# Patient Record
Sex: FEMALE | Race: WHITE | Employment: UNEMPLOYED | URBAN - METROPOLITAN AREA
[De-identification: names, ages, dates, MRNs, and addresses within clinical notes are randomized per-mention and may not be internally consistent; named-entity substitution may affect disease eponyms.]

---

## 2017-01-30 ENCOUNTER — PRE VISIT (OUTPATIENT)
Dept: OTOLARYNGOLOGY | Facility: CLINIC | Age: 32
End: 2017-01-30

## 2017-01-30 NOTE — TELEPHONE ENCOUNTER
1.  Date/reason for appt: 2/2/17 at 8 AM- sinus  2.  Referring provider: Self  3.  Call to patient (Yes / No - short description):    1ST ATTEMPT: 1/30/17 AT 9:10 AM. VM BOX FULL, UNABLE TO LEAVE MSG.    2ND ATTEMPT: 2/1/17 1:05 PM - NO ANSWER, LVM.   4.  Previous care at: ?

## 2017-02-02 ENCOUNTER — OFFICE VISIT (OUTPATIENT)
Dept: OTOLARYNGOLOGY | Facility: CLINIC | Age: 32
End: 2017-02-02

## 2017-02-02 VITALS — BODY MASS INDEX: 30.13 KG/M2 | WEIGHT: 192 LBS | HEIGHT: 67 IN

## 2017-02-02 DIAGNOSIS — J32.4 CHRONIC PANSINUSITIS: Primary | ICD-10-CM

## 2017-02-02 DIAGNOSIS — E04.1 THYROID NODULE: ICD-10-CM

## 2017-02-02 DIAGNOSIS — J32.4 CHRONIC PANSINUSITIS: ICD-10-CM

## 2017-02-02 LAB
BASOPHILS # BLD AUTO: 0 10E9/L (ref 0–0.2)
BASOPHILS NFR BLD AUTO: 0.5 %
CRP SERPL-MCNC: <2.9 MG/L (ref 0–8)
DIFFERENTIAL METHOD BLD: NORMAL
EOSINOPHIL # BLD AUTO: 0.3 10E9/L (ref 0–0.7)
EOSINOPHIL NFR BLD AUTO: 5.6 %
ERYTHROCYTE [DISTWIDTH] IN BLOOD BY AUTOMATED COUNT: 12.9 % (ref 10–15)
HCT VFR BLD AUTO: 41.7 % (ref 35–47)
HGB BLD-MCNC: 13.8 G/DL (ref 11.7–15.7)
IGA SERPL-MCNC: 94 MG/DL (ref 70–380)
IGG SERPL-MCNC: 754 MG/DL (ref 695–1620)
IGM SERPL-MCNC: 61 MG/DL (ref 60–265)
IMM GRANULOCYTES # BLD: 0 10E9/L (ref 0–0.4)
IMM GRANULOCYTES NFR BLD: 0.2 %
LYMPHOCYTES # BLD AUTO: 1.2 10E9/L (ref 0.8–5.3)
LYMPHOCYTES NFR BLD AUTO: 21.1 %
MCH RBC QN AUTO: 30.2 PG (ref 26.5–33)
MCHC RBC AUTO-ENTMCNC: 33.1 G/DL (ref 31.5–36.5)
MCV RBC AUTO: 91 FL (ref 78–100)
MONOCYTES # BLD AUTO: 0.4 10E9/L (ref 0–1.3)
MONOCYTES NFR BLD AUTO: 8 %
NEUTROPHILS # BLD AUTO: 3.5 10E9/L (ref 1.6–8.3)
NEUTROPHILS NFR BLD AUTO: 64.6 %
NRBC # BLD AUTO: 0 10*3/UL
NRBC BLD AUTO-RTO: 0 /100
PLATELET # BLD AUTO: 185 10E9/L (ref 150–450)
RBC # BLD AUTO: 4.57 10E12/L (ref 3.8–5.2)
WBC # BLD AUTO: 5.5 10E9/L (ref 4–11)

## 2017-02-02 RX ORDER — NORGESTIMATE AND ETHINYL ESTRADIOL 0.25-0.035
KIT ORAL
COMMUNITY
Start: 2013-07-15 | End: 2019-12-20

## 2017-02-02 RX ORDER — PRAZOSIN HYDROCHLORIDE 1 MG/1
1 CAPSULE ORAL
COMMUNITY
Start: 2016-08-22 | End: 2017-05-08

## 2017-02-02 ASSESSMENT — PAIN SCALES - GENERAL: PAINLEVEL: MODERATE PAIN (4)

## 2017-02-02 NOTE — Clinical Note
2017       RE: Andria Daily  [address]  [facility] [address]     Dear Colleague,    Thank you for referring your patient, Andria Daily, to the [facility] EAR NOSE AND THROAT at [facility]. Please see a copy of my visit note below.      Otolaryngology Adult Consultation    Patient: Andria Daily   : 1985     Patient presents with:  Consult:   Chief Complaint   Patient presents with     Consult     sinus problems        Referring Provider: Referred Self   Consulting Physician:  Glory Dowd MD       Assessment/Plan: ASSESSMENT AND PLAN:  Given the frequency of [place] infections, I am recommending an immune workup on her.  I also palpated a small nodule in the left lower thyroid area so I am sending her for a thyroid ultrasound.  I will order immunoglobulin levels on her, CRP and CBC with diff.  We will have her sign a release of information to get her records from [facility].  She is comfortable with this plan.  I also recommend that she work with a therapeutic massage person for her chronic fatigue and pain.     Also recommended cutting back on antihistamines, increasing nasal hydration.     HPI: Andria Daily is a 31 year old female seen today in the Otolaryngology Clinic for ongoing sinus issues.  She states that she feels like she may have a sinus infection right now.  She   describes seven episodes this year.  Most of the time she does not take antibiotics.  She rests and uses a neti pot but she is quite fatigued and worn out when she has these episodes.  She does have allergies.  She has treated that with over-the-counter things as well as Zyrtec, Allegra and Flonase.  She uses saline flushing.  She was on steroids in December from her primary MD.  She had been on antibiotics as well but does not feel like they helped.  She feels like she has been sick a lot this year compared to usual.  Otherwise, she also deals with chronic pain and chronic fatigue.  She takes  some medications other than ibuprofen.  She takes Epsom salt baths.  She takes Celexa and takes Vistaril as well as Sprintec for birth control.  The Vistaril is for sleep.  She has been seen at [facility] for this.  She had a CT scan in November which was reportedly basically normal other than mild mucosal thickening.  She has not had additional workup from a sinus perspective.           Current Outpatient Prescriptions on File Prior to Visit:  citalopram (CELEXA) 20 MG tablet Take 1 tablet (20 mg) by mouth daily     No current facility-administered medications on file prior to visit.       Allergies   Allergen Reactions     No Clinical Screening - See Comments      Apples and bananas cause her throat to itch     Amoxicillin Rash     Penicillins Rash       Past Medical History   Diagnosis Date     Depressive disorder      Anxiety      Substance abuse      Eating disorder          Review of Systems  [facility] E[facility] ROS 2/2/2017   Constitutional Appetite change, Unexplained fatigue, Problems with sleep   Neurology Dizzy spells, Numbness, Unexplained weakness, Headache   Psychology Frequently feeling anxious   Ears, Nose, Throat Hearing loss, Ear pain, Nasal congestion or drainage, Sore throat, Hoarseness   Cardiopulmonary Breathing problems   Gastrointestinal/Genitourinary Constipation   Musculoskeletal Sore or stiff joints, Swollen joints, Back pain, Neck pain   Allergy/Immunology Allergies or hay fever   Endocrine Heat or cold intolerance        14 point ROS neg other than the symptoms noted above.    Physical Exam:    General Assessment   The patient is alert, oriented and in no acute distress.   Head/Face/Scalp  Grossly normal.   Ears  Normal canals, auricles and tympanic membranes.   Nose  External nose is straight, skin is normal. Intranasal exam (anterior rhinoscopy) reveals normal moist mucosa, turbinate tissue without edema, erythema or crusting.  Septum mainly in the midline.  Mild crusting and dryness  throughout.  Mouth  Oral cavity shows healthy mucosa with out ulceration, masses or other lesions involving the tongue, palate, buccal mucosa, floor of mouth or gingiva.  Dentition in good repair.  Oropharynx with normal tonsils, posterior wall and base of tongue.  Neck  No significant adenopathy, thyroid or salivary gland abnormality. Left thyroid fullness, ? Nodule.        SNOT20:  Average and Sum Patient-Supplied Answers for the SNOT-20 2/2/2017   Total SNOT-20 Score (Average) 3.81   SNOT Score: Sum of responses 75     Again, thank you for allowing me to participate in the care of your patient.      Sincerely,    Glory Dowd MD

## 2017-02-02 NOTE — PATIENT INSTRUCTIONS
Plan of care:  Blood work today  Schedule a Ultrasound of the thyroid  Follow up with Dr galindo in 4-5 weeks  Clinic contact information:  1. To schedule an appointment call [phone], option 1  2. To talk to the Triage RN call [phone], option 3  3. If you need to speak to Audra or get a message to your doctor on a Friday, call the triage RN  4. AudraRN: [phone]  5. Surgery scheduling:      Ilana Ely: [phone]      Airam Galeas: [phone]  6. Fax: [phone]  7. [phone]

## 2017-02-02 NOTE — PROGRESS NOTES
Otolaryngology Adult Consultation    Patient: Andria Daily   : 1985     Patient presents with:  Consult:   Chief Complaint   Patient presents with     Consult     sinus problems        Referring Provider: Referred Self   Consulting Physician:  Glory Dowd MD       Assessment/Plan: ASSESSMENT AND PLAN:  Given the frequency of Andria's infections, I am recommending an immune workup on her.  I also palpated a small nodule in the left lower thyroid area so I am sending her for a thyroid ultrasound.  I will order immunoglobulin levels on her, CRP and CBC with diff.  We will have her sign a release of information to get her records from [facility].  She is comfortable with this plan.  I also recommend that she work with a therapeutic massage person for her chronic fatigue and pain.     Also recommended cutting back on antihistamines, increasing nasal hydration.     HPI: Andria Daily is a 31 year old female seen today in the Otolaryngology Clinic for ongoing sinus issues.  She states that she feels like she may have a sinus infection right now.  She   describes seven episodes this year.  Most of the time she does not take antibiotics.  She rests and uses a neti pot but she is quite fatigued and worn out when she has these episodes.  She does have allergies.  She has treated that with over-the-counter things as well as Zyrtec, Allegra and Flonase.  She uses saline flushing.  She was on steroids in December from her primary MD.  She had been on antibiotics as well but does not feel like they helped.  She feels like she has been sick a lot this year compared to usual.  Otherwise, she also deals with chronic pain and chronic fatigue.  She takes some medications other than ibuprofen.  She takes Epsom salt baths.  She takes Celexa and takes Vistaril as well as Sprintec for birth control.  The Vistaril is for sleep.  She has been seen at [facility] for this.  She had a CT scan in November which was reportedly basically normal  other than mild mucosal thickening.  She has not had additional workup from a sinus perspective.           Current Outpatient Prescriptions on File Prior to Visit:  citalopram (CELEXA) 20 MG tablet Take 1 tablet (20 mg) by mouth daily     No current facility-administered medications on file prior to visit.       Allergies   Allergen Reactions     No Clinical Screening - See Comments      Apples and bananas cause her throat to itch     Amoxicillin Rash     Penicillins Rash       Past Medical History   Diagnosis Date     Depressive disorder      Anxiety      Substance abuse      Eating disorder          Review of Systems   ENT ROS 2/2/2017   Constitutional Appetite change, Unexplained fatigue, Problems with sleep   Neurology Dizzy spells, Numbness, Unexplained weakness, Headache   Psychology Frequently feeling anxious   Ears, Nose, Throat Hearing loss, Ear pain, Nasal congestion or drainage, Sore throat, Hoarseness   Cardiopulmonary Breathing problems   Gastrointestinal/Genitourinary Constipation   Musculoskeletal Sore or stiff joints, Swollen joints, Back pain, Neck pain   Allergy/Immunology Allergies or hay fever   Endocrine Heat or cold intolerance        14 point ROS neg other than the symptoms noted above.    Physical Exam:    General Assessment   The patient is alert, oriented and in no acute distress.   Head/Face/Scalp  Grossly normal.   Ears  Normal canals, auricles and tympanic membranes.   Nose  External nose is straight, skin is normal. Intranasal exam (anterior rhinoscopy) reveals normal moist mucosa, turbinate tissue without edema, erythema or crusting.  Septum mainly in the midline.  Mild crusting and dryness throughout.  Mouth  Oral cavity shows healthy mucosa with out ulceration, masses or other lesions involving the tongue, palate, buccal mucosa, floor of mouth or gingiva.  Dentition in good repair.  Oropharynx with normal tonsils, posterior wall and base of tongue.  Neck  No significant adenopathy,  thyroid or salivary gland abnormality. Left thyroid fullness, ? Nodule.        SNOT20:  Average and Sum Patient-Supplied Answers for the SNOT-20 2/2/2017   Total SNOT-20 Score (Average) 3.81   SNOT Score: Sum of responses 75

## 2017-02-02 NOTE — MR AVS SNAPSHOT
After Visit Summary   2017    Andria Daily    MRN: [id number]           Patient Information     Date Of Birth          1985        Visit Information        Provider Department      2017 8:00 AM Glory Dowd MD [facility] Ear Nose and Throat        Today's Diagnoses     Chronic pansinusitis    -  1     Thyroid nodule           Care Instructions    Plan of care:  Blood work today  Schedule a Ultrasound of the thyroid  Follow up with Dr dowd in 4-5 weeks  Clinic contact information:  1. To schedule an appointment call [phone], option 1  2. To talk to the Triage RN call [phone], option 3  3. If you need to speak to Audra or get a message to your doctor on a Friday, call the triage RN  4. AudraRN: [phone]  5. Surgery scheduling:      Ilana Ely: [phone]      Airam Roweter: [phone]  6. Fax: [phone]  7. [phone]          Follow-ups after your visit        Your next 10 appointments already scheduled     2017 10:00 AM   LAB with  LAB   [facility])    [address] [place] [id number] [phone]           Patient must bring picture ID.  Patient should be prepared to give a urine specimen  Please do not eat 10-12 hours before your appointment if you are coming in fasting for labs on lipids, cholesterol, or glucose (sugar).  Pregnant women should follow their Care Team instructions. Water with medications is okay. Do not drink coffee or other fluids.   If you have concerns about taking  your medications, please ask at office or if scheduling via [employer], send a message by clicking on Secure Messaging, Message Your Care Team.            Mar 09, 2017  7:45 AM   (Arrive by 7:30 AM)   RETURN RHINOLOGY with MD PRITESH Bhatti [facility] Ear Nose and Throat [facility])    [address] [place] [address] [phone]              Who  "to contact     Please call your clinic at [phone] to:    Ask questions about your health    Make or cancel appointments    Discuss your medicines    Learn about your test results    Speak to your doctor   If you have compliments or concerns about an experience at your clinic, or if you wish to file a complaint, please contact [facility] Patient Relations at [phone] or email us at [email or web address]         Additional Information About Your Visit        [employer] Information     [employer] is an electronic gateway that provides easy, online access to your medical records. With [employer], you can request a clinic appointment, read your test results, renew a prescription or communicate with your care team.     To sign up for [employer] visit the website at [email or web address]   You will be asked to enter the access code listed below, as well as some personal information. Please follow the directions to create your username and password.     Your access code is: [email or web address]  Expires: 2017  6:30 AM     Your access code will  in 90 days. If you need help or a new code, please contact your [facility] or call [phone] for assistance.        Care EveryWhere ID     This is your Care EveryWhere ID. This could be used by other organizations to access your [place] medical records  [id number]        Your Vitals Were     Height BMI (Body Mass Index)                1.71 m (5' 7.32\") 29.78 kg/m2           Blood Pressure from Last 3 Encounters:   08/17/15 104/63   08/10/15 106/72    Weight from Last 3 Encounters:   17 87.091 kg (192 lb)                 Today's Medication Changes          These changes are accurate as of: 17  9:17 AM.  If you have any questions, ask your nurse or doctor.               Stop taking these medicines if you haven't already. Please contact your care team if you have questions.     BUSPAR PO   Stopped " by:  Glory Dowd MD           TRAZODONE HCL PO   Stopped by:  Glory Dowd MD           WELLBUTRIN PO   Stopped by:  Glory Dowd MD                    Primary Care Provider Office Phone # Fax #    Mera GUERRA EVELIA Plata [phone]       [facility]   [place] [address]        Thank you!     Thank you for choosing [facility] EAR NOSE AND THROAT  for your care. Our goal is always to provide you with excellent care. Hearing back from our patients is one way we can continue to improve our services. Please take a few minutes to complete the written survey that you may receive in the mail after your visit with us. Thank you!             Your Updated Medication List - Protect others around you: Learn how to safely use, store and throw away your medicines at www.disposemymeds.org.          This list is accurate as of: 2/2/17  9:17 AM.  Always use your most recent med list.                   Brand Name Dispense Instructions for use    citalopram 20 MG tablet    celeXA    30 tablet    Take 1 tablet (20 mg) by mouth daily       norgestimate-ethinyl estradiol 0.25-35 MG-MCG per tablet    ORTHO-CYCLEN, SPRINTEC         prazosin 1 MG capsule    MINIPRESS     Take 1 mg by mouth

## 2017-02-03 LAB — IGE SERPL-ACNC: 160 KIU/L (ref 0–114)

## 2017-02-13 NOTE — PROGRESS NOTES
Audra, Please contact patient by letter/phone with following results:  IgE elevated (indicates allergy) otherwise tests are normal.

## 2017-02-16 ENCOUNTER — CARE COORDINATION (OUTPATIENT)
Dept: OTOLARYNGOLOGY | Facility: CLINIC | Age: 32
End: 2017-02-16

## 2017-02-16 DIAGNOSIS — J32.4 CHRONIC PANSINUSITIS: Primary | ICD-10-CM

## 2017-02-16 NOTE — PROGRESS NOTES
US reviewed by Dr Dowd:Most labs are back and are normal as well.  Labs reviewed by Dr Dowd:IgE elevated (indicates allergy) otherwise tests are normal  Message left on patient's voicemail, reminder left to follow up on 3/9 as scheduled

## 2017-03-09 ENCOUNTER — OFFICE VISIT (OUTPATIENT)
Dept: OTOLARYNGOLOGY | Facility: CLINIC | Age: 32
End: 2017-03-09

## 2017-03-09 VITALS — HEIGHT: 69 IN | WEIGHT: 176 LBS | BODY MASS INDEX: 26.07 KG/M2

## 2017-03-09 DIAGNOSIS — J32.4 CHRONIC PANSINUSITIS: Primary | ICD-10-CM

## 2017-03-09 ASSESSMENT — PAIN SCALES - GENERAL: PAINLEVEL: NO PAIN (0)

## 2017-03-09 NOTE — LETTER
3/9/2017       RE: Andria Daily  [address]  [facility] [address]     Dear Colleague,    Thank you for referring your patient, Andria Daily, to the [facility] EAR NOSE AND THROAT at [facility]. Please see a copy of my visit note below.    HISTORY OF PRESENT ILLNESS:  Andria Daily returns.  She had recurrent acute sinusitis.  A workup was ordered.  She also had what I felt was a thyroid nodule, and the thyroid ultrasound was clear.  Her workup for her recurrent sinusitis revealed an elevated IgE indicating allergies.  She states that she has had allergy tests a long time ago and was allergic to multiple things.  Her CT scan showed minimal mucosal thickening and no evidence of anatomic obstruction.  My recommendation to Andria is that she meet with an allergist to discuss ongoing intervention for allergy to see if she can maximize her medical management for that.  She does feel some improvement in cutting back on some of her medications.  She has dryness in her nose, and she is focusing on keeping her nose well-hydrated.      PHYSICAL EXAMINATION:  Exam today reveals on anterior rhinoscopy that she still has some thick secretions but no inflammation and clear nasal passages.      ASSESSMENT AND PLAN:  We spent some time discussing this.  She is happy to meet with an allergist and we will give her a referral for Dr. Pruitt.         Again, thank you for allowing me to participate in the care of your patient.      Sincerely,    Glory Dowd MD

## 2017-03-09 NOTE — PROGRESS NOTES
HISTORY OF PRESENT ILLNESS:  Andria Daily returns.  She had recurrent acute sinusitis.  A workup was ordered.  She also had what I felt was a thyroid nodule, and the thyroid ultrasound was clear.  Her workup for her recurrent sinusitis revealed an elevated IgE indicating allergies.  She states that she has had allergy tests a long time ago and was allergic to multiple things.  Her CT scan showed minimal mucosal thickening and no evidence of anatomic obstruction.  My recommendation to Andria is that she meet with an allergist to discuss ongoing intervention for allergy to see if she can maximize her medical management for that.  She does feel some improvement in cutting back on some of her medications.  She has dryness in her nose, and she is focusing on keeping her nose well-hydrated.      PHYSICAL EXAMINATION:  Exam today reveals on anterior rhinoscopy that she still has some thick secretions but no inflammation and clear nasal passages.      ASSESSMENT AND PLAN:  We spent some time discussing this.  She is happy to meet with an allergist and we will give her a referral for Dr. Pruitt.

## 2017-03-09 NOTE — MR AVS SNAPSHOT
After Visit Summary   3/9/2017    Andria Daily    MRN: [id number]           Patient Information     Date Of Birth          1985        Visit Information        Provider Department      3/9/2017 7:45 AM Glory Dowd MD [facility] Ear Nose and Throat        Today's Diagnoses     Chronic pansinusitis    -  1      Care Instructions    Plan of care:  Follow up with Dr Pruitt as scheduled  Clinic contact information:  1. To schedule an appointment call [phone], option 1  2. To talk to the Triage RN call [phone], option 3  3. If you need to speak to Audra or get a message to your doctor on a Friday, call the triage RN  4. AudraRN: [phone]  5. Surgery scheduling:      Ilana Ely: [phone]      Airam Roweter: [phone]  6. Fax: [phone]  7. [phone]          Follow-ups after your visit        Additional Services     MISCELLANEOUS REFERRAL       Allergy referral with Dr Pruitt                  Your next 10 appointments already scheduled     Mar 13, 2017 10:15 AM CDT   (Arrive by 10:00 AM)   New Allergy with Aries Pruitt MD   [facility] ([facility])    [address] [place] [address] [phone]           Do not take anti-histamines or Zantac for seven day prior to your appointment.              Who to contact     Please call your clinic at [phone] to:    Ask questions about your health    Make or cancel appointments    Discuss your medicines    Learn about your test results    Speak to your doctor   If you have compliments or concerns about an experience at your clinic, or if you wish to file a complaint, please contact [facility] Patient Relations at [phone] or email us at [email or web address]         Additional Information About Your Visit        MyChart Information     [employer] gives you secure access to your  "electronic health record. If you see a primary care provider, you can also send messages to your care team and make appointments. If you have questions, please call your primary care clinic.  If you do not have a primary care provider, please call [phone] and they will assist you.      [employer] is an electronic gateway that provides easy, online access to your medical records. With [employer], you can request a clinic appointment, read your test results, renew a prescription or communicate with your care team.     To access your existing account, please contact your [facility] or call [phone] for assistance.        Care EveryWhere ID     This is your Care EveryWhere ID. This could be used by other organizations to access your [place] medical records  [id number]        Your Vitals Were     Height BMI (Body Mass Index)                1.74 m (5' 8.5\") 26.37 kg/m2           Blood Pressure from Last 3 Encounters:   08/17/15 104/63   08/10/15 106/72    Weight from Last 3 Encounters:   03/09/17 79.8 kg (176 lb)   02/02/17 87.1 kg (192 lb)              We Performed the Following     MISCELLANEOUS REFERRAL        Primary Care Provider Office Phone # Fax #    Mera Plata [phone]       [facility]  [place] [address]        Thank you!     Thank you for choosing [facility] EAR NOSE AND THROAT  for your care. Our goal is always to provide you with excellent care. Hearing back from our patients is one way we can continue to improve our services. Please take a few minutes to complete the written survey that you may receive in the mail after your visit with us. Thank you!             Your Updated Medication List - Protect others around you: Learn how to safely use, store and throw away your medicines at www.disposemymeds.org.          This list is accurate as of: 3/9/17  8:14 AM.  Always use your most recent med list.                   " Brand Name Dispense Instructions for use    citalopram 20 MG tablet    celeXA    30 tablet    Take 1 tablet (20 mg) by mouth daily       norgestimate-ethinyl estradiol 0.25-35 MG-MCG per tablet    ORTHO-CYCLEN, SPRINTEC         prazosin 1 MG capsule    MINIPRESS     Take 1 mg by mouth

## 2017-03-09 NOTE — PATIENT INSTRUCTIONS
Plan of care:  Follow up with Dr Pruitt as scheduled  Clinic contact information:  1. To schedule an appointment call [phone], option 1  2. To talk to the Triage RN call [phone], option 3  3. If you need to speak to Audra or get a message to your doctor on a Friday, call the triage RN  4. AudraRN: [phone]  5. Surgery scheduling:      Ilana Ely: [phone]      Airam Galeas: [phone]  6. Fax: [phone]  7. [phone]

## 2017-03-13 ENCOUNTER — PRE VISIT (OUTPATIENT)
Dept: PULMONOLOGY | Facility: CLINIC | Age: 32
End: 2017-03-13

## 2017-03-13 NOTE — TELEPHONE ENCOUNTER
1.  Date/reason for appt:  3/13/17   Allergy    2.  Referring provider:  Dr Dowd    3.  Call to patient (Yes / No - short description):  No, referred.  Records reviewed.  All records are in Epic

## 2017-05-01 ENCOUNTER — CARE COORDINATION (OUTPATIENT)
Dept: PULMONOLOGY | Facility: CLINIC | Age: 32
End: 2017-05-01

## 2017-05-01 NOTE — PROGRESS NOTES
Writer called patient and left a message to remind her to hold any antihistamines or zantac for 1 week prior to upcoming appointment with Dr. Pruitt. Triage number left if patient has questions.

## 2017-05-08 ENCOUNTER — OFFICE VISIT (OUTPATIENT)
Dept: PULMONOLOGY | Facility: CLINIC | Age: 32
End: 2017-05-08
Attending: ALLERGY & IMMUNOLOGY
Payer: COMMERCIAL

## 2017-05-08 VITALS
RESPIRATION RATE: 16 BRPM | SYSTOLIC BLOOD PRESSURE: 114 MMHG | DIASTOLIC BLOOD PRESSURE: 77 MMHG | OXYGEN SATURATION: 97 % | HEART RATE: 81 BPM

## 2017-05-08 DIAGNOSIS — L27.2 DERMATITIS DUE TO FOOD TAKEN INTERNALLY: Primary | ICD-10-CM

## 2017-05-08 DIAGNOSIS — J30.1 SEASONAL ALLERGIC RHINITIS DUE TO POLLEN: ICD-10-CM

## 2017-05-08 PROCEDURE — 95004 PERQ TESTS W/ALRGNC XTRCS: CPT | Performed by: ALLERGY & IMMUNOLOGY

## 2017-05-08 PROCEDURE — 99212 OFFICE O/P EST SF 10 MIN: CPT | Mod: ZF

## 2017-05-08 ASSESSMENT — PAIN SCALES - GENERAL: PAINLEVEL: NO PAIN (0)

## 2017-05-08 NOTE — PATIENT INSTRUCTIONS
High cat, dust mites and pollen allergies.  It would be a good idea to not bring the cats to the new home and to wash all clothing and not bring coaches and to wash bedding and get a mattress cover and wash the pillows or get new pillows.      Allergy shots are an option and can be started now but with the high levels we will need to do a sensitive build and this will take about 8 months of weekly injections.      Continue the fluticasone 2 sprays in each nostril daily and cetirizine (zyrtec) 10 mg daily.  If you no longer have cats you can try off this regimen in the early winter.                              Oral Allergy Syndrome (Pollen-Food Allergy Syndrome)    The oral allergy syndrome (OAS) is a relatively common form of food allergy, particularly in adults. It occurs in people who have pollen allergy, although not all patients have obvious hay-fever or seasonal allergy symptoms. Patients typically report itching and/or mild swelling of the mouth and throat immediately following ingestion of certain uncooked fruits (including nuts) or raw vegetables. The symptoms result from contact urticaria in the oropharynx caused by pollen-related proteins in these foods. Only a small proportion of affected individuals experience systemic allergic reactions, although the disorder must be differentiated from more serious forms of food allergy.     The prevalence of pollen-food allergy syndrome (PFAS) in the [facility] has not been reported, although it is probably the most common food allergy in adult subjects and there is a general impression that it has become more prevalent as respiratory allergy to pollen has increased over the past two decades.     CLINICAL MANIFESTATIONS--In most patients, symptoms are limited to the oropharynx. However, 2 to 10 percent may experience systemic symptoms, and patients with concomitant atopic dermatitis may notice a worsening of their cutaneous symptoms.    Oropharyngeal symptoms--The  most common signs and symptoms are pruritus, tingling, mild erythema, and subtle angioedema of the lips, oral mucosa, palate, and throat; sometimes accompanied by a sensation of throat swelling. Symptoms occur while or shortly after (within 5-10 minutes of) ingesting the culprit fruit, nut or vegetable. Oral papules or blisters are occasionally reported, although blisters or vesicles in isolation are not typical. Symptoms resolve promptly when the food is swallowed due to disruption of the structure of the allergen by gastric acid and proteolytic digestive enzymes. Data indicates about 1-2% of people will develop severe reactions to these foods so if symptoms worsen it is advised to stop eating the offending food.     Particularly with fruits, patients may report that one variety of the food is more troublesome than another, or that the symptoms do not develop after every exposure.     Seasonal variation--PFAS is an immunoglobulin E (IgE)-mediated reaction, and symptoms may increase during or following the pollen season because of seasonal boosting of pollen IgE levels.    Impact of cooking--In most cases, symptoms only develop in response to eating the raw, uncooked food. Some patients react predominantly to the peel of the raw fruit or vegetable and tolerate pulp. Patients usually tolerate the culprit food in various cooked forms. Cooking, baking, or even briefly microwaving raw fruits and vegetables is usually sufficient to alter the allergens that are responsible for PFAS. Tree nuts and peanuts are an important exception to this generalization, as roasted nuts can cause PFAS.    Caution with anti-ulcer treatments/GERD treatments--Anti-ulcer drugs increase gastric pH and may impair digestion of food proteins.     WHAT CAN I DO ABOUT THIS?: Some studies indicate there may be a benefit when doing allergy shots to the offending pollen however data is not strong. One study looking at slowly increasing the amount  eaten over months and then regularly ingesting the food decreased overall symptoms. However, this way only one study of 40 individuals. Taking antihistamines such as Benadryl, Claritin or Zyrtec should also provide some benefit. If the reactions are severe seek medical care immediately and it is advised at that point to carry an epinephrine device. As stated earlier the risk of severe reactions are low.        *All information has been reviewed, updated and approved by:  Dr. Aries Pruitt[facility] updated: 11/2016        Animal Allergy    The number of pets in the [place] is estimated at more than 150 million. This large number also increases the likelihood of accidental exposure to animals by the allergic patient when visiting homes and public places.    Ways to reduce reactions/complications:    The ideal situation for an allergic reaction would be to not have any pet at all. However, many pet owners feel strongly about their pets, and would rather remove the allergic individual from the home than the pet! A pet such as a dog or cat should at least be kept out of the patient's bedroom. Recent studies have shown that bathing a cat or dog once a week can decrease significantly the amount of the allergen that the allergic patient is exposed to. The avid pet owner may claim that exposure to his or her pet does not cause them any problem. This however, should be viewed skeptically since pet ownership is an emotionally charged subject. Also, many allergic pet owners are rarely away from their pets, so an accurate reporting of pet related symptoms may not be possible. The best test to confirm if a person is allergic to a pet is removal from the home for several weeks and a thorough cleaning done by the non-allergic individual to remove the hair and dander. It should be understood that it could take weeks for meticulous cleaning to remove all the animal hair and dander before a  "change in the allergic patient is noticed. A frequently mistaken idea is that shorthaired animals cause fewer problems. It is the dander (flakes of skin) that causes the most significant allergic reactions, not the length of the hair on the pet. Allergens are also found in the pet's saliva and urine. In addition, long haired animals that are outside can then bring outdoors pollens inside and exposing allergic individuals.    Indoor pets should be restricted to a few rooms in the home if possible. Isolating the pet to one room however, will not limit the allergens to that room. Air currents from forced-air heating and air conditioning will spread the allergens throughout the house. Homes with forced-air heating and/or air conditioning may be fitted with a central air . This may remove significant amounts of pet allergens from the home. The air  should be used at least four hours per day.     Best Pets for Allergenic Individuals:    The best types of pets for allergic patients are tropical fish, lizards, turtles, salamanders and certain types of frogs and tortoises. All of these pets do not have hair, fur or dander nor does their excrement create allergic problems. However, patients should keep in mind that large aquariums can add water vapor in a room, thus increasing mold and house dust mite concentrations in their home.         *All information has been reviewed, updated and approved by:  Dr. Aries Pruitt[facility] updated: 11/2016             Pollen Control Measures      * Keep windows and doors shut and run air conditioner at all times. This keeps outdoor air outside.    * Keep windows closed while in the car and air conditioner on the \"re-circulate\" mode.    * Wash your hair before going to bed at night to reduce exposure during sleep.    * Keep pets outdoors to prevent the pollen from being brought in on their hair.    * Avoid hanging clothes and linens " outside as pollens may collect on the items.     * Don't mow the lawn if you are allergic to grass or weeds. If you must mow the grass, wear a face mask.       Spring: Tree Pollen    Summer: Grass Pollen and Mold    Fall: Weed Pollen and Mold      *All information has been reviewed, updated and approved by:  Dr. Aries Pruitt[facility] updated: 11/2016

## 2017-05-08 NOTE — PROGRESS NOTES
Results:   Reason for Visit  Andria Daily is a 32 year old female who is referred by Mera Plata for rhintis    Allergy HPI  Andria has symptoms of rhinitis, with sneezing, postnasal drip and itchy eyes and occasionally itchy skin.  Most of the time it seems to be year round.  She was having these symptoms since she was a child.  The symptoms seemed to get worse as she gets older.  She had allergy testing in the past that was positive.  She has never been on allergy shots but is interested in them.  She has lots of sinus infection.  She got 5 last year.  Flonase does seem to help, Zyrtec and Claritin, Allegra, also seem to provide some help.  Breathing is fine for her.  She says apples and bananas, almonds and kiwi seem to cause her mouth to itch.      SOCIAL HISTORY:  She has a cat, but she is thinking of moving and not bringing the cats with her.  She does not smoke.         The patient was seen and examined by Aries Mireles MD   Current Outpatient Prescriptions   Medication     norgestimate-ethinyl estradiol (ORTHO-CYCLEN, SPRINTEC) 0.25-35 MG-MCG per tablet     citalopram (CELEXA) 20 MG tablet     No current facility-administered medications for this visit.      Allergies   Allergen Reactions     No Clinical Screening - See Comments      Apples and bananas cause her throat to itch     Amoxicillin Rash     Penicillins Rash     Social History     Social History     Marital status: Single     Spouse name: N/A     Number of children: N/A     Years of education: N/A     Occupational History     Not on file.     Social History Main Topics     Smoking status: Former Smoker     Smokeless tobacco: Never Used     Alcohol use No      Comment: sober for 90 days     Drug use: Yes     Special: Marijuana      Comment: opiate pain killers     Sexual activity: Not on file     Other Topics Concern     Not on file     Social History Narrative     Past Medical History:   Diagnosis Date     Anxiety       Depressive disorder      Eating disorder      Substance abuse      Past Surgical History:   Procedure Laterality Date     ORTHOPEDIC SURGERY       No family history on file.      ROS   A complete ROS was otherwise negative except as noted in the HPI and the end of the note.  /77 (BP Location: Right arm, Patient Position: Chair, Cuff Size: Adult Regular)  Pulse 81  Resp 16  SpO2 97%  Exam:   GENERAL APPEARANCE: Well developed, well nourished, alert, and in no apparent distress.  EYES: PERRL, EOMI, conjunctiva clear non-injected  HENT: Nasal mucosa with no edema and no discharge. No nasal polyps.  No facial tenderness.  EARS: Canals clear, TMs normal  MOUTH: Oral mucosa is moist, without any lesions, no tonsillar enlargement, no oropharyngeal exudate.  NECK: Supple, no masses, no thyromegaly.  LYMPHATICS: No significant cervical, or supraclavicular nodes.  RESP: Good air flow throughout.  No crackles. No rhonchi. No wheezes.  CV: Normal S1, S2, regular rhythm, normal rate. No murmur.  No rub. No gallop. No LE edema.   MS: Extremities normal. No clubbing. No cyanosis.  SKIN: No rashes noted  NEURO: Speech normal, normal strength and tone, normal gait and stance  PSYCH: Normal mentation, orientation to person, place, and time.  Results: 38 percutaneous environmental allergen (and 2 controls) extracts placed by MA and read by MD.  Positive to dust mites, cat, tree, grass and weed pollen.      Assessment and plan: Andria has high environmental allergies to dust mites, cat, tree, grass and weed pollens.  She does have a cat and is thinking about moving to a different environment which I do recommend and doing a thorough cleaning of her environment when she moves.  She can continue fluticasone nasal spray as well as oral antihistamines.  She is interested in allergy shots which is a good option.  She will check insurance and notify us if she wants to start these.  We will most likely need to put her on a sensitive  schedule with all of her high allergens.           She also has Food-Pollen syndrome and this condition was explained to her in detail.   Most of the reactions are mild and limited to the throat or face area and 8% can have GI effects and around 1-2% can have severe reactions.  This condition is very common and normally it is not advised that they need an epinephrine device.  She may notice improved symptoms during various seasons or depending on the ripeness of the fruit or vegetable.  Allergy shots to the offending pollen has shown a benefit in some individuals and can be considered if the above measures are not helpful.

## 2017-05-08 NOTE — LETTER
5/8/2017       RE: Andria Daily  [address]  [place] [address]     Dear Colleague,    Thank you for referring your patient, Andria Daily, to the [facility] at [facility]. Please see a copy of my visit note below.      Results:   Reason for Visit  Andria Daily is a 32 year old female who is referred by Mera Plata for rhintis    Allergy HPI  Andria has symptoms of rhinitis, with sneezing, postnasal drip and itchy eyes and occasionally itchy skin.  Most of the time it seems to be year round.  She was having these symptoms since she was a child.  The symptoms seemed to get worse as she gets older.  She had allergy testing in the past that was positive.  She has never been on allergy shots but is interested in them.  She has lots of sinus infection.  She got 5 last year.  Flonase does seem to help, Zyrtec and Claritin, Allegra, also seem to provide some help.  Breathing is fine for her.  She says apples and bananas, almonds and kiwi seem to cause her mouth to itch.      SOCIAL HISTORY:  She has a cat, but she is thinking of moving and not bringing the cats with her.  She does not smoke.         The patient was seen and examined by Aries Mireles MD   Current Outpatient Prescriptions   Medication     norgestimate-ethinyl estradiol (ORTHO-CYCLEN, SPRINTEC) 0.25-35 MG-MCG per tablet     citalopram (CELEXA) 20 MG tablet     No current facility-administered medications for this visit.      Allergies   Allergen Reactions     No Clinical Screening - See Comments      Apples and bananas cause her throat to itch     Amoxicillin Rash     Penicillins Rash     Social History     Social History     Marital status: Single     Spouse name: N/A     Number of children: N/A     Years of education: N/A     Occupational History     Not on file.     Social History Main Topics     Smoking status: Former Smoker     Smokeless tobacco: Never Used      Alcohol use No      Comment: sober for 90 days     Drug use: Yes     Special: Marijuana      Comment: opiate pain killers     Sexual activity: Not on file     Other Topics Concern     Not on file     Social History Narrative     Past Medical History:   Diagnosis Date     Anxiety      Depressive disorder      Eating disorder      Substance abuse      Past Surgical History:   Procedure Laterality Date     ORTHOPEDIC SURGERY       No family history on file.      ROS   A complete ROS was otherwise negative except as noted in the HPI and the end of the note.  /77 (BP Location: Right arm, Patient Position: Chair, Cuff Size: Adult Regular)  Pulse 81  Resp 16  SpO2 97%  Exam:   GENERAL APPEARANCE: Well developed, well nourished, alert, and in no apparent distress.  EYES: PERRL, EOMI, conjunctiva clear non-injected  HENT: Nasal mucosa with no edema and no discharge. No nasal polyps.  No facial tenderness.  EARS: Canals clear, TMs normal  MOUTH: Oral mucosa is moist, without any lesions, no tonsillar enlargement, no oropharyngeal exudate.  NECK: Supple, no masses, no thyromegaly.  LYMPHATICS: No significant cervical, or supraclavicular nodes.  RESP: Good air flow throughout.  No crackles. No rhonchi. No wheezes.  CV: Normal S1, S2, regular rhythm, normal rate. No murmur.  No rub. No gallop. No LE edema.   MS: Extremities normal. No clubbing. No cyanosis.  SKIN: No rashes noted  NEURO: Speech normal, normal strength and tone, normal gait and stance  PSYCH: Normal mentation, orientation to person, place, and time.  Results: 38 percutaneous environmental allergen (and 2 controls) extracts placed by MA and read by MD.  Positive to dust mites, cat, tree, grass and weed pollen.      Assessment and plan: Andria has high environmental allergies to dust mites, cat, tree, grass and weed pollens.  She does have a cat and is thinking about moving to a different environment which I do recommend and doing a thorough cleaning of  her environment when she moves.  She can continue fluticasone nasal spray as well as oral antihistamines.  She is interested in allergy shots which is a good option.  She will check insurance and notify us if she wants to start these.  We will most likely need to put her on a sensitive schedule with all of her high allergens.     She also has Food-Pollen syndrome and this condition was explained to her in detail.   Most of the reactions are mild and limited to the throat or face area and 8% can have GI effects and around 1-2% can have severe reactions.  This condition is very common and normally it is not advised that they need an epinephrine device.  She may notice improved symptoms during various seasons or depending on the ripeness of the fruit or vegetable.  Allergy shots to the offending pollen has shown a benefit in some individuals and can be considered if the above measures are not helpful.      Again, thank you for allowing me to participate in the care of your patient.      Sincerely,    Aries Mireles MD

## 2017-05-08 NOTE — MR AVS SNAPSHOT
After Visit Summary   5/8/2017    Andria Daily    MRN: [id number]           Patient Information     Date Of Birth          1985        Visit Information        Provider Department      5/8/2017 3:15 PM Aries Pruitt MD [facility]        Today's Diagnoses     Dermatitis due to food taken internally    -  1    Seasonal allergic rhinitis due to pollen          Care Instructions    High cat, dust mites and pollen allergies.  It would be a good idea to not bring the cats to the new home and to wash all clothing and not bring coaches and to wash bedding and get a mattress cover and wash the pillows or get new pillows.      Allergy shots are an option and can be started now but with the high levels we will need to do a sensitive build and this will take about 8 months of weekly injections.      Continue the fluticasone 2 sprays in each nostril daily and cetirizine (zyrtec) 10 mg daily.  If you no longer have cats you can try off this regimen in the early winter.                              Oral Allergy Syndrome (Pollen-Food Allergy Syndrome)    The oral allergy syndrome (OAS) is a relatively common form of food allergy, particularly in adults. It occurs in people who have pollen allergy, although not all patients have obvious hay-fever or seasonal allergy symptoms. Patients typically report itching and/or mild swelling of the mouth and throat immediately following ingestion of certain uncooked fruits (including nuts) or raw vegetables. The symptoms result from contact urticaria in the oropharynx caused by pollen-related proteins in these foods. Only a small proportion of affected individuals experience systemic allergic reactions, although the disorder must be differentiated from more serious forms of food allergy.     The prevalence of pollen-food allergy syndrome (PFAS) in the [facility] has not been reported, although it is probably the most common food  allergy in adult subjects and there is a general impression that it has become more prevalent as respiratory allergy to pollen has increased over the past two decades.     CLINICAL MANIFESTATIONS--In most patients, symptoms are limited to the oropharynx. However, 2 to 10 percent may experience systemic symptoms, and patients with concomitant atopic dermatitis may notice a worsening of their cutaneous symptoms.    Oropharyngeal symptoms--The most common signs and symptoms are pruritus, tingling, mild erythema, and subtle angioedema of the lips, oral mucosa, palate, and throat; sometimes accompanied by a sensation of throat swelling. Symptoms occur while or shortly after (within 5-10 minutes of) ingesting the culprit fruit, nut or vegetable. Oral papules or blisters are occasionally reported, although blisters or vesicles in isolation are not typical. Symptoms resolve promptly when the food is swallowed due to disruption of the structure of the allergen by gastric acid and proteolytic digestive enzymes. Data indicates about 1-2% of people will develop severe reactions to these foods so if symptoms worsen it is advised to stop eating the offending food.     Particularly with fruits, patients may report that one variety of the food is more troublesome than another, or that the symptoms do not develop after every exposure.     Seasonal variation--PFAS is an immunoglobulin E (IgE)-mediated reaction, and symptoms may increase during or following the pollen season because of seasonal boosting of pollen IgE levels.    Impact of cooking--In most cases, symptoms only develop in response to eating the raw, uncooked food. Some patients react predominantly to the peel of the raw fruit or vegetable and tolerate pulp. Patients usually tolerate the culprit food in various cooked forms. Cooking, baking, or even briefly microwaving raw fruits and vegetables is usually sufficient to alter the allergens that are responsible for PFAS.  Tree nuts and peanuts are an important exception to this generalization, as roasted nuts can cause PFAS.    Caution with anti-ulcer treatments/GERD treatments--Anti-ulcer drugs increase gastric pH and may impair digestion of food proteins.     WHAT CAN I DO ABOUT THIS?: Some studies indicate there may be a benefit when doing allergy shots to the offending pollen however data is not strong. One study looking at slowly increasing the amount eaten over months and then regularly ingesting the food decreased overall symptoms. However, this way only one study of 40 individuals. Taking antihistamines such as Benadryl, Claritin or Zyrtec should also provide some benefit. If the reactions are severe seek medical care immediately and it is advised at that point to carry an epinephrine device. As stated earlier the risk of severe reactions are low.        *All information has been reviewed, updated and approved by:  Dr. Aries Pruitt[facility] updated: 11/2016        Animal Allergy    The number of pets in the [place] is estimated at more than 150 million. This large number also increases the likelihood of accidental exposure to animals by the allergic patient when visiting homes and public places.    Ways to reduce reactions/complications:    The ideal situation for an allergic reaction would be to not have any pet at all. However, many pet owners feel strongly about their pets, and would rather remove the allergic individual from the home than the pet! A pet such as a dog or cat should at least be kept out of the patient's bedroom. Recent studies have shown that bathing a cat or dog once a week can decrease significantly the amount of the allergen that the allergic patient is exposed to. The avid pet owner may claim that exposure to his or her pet does not cause them any problem. This however, should be viewed skeptically since pet ownership is an emotionally charged subject. Also,  many allergic pet owners are rarely away from their pets, so an accurate reporting of pet related symptoms may not be possible. The best test to confirm if a person is allergic to a pet is removal from the home for several weeks and a thorough cleaning done by the non-allergic individual to remove the hair and dander. It should be understood that it could take weeks for meticulous cleaning to remove all the animal hair and dander before a change in the allergic patient is noticed. A frequently mistaken idea is that shorthaired animals cause fewer problems. It is the dander (flakes of skin) that causes the most significant allergic reactions, not the length of the hair on the pet. Allergens are also found in the pet's saliva and urine. In addition, long haired animals that are outside can then bring outdoors pollens inside and exposing allergic individuals.    Indoor pets should be restricted to a few rooms in the home if possible. Isolating the pet to one room however, will not limit the allergens to that room. Air currents from forced-air heating and air conditioning will spread the allergens throughout the house. Homes with forced-air heating and/or air conditioning may be fitted with a central air . This may remove significant amounts of pet allergens from the home. The air  should be used at least four hours per day.     Best Pets for Allergenic Individuals:    The best types of pets for allergic patients are tropical fish, lizards, turtles, salamanders and certain types of frogs and tortoises. All of these pets do not have hair, fur or dander nor does their excrement create allergic problems. However, patients should keep in mind that large aquariums can add water vapor in a room, thus increasing mold and house dust mite concentrations in their home.         *All information has been reviewed, updated and approved by:  Dr. Aries Pruitt-[facility] updated:  "11/2016             Pollen Control Measures      * Keep windows and doors shut and run air conditioner at all times. This keeps outdoor air outside.    * Keep windows closed while in the car and air conditioner on the \"re-circulate\" mode.    * Wash your hair before going to bed at night to reduce exposure during sleep.    * Keep pets outdoors to prevent the pollen from being brought in on their hair.    * Avoid hanging clothes and linens outside as pollens may collect on the items.     * Don't mow the lawn if you are allergic to grass or weeds. If you must mow the grass, wear a face mask.       Spring: Tree Pollen    Summer: Grass Pollen and Mold    Fall: Weed Pollen and Mold      *All information has been reviewed, updated and approved by:  Dr. Aries Pruitt-[facility] updated: 11/2016                Follow-ups after your visit        Follow-up notes from your care team     Return in about 4 months (around 9/8/2017).      Your next 10 appointments already scheduled     Sep 11, 2017  3:15 PM CDT   (Arrive by 3:00 PM)   Return Allergy with Aries Pruitt MD   [facility] ([facility])    [address] [place] [id number] [phone]           Do not take anti-histamines or Zantac for seven day prior to your appointment.              Who to contact     If you have questions or need follow up information about today's clinic visit or your schedule please contact [facility] directly at [phone].  Normal or non-critical lab and imaging results will be communicated to you by MyChart, letter or phone within 4 business days after the clinic has received the results. If you do not hear from us within 7 days, please contact the clinic through MyChart or phone. If you have a critical or abnormal lab result, we will notify you by phone as soon as possible.  Submit " refill requests through [employer] or call your pharmacy and they will forward the refill request to us. Please allow 3 business days for your refill to be completed.          Additional Information About Your Visit        [employer] Information     [employer] gives you secure access to your electronic health record. If you see a primary care provider, you can also send messages to your care team and make appointments. If you have questions, please call your primary care clinic.  If you do not have a primary care provider, please call [phone] and they will assist you.        Care EveryWhere ID     This is your Care EveryWhere ID. This could be used by other organizations to access your [facility] medical records  [id number]        Your Vitals Were     Pulse Respirations Pulse Oximetry             81 16 97%          Blood Pressure from Last 3 Encounters:   05/08/17 114/77   08/17/15 104/63   08/10/15 106/72    Weight from Last 3 Encounters:   03/09/17 79.8 kg (176 lb)   02/02/17 87.1 kg (192 lb)              We Performed the Following     Percutaneous test with allergic extract with number of test to be specified        Primary Care Provider Office Phone # Fax #    Mera Plata [phone]       [facility]  [address] [facility] [address]        Thank you!     Thank you for choosing [facility]  for your care. Our goal is always to provide you with excellent care. Hearing back from our patients is one way we can continue to improve our services. Please take a few minutes to complete the written survey that you may receive in the mail after your visit with us. Thank you!             Your Updated Medication List - Protect others around you: Learn how to safely use, store and throw away your medicines at www.disposemymeds.org.          This list is accurate as of: 5/8/17  4:07 PM.  Always use your most recent med list.                   Brand Name  Dispense Instructions for use    citalopram 20 MG tablet    celeXA    30 tablet    Take 1 tablet (20 mg) by mouth daily       norgestimate-ethinyl estradiol 0.25-35 MG-MCG per tablet    ORTHO-CYCLEN, SPRINTEC

## 2017-05-22 ENCOUNTER — TRANSFERRED RECORDS (OUTPATIENT)
Dept: HEALTH INFORMATION MANAGEMENT | Facility: CLINIC | Age: 32
End: 2017-05-22

## 2017-05-22 LAB
HPV ABSTRACT: NORMAL
PAP-ABSTRACT: NORMAL

## 2017-12-18 ENCOUNTER — TRANSFERRED RECORDS (OUTPATIENT)
Dept: HEALTH INFORMATION MANAGEMENT | Facility: CLINIC | Age: 32
End: 2017-12-18

## 2017-12-31 ENCOUNTER — HEALTH MAINTENANCE LETTER (OUTPATIENT)
Age: 32
End: 2017-12-31

## 2018-01-24 ENCOUNTER — OFFICE VISIT - HEALTHEAST (OUTPATIENT)
Dept: FAMILY MEDICINE | Facility: CLINIC | Age: 33
End: 2018-01-24

## 2018-01-24 ENCOUNTER — COMMUNICATION - HEALTHEAST (OUTPATIENT)
Dept: TELEHEALTH | Facility: CLINIC | Age: 33
End: 2018-01-24

## 2018-01-24 DIAGNOSIS — J20.9 ACUTE BRONCHITIS: ICD-10-CM

## 2018-12-27 ENCOUNTER — HOSPITAL PATHOLOGY (OUTPATIENT)
Dept: OTHER | Facility: CLINIC | Age: 33
End: 2018-12-27

## 2019-04-18 ENCOUNTER — OFFICE VISIT - HEALTHEAST (OUTPATIENT)
Dept: FAMILY MEDICINE | Facility: CLINIC | Age: 34
End: 2019-04-18

## 2019-04-18 DIAGNOSIS — H81.11 BENIGN PAROXYSMAL POSITIONAL VERTIGO OF RIGHT EAR: ICD-10-CM

## 2019-06-20 ENCOUNTER — OFFICE VISIT - HEALTHEAST (OUTPATIENT)
Dept: FAMILY MEDICINE | Facility: CLINIC | Age: 34
End: 2019-06-20

## 2019-06-20 DIAGNOSIS — R10.32 LLQ ABDOMINAL PAIN: ICD-10-CM

## 2019-06-20 DIAGNOSIS — R10.84 ABDOMINAL PAIN, GENERALIZED: ICD-10-CM

## 2019-06-20 LAB
ALBUMIN UR-MCNC: ABNORMAL MG/DL
APPEARANCE UR: CLEAR
BACTERIA #/AREA URNS HPF: ABNORMAL HPF
BILIRUB UR QL STRIP: ABNORMAL
COLOR UR AUTO: YELLOW
GLUCOSE UR STRIP-MCNC: NEGATIVE MG/DL
HCG UR QL: NEGATIVE
HGB UR QL STRIP: NEGATIVE
KETONES UR STRIP-MCNC: ABNORMAL MG/DL
LEUKOCYTE ESTERASE UR QL STRIP: NEGATIVE
MUCOUS THREADS #/AREA URNS LPF: ABNORMAL LPF
NITRATE UR QL: NEGATIVE
PH UR STRIP: 7.5 [PH] (ref 5–8)
RBC #/AREA URNS AUTO: ABNORMAL HPF
SP GR UR STRIP: 1.02 (ref 1–1.03)
SQUAMOUS #/AREA URNS AUTO: ABNORMAL LPF
UROBILINOGEN UR STRIP-ACNC: ABNORMAL
WBC #/AREA URNS AUTO: ABNORMAL HPF

## 2019-08-15 ENCOUNTER — OFFICE VISIT (OUTPATIENT)
Dept: OBGYN | Facility: CLINIC | Age: 34
End: 2019-08-15
Payer: COMMERCIAL

## 2019-08-15 VITALS
HEART RATE: 66 BPM | WEIGHT: 160 LBS | SYSTOLIC BLOOD PRESSURE: 102 MMHG | HEIGHT: 68 IN | BODY MASS INDEX: 24.25 KG/M2 | DIASTOLIC BLOOD PRESSURE: 72 MMHG

## 2019-08-15 DIAGNOSIS — R10.2 PELVIC PAIN IN FEMALE: ICD-10-CM

## 2019-08-15 DIAGNOSIS — Z01.419 ENCOUNTER FOR GYNECOLOGICAL EXAMINATION WITHOUT ABNORMAL FINDING: Primary | ICD-10-CM

## 2019-08-15 PROCEDURE — 99385 PREV VISIT NEW AGE 18-39: CPT | Performed by: NURSE PRACTITIONER

## 2019-08-15 RX ORDER — SERTRALINE HYDROCHLORIDE 100 MG/1
100 TABLET, FILM COATED ORAL DAILY
COMMUNITY
End: 2020-08-27

## 2019-08-15 ASSESSMENT — ANXIETY QUESTIONNAIRES
3. WORRYING TOO MUCH ABOUT DIFFERENT THINGS: MORE THAN HALF THE DAYS
2. NOT BEING ABLE TO STOP OR CONTROL WORRYING: MORE THAN HALF THE DAYS
5. BEING SO RESTLESS THAT IT IS HARD TO SIT STILL: SEVERAL DAYS
6. BECOMING EASILY ANNOYED OR IRRITABLE: MORE THAN HALF THE DAYS
1. FEELING NERVOUS, ANXIOUS, OR ON EDGE: MORE THAN HALF THE DAYS
IF YOU CHECKED OFF ANY PROBLEMS ON THIS QUESTIONNAIRE, HOW DIFFICULT HAVE THESE PROBLEMS MADE IT FOR YOU TO DO YOUR WORK, TAKE CARE OF THINGS AT HOME, OR GET ALONG WITH OTHER PEOPLE: SOMEWHAT DIFFICULT
GAD7 TOTAL SCORE: 10
7. FEELING AFRAID AS IF SOMETHING AWFUL MIGHT HAPPEN: NOT AT ALL

## 2019-08-15 ASSESSMENT — PATIENT HEALTH QUESTIONNAIRE - PHQ9
SUM OF ALL RESPONSES TO PHQ QUESTIONS 1-9: 13
5. POOR APPETITE OR OVEREATING: SEVERAL DAYS

## 2019-08-15 ASSESSMENT — MIFFLIN-ST. JEOR: SCORE: 1478.23

## 2019-08-15 NOTE — PROGRESS NOTES
Andria is a 34 year old  female who presents for annual exam.     Besides routine health maintenance, she would like to discuss heavy and painful periods.    HPI:    New patient to me and the clinic here today for her annual gyn exam and to discuss heavy painful menses.     Menses are regular. Taking OCP's. They are 5-7 days long. Using a super tampon and pad for protection. She has cramping with her menses as well as throughout the month. She also has pain with a full colon and deep penetration IC. She does not have any change in bladder function.     She has a new dairy allergy and is workign through this.     Pap in 2017 was NIL HPV neg. Will pap again in . Hx of abnormal in  +HPV.     Same partner for 2 years. Declines STD testing.     Known depression/anxiety. Seeing a new psychiatrist in the next few weeks fo rmed management. Also sees [facility] twice a week for therapy.     The patient's PCP is Dr Mera Plata MD.     GYNECOLOGIC HISTORY:    Patient's last menstrual period was 2019 (approximate).  Her current contraception method is: oral contraceptives.  She  reports that she has quit smoking. She has never used smokeless tobacco.  Patient is sexually active.  STD testing offered?  Declined     Last PHQ-9 score on record =   PHQ-9 SCORE 8/15/2019   PHQ-9 Total Score 13     Last GAD7 score on record =   DIANA-7 SCORE 8/15/2019   Total Score 10     Alcohol Score = 1    HEALTH MAINTENANCE:  Cholesterol: found in Care Everywhere  14   Total= 207, Triglycerides=81, HDL=85, KFK=649  Last Mammo: Last Mammo: N/A, Result: not applicable, Next Mammo: screen age 40  Pap: 2014 found in Care Everywhere, Negative  Colonoscopy:  N/A, Result: not applicable, Next Colonoscopy: screen age 50  Dexa:  N/A  Health maintenance updated:  yes    HISTORY:  OB History    Para Term  AB Living   1 0 0 0 1 0   SAB TAB Ectopic Multiple Live Births   0 1 0 0 0      # Outcome Date GA Lbr  [place]/2nd Weight Sex Delivery Anes PTL Lv   1 TAB                There is no problem list on file for this patient.    Past Surgical History:   Procedure Laterality Date     ORTHOPEDIC SURGERY        Social History     Tobacco Use     Smoking status: Former Smoker     Smokeless tobacco: Never Used   Substance Use Topics     Alcohol use: No     Alcohol/week: 0.0 oz     Comment: sober for 90 days      Problem (# of Occurrences) Relation (Name,Age of Onset)    Diabetes (1) Maternal Grandmother    Hyperlipidemia (1) Maternal Grandfather    Hypertension (1) Maternal Grandfather    No Known Problems (2) Mother, Father            Current Outpatient Medications   Medication Sig     norgestimate-ethinyl estradiol (ORTHO-CYCLEN, SPRINTEC) 0.25-35 MG-MCG per tablet      norgestrel-ethinyl estradiol (LO/OVRAL) 0.3-30 MG-MCG tablet Take 1 tablet by mouth daily . Active tabs only, skip placebo pills.     sertraline (ZOLOFT) 50 MG tablet Take 50 mg by mouth daily     No current facility-administered medications for this visit.      Allergies   Allergen Reactions     No Clinical Screening - See Comments      Apples and bananas cause her throat to itch     Amoxicillin Rash     Penicillins Rash       Past medical, surgical, social and family histories were reviewed and updated in EPIC.    ROS:   12 point review of systems negative other than symptoms noted below.  Constitutional: Fatigue, Loss of Appetite and Weight Loss  Eyes: Spots  Head: Nasal Congestion  Cardiovascular: Chest Pain and Lightheadedness  Respiratory: Shortness of Breath  Gastrointestinal: Diarrhea  Genitourinary: Cramps, Frequency, Heavy Bleeding with Period, Irregular Menses, Night Sweats, Painful [place], Pelvic Pain, Significant PMS, Urgency, Vaginal Discharge, Vaginal Dryness and Vaginal Itching  Skin: Skin Dryness  Neurologic: Dizziness, Headaches and Tremors  Musculoskeletal: Height Loss, Joint Pain, Muscle Cramps and Muscular Weakness  Endocrine: Cold  "Intolerance  Psychiatric: Anxiety, Depression, Difficulty Sleeping, [place] and Significant Memory Loss    EXAM:  /72   Pulse 66   Ht 1.734 m (5' 8.25\")   Wt 72.6 kg (160 lb)   LMP 08/01/2019 (Approximate)   BMI 24.15 kg/m     BMI: Body mass index is 24.15 kg/m .    PHYSICAL EXAM:  Constitutional:  Appearance: Well nourished, well developed, alert, in no acute distress  Neck:  Lymph Nodes:  No lymphadenopathy present    Thyroid:  Gland size normal, nontender, no nodules or masses present  on palpation  Chest:  Respiratory Effort:  Breathing unlabored  Cardiovascular:    Heart: Auscultation:  Regular rate, normal rhythm, no murmurs present  Breasts: Inspection of Breasts:  No lymphadenopathy present., Palpation of Breasts and Axillae:  No masses present on palpation, no breast tenderness., Axillary Lymph Nodes:  No lymphadenopathy present. and No nodularity, asymmetry or nipple discharge bilaterally.  Gastrointestinal:   Abdominal Examination:  Abdomen nontender to palpation, tone normal without rigidity or guarding, no masses present, umbilicus without lesions   Liver and Spleen:  No hepatomegaly present, liver nontender to palpation    Hernias:  No hernias present  Lymphatic: Lymph Nodes:  No other lymphadenopathy present  Skin:  General Inspection:  No rashes present, no lesions present, no areas of  discoloration    Genitalia and Groin:  No rashes present, no lesions present, no areas of  discoloration, no masses present  Neurologic/Psychiatric:    Mental Status:  Oriented X3     Pelvic Exam:  External Genitalia:     Normal appearance for age, no discharge present, no tenderness present, no inflammatory lesions present, color normal  Vagina:     Normal vaginal vault without central or paravaginal defects, no discharge present, no inflammatory lesions present, no masses present  Bladder:     Nontender to palpation  Urethra:   Urethral Body:  Urethra palpation normal, urethra structural support " normal   Urethral Meatus:  No erythema or lesions present  Cervix:     Appearance healthy, no lesions present, nontender to palpation, no bleeding present  Uterus:     Uterus: firm, normal sized and tender, anteverted in position.   Adnexa:     No adnexal tenderness present, no adnexal masses present. Tenderness on uterosacral ligaments.   Perineum:     Perineum within normal limits, no evidence of trauma, no rashes or skin lesions present  Anus:     Anus within normal limits, no hemorrhoids present  Inguinal Lymph Nodes:     No lymphadenopathy present  Pubic Hair:     Normal pubic hair distribution for age  Genitalia and Groin:     No rashes present, no lesions present, no areas of discoloration, no masses present      COUNSELING:   Special attention given to:        Regular exercise       Healthy diet/nutrition       Contraception       Safe sex practices/STD prevention    BMI: Body mass index is 24.15 kg/m .      ASSESSMENT:  34 year old female with satisfactory annual exam.    ICD-10-CM    1. Encounter for gynecological examination without abnormal finding Z01.419    2. Pelvic pain in female R10.2 norgestrel-ethinyl estradiol (LO/OVRAL) 0.3-30 MG-MCG tablet     US Transvaginal Non OB       PLAN:  Thin female with tender exam. ?endo? Will change pills and use them continuously. i've asked her to do an US to evaluate pelvis as she has never had any imaging done. If she does suppress nicely, we will monitor with OCP's. If she continues to have pain we will ask her to consult with DR. Mcneill. Pt agrees to plan.     DAHIANA Caal CNP

## 2019-08-15 NOTE — PROGRESS NOTES
"    SUBJECTIVE:                                                   Andria Daily is a 34 year old female who presents to clinic today for the following health issue(s):  No chief complaint on file.      Additional information: ***    HPI:  ***    No LMP recorded..   Patient {is/is not:395334::\"is\"} sexually active, No obstetric history on file..  Using {[facility] CONTRACEPTION:278225} for contraception.    reports that she has quit smoking. She has never used smokeless tobacco.  {Tobacco Cessation -- Delete if patient is a non-smoker:978314}  STD testing offered?  {[facility] GC/CHLAMYDIA:475291}    Health maintenance updated:  {yes no:263081}    Today's PHQ-2 Score: No flowsheet data found.  Today's PHQ-9 Score: No flowsheet data found.  Today's DIANA-7 Score: No flowsheet data found.    Problem list and histories reviewed & adjusted, as indicated.  Additional history: as documented.    There is no problem list on file for this patient.    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      Allergies   Allergen Reactions     No Clinical Screening - See Comments      Apples and bananas cause her throat to itch     Amoxicillin Rash     Penicillins Rash       ROS:  {[facility] ROSGYN:274112::\"12 point review of systems negative other than symptoms noted below.\"}    OBJECTIVE:     There were no vitals taken for this visit.  There is no height or weight on file to calculate BMI.    Exam:  {[facility] EXAM CHOICES:791835}     In-Clinic Test " Results:  No results found for this or any previous visit (from the past 24 hour(s)).    ASSESSMENT/PLAN:                                                      No diagnosis found.    There are no Patient Instructions on file for this visit.    ***    DAHIANA Caal [facility]

## 2019-08-16 ASSESSMENT — ANXIETY QUESTIONNAIRES: GAD7 TOTAL SCORE: 10

## 2019-10-28 ENCOUNTER — OFFICE VISIT (OUTPATIENT)
Dept: ORTHOPEDICS | Facility: CLINIC | Age: 34
End: 2019-10-28
Payer: COMMERCIAL

## 2019-10-28 ENCOUNTER — MEDICAL CORRESPONDENCE (OUTPATIENT)
Dept: HEALTH INFORMATION MANAGEMENT | Facility: CLINIC | Age: 34
End: 2019-10-28

## 2019-10-28 VITALS — RESPIRATION RATE: 16 BRPM | WEIGHT: 167 LBS | BODY MASS INDEX: 25.21 KG/M2

## 2019-10-28 DIAGNOSIS — F33.2 MDD (MAJOR DEPRESSIVE DISORDER), RECURRENT SEVERE, WITHOUT PSYCHOSIS (H): Primary | ICD-10-CM

## 2019-10-28 DIAGNOSIS — M54.6 ACUTE RIGHT-SIDED THORACIC BACK PAIN: Primary | ICD-10-CM

## 2019-10-28 RX ORDER — TIZANIDINE 2 MG/1
2-4 TABLET ORAL 3 TIMES DAILY PRN
Qty: 30 TABLET | Refills: 1 | Status: SHIPPED | OUTPATIENT
Start: 2019-10-28 | End: 2019-12-23

## 2019-10-28 RX ORDER — DICLOFENAC SODIUM 75 MG/1
75 TABLET, DELAYED RELEASE ORAL 2 TIMES DAILY
Qty: 28 TABLET | Refills: 1 | Status: SHIPPED | OUTPATIENT
Start: 2019-10-28 | End: 2019-12-20

## 2019-10-28 NOTE — PROGRESS NOTES
SPORTS & ORTHOPEDIC WALK-IN VISIT 10/28/2019    Primary Care Physician: Dr. Plata     Patient is here today for right-sided posterior shoulder pain.  4 days ago she experienced a sudden sharp pain in the right lower periscapular area.  She is not sure what precipitated her inciting event.  Has been persistent in a very localized area since that time.  She does have some tingling and numbness in that area but no radiation of pain tingling or numbness around to the chest.  She has had some paresthesias in the right upper extremity separate from her current symptoms.  Rotation of her head provokes the pain.  It does improve with ice heat Tylenol and ibuprofen.  It has improved slightly over the course the last 4 days.    Reason for visit:     What part of your body is injured / painful?  right shoulder    What caused the injury /pain? Stretch     How long ago did your injury occur or pain begin? a week ago    What are your most bothersome symptoms? Pain, Numbness and Tingling    How would you characterize your symptom?  stabbing, sharp and burning    What makes your symptoms better? Ice, Heat, Ibuprofen and Tylenol    What makes your symptoms worse? certain Movement    Have you been previously seen for this problem? No    Medical History:    Any recent changes to your medical history? No    Any new medication prescribed since last visit? No    Have you had surgery on this body part before? No    Social History:    Occupation: Unemployed      Review of Systems:    Do you have fever, chills, weight loss? No    Do you have any vision problems? No    Do you have any chest pain or edema? No    Do you have any shortness of breath or wheezing?  No    Do you have stomach problems? Yes, food related sensitivies     Do you have any numbness or focal weakness? No    Do you have diabetes? No    Do you have problems with bleeding or clotting? No    Do you have an rashes or other skin lesions? No         Past Medical History,  Current Medications, and Allergies are reviewed in the electronic medical record as appropriate.       EXAM:Resp 16   Wt 75.8 kg (167 lb)   BMI 25.21 kg/m      General: alert, pleasant, no distress  Neck/Spine: no TTP of spinous processes in cervical spine. Full ROM with flexion, extension, rotation, tilting  with pain . negative TTP along paraspinous muscles and upper trapezius musculature. positive  Periscapular pain particularly along the inferior medial border of the right scapula.  positive  tightness in this area. No noted bruising or skin discoloration. Spurlings negative  Neurologic: SILT throughout upper extremities. Strength 5/5 and symmetric throughout upper extremities.   Upper Extremities: warm, well perfused, no edema. Full ROM without pain in shoulder, elbow, wrist, and hand. Good capillary refill bilaterally      Imaging: No imaging this visit.      Assessment: Patient is a 34 year old female with right-sided localized periscapular pain.  Likely muscular.    Recommendations:   Reviewed assessment the patient detail  Recommended course of diclofenac along with PRN tizanidine.  Given home exercises.  She will call if she would like to pursue formal physical therapy.  Follow-up in clinic PRN.  Follow-up if there is any new or worsening symptoms including radiation of pain into the right upper extremity or paresthesias.    Quincy Mcconnell MD

## 2019-11-04 NOTE — TELEPHONE ENCOUNTER
DIAGNOSIS: Neck, back pain, per pt. No records available.    APPOINTMENT DATE: 11.6.19   NOTES STATUS DETAILS   OFFICE NOTE from referring provider N/A    OFFICE NOTE from other specialist N/A 10.28.19 Dr. Mcconnell   DISCHARGE SUMMARY from hospital N/A    DISCHARGE REPORT from the ER N/A    OPERATIVE REPORT N/A    MEDICATION LIST Internal    IMPLANT RECORD/STICKER N/A    LABS     CBC/DIFF Internal 10.28.19   CULTURES N/A    INJECTIONS DONE IN RADIOLOGY N/A    MRI N/A    CT SCAN N/A    XRAYS (IMAGES & REPORTS) N/A    TUMOR     PATHOLOGY  Slides & report N/A

## 2019-11-06 ENCOUNTER — PRE VISIT (OUTPATIENT)
Dept: ORTHOPEDICS | Facility: CLINIC | Age: 34
End: 2019-11-06

## 2019-12-16 ENCOUNTER — OFFICE VISIT (OUTPATIENT)
Dept: ALLERGY | Facility: CLINIC | Age: 34
End: 2019-12-16
Payer: COMMERCIAL

## 2019-12-16 DIAGNOSIS — J30.1 SEASONAL ALLERGIC RHINITIS DUE TO POLLEN: Primary | ICD-10-CM

## 2019-12-16 ASSESSMENT — PAIN SCALES - GENERAL: PAINLEVEL: NO PAIN (0)

## 2019-12-16 NOTE — NURSING NOTE
Chief Complaint   Patient presents with     Allergies     Allergy testing last year from [facility] -  positive for food allergies and an ingredient in her nasal spray.  Area of concern: sinus congetion and pain      Eugenia Pantoja LPN

## 2019-12-16 NOTE — PROGRESS NOTES
Reason for Visit  Andria Daily is a 34 year old female who is here for follow-up for allergies.  Allergy HPI    She had some serum food IgG allergy testing last year. Found out that she had a dairy allergy and elimination of dairy has made some difference. Has cut out other things but still having difficulty with sinuses and a chronic dry cough, feels like she has some mucous in her chest for the last week, breathing a bit worse the last year. She also has a lot of sinus infections. Antibiotics don't work well, steroids do tend to work well for her sinus symptoms. She also wants to see if there is a nasal steroid that doesn't have polysorbate 80 (she tested allergic). She occasionally has some wheezing, wonders about getting an inhaler.     Mold and dust are triggers, all seasons outside winter are bad. She still has a cat, she is not going to get rid of the cat. They do have some dust and mold in the home.     Had allergy testing here in 2017 and tested positive for dust mites, cat, tree, grass and weed pollen. She gets some dry skin on her fingers occasionally, some times has some spots that turn red and scabbed if she scratches too much.     She has chronic fatigue and sleep issues.     For allergy medications she is taking hydroxyzine (mostly for anxiety) and is taking fluticasone. She stopped antihistamines due to dry mouth. She is wondering about allergy shots. She has never had lung function testing.     She not had difficulty with almonds or apples lately. Has not tried kiwi or bananas lately (food pollen syndrome last visit).       Current Outpatient Medications   Medication     norgestimate-ethinyl estradiol (ORTHO-CYCLEN, SPRINTEC) 0.25-35 MG-MCG per tablet     norgestrel-ethinyl estradiol (LO/OVRAL) 0.3-30 MG-MCG tablet     sertraline (ZOLOFT) 50 MG tablet     tiZANidine (ZANAFLEX) 2 MG tablet     diclofenac (VOLTAREN) 75 MG EC tablet     No current facility-administered medications for this visit.       Allergies   Allergen Reactions     No Clinical Screening - See Comments      Apples and bananas cause her throat to itch     Amoxicillin Rash     Penicillins Rash     Social History     Socioeconomic History     Marital status: Single     Spouse name: Not on file     Number of children: Not on file     Years of education: Not on file     Highest education level: Not on file   Occupational History     Not on file   Social Needs     Financial resource strain: Not on file     Food insecurity:     Worry: Not on file     Inability: Not on file     Transportation needs:     Medical: Not on file     Non-medical: Not on file   Tobacco Use     Smoking status: Former Smoker     Smokeless tobacco: Never Used   Substance and Sexual Activity     Alcohol use: No     Alcohol/week: 0.0 standard drinks     Comment: sober for 90 days     Drug use: Yes     Types: Marijuana     Comment: opiate pain killers     Sexual activity: Not on file   Lifestyle     Physical activity:     Days per week: Not on file     Minutes per session: Not on file     Stress: Not on file   Relationships     Social connections:     Talks on phone: Not on file     Gets together: Not on file     Attends [employer] service: Not on file     Active member of club or organization: Not on file     Attends meetings of clubs or organizations: Not on file     Relationship status: Not on file     Intimate partner violence:     Fear of current or ex partner: Not on file     Emotionally abused: Not on file     Physically abused: Not on file     Forced sexual activity: Not on file   Other Topics Concern     Not on file   Social History Narrative     Not on file     Past Medical History:   Diagnosis Date     Anxiety      CHHAYA I (cervical intraepithelial neoplasia I) 05/24/2007     Depressive disorder      Eating disorder      Substance abuse (H)      Past Surgical History:   Procedure Laterality Date     ORTHOPEDIC SURGERY       Family History   Problem Relation Age of  Onset     Diabetes Maternal Grandmother      Hyperlipidemia Maternal Grandfather      Hypertension Maternal Grandfather      No Known Problems Mother      No Known Problems Father          ROS   A complete ROS was otherwise negative except as noted in the HPI.    Exam:   GENERAL APPEARANCE: Well developed, well nourished, alert, and in no apparent distress.  EYES: EOMI, conjunctiva clear non-injected  HENT: Nasal mucosa with no edema and no discharge. No nasal polyps.    MOUTH: Oral mucosa is moist, without any lesions, no tonsillar enlargement, no oropharyngeal exudate.  RESP: Good air flow throughout.  No crackles. No rhonchi. No wheezes.  CV: Normal S1, S2, regular rhythm, normal rate. No murmur.  No rub. No gallop. No LE edema.   MS: Extremities normal. No clubbing. No cyanosis.  SKIN: No rashes noted.  NEURO: Speech normal, normal strength and tone, normal gait and stance  PSYCH: Normal mentation, orientation to person, place, and time.  Results:  Had Food Inflammation Testing (FIT) in 1/2018 and tested positive for IgG to:  Cows milk and Vanilla (4+)  Codfish (3+)  Casein and polysorbate 80 (2+)      Assessment and plan:   34 year old woman with chronic fatigue and seasonal allergies as well as cat allergies here for follow up of allergies. Remains symptomatic, particularly during spring, summer, and fall. Has tested positive to tree, weed, and grass pollens previously. Also tested positive to cats but not willing to get rid of her cat. Interested in allergy shots, she will think about allergy shots and call if she wishes to pursue. Will also investigate whether there is a nasal steroid without polysorbate 80.     Patient seen and staffed with Dr. Pruitt.  Tyler Spence MD  Pulmonary/Critical Care Fellow     Physician Attestation   I, Aries Pruitt MD, saw this patient with the resident and agree with the resident/fellow's findings and plan of care as documented in the note.          Aries  John Mireles MD  Date of Service (when I saw the patient): 12/17/19    Qnasl does not seem to have polysorbate 80.  We will prescribe this.

## 2019-12-16 NOTE — LETTER
12/16/2019         RE: Andria Daily  [address]  [facility] [address]        Dear Colleague,    Thank you for referring your patient, Andria Daily, to the [facility]. Please see a copy of my visit note below.    Reason for Visit  Andria Dialy is a 34 year old female who is here for follow-up for allergies.  Allergy HPI    She had some serum food IgG allergy testing last year. Found out that she had a dairy allergy and elimination of dairy has made some difference. Has cut out other things but still having difficulty with sinuses and a chronic dry cough, feels like she has some mucous in her chest for the last week, breathing a bit worse the last year. She also has a lot of sinus infections. Antibiotics don't work well, steroids do tend to work well for her sinus symptoms. She also wants to see if there is a nasal steroid that doesn't have polysorbate 80 (she tested allergic). She occasionally has some wheezing, wonders about getting an inhaler.     Mold and dust are triggers, all seasons outside winter are bad. She still has a cat, she is not going to get rid of the cat. They do have some dust and mold in the home.     Had allergy testing here in 2017 and tested positive for dust mites, cat, tree, grass and weed pollen. She gets some dry skin on her fingers occasionally, some times has some spots that turn red and scabbed if she scratches too much.     She has chronic fatigue and sleep issues.     For allergy medications she is taking hydroxyzine (mostly for anxiety) and is taking fluticasone. She stopped antihistamines due to dry mouth. She is wondering about allergy shots. She has never had lung function testing.     She not had difficulty with almonds or apples lately. Has not tried kiwi or bananas lately (food pollen syndrome last visit).       Current Outpatient Medications   Medication     norgestimate-ethinyl estradiol (ORTHO-CYCLEN, SPRINTEC) 0.25-35 MG-MCG per tablet     norgestrel-ethinyl  estradiol (LO/OVRAL) 0.3-30 MG-MCG tablet     sertraline (ZOLOFT) 50 MG tablet     tiZANidine (ZANAFLEX) 2 MG tablet     diclofenac (VOLTAREN) 75 MG EC tablet     No current facility-administered medications for this visit.      Allergies   Allergen Reactions     No Clinical Screening - See Comments      Apples and bananas cause her throat to itch     Amoxicillin Rash     Penicillins Rash     Social History     Socioeconomic History     Marital status: Single     Spouse name: Not on file     Number of children: Not on file     Years of education: Not on file     Highest education level: Not on file   Occupational History     Not on file   Social Needs     Financial resource strain: Not on file     Food insecurity:     Worry: Not on file     Inability: Not on file     Transportation needs:     Medical: Not on file     Non-medical: Not on file   Tobacco Use     Smoking status: Former Smoker     Smokeless tobacco: Never Used   Substance and Sexual Activity     Alcohol use: No     Alcohol/week: 0.0 standard drinks     Comment: sober for 90 days     Drug use: Yes     Types: Marijuana     Comment: opiate pain killers     Sexual activity: Not on file   Lifestyle     Physical activity:     Days per week: Not on file     Minutes per session: Not on file     Stress: Not on file   Relationships     Social connections:     Talks on phone: Not on file     Gets together: Not on file     Attends Episcopalian service: Not on file     Active member of club or organization: Not on file     Attends meetings of clubs or organizations: Not on file     Relationship status: Not on file     Intimate partner violence:     Fear of current or ex partner: Not on file     Emotionally abused: Not on file     Physically abused: Not on file     Forced sexual activity: Not on file   Other Topics Concern     Not on file   Social History Narrative     Not on file     Past Medical History:   Diagnosis Date     Anxiety      CHHAYA I (cervical intraepithelial  neoplasia I) 05/24/2007     Depressive disorder      Eating disorder      Substance abuse (H)      Past Surgical History:   Procedure Laterality Date     ORTHOPEDIC SURGERY       Family History   Problem Relation Age of Onset     Diabetes Maternal Grandmother      Hyperlipidemia Maternal Grandfather      Hypertension Maternal Grandfather      No Known Problems Mother      No Known Problems Father          ROS   A complete ROS was otherwise negative except as noted in the HPI.    Exam:   GENERAL APPEARANCE: Well developed, well nourished, alert, and in no apparent distress.  EYES: EOMI, conjunctiva clear non-injected  HENT: Nasal mucosa with no edema and no discharge. No nasal polyps.    MOUTH: Oral mucosa is moist, without any lesions, no tonsillar enlargement, no oropharyngeal exudate.  RESP: Good air flow throughout.  No crackles. No rhonchi. No wheezes.  CV: Normal S1, S2, regular rhythm, normal rate. No murmur.  No rub. No gallop. No LE edema.   MS: Extremities normal. No clubbing. No cyanosis.  SKIN: No rashes noted.  NEURO: Speech normal, normal strength and tone, normal gait and stance  PSYCH: Normal mentation, orientation to person, place, and time.  Results:  Had Food Inflammation Testing (FIT) in 1/2018 and tested positive for IgG to:  Cows milk and Vanilla (4+)  Codfish (3+)  Casein and polysorbate 80 (2+)      Assessment and plan:   34 year old woman with chronic fatigue and seasonal allergies as well as cat allergies here for follow up of allergies. Remains symptomatic, particularly during spring, summer, and fall. Has tested positive to tree, weed, and grass pollens previously. Also tested positive to cats but not willing to get rid of her cat. Interested in allergy shots, she will think about allergy shots and call if she wishes to pursue. Will also investigate whether there is a nasal steroid without polysorbate 80.     Patient seen and staffed with Dr. Pruitt.  Tyler Spence,  MD  Pulmonary/Critical Care Fellow     Physician Attestation   I, Aries Mireles MD, saw this patient with the resident and agree with the resident/fellow's findings and plan of care as documented in the note.          Aries Mireles MD  Date of Service (when I saw the patient): 12/17/19    Qnasl does not seem to have polysorbate 80.  We will prescribe this.               Again, thank you for allowing me to participate in the care of your patient.        Sincerely,        Aries Mireles MD

## 2019-12-19 NOTE — PROGRESS NOTES
"  SUBJECTIVE:   Andria Daily is a 34 year old female who presents to clinic today to establish care and to discuss the following problem(s).    # ENT Referral  - chronic sinus pain/pressure, post-nasal drip  - cough for about a month  - recently started on beclomethasone 80mcg by allergist  - previously on flonase but allergy to polysorbol-80  - also uses neti pot and saline nasal spray regularly  - cutting out dairy helped improve symptoms  - smokes MJ daily to help with sleep  - would like to see ENT to evaluate for structural issues    # Endocrinology Referral  - chiropractor did a number of tests  - told her that her cortisol level and hormone levels were off, brought it in records    # Contraception  - started on new contraceptive for possible endometriosis symptoms by ob/gyn Maren Bourne CNP  - takes norgestrel-ethinyl estradiol 0.3mg-30mcg since August continuously  - needs an ultrasound    # CFS  - for about 15 years, diagnosed around 2010    # Hypersomnia  - diagnosed by sleep at [place]  - sleeps 12-14 hours a day and doesn't \"feel rested at all\"  - on modafinil 200mg daily for a little over a month, prescribed by psychiatrist Dr. Palacio  - has talked with sleep doctor about increasing dose but when she doesn't eat it does cause tremors    # Night Sweats  - not as bad as it was  - most nights, wet \"head to toe\"  - feels like she has been losing weight  - always feels cold or sweaty, palms feel clammy    # Anxiety and Depression  # PTSD  # History of substance use  - also history of eating disorder that she has had treatment for and feels like she is in a better place with  - had to quit working because mental health was getting worse  - feels that ideal weight is around 160-165lbs (\"I'm not happy with the number but I feel that is what my body needs\")  - zoloft increased from 50 to 100mg daily last month  - takes hydroxyzine 25mg as needed for anxiety and helps with sinus symptoms  - did get a light box for " light therapy, sometimes forgets to use  - follows with Dr. Palacio with psychiatry  - does smoke MJ daily to help with sleep  - Previously did twice weekly group therapy with [facility], thinking about going back to that  - does have a regular individual therapist for the past 2 years  - considering TMS  - support from boyfriend, mom, and friend from treatment    # Med Monitoring  - psychiatrist requesting CMP, CBC, TSH, Vit D, B12, and Folate      ROS: Denies fevers, chills, chest pain, difficulty breathing    Past Medical History:   Diagnosis Date     Anxiety      Chronic fatigue syndrome      Chronic rhinitis      CHHAYA I (cervical intraepithelial neoplasia I) 05/24/2007     Depressive disorder      History of eating disorder      History of substance use     prescription pain killers, alcohol, cocaine     Hypersomnia      Past Surgical History:   Procedure Laterality Date     KNEE SURGERY Left 2018    for osgood-schlatter and tendon repair/anchor     WRIST SURGERY Left 2015    snowboarding injury, fractured, had metal plate initially that was then removed     Family History   Problem Relation Age of Onset     Diabetes Maternal Grandmother      Hyperlipidemia Maternal Grandfather      Hypertension Maternal Grandfather      Heart Disease Maternal Grandfather      Anxiety Disorder Mother      Depression Father      Anxiety Disorder Father      Anxiety Disorder Sister         after bad car accident     Breast Cancer No family hx of      Ovarian Cancer No family hx of      Colon Cancer No family hx of      Social History     Tobacco Use     Smoking status: Former Smoker     Last attempt to quit: 2016     Years since quitting: 3.9     Smokeless tobacco: Never Used   Substance Use Topics     Alcohol use: Not Currently     Alcohol/week: 0.0 standard drinks     Frequency: 2-4 times a month     Drinks per session: 1 or 2     Drug use: Yes     Frequency: 7.0 times per week     Types: Marijuana     Comment: sober from  "cocaine since 01/2018,      Social History     Social History Narrative    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      I have reviewed the patient's past medical, surgical, family, and social history.     OBJECTIVE:   /76 (BP Location: Right arm, Patient Position: Sitting, Cuff Size: Adult Regular)   Pulse 79   Temp 98.5  F (36.9  C) (Oral)   Resp 17   Ht 1.736 m (5' 8.35\")   Wt 71.7 kg (158 lb)   LMP 11/29/2019 (Within Days)   SpO2 100%   Breastfeeding No   BMI 23.78 kg/m      Constitutional: well-appearing, appears stated age  Eyes: conjunctivae without erythema, sclera anicteric.   Cardiac: regular rate and rhythm, normal S1/S2, no murmur/rubs/gallops  Respiratory: lungs clear to auscultation bilaterally, normal work of breathing, no wheezes/crackles  Skin: no rashes, lesions, or wounds  Psych: affect is full and appropriate, speech is fluent and non-pressured    ASSESSMENT AND PLAN:     (E34.9) Hormone imbalance  (primary encounter diagnosis)  Comment: Brought in records from chiropractor showing a number of \"abnormal\" salivary hormone levels including cortisol, estradiol, and a few others. I advised her that many of these salivary tests are unlikely to have been well validated for correlating to a specific disease/condition. Due to the sheer number of tests, I am referring to endocrinology to discuss the results.   Plan: ENDOCRINOLOGY ADULT REFERRAL          (R53.82) Chronic fatigue syndrome  Comment: Chronic, " stable.   Plan:     (G47.10) Hypersomnia  Comment: Chronic stable. Takes modafinil 200mg daily. Provided with resources for CBT-insomnia.   Plan:     (N87.0) CHHAYA I (cervical intraepithelial neoplasia I)  Comment: Up to date on Pap smears. Reviewed old records. Next due 2022 for co-testing. See problem list for details.   Plan:     (J31.0) Chronic rhinitis  Comment: Has seen allergy. Would like ENT evaluation for possible structural causes. Referred   Plan: OTOLARYNGOLOGY REFERRAL    (F32.9) Depressive disorder  (F41.9) Anxiety  Comment: Chronic. Follows with psychiatry. On sertraline 100mg daily. Considering TMS  Plan:     (Z86.59) History of eating disorder  Comment: Now in remission.   Plan:     (Z87.898) History of substance use  Comment: Continues to use alcohol intermittently and MJ daily but has stopped prescription opioids and cocaine.   Plan:     (Z11.4) Screening for HIV (human immunodeficiency virus)  Comment:   Plan: HIV Antigen Antibody Combo          Mook De La Rosa MD   [facility]  12/23/2019, 4:11 PM

## 2019-12-20 ENCOUNTER — OFFICE VISIT (OUTPATIENT)
Dept: FAMILY MEDICINE | Facility: CLINIC | Age: 34
End: 2019-12-20
Payer: COMMERCIAL

## 2019-12-20 VITALS
WEIGHT: 158 LBS | OXYGEN SATURATION: 100 % | HEIGHT: 68 IN | BODY MASS INDEX: 23.95 KG/M2 | DIASTOLIC BLOOD PRESSURE: 76 MMHG | HEART RATE: 79 BPM | SYSTOLIC BLOOD PRESSURE: 112 MMHG | TEMPERATURE: 98.5 F | RESPIRATION RATE: 17 BRPM

## 2019-12-20 DIAGNOSIS — Z11.4 SCREENING FOR HIV (HUMAN IMMUNODEFICIENCY VIRUS): ICD-10-CM

## 2019-12-20 DIAGNOSIS — F41.9 ANXIETY: ICD-10-CM

## 2019-12-20 DIAGNOSIS — F32.A DEPRESSIVE DISORDER: ICD-10-CM

## 2019-12-20 DIAGNOSIS — Z87.898 HISTORY OF SUBSTANCE USE: ICD-10-CM

## 2019-12-20 DIAGNOSIS — E34.9 HORMONE IMBALANCE: Primary | ICD-10-CM

## 2019-12-20 DIAGNOSIS — J31.0 CHRONIC RHINITIS: ICD-10-CM

## 2019-12-20 DIAGNOSIS — G93.32 CHRONIC FATIGUE SYNDROME: ICD-10-CM

## 2019-12-20 DIAGNOSIS — N87.0 CIN I (CERVICAL INTRAEPITHELIAL NEOPLASIA I): ICD-10-CM

## 2019-12-20 DIAGNOSIS — G47.10 HYPERSOMNIA: ICD-10-CM

## 2019-12-20 DIAGNOSIS — Z86.59 HISTORY OF EATING DISORDER: ICD-10-CM

## 2019-12-20 DIAGNOSIS — F43.10 PTSD (POST-TRAUMATIC STRESS DISORDER): ICD-10-CM

## 2019-12-20 RX ORDER — MODAFINIL 200 MG/1
1 TABLET ORAL DAILY
Refills: 1 | COMMUNITY
Start: 2019-11-13 | End: 2021-09-30

## 2019-12-20 RX ORDER — HYDROXYZINE HYDROCHLORIDE 25 MG/1
25 TABLET, FILM COATED ORAL 3 TIMES DAILY PRN
COMMUNITY
End: 2021-04-21

## 2019-12-20 SDOH — HEALTH STABILITY: MENTAL HEALTH: HOW MANY STANDARD DRINKS CONTAINING ALCOHOL DO YOU HAVE ON A TYPICAL DAY?: 1 OR 2

## 2019-12-20 SDOH — HEALTH STABILITY: MENTAL HEALTH: HOW OFTEN DO YOU HAVE A DRINK CONTAINING ALCOHOL?: 2-4 TIMES A MONTH

## 2019-12-20 ASSESSMENT — MIFFLIN-ST. JEOR: SCORE: 1470.67

## 2019-12-20 NOTE — NURSING NOTE
"34 year old  Chief Complaint   Patient presents with     Establish Care     Referral     ENT, Endocrinology       Blood pressure 112/76, pulse 79, temperature 98.5  F (36.9  C), temperature source Oral, resp. rate 17, height 1.736 m (5' 8.35\"), weight 71.7 kg (158 lb), last menstrual period 11/29/2019, SpO2 100 %, not currently breastfeeding. Body mass index is 23.78 kg/m .  There is no problem list on file for this patient.      Wt Readings from Last 2 Encounters:   12/20/19 71.7 kg (158 lb)   10/28/19 75.8 kg (167 lb)     BP Readings from Last 3 Encounters:   12/20/19 112/76   08/15/19 102/72   05/08/17 114/77         Current Outpatient Medications   Medication     beclomethasone (QNASL) 80 MCG/ACT nasal aerosol     modafinil (PROVIGIL) 200 MG tablet     norgestrel-ethinyl estradiol (LO/OVRAL) 0.3-30 MG-MCG tablet     sertraline (ZOLOFT) 50 MG tablet     tiZANidine (ZANAFLEX) 2 MG tablet     diclofenac (VOLTAREN) 75 MG EC tablet     norgestimate-ethinyl estradiol (ORTHO-CYCLEN, SPRINTEC) 0.25-35 MG-MCG per tablet     No current facility-administered medications for this visit.        Social History     Tobacco Use     Smoking status: Former Smoker     Smokeless tobacco: Never Used   Substance Use Topics     Alcohol use: No     Alcohol/week: 0.0 standard drinks     Comment: sober for 90 days     Drug use: Yes     Types: Marijuana     Comment: opiate pain killers       Health Maintenance Due   Topic Date Due     DEPRESSION ACTION PLAN  1985     DTAP/TDAP/TD IMMUNIZATION (1 - Tdap) 03/17/1996     HIV SCREENING  03/17/2000     HPV  03/17/2006     PAP  03/17/2010       No results found for: PAP      December 20, 2019 4:14 PM    "

## 2019-12-31 ENCOUNTER — OFFICE VISIT (OUTPATIENT)
Dept: ORTHOPEDICS | Facility: CLINIC | Age: 34
End: 2019-12-31
Payer: COMMERCIAL

## 2019-12-31 VITALS — RESPIRATION RATE: 16 BRPM | BODY MASS INDEX: 23.95 KG/M2 | HEIGHT: 68 IN | WEIGHT: 158 LBS

## 2019-12-31 DIAGNOSIS — M79.18 MYOFASCIAL PAIN: Primary | ICD-10-CM

## 2019-12-31 ASSESSMENT — MIFFLIN-ST. JEOR: SCORE: 1470.74
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
## 2020-02-13 ENCOUNTER — TELEPHONE (OUTPATIENT)
Dept: FAMILY MEDICINE | Facility: CLINIC | Age: 35
End: 2020-02-13

## 2020-02-13 NOTE — TELEPHONE ENCOUNTER
Health Call Center    Phone Message    May a detailed message be left on voicemail: yes     Reason for Call: Form or Letter   Type or form/letter needing completion: The patient needs a Supporting statement as to why the patient is unable to work currently @ [employer] personal and social work services Attn: Nyla Mosher Phone:[phone] Fax: [phone]   Provider: Mook De La Rosa MD   Date form needed: asap  Once completed: Fax form to: [phone]       Action Taken: Message routed to:  [facility] [facility] [place]: Not Applicable

## 2020-02-13 NOTE — TELEPHONE ENCOUNTER
Spoke to patient and she is looking for letter of support for disability due to mental health. She has a psychiatrist, last visit about 3 months ago. She agrees to reach out to that provider for letter/forms. She has a therapist and she has reached out to them for letter of support. Advised that she should see Dr. De La Rosa to discuss so that we can try and assist. She is agreeable to this plan, scheduled for Monday to discuss letter/form for disability. Asked her to bring in any previous information she has for disability application denial and what she needs - speak to her SW who is working with her for advice. She will do this, bring with her to appointment.     Antonette Curtis RN  02/13/20  5:01 PM

## 2020-02-13 NOTE — TELEPHONE ENCOUNTER
Called patient - LVM to call back to clinic to speak to a nurse. Will need to discuss more about request - get more information.     CALL CENTER - OK to put call through to [facility].    Antonette Curtis RN  02/13/20  4:41 PM

## 2020-03-10 ENCOUNTER — HEALTH MAINTENANCE LETTER (OUTPATIENT)
Age: 35
End: 2020-03-10

## 2020-04-28 ENCOUNTER — TELEPHONE (OUTPATIENT)
Dept: OTOLARYNGOLOGY | Facility: CLINIC | Age: 35
End: 2020-04-28

## 2020-04-28 NOTE — TELEPHONE ENCOUNTER
Let  message for pt regarding appt on 5/4/20 and needing to switch to a video visit. Call back number provided on .

## 2020-05-01 ENCOUNTER — TELEPHONE (OUTPATIENT)
Dept: OTOLARYNGOLOGY | Facility: CLINIC | Age: 35
End: 2020-05-01

## 2020-05-01 NOTE — TELEPHONE ENCOUNTER
vm message left for pt to discuss the need to change appt on 5/4/20 to a video visit and to get her set up for it. Will contact pt via my chart as well. Call back number was provided on [employer].

## 2020-05-04 ENCOUNTER — VIRTUAL VISIT (OUTPATIENT)
Dept: OTOLARYNGOLOGY | Facility: CLINIC | Age: 35
End: 2020-05-04
Payer: COMMERCIAL

## 2020-05-04 VITALS — HEIGHT: 68 IN | BODY MASS INDEX: 23.19 KG/M2 | WEIGHT: 153 LBS

## 2020-05-04 DIAGNOSIS — J31.0 CHRONIC RHINITIS: Primary | Chronic | ICD-10-CM

## 2020-05-04 RX ORDER — AZELASTINE 1 MG/ML
1 SPRAY, METERED NASAL 2 TIMES DAILY
Qty: 1 BOTTLE | Refills: 3 | Status: SHIPPED | OUTPATIENT
Start: 2020-05-04 | End: 2023-01-05

## 2020-05-04 ASSESSMENT — PAIN SCALES - GENERAL: PAINLEVEL: NO PAIN (0)

## 2020-05-04 ASSESSMENT — MIFFLIN-ST. JEOR: SCORE: 1437.5

## 2020-05-04 NOTE — PROGRESS NOTES
"Andria Daily is a 35 year old female who is being evaluated via a billable video visit.      The patient has been notified of following:     \"This video visit will be conducted via a call between you and your physician/provider. We have found that certain health care needs can be provided without the need for an in-person physical exam.  This service lets us provide the care you need with a video conversation.  If a prescription is necessary we can send it directly to your pharmacy.  If lab work is needed we can place an order for that and you can then stop by our lab to have the test done at a later time.    Video visits are billed at different rates depending on your insurance coverage.  Please reach out to your insurance provider with any questions.    If during the course of the call the physician/provider feels a video visit is not appropriate, you will not be charged for this service.\"    Patient has given verbal consent for Video visit? Yes    How would you like to obtain your AVS? MyChart    Patient would like the video invitation sent by: Send to e-mail at: [email or web address]    Will anyone else be joining your video visit? No        Video-Visit Details    Type of service:  Video Visit    Video Start Time:12:59  Video End Time: 1:11    Originating Location (pt. Location): Home    Distant Location (provider location):  [facility]     Platform used for Video Visit: Jonny Dowd MD        "

## 2020-05-04 NOTE — PATIENT INSTRUCTIONS
Thank You for choosing [facility]. You were seen in the ENT Clinic today.     has recommended the followin.Ordered CT for post covid time frame, will call with results  2. see her after result of CT reviewed  3. Astelin nose spray.      If you have any questions or concerns after your appointment, please  Call [phone].

## 2020-05-04 NOTE — PROGRESS NOTES
Otolaryngology Adult Consultation    Patient: Andria Daily   : 1985     Patient presents with:  Consult: No chief complaint on file.       Referring Provider: Referred Self   Consulting Physician:  Glory Dowd MD       Assessment/Plan: Andria has chronic rhinitis, possible rhinosinusitis. Symptoms have progressed. Will change from nasal steroids to Astelin, and order a CT to be done post covid. She is comfortable with the plan.      HPI: Andria Daily is a 35 year old female seen today virtually in the Otolaryngology Clinic for a history of chronic sinus issues.  I have seen her previously. Work up at that time revealed normal immunoglobulins, elevated IgE, and per report her CT showed minimal mucosal thickening. She continues to have symptoms of congestion and drainage, worse with exercise. She feels like her nose is oversensitive. She uses a netti pot with improvement. Feels like flonase, which she has been using for a long time is not very effective and doesn't want to rely on it. She has seen an allergist who suggested dairy and gluten were an issue. Left nasal congestion is worse.      [] beclomethasone (QNASL) 80 MCG/ACT nasal aerosol, Spray 1 spray into both nostrils daily  hydrOXYzine (ATARAX) 25 MG tablet, Take 25 mg by mouth 3 times daily as needed for itching or other (sinus)  modafinil (PROVIGIL) 200 MG tablet, Take 1 tablet by mouth daily  norgestrel-ethinyl estradiol (LO/OVRAL) 0.3-30 MG-MCG tablet, Take 1 tablet by mouth daily . Active tabs only, skip placebo pills.  sertraline (ZOLOFT) 100 MG tablet, Take 100 mg by mouth daily     No current facility-administered medications on file prior to visit.          Allergies   Allergen Reactions     Milk Protein Extract Other (See Comments)     Nasal problems     No Clinical Screening - See Comments      Apples and bananas cause her throat to itch     Penicillins Rash     Rash on face and threw up       Past Medical History:   Diagnosis Date      Anxiety      Chronic fatigue syndrome      Chronic rhinitis      CHHAYA I (cervical intraepithelial neoplasia I) 2007     Depressive disorder      History of eating disorder      History of substance use     prescription pain killers, alcohol, cocaine     Hypersomnia      PTSD (post-traumatic stress disorder)        Social History     Occupational History     Not on file   Tobacco Use     Smoking status: Former Smoker     Last attempt to quit: 2016     Years since quittin.3     Smokeless tobacco: Never Used   Substance and Sexual Activity     Alcohol use: Not Currently     Alcohol/week: 0.0 standard drinks     Frequency: 2-4 times a month     Drinks per session: 1 or 2     Drug use: Yes     Frequency: 7.0 times per week     Types: Marijuana     Comment: sober from cocaine since 2018,      Sexual activity: Not on file        Review of Systems  [facility] ROS 2017   Constitutional Appetite change, Unexplained fatigue, Problems with sleep   Neurology Dizzy spells, Numbness, Unexplained weakness, Headache   Psychology Frequently feeling anxious   Ears, Nose, Throat Hearing loss, Ear pain, Nasal congestion or drainage, Sore throat, Hoarseness   Cardiopulmonary Breathing problems   Gastrointestinal/Genitourinary Constipation   Musculoskeletal Sore or stiff joints, Swollen joints, Back pain, Neck pain   Allergy/Immunology Allergies or hay fever   Endocrine Heat or cold intolerance        14 point ROS neg other than the symptoms noted above.    Physical Exam:    General Assessment   GENERAL: healthy, alert and no distress  EYES: Eyes grossly normal to inspection, conjunctivae and sclerae normal  RESP: no audible wheeze, cough, or visible cyanosis.  No visible retractions or increased work of breathing.  Able to speak fully in complete sentences.  NEURO: Cranial nerves grossly intact, mentation intact and speech normal  PSYCH: mentation appears normal, affect normal/bright, judgement and insight intact, normal  speech and appearance well-groomed

## 2020-05-04 NOTE — NURSING NOTE
Chief Complaint   Patient presents with     RECHECK     chronic pansinusitis     Viviana Soares LPN

## 2020-05-05 ENCOUNTER — TELEPHONE (OUTPATIENT)
Dept: ENDOCRINOLOGY | Facility: CLINIC | Age: 35
End: 2020-05-05

## 2020-05-05 NOTE — TELEPHONE ENCOUNTER
Patient has a scheduling request in to see Dr. Muir due to dizziness and hypoglycemia.  Called patient and was unable to leave a message as the mailbox was full.

## 2020-06-11 ENCOUNTER — TRANSFERRED RECORDS (OUTPATIENT)
Dept: HEALTH INFORMATION MANAGEMENT | Facility: CLINIC | Age: 35
End: 2020-06-11

## 2020-06-18 ENCOUNTER — TRANSFERRED RECORDS (OUTPATIENT)
Dept: HEALTH INFORMATION MANAGEMENT | Facility: CLINIC | Age: 35
End: 2020-06-18

## 2020-07-13 PROBLEM — F41.1 GAD (GENERALIZED ANXIETY DISORDER): Chronic | Status: ACTIVE | Noted: 2020-07-13

## 2020-07-23 ENCOUNTER — ANCILLARY PROCEDURE (OUTPATIENT)
Dept: CT IMAGING | Facility: CLINIC | Age: 35
End: 2020-07-23
Attending: OTOLARYNGOLOGY
Payer: COMMERCIAL

## 2020-07-23 DIAGNOSIS — J31.0 CHRONIC RHINITIS: Chronic | ICD-10-CM

## 2020-08-17 ENCOUNTER — VIRTUAL VISIT (OUTPATIENT)
Dept: OTOLARYNGOLOGY | Facility: CLINIC | Age: 35
End: 2020-08-17
Payer: COMMERCIAL

## 2020-08-17 VITALS — BODY MASS INDEX: 23.95 KG/M2 | HEIGHT: 68 IN | WEIGHT: 158 LBS

## 2020-08-17 DIAGNOSIS — J31.0 CHRONIC RHINITIS: Primary | ICD-10-CM

## 2020-08-17 ASSESSMENT — PAIN SCALES - GENERAL: PAINLEVEL: NO PAIN (0)

## 2020-08-17 ASSESSMENT — MIFFLIN-ST. JEOR: SCORE: 1460.18

## 2020-08-17 NOTE — LETTER
8/17/2020       RE: Andria Daily  [address]  [facility] [address]     Dear Colleague,    Thank you for referring your patient, Andria Daily, to the [facility] at [facility]. Please see a copy of my visit note below.    Andria Daily is a 35 year old female who is being evaluated via a billable video visit.              Video-Visit Details    Type of service:  Video Visit    Video Start Time: 3:13  Video End Time: 3:26    Originating Location (pt. Location): Home    Distant Location (provider location):  [facility]SE [facility] THRO[facility]     Platform used for Video Visit: Jonny Ayers has had chronic nasal drainage and congestion. She feels a bit worse today than usual. She tried astelin and is not sure it helped. She uses atarax occasionally. She possibly had a reaction to preservative in nasal steroid spray but would like to try them again. She has a cat and is allergic to it, but would be interested in immunotherapy. A recent CT was pretty unremarkable other than some small cysts in the floor of the maxillary sinuses.     ROS: no pulmonary issues reported    Exam: GENERAL: Healthy, alert and no distress  EYES: Eyes grossly normal to inspection.  No discharge or erythema, or obvious scleral/conjunctival abnormalities.  RESP: No audible wheeze, cough, or visible cyanosis.  No visible retractions or increased work of breathing. Frequent sniffling and nose blowing during visit.  SKIN: Visible skin clear. No significant rash, abnormal pigmentation or lesions.  NEURO: Cranial nerves grossly intact.  Mentation and speech appropriate for age.  PSYCH: Mentation appears normal, affect normal/bright, judgement and insight intact, normal speech and appearance well-groomed.    A/P: Andria would like to continue to pursue medical therapy and potentially immunotherapy. We discuss the intended benefits of the medications that were recommended. She will try OTC nasal steroids,  antihistamines. Will avoid decongestants for now. Will also consider immunotherapy. Given minimal findings on CT and main symptom of drainage, it is not likely that surgery would be beneficial.     Glory Dowd MD

## 2020-08-17 NOTE — PROGRESS NOTES
"Andria Daily is a 35 year old female who is being evaluated via a billable video visit.      The patient has been notified of following:     \"This video visit will be conducted via a call between you and your physician/provider. We have found that certain health care needs can be provided without the need for an in-person physical exam.  This service lets us provide the care you need with a video conversation.  If a prescription is necessary we can send it directly to your pharmacy.  If lab work is needed we can place an order for that and you can then stop by our lab to have the test done at a later time.    Video visits are billed at different rates depending on your insurance coverage.  Please reach out to your insurance provider with any questions.    If during the course of the call the physician/provider feels a video visit is not appropriate, you will not be charged for this service.\"    Patient has given verbal consent for Video visit? Yes  How would you like to obtain your AVS? MyChart  If you are dropped from the video visit, the video invite should be resent to: Send to e-mail at: [email or web address]  Will anyone else be joining your video visit? No        Video-Visit Details    Type of service:  Video Visit    Video Start Time: 3:13  Video End Time: 3:26    Originating Location (pt. Location): Home    Distant Location (provider location):  [facility]SE [facility] TH[facility]     Platform used for Video Visit: Jonny Ayers has had chronic nasal drainage and congestion. She feels a bit worse today than usual. She tried astelin and is not sure it helped. She uses atarax occasionally. She possibly had a reaction to preservative in nasal steroid spray but would like to try them again. She has a cat and is allergic to it, but would be interested in immunotherapy. A recent CT was pretty unremarkable other than some small cysts in the floor of the maxillary sinuses.     ROS: no pulmonary issues reported    Exam: " GENERAL: Healthy, alert and no distress  EYES: Eyes grossly normal to inspection.  No discharge or erythema, or obvious scleral/conjunctival abnormalities.  RESP: No audible wheeze, cough, or visible cyanosis.  No visible retractions or increased work of breathing. Frequent sniffling and nose blowing during visit.  SKIN: Visible skin clear. No significant rash, abnormal pigmentation or lesions.  NEURO: Cranial nerves grossly intact.  Mentation and speech appropriate for age.  PSYCH: Mentation appears normal, affect normal/bright, judgement and insight intact, normal speech and appearance well-groomed.    A/P: Andria would like to continue to pursue medical therapy and potentially immunotherapy. We discuss the intended benefits of the medications that were recommended. She will try OTC nasal steroids, antihistamines. Will avoid decongestants for now. Will also consider immunotherapy. Given minimal findings on CT and main symptom of drainage, it is not likely that surgery would be beneficial.     Glory Dowd MD

## 2020-08-21 DIAGNOSIS — R10.2 PELVIC PAIN IN FEMALE: ICD-10-CM

## 2020-08-24 RX ORDER — NORGESTREL AND ETHINYL ESTRADIOL 0.3-0.03MG
KIT ORAL
Qty: 28 TABLET | Refills: 1 | Status: SHIPPED | OUTPATIENT
Start: 2020-08-24 | End: 2020-10-06

## 2020-08-24 NOTE — TELEPHONE ENCOUNTER
"Requested Prescriptions   Pending Prescriptions Disp Refills     LOW-OGESTREL 0.3-30 MG-MCG tablet [Pharmacy Med Name: LOW-OGESTREL TABLETS 28] 112 tablet 4     Sig: TAKE 1 TABLET BY MOUTH EVERY DAY. ACTIVE TABLETS ONLY, SKIP PLACEBO PILLS       Contraceptives Protocol Failed - 8/21/2020  6:48 PM        Failed - Recent (12 mo) or future (30 days) visit within the authorizing provider's specialty     Patient has had an office visit with the authorizing provider or a provider within the authorizing providers department within the previous 12 mos or has a future within next 30 days. See \"Patient Info\" tab in inbasket, or \"Choose Columns\" in Meds & Orders section of the refill encounter.              Passed - Patient is not a current smoker if age is 35 or older        Passed - Medication is active on med list        Passed - No active pregnancy on record        Passed - No positive pregnancy test in past 12 months           Last Written Prescription Date:  08/15/2019  Last Fill Quantity: 112,  # refills: 4   Last office visit: 8/15/2019 with prescribing provider:  Maren Bourne   Future Office Visit:  none    Medication is being filled for 1 time refill only due to:  appointment needed for further refills   Ofelia Lopez RN on 8/24/2020 at 1:40 PM          "

## 2020-08-25 NOTE — PROGRESS NOTES
"Andria Daily is a 35 year old female who is being evaluated via a billable telephone visit.      The patient has been notified of following:     \"This telephone visit will be conducted via a call between you and your physician/provider. We have found that certain health care needs can be provided without the need for a physical exam.  This service lets us provide the care you need with a short phone conversation.  If a prescription is necessary we can send it directly to your pharmacy.  If lab work is needed we can place an order for that and you can then stop by our lab to have the test done at a later time.    Telephone visits are billed at different rates depending on your insurance coverage. During this emergency period, for some insurers they may be billed the same as an in-person visit.  Please reach out to your insurance provider with any questions.    If during the course of the call the physician/provider feels a telephone visit is not appropriate, you will not be charged for this service.\"    Patient has given verbal consent for Telephone visit?  Yes    What phone number would you like to be contacted at? [phone]    How would you like to obtain your AVS? Clemente    CC: Dizziness.     HPI:   Patient presents for evaluation of dizziness.   Issue began ~2 years ago when she fainted at work.   EMS was not called. She woke up and was given food and recovered.   Prior to fainting, she felt light headed. She sat down. Then got up and collapsed.     Relates having \"heat stroke\" this summer.   She was going on a walk. She had Gatorade, smoothie, and snacks but still collapsed afterwards.     Describes tremors and diaphoresis as associated with above.  She has not had any episodes at night.   No clear relation to type of food she has eaten.     Notes she has had other episodes of lightheadedness after moving from sitting to standing. These resolve if she holds onto a wall for a minute.     One episode she " "recalls from her youth, age 13, when she felt lightheaded and the mall and leaned against a counter and then collapsed. No evaluation at that time. She wokr up and left.     She began taking prazosin this summer for her PTSD.   Notes episodes present before starting this drug.     No recent change in weight.     Diarrhea when she eats dairy of gluten.     Last opiate Rx was in 2018.     ROS: 10 point ROS neg other than the symptoms noted above in the HPI.    PMH:   Patient Active Problem List   Diagnosis     Chronic fatigue syndrome     Hypersomnia     CHHAYA I (cervical intraepithelial neoplasia I)     Chronic rhinitis     Recurrent major depressive disorder (H)     Anxiety     History of eating disorder     History of substance use     PTSD (post-traumatic stress disorder)     DIANA (generalized anxiety disorder)     Meds:  Current Outpatient Medications   Medication     azelastine (ASTELIN) 0.1 % nasal spray     hydrOXYzine (ATARAX) 25 MG tablet     LOW-OGESTREL 0.3-30 MG-MCG tablet     modafinil (PROVIGIL) 200 MG tablet     sertraline (ZOLOFT) 100 MG tablet     No current facility-administered medications for this visit.      FHX:   Grandmother had DM.   Aunt with \"thyroid issues\".   No known adrenal disease.     SHX:  2-3 drinks a month.   Prior smoker.     Exam:   Gen: In NAD.     A/P:   Dizziness - Possible hypoglycemia but we do not have any objective evidence to support that at this time. She is on prazosin which can cause lightheadedness but her symptoms pre-date its use. We will start with fasting labs and having her record her FSG during these episodes.   -Rx for meter, lancets, test strips. Call me if you have readings below 60.   -Schedule AM fasting labs.     Due to the COVID 19 pandemic this visit was a telephone/video visit in order to help prevent spread of infection in this high risk patient and the general population. The patient gave verbal consent for the visit today.    Start time 1357  Stop time " 1422  Total time 25  This visit would have been billed as [id number] as an E & M code    Paul Rice MD on 8/26/2020 at 2:22 PM

## 2020-08-26 ENCOUNTER — VIRTUAL VISIT (OUTPATIENT)
Dept: ENDOCRINOLOGY | Facility: CLINIC | Age: 35
End: 2020-08-26
Payer: COMMERCIAL

## 2020-08-26 DIAGNOSIS — R42 DIZZINESS: Primary | ICD-10-CM

## 2020-08-26 PROCEDURE — 99202 OFFICE O/P NEW SF 15 MIN: CPT | Mod: 95 | Performed by: INTERNAL MEDICINE

## 2020-08-26 NOTE — LETTER
"    8/26/2020         RE: Andria Daily  [address]  [facility] [address]        Dear Colleague,    Thank you for referring your patient, Andria Daily, to the [facility]. Please see a copy of my visit note below.    Andria Daily is a 35 year old female who is being evaluated via a billable telephone visit.      The patient has been notified of following:     \"This telephone visit will be conducted via a call between you and your physician/provider. We have found that certain health care needs can be provided without the need for a physical exam.  This service lets us provide the care you need with a short phone conversation.  If a prescription is necessary we can send it directly to your pharmacy.  If lab work is needed we can place an order for that and you can then stop by our lab to have the test done at a later time.    Telephone visits are billed at different rates depending on your insurance coverage. During this emergency period, for some insurers they may be billed the same as an in-person visit.  Please reach out to your insurance provider with any questions.    If during the course of the call the physician/provider feels a telephone visit is not appropriate, you will not be charged for this service.\"    Patient has given verbal consent for Telephone visit?  Yes    What phone number would you like to be contacted at? [phone]    How would you like to obtain your AVS? Clemente    CC: Dizziness.     HPI:   Patient presents for evaluation of dizziness.   Issue began ~2 years ago when she fainted at work.   EMS was not called. She woke up and was given food and recovered.   Prior to fainting, she felt light headed. She sat down. Then got up and collapsed.     Relates having \"heat stroke\" this summer.   She was going on a walk. She had Gatorade, smoothie, and snacks but still collapsed afterwards.     Describes tremors and diaphoresis as associated with above.  She has not had any episodes " "at night.   No clear relation to type of food she has eaten.     Notes she has had other episodes of lightheadedness after moving from sitting to standing. These resolve if she holds onto a wall for a minute.     One episode she recalls from her youth, age 13, when she felt lightheaded and the mall and leaned against a counter and then collapsed. No evaluation at that time. She wokr up and left.     She began taking prazosin this summer for her PTSD.   Notes episodes present before starting this drug.     No recent change in weight.     Diarrhea when she eats dairy of gluten.     Last opiate Rx was in 2018.     ROS: 10 point ROS neg other than the symptoms noted above in the HPI.    PMH:   Patient Active Problem List   Diagnosis     Chronic fatigue syndrome     Hypersomnia     CHHAYA I (cervical intraepithelial neoplasia I)     Chronic rhinitis     Recurrent major depressive disorder (H)     Anxiety     History of eating disorder     History of substance use     PTSD (post-traumatic stress disorder)     DIANA (generalized anxiety disorder)     Meds:  Current Outpatient Medications   Medication     azelastine (ASTELIN) 0.1 % nasal spray     hydrOXYzine (ATARAX) 25 MG tablet     LOW-OGESTREL 0.3-30 MG-MCG tablet     modafinil (PROVIGIL) 200 MG tablet     sertraline (ZOLOFT) 100 MG tablet     No current facility-administered medications for this visit.      FHX:   Grandmother had DM.   Aunt with \"thyroid issues\".   No known adrenal disease.     SHX:  2-3 drinks a month.   Prior smoker.     Exam:   Gen: In NAD.     A/P:   Dizziness - Possible hypoglycemia but we do not have any objective evidence to support that at this time. She is on prazosin which can cause lightheadedness but her symptoms pre-date its use. We will start with fasting labs and having her record her FSG during these episodes.   -Rx for meter, lancets, test strips. Call me if you have readings below 60.   -Schedule AM fasting labs.     Due to the COVID 19 " pandemic this visit was a telephone/video visit in order to help prevent spread of infection in this high risk patient and the general population. The patient gave verbal consent for the visit today.    Start time 1357  Stop time 1422  Total time 25  This visit would have been billed as [id number] as an E & M code    Paul Rice MD on 8/26/2020 at 2:22 PM            Again, thank you for allowing me to participate in the care of your patient.        Sincerely,        Paul Rice MD

## 2020-08-27 ENCOUNTER — VIRTUAL VISIT (OUTPATIENT)
Dept: FAMILY MEDICINE | Facility: CLINIC | Age: 35
End: 2020-08-27
Payer: COMMERCIAL

## 2020-08-27 DIAGNOSIS — F41.1 GAD (GENERALIZED ANXIETY DISORDER): Chronic | ICD-10-CM

## 2020-08-27 DIAGNOSIS — M54.6 CHRONIC MIDLINE THORACIC BACK PAIN: ICD-10-CM

## 2020-08-27 DIAGNOSIS — G89.29 CHRONIC NECK PAIN: Primary | ICD-10-CM

## 2020-08-27 DIAGNOSIS — G89.29 CHRONIC MIDLINE THORACIC BACK PAIN: ICD-10-CM

## 2020-08-27 DIAGNOSIS — F33.41 RECURRENT MAJOR DEPRESSIVE DISORDER, IN PARTIAL REMISSION (H): Chronic | ICD-10-CM

## 2020-08-27 DIAGNOSIS — F43.10 PTSD (POST-TRAUMATIC STRESS DISORDER): Chronic | ICD-10-CM

## 2020-08-27 DIAGNOSIS — M54.2 CHRONIC NECK PAIN: Primary | ICD-10-CM

## 2020-08-27 RX ORDER — SERTRALINE HYDROCHLORIDE 25 MG/1
25 TABLET, FILM COATED ORAL DAILY
COMMUNITY
End: 2021-01-29

## 2020-08-27 RX ORDER — PRAZOSIN HYDROCHLORIDE 1 MG/1
1 CAPSULE ORAL AT BEDTIME
COMMUNITY
End: 2021-04-21
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
## 2020-08-28 ENCOUNTER — ANCILLARY PROCEDURE (OUTPATIENT)
Dept: GENERAL RADIOLOGY | Facility: CLINIC | Age: 35
End: 2020-08-28
Attending: FAMILY MEDICINE
Payer: COMMERCIAL

## 2020-08-28 DIAGNOSIS — M54.6 CHRONIC MIDLINE THORACIC BACK PAIN: ICD-10-CM

## 2020-08-28 DIAGNOSIS — M54.2 CHRONIC NECK PAIN: ICD-10-CM

## 2020-08-28 DIAGNOSIS — G89.29 CHRONIC NECK PAIN: ICD-10-CM

## 2020-08-28 DIAGNOSIS — G89.29 CHRONIC MIDLINE THORACIC BACK PAIN: ICD-10-CM

## 2020-10-06 DIAGNOSIS — R10.2 PELVIC PAIN IN FEMALE: ICD-10-CM

## 2020-10-06 RX ORDER — NORGESTREL AND ETHINYL ESTRADIOL 0.3-0.03MG
KIT ORAL
Qty: 28 TABLET | Refills: 0 | Status: SHIPPED | OUTPATIENT
Start: 2020-10-06 | End: 2020-10-07

## 2020-10-06 NOTE — TELEPHONE ENCOUNTER
Prescription approved per [facility] Refill Protocol.    Nicole Miller RN on 10/6/2020 at 8:53 AM

## 2020-10-06 NOTE — PROGRESS NOTES
SUBJECTIVE:                                                   Andria Daily is a 35 year old female who presents to clinic today for the following health issue(s):  Patient presents with:  Consult: Paragard IUD consult      Additional information:     HPI: The patient is seen at this time for follow-up of left-sided pelvic pain.  She was treated at [facility] and had negative cultures but was given antibiotics for 2 weeks for pelvic inflammatory disease.  An ultrasound was unremarkable.  The patient is on her menses today.  She has not had any follow-up cultures after she finished the course of antibiotics.  She is very emotional and is concerned about the possibility of endometriosis.      Patient's last menstrual period was 09/15/2020 (approximate)..     Patient is sexually active, .  Using oral contraceptives for contraception.    reports that she quit smoking about 4 years ago. She has never used smokeless tobacco.    STD testing offered?  Declined    Health maintenance updated:  no    Today's PHQ-2 Score:   PHQ-2 (  [employer]) 2020   Q1: Little interest or pleasure in doing things 1   Q2: Feeling down, depressed or hopeless 0   PHQ-2 Score 1     Today's PHQ-9 Score:   PHQ-9 SCORE 9/15/2020   PHQ-9 Total Score MyChart 10 (Moderate depression)   PHQ-9 Total Score 10     Today's DIANA-7 Score:   DIANA-7 SCORE 9/15/2020   Total Score 13 (moderate anxiety)   Total Score 13       Problem list and histories reviewed & adjusted, as indicated.  Additional history: as documented.    Patient Active Problem List   Diagnosis     Chronic fatigue syndrome     Hypersomnia     CHHAYA I (cervical intraepithelial neoplasia I)     Chronic rhinitis     Recurrent major depressive disorder (H)     Anxiety     History of eating disorder     History of substance use     PTSD (post-traumatic stress disorder)     DIANA (generalized anxiety disorder)     Past Surgical History:   Procedure Laterality Date     KNEE SURGERY Left  2018    for osgood-schlatter and tendon repair/anchor     WRIST SURGERY Left     snowboarding injury, fractured, had metal plate initially that was then removed      Social History     Tobacco Use     Smoking status: Former Smoker     Quit date: 2016     Years since quittin.7     Smokeless tobacco: Never Used   Substance Use Topics     Alcohol use: Not Currently     Alcohol/week: 0.0 standard drinks     Frequency: 2-4 times a month     Drinks per session: 1 or 2      Problem (# of Occurrences) Relation (Name,Age of Onset)    Anxiety Disorder (3) Mother, Father, Sister: after bad car accident    Depression (1) Father    Diabetes (1) Maternal Grandmother    Heart Disease (1) Maternal Grandfather    Hyperlipidemia (1) Maternal Grandfather    Hypertension (1) Maternal Grandfather       Negative family history of: Breast Cancer, Ovarian Cancer, Colon Cancer            Current Outpatient Medications   Medication Sig     ascorbic acid (VITAMIN C) 500 MG CPCR CR capsule      azelastine (ASTELIN) 0.1 % nasal spray Spray 1 spray into both nostrils 2 times daily     blood glucose (NO BRAND SPECIFIED) lancets standard Use to test blood sugar 1 times daily or as directed.     blood glucose (NO BRAND SPECIFIED) test strip Use to test blood sugar 1 times daily or as directed.     blood glucose monitoring (NO BRAND SPECIFIED) meter device kit Use to test blood sugar 1 times daily or as directed.     calcium gluconate 500 MG tablet Take 500 mg by mouth 2 times daily     Cyanocobalamin (B-12) 1000 MCG TBCR      hydrOXYzine (ATARAX) 25 MG tablet Take 25 mg by mouth 3 times daily as needed for itching or other (sinus)     LOW-OGESTREL 0.3-30 MG-MCG tablet TAKE 1 TABLET BY MOUTH EVERY DAY ONLY TAKING ACTIVE TABLETS     modafinil (PROVIGIL) 200 MG tablet Take 1 tablet by mouth daily     prazosin (MINIPRESS) 1 MG capsule Take 1 mg by mouth At Bedtime     sertraline (ZOLOFT) 25 MG tablet Take 25 mg by mouth daily     vitamin D3  "(CHOLECALCIFEROL) 25 mcg (50080 units) capsule Take 1 capsule by mouth daily     No current facility-administered medications for this visit.      Allergies   Allergen Reactions     Milk Protein Extract Other (See Comments)     Nasal problems     No Clinical Screening - See Comments      Apples and bananas cause her throat to itch     Seasonal Allergies      Penicillins Rash     Rash on face and threw up       ROS:  12 point review of systems negative other than symptoms noted below or in the HPI.  No urinary frequency or dysuria, bladder or kidney problems      OBJECTIVE:     Ht 1.74 m (5' 8.5\")   Wt 78.3 kg (172 lb 9.6 oz)   LMP 09/15/2020 (Approximate)   Breastfeeding No   BMI 25.86 kg/m    Body mass index is 25.86 kg/m .    Exam:  Constitutional:  Appearance: Well nourished, well developed alert, in no acute distress     In-Clinic Test Results:      ASSESSMENT/PLAN:                                                        Patient with left-sided pelvic pain who deferred exam at this time.  We have asked her to go on pills on a continuous basis.  A long discussion of endometriosis was undertaken.  She is not a candidate for a IUD at this time.  She needs a repeat set of cervical cultures done.          Edmund Mcneill MD  [facility] [place]      "

## 2020-10-06 NOTE — TELEPHONE ENCOUNTER
"Requested Prescriptions   Pending Prescriptions Disp Refills     LOW-OGESTREL 0.3-30 MG-MCG tablet [Pharmacy Med Name: LOW-OGESTREL TABLETS 28] 28 tablet 1     Sig: TAKE 1 TABLET BY MOUTH EVERY DAY ONLY TAKING ACTIVE TABLETS       Contraceptives Protocol Passed - 10/6/2020  3:21 AM        Passed - Patient is not a current smoker if age is 35 or older        Passed - Recent (12 mo) or future (30 days) visit within the authorizing provider's specialty     Patient has had an office visit with the authorizing provider or a provider within the authorizing providers department within the previous 12 mos or has a future within next 30 days. See \"Patient Info\" tab in inbasket, or \"Choose Columns\" in Meds & Orders section of the refill encounter.              Passed - Medication is active on med list        Passed - No active pregnancy on record        Passed - No positive pregnancy test in past 12 months           Last Written Prescription Date:  08/24/2020  Last Fill Quantity: 28,  # refills: 1   Last office visit: 8/15/2019 with prescribing provider:  Maren Bourne   Future Office Visit:   Next 5 appointments (look out 90 days)    Oct 07, 2020  3:45 PM  Office Visit with Edmund Mcneill MD  [facility] ([facility]) [address] [place] [id number] [phone]                 "

## 2020-10-07 ENCOUNTER — OFFICE VISIT (OUTPATIENT)
Dept: OBGYN | Facility: CLINIC | Age: 35
End: 2020-10-07
Payer: COMMERCIAL

## 2020-10-07 VITALS
WEIGHT: 172.6 LBS | HEIGHT: 69 IN | DIASTOLIC BLOOD PRESSURE: 70 MMHG | BODY MASS INDEX: 25.56 KG/M2 | SYSTOLIC BLOOD PRESSURE: 110 MMHG

## 2020-10-07 DIAGNOSIS — R10.2 PELVIC PAIN IN FEMALE: ICD-10-CM

## 2020-10-07 PROCEDURE — 99213 OFFICE O/P EST LOW 20 MIN: CPT | Performed by: OBSTETRICS & GYNECOLOGY

## 2020-10-07 RX ORDER — NORGESTREL AND ETHINYL ESTRADIOL 0.3-0.03MG
KIT ORAL
Qty: 28 TABLET | Refills: 3 | Status: SHIPPED | OUTPATIENT
Start: 2020-10-07 | End: 2022-01-20

## 2020-10-07 ASSESSMENT — MIFFLIN-ST. JEOR: SCORE: 1534.35

## 2020-10-30 DIAGNOSIS — R10.2 PELVIC PAIN IN FEMALE: ICD-10-CM

## 2020-10-30 RX ORDER — NORGESTREL AND ETHINYL ESTRADIOL 0.3-0.03MG
KIT ORAL
Qty: 28 TABLET | Refills: 3 | OUTPATIENT
Start: 2020-10-30

## 2020-10-30 NOTE — TELEPHONE ENCOUNTER
"Requested Prescriptions   Pending Prescriptions Disp Refills     norgestrel-ethinyl estradiol (LOW-OGESTREL) 0.3-30 MG-MCG tablet 28 tablet 3     Sig: TAKE 1 TABLET BY MOUTH EVERY DAY ONLY TAKING ACTIVE TABLETS       Contraceptives Protocol Passed - 10/30/2020  2:19 PM        Passed - Patient is not a current smoker if age is 35 or older        Passed - Recent (12 mo) or future (30 days) visit within the authorizing provider's specialty     Patient has had an office visit with the authorizing provider or a provider within the authorizing providers department within the previous 12 mos or has a future within next 30 days. See \"Patient Info\" tab in inbasket, or \"Choose Columns\" in Meds & Orders section of the refill encounter.              Passed - Medication is active on med list        Passed - No active pregnancy on record        Passed - No positive pregnancy test in past 12 months           Last Written Prescription Date:  10/7/20  Last Fill Quantity: 28,  # refills: 3   Last office visit: 10/7/2020 with prescribing provider:   kishor  Future Office Visit:  none          "

## 2020-11-04 NOTE — TELEPHONE ENCOUNTER
DIAGNOSIS: right foot pain,self,no imaging,   APPOINTMENT DATE:11.24.20   NOTES STATUS DETAILS   OFFICE NOTE from referring provider N/A    OFFICE NOTE from other specialist N/A    DISCHARGE SUMMARY from hospital N/A    DISCHARGE REPORT from the ER N/A    OPERATIVE REPORT N/A    MEDICATION LIST Internal    MRI N/A    CT SCAN N/A    XRAYS (IMAGES & REPORTS) N/A

## 2020-11-18 ENCOUNTER — TELEPHONE (OUTPATIENT)
Dept: ORTHOPEDICS | Facility: CLINIC | Age: 35
End: 2020-11-18

## 2020-11-18 NOTE — TELEPHONE ENCOUNTER
Called and LVM.    Explained that due to COVID-19, [facility] is taking steps to try to limit potential patient exposures by reducing face to face visits. A way to do this is by offering telephone visits as an alternative option for their follow-up care.    Video Visit  Billing information reviewed: LUKE  Time information reviewed: LUKE  Contact Number Verified: LUKE    Cancelling Visit  They will call to reschedule: NA  Their appointment was rescheduled for: LUKE    LVM with call back number to convert visit to video and answer any questions.

## 2020-11-20 ENCOUNTER — OFFICE VISIT (OUTPATIENT)
Dept: FAMILY MEDICINE | Facility: CLINIC | Age: 35
End: 2020-11-20
Payer: COMMERCIAL

## 2020-11-20 VITALS
OXYGEN SATURATION: 100 % | TEMPERATURE: 97.1 F | DIASTOLIC BLOOD PRESSURE: 78 MMHG | BODY MASS INDEX: 27.35 KG/M2 | RESPIRATION RATE: 12 BRPM | HEIGHT: 68 IN | WEIGHT: 180.5 LBS | HEART RATE: 70 BPM | SYSTOLIC BLOOD PRESSURE: 115 MMHG

## 2020-11-20 DIAGNOSIS — R25.1 TREMOR: ICD-10-CM

## 2020-11-20 DIAGNOSIS — Z23 NEEDS FLU SHOT: ICD-10-CM

## 2020-11-20 DIAGNOSIS — M79.671 RIGHT FOOT PAIN: ICD-10-CM

## 2020-11-20 DIAGNOSIS — M79.671 RIGHT FOOT PAIN: Primary | ICD-10-CM

## 2020-11-20 ASSESSMENT — MIFFLIN-ST. JEOR: SCORE: 1567.73

## 2020-11-20 NOTE — TELEPHONE ENCOUNTER
Spoke with patient about her visit on 11/24 with Dr. Meraz. According to her [employer] message to her PCP, she injured her foot by dropping something heavy on it. I told her that we can see her in clinic for acute injuries. She will be seeing her PCP today. She will keep or cancel the appointment on 11/14 depending on what happens with her PCP.

## 2020-11-20 NOTE — PROGRESS NOTES
SUBJECTIVE:   Andria is a 35 year old female who presents to clinic today for a return visit.      # Right Foot/Ankle Pain:  Duration: 3 weeks ago, pain stayed about the same  Injury?/ Inciting event?: dropped manikin on foot  Location: Right foot, dorsal aspect of big toe and metatarsals.  Swelling?/ Bruising? no  Able to bear weight? Yes, painful  Limited motion? Sore to bend toes. Painful while driving  Weakness?/ Instability? some  Imaging? no  Treatment? none    -Elevation, taping helps    # Neurologic Symptoms  - has a history of CFS, diagnosed around 2010  - also has a history of hypersomnia, diagnosed by sleep medicine at [facility], takes Modafinil 200mg daily  -really tired last couple months, especially this past month  -working on mental health, trauma recently.   -pain in neck and shoulders, pain in lower back. Whole spine feels sore  -tried vicodin in past, had issues with addiction.   -Taking tylenol 500 mg twice a day  -heat and stretching helps, but only does so much  -ice can be painful    #Tremor  -worsens with specific movements  -past month  -affects family members (mom), mom's cousin  -unsure if alcohol makes it better  -worsens with coffee, may be worse with modafinil     #urinary urgency, incontinence   -not as recent.   -2 episodes of leakage over the past 6 months  -no pain with urination, maybe a little after sex  -gets up once to urinate per night   -no foreign substance around urethra     #vision  -harder to focus, tired eyes at end of the day  -a little light insensitivity     #Vertigo   -first episode 2 years ago  -treated with meclazine  -went to chiro, went away  -light headed when stand too fast  -no tinnitus  -vertigo when move head too fast  -couple times a week  -balance hasn't been great, but hasn't fallen yet    #hands  -losing  strength, drop things such as dishes  -bilateral       ROS: Denies fevers, chills, chest pain, difficulty breathing, abdominal pain    Patient Active  "Problem List   Diagnosis     Chronic fatigue syndrome     Hypersomnia     CHHAYA I (cervical intraepithelial neoplasia I)     Chronic rhinitis     Recurrent major depressive disorder (H)     Anxiety     History of eating disorder     History of substance use     PTSD (post-traumatic stress disorder)     DIANA (generalized anxiety disorder)     Current Outpatient Medications   Medication     ascorbic acid (VITAMIN C) 500 MG CPCR CR capsule     azelastine (ASTELIN) 0.1 % nasal spray     blood glucose (NO BRAND SPECIFIED) lancets standard     blood glucose (NO BRAND SPECIFIED) test strip     blood glucose monitoring (NO BRAND SPECIFIED) meter device kit     calcium gluconate 500 MG tablet     Cyanocobalamin (B-12) 1000 MCG TBCR     hydrOXYzine (ATARAX) 25 MG tablet     modafinil (PROVIGIL) 200 MG tablet     norgestrel-ethinyl estradiol (LOW-OGESTREL) 0.3-30 MG-MCG tablet     prazosin (MINIPRESS) 1 MG capsule     sertraline (ZOLOFT) 25 MG tablet     vitamin D3 (CHOLECALCIFEROL) 25 mcg (49159 units) capsule     No current facility-administered medications for this visit.        I have reviewed the patient's relevant past medical history.     OBJECTIVE:   /78   Pulse 70   Temp 97.1  F (36.2  C)   Resp 12   Ht 1.736 m (5' 8.35\")   Wt 81.9 kg (180 lb 8 oz)   LMP 10/30/2020 (Approximate)   SpO2 100%   Breastfeeding No   BMI 27.17 kg/m      Constitutional: well-appearing, appears stated age  Eyes: conjunctivae without erythema, sclera anicteric.   Skin: no rashes, lesions, or wounds  Psych: affect is full and appropriate, speech is fluent and non-pressured    Neuro: CN 2-12 intact, biceps reflexes 2+ and symmetric, patellar reflexes 2+ and symmetric. Downgoing Babinski. No ankle clonus Intact to fine touch in upper and lower extremities. Strength 5/5 and symmetric with shoulder abduction/adduction, wrist flexion/extension. 5/5 left elbow flexion/extension. 5/5 right elbow flexion, 4+5 right elbow " extension.  Negative Romberg  -left thenar slight atrophy compared to right  No resting tremor  Postural tremor present in both hands equally with arms outstretched  Tremor present with actions as well, such as writing and drawing spiral.     Foot/Ankle:   Swelling: No ; Bruising: faint bruising over dorsal surface right 1st toe proximal phalanx  ROM: Full in dorsiflexion, plantarflexion, inversion, eversion  Sensation: intact over sural, saphenous, tibial, and peroneal distribution.   Pulses: 2+  Tender to palpation over distal 1st metatarsal and proximal 1st phalanx    Results for orders placed or performed in visit on 11/20/20   X-ray rt Foot G/E 3 vws*     Status: None    Narrative    3 views right foot radiographs 11/20/2020 3:58 PM    History: Trauma from dropped weight x3 weeks, tender over distal 1st  metatarsal and proximal 1st phalanx; Right foot pain     Comparison: None    Findings:    Nonweightbearing AP, oblique, and lateral  views of the right foot  were obtained.     No acute osseous abnormality.      Lisfranc articulation alignment is congruent on this non-weight  bearing study.    Mild degenerative changes of first metatarsophalangeal joint. Tiny  plantar calcaneal heel spur. Os peroneus.    Soft tissue is unremarkable.      Impression    Impression:  1. No acute osseous abnormality.  2. No substantial degenerative change.    BRIANA POZO MD (Joe)       ASSESSMENT AND PLAN:     (M79.671) Right foot pain  (primary encounter diagnosis)  Comment: Andria dropped a manikin on her right foot three weeks ago, and has experienced pain in the interphalangeal joint of her right big toe, as well as on the dorsal aspect of her first metatarsal. She reports discomfort while walking, although is still able to bear weight. The area was mildly tender to palpation, with no swelling and little bruising. X-ray did not show any fractures. Andria most likely has a lingering bone contusing.   Plan: X-ray rt Foot G/E  "3 vws*        We talked about resting the foot with possible, and managing pain with NSAIDS. Also recommended mole skin or a padding for the bottom of her foot over the metatarsal phalangeal joints to take pressure of of the injured area.     (R25.1) Tremor  Comment: Andria has been experiencing a hand tremor over the past month that gets worse with specific movements. Runs in the family with her mother and her mother's  Cousin. Unsure if alcohol makes it better. Stopped drinking coffee because of this. Archimedean spiral test showed a low frequency tremor. Most likely diagnosis is an essential tremor.   Plan: NEUROLOGY ADULT REFERRAL        Consult neurology, and possibly start primidone or propanolol to treat essential tremor.     Andria is a 35 year old female with a past medical history of chronic fatigue syndrome who presents to the clinic for evaluation of pain in her first interphalangeal joint of the right foot secondary to dropping an object on it 3 weeks ago. During the visit, Andria also mentioned several symptoms that could be concerning for a neurological etiology. She reports two episodes of nocturnal urinary incontinence over the past 6 months, and says she wakes up about once a night to urinate. Focusing on objects has been more difficult at the end of each day, and is having some light sensitivity. 2 years ago she began having what she describes as \"vertigo\", with dizziness when standing up, and sometimes feels unsteady on her feet. Andria also reports a loss of  strength bilaterally, occasionally leading to her dropping dishes. Physical exam showed slight atrophy of the left thenar muscles, and 4+/5 triceps strength on the right. 5/5 triceps strength on the left. Cranial nerve exam was normal. no ankle clonus, downgoing babinksi, negative romberg. Individually, these symptoms could be attributed to CFS, but in constellation, we can't overlook a neurological origin such as MS. Andria should see neurology " for further evaluation of her symptoms.       Prakash Branch, MS3  On November 22, 2020 at 9:27 PM        I was present with the medical student who participated in the service and in the documentation of the note. I have verified the history and personally performed the physical exam and medical decision making. I agree with the assessment and plan of care as documented in the note with the following additions:     Hard to know what to make of Andria's cluster of symptoms. I suspect most are related to her previously diagnosed CFS (orthostatic intolerance, fatigue). Tremor most consistent with essential tremor. She's young for it but it does run in her family. She's overall fairly hyperreflexic but it's equal. Slightly weaker with elbow extension on the left and less full thenar eminence on the left combined with her other symptoms could suggest some primary neurologic issue. I think all things considered it is very reasonable for her to talk through her cluster of symptoms with neurology to see if additional workup for a CNS process would be warranted.       Mook De La Rosa MD   [facility]  11/24/2020, 10:03 AM

## 2020-11-20 NOTE — NURSING NOTE
"35 year old  Chief Complaint   Patient presents with     Fatigue     Musculoskeletal Problem     right foot pain, pt dropped a heavy object on it about 3 weeks ago        Blood pressure 115/78, pulse 70, temperature 97.1  F (36.2  C), resp. rate 12, height 1.736 m (5' 8.35\"), weight 81.9 kg (180 lb 8 oz), last menstrual period 10/30/2020, SpO2 100 %, not currently breastfeeding. Body mass index is 27.17 kg/m .  Patient Active Problem List   Diagnosis     Chronic fatigue syndrome     Hypersomnia     CHHAYA I (cervical intraepithelial neoplasia I)     Chronic rhinitis     Recurrent major depressive disorder (H)     Anxiety     History of eating disorder     History of substance use     PTSD (post-traumatic stress disorder)     DIANA (generalized anxiety disorder)       Wt Readings from Last 2 Encounters:   20 81.9 kg (180 lb 8 oz)   10/07/20 78.3 kg (172 lb 9.6 oz)     BP Readings from Last 3 Encounters:   20 115/78   10/07/20 110/70   19 112/76         Current Outpatient Medications   Medication     ascorbic acid (VITAMIN C) 500 MG CPCR CR capsule     azelastine (ASTELIN) 0.1 % nasal spray     blood glucose (NO BRAND SPECIFIED) lancets standard     blood glucose (NO BRAND SPECIFIED) test strip     blood glucose monitoring (NO BRAND SPECIFIED) meter device kit     calcium gluconate 500 MG tablet     Cyanocobalamin (B-12) 1000 MCG TBCR     hydrOXYzine (ATARAX) 25 MG tablet     modafinil (PROVIGIL) 200 MG tablet     norgestrel-ethinyl estradiol (LOW-OGESTREL) 0.3-30 MG-MCG tablet     prazosin (MINIPRESS) 1 MG capsule     sertraline (ZOLOFT) 25 MG tablet     vitamin D3 (CHOLECALCIFEROL) 25 mcg (74438 units) capsule     No current facility-administered medications for this visit.        Social History     Tobacco Use     Smoking status: Former Smoker     Quit date: 2016     Years since quittin.8     Smokeless tobacco: Never Used   Substance Use Topics     Alcohol use: Not Currently     Alcohol/week: 0.0 " standard drinks     Frequency: 2-4 times a month     Drinks per session: 1 or 2     Drug use: Yes     Frequency: 7.0 times per week     Types: Marijuana     Comment: sober from cocaine since 01/2018,        Health Maintenance Due   Topic Date Due     DEPRESSION ACTION PLAN  1985     HIV SCREENING  03/17/2000     HEPATITIS C SCREENING  03/17/2003     PREVENTIVE CARE VISIT  08/15/2020     INFLUENZA VACCINE (1) 09/01/2020       No results found for: PAP      November 20, 2020 1:52 PM

## 2020-11-24 ENCOUNTER — PRE VISIT (OUTPATIENT)
Dept: ORTHOPEDICS | Facility: CLINIC | Age: 35
End: 2020-11-24

## 2020-12-27 ENCOUNTER — HEALTH MAINTENANCE LETTER (OUTPATIENT)
Age: 35
End: 2020-12-27

## 2021-02-26 ENCOUNTER — OFFICE VISIT (OUTPATIENT)
Dept: OBGYN | Facility: CLINIC | Age: 36
End: 2021-02-26
Attending: FAMILY MEDICINE
Payer: COMMERCIAL

## 2021-02-26 VITALS
BODY MASS INDEX: 26.1 KG/M2 | HEART RATE: 77 BPM | DIASTOLIC BLOOD PRESSURE: 79 MMHG | WEIGHT: 172.2 LBS | HEIGHT: 68 IN | SYSTOLIC BLOOD PRESSURE: 114 MMHG

## 2021-02-26 DIAGNOSIS — Z11.3 SCREEN FOR STD (SEXUALLY TRANSMITTED DISEASE): ICD-10-CM

## 2021-02-26 DIAGNOSIS — N88.8 NABOTHIAN CYST: ICD-10-CM

## 2021-02-26 DIAGNOSIS — R10.2 PELVIC PAIN IN FEMALE: Primary | ICD-10-CM

## 2021-02-26 DIAGNOSIS — Z12.4 SCREENING FOR CERVICAL CANCER: ICD-10-CM

## 2021-02-26 LAB
HCV AB SERPL QL IA: NONREACTIVE
HIV 1+2 AB+HIV1 P24 AG SERPL QL IA: NONREACTIVE
T PALLIDUM AB SER QL: NONREACTIVE

## 2021-02-26 PROCEDURE — G0463 HOSPITAL OUTPT CLINIC VISIT: HCPCS

## 2021-02-26 PROCEDURE — 99204 OFFICE O/P NEW MOD 45 MIN: CPT | Performed by: NURSE PRACTITIONER

## 2021-02-26 PROCEDURE — 86780 TREPONEMA PALLIDUM: CPT | Performed by: NURSE PRACTITIONER

## 2021-02-26 PROCEDURE — 87491 CHLMYD TRACH DNA AMP PROBE: CPT | Performed by: NURSE PRACTITIONER

## 2021-02-26 PROCEDURE — 36415 COLL VENOUS BLD VENIPUNCTURE: CPT | Performed by: NURSE PRACTITIONER

## 2021-02-26 PROCEDURE — 87624 HPV HI-RISK TYP POOLED RSLT: CPT | Performed by: NURSE PRACTITIONER

## 2021-02-26 PROCEDURE — 86803 HEPATITIS C AB TEST: CPT | Performed by: NURSE PRACTITIONER

## 2021-02-26 PROCEDURE — 87389 HIV-1 AG W/HIV-1&-2 AB AG IA: CPT | Performed by: NURSE PRACTITIONER

## 2021-02-26 PROCEDURE — G0145 SCR C/V CYTO,THINLAYER,RESCR: HCPCS | Performed by: NURSE PRACTITIONER

## 2021-02-26 PROCEDURE — 87591 N.GONORRHOEAE DNA AMP PROB: CPT | Performed by: NURSE PRACTITIONER

## 2021-02-26 ASSESSMENT — ANXIETY QUESTIONNAIRES
GAD7 TOTAL SCORE: 6
6. BECOMING EASILY ANNOYED OR IRRITABLE: SEVERAL DAYS
3. WORRYING TOO MUCH ABOUT DIFFERENT THINGS: SEVERAL DAYS
1. FEELING NERVOUS, ANXIOUS, OR ON EDGE: SEVERAL DAYS
7. FEELING AFRAID AS IF SOMETHING AWFUL MIGHT HAPPEN: NOT AT ALL
2. NOT BEING ABLE TO STOP OR CONTROL WORRYING: SEVERAL DAYS
5. BEING SO RESTLESS THAT IT IS HARD TO SIT STILL: SEVERAL DAYS

## 2021-02-26 ASSESSMENT — PATIENT HEALTH QUESTIONNAIRE - PHQ9
SUM OF ALL RESPONSES TO PHQ QUESTIONS 1-9: 7
5. POOR APPETITE OR OVEREATING: SEVERAL DAYS

## 2021-02-26 ASSESSMENT — MIFFLIN-ST. JEOR: SCORE: 1524.59

## 2021-02-26 NOTE — LETTER
2021       RE: Andria Daily  [address]  [facility] [address]     Dear Colleague,    Thank you for referring your patient, Andria Daily, to the [facility] [place] [facility]. Please see a copy of my visit note below.    Subjective:  Andria Daily is a 35 y.o., , presenting for chronic pelvic pain and concern for endometriosis. She reports intermittent LLQ pain and left lower back pain that tends to present when she is expected to have her period. Pain is consistently on left side but randomly she will get sharp pain in rectum and bilaterally in lower pelvic region. Additional symptoms include painful bowel movements and being unable to hold bowel movements due to pain, dyspareunia, and pain with urination after sex. She is unable to recall when these symptoms started, but the pain has worsened since 2019. She is on continuous COCs which does decrease her symptoms, but does not provide adequate pain control. She has been on COCs for about 4 years and is interested in trying another form of contraception. She reports not being great at remembering to consistently take her pills and she does not want to become pregnant anytime soon. She also uses tylenol and a heating pad to help with pain.    Andria was seen at [facility] on 5/3/2020. She was treated for PID. TVUS was normal. After tx for PID, she reports that she had short term relief of symptoms, but they returned shortly afterwards. Gonorrhea & chlamydia were negative at this time.     Menstrual history: occasional spotting since on continuous COCs. LMP was sometime in summer.     Sexual history: sexually active with 1 male partner in past year. She would like to proceed with STI testing today.    History of depression, anxiety, PTSD, and trauma - currently in mental health group x 3 weeks. Feels like it is helping. PCP is managing her medications. Also has  connection with a psychiatrist.    PHQ-9 score:    PHQ 2/26/2021   PHQ-9 Total Score 7   Q9: Thoughts of better off dead/self-harm past 2 weeks Not at all     DIANA-7 SCORE 8/15/2019 9/15/2020 2/26/2021   Total Score - 13 (moderate anxiety) -   Total Score 10 13 6       Past Medical History:   Diagnosis Date     Anxiety      Chronic fatigue syndrome      Chronic rhinitis      CHHAYA I (cervical intraepithelial neoplasia I) 05/24/2007     Depressive disorder      History of eating disorder      History of substance use     prescription pain killers, alcohol, cocaine     Hypersomnia      PTSD (post-traumatic stress disorder)      Past Surgical History:   Procedure Laterality Date     KNEE SURGERY Left 2018    for osgood-schlatter and tendon repair/anchor     WRIST SURGERY Left 2015    snowboarding injury, fractured, had metal plate initially that was then removed     Family History   Problem Relation Age of Onset     Diabetes Maternal Grandmother      Hyperlipidemia Maternal Grandfather      Hypertension Maternal Grandfather      Heart Disease Maternal Grandfather      Anxiety Disorder Mother      Tremor Mother      Depression Father      Anxiety Disorder Father      Anxiety Disorder Sister         after bad car accident     Breast Cancer No family hx of      Ovarian Cancer No family hx of      Colon Cancer No family hx of      Current Outpatient Medications   Medication     ascorbic acid (VITAMIN C) 500 MG CPCR CR capsule     azelastine (ASTELIN) 0.1 % nasal spray     blood glucose (NO BRAND SPECIFIED) lancets standard     calcium gluconate 500 MG tablet     Cyanocobalamin (B-12) 1000 MCG TBCR     hydrOXYzine (ATARAX) 25 MG tablet     modafinil (PROVIGIL) 200 MG tablet     norgestrel-ethinyl estradiol (LOW-OGESTREL) 0.3-30 MG-MCG tablet     prazosin (MINIPRESS) 1 MG capsule     propranolol (INDERAL) 10 MG tablet     sertraline (ZOLOFT) 50 MG tablet     vitamin D3 (CHOLECALCIFEROL) 25 mcg (45626 units) capsule     blood  "glucose (NO BRAND SPECIFIED) test strip     blood glucose monitoring (NO BRAND SPECIFIED) meter device kit     No current facility-administered medications for this visit.      Allergies   Allergen Reactions     Milk Protein Extract Other (See Comments)     Nasal problems     No Clinical Screening - See Comments      Apples and bananas cause her throat to itch     Seasonal Allergies      Penicillins Rash     Rash on face and threw up       Objective:  /79 (BP Location: Right arm, Patient Position: Chair)   Pulse 77   Ht 1.727 m (5' 8\")   Wt 78.1 kg (172 lb 3.2 oz)   BMI 26.18 kg/m    General: pleasant female in no acute distress  Respiratory: unlabored breathing  Pelvic Exam:  Vulva: No external lesions, normal hair distribution, no adenopathy  Vagina: Moist, pink, no abnormal discharge, well rugated, no lesions  Cervix: deviated to the left, mobile, without CMT; cystic appearing translucent lesion on posterior half of cervix with 2 small opaque nabothian cysts at 5 o'clock and 7 o'clock positions. Pap smear taken.   Uterus: Normal size, anteverted, non-tender, mobile  Ovaries: No mass, non-tender, mobile  Rectal exam: normal anus    Assessment:  Encounter Diagnoses   Name Primary?     Pelvic pain in female Yes     Screen for STD (sexually transmitted disease)      Screening for cervical cancer      Nabothian cyst      Plan:  Counseled on alternative options to manage dysmenorrhea & pelvic pain including addition of Ibuprofen and switching to Nexplanon, Depo provera, or LNG-IUD. Explained to patient that definitive diagnosis of endometriosis is only able to be made by laparoscopy. Pt is interested in pursuing this to get a definitive diagnosis. Ordered pelvic ultrasound to evaluate for other structural cause and/or presence of endometriomas.   At this time, pt desires to continue taking continuous COCs and will take 600mg Ibuprofen q6 hours to manage cyclic pain when present.  Nabothian cysts present on " cervix; given size of cystic lesion and last pap smear ~4 yrs ago, pap smear with HPV testing completed today.   STD testing completed today per patient request.    Pt to schedule pelvic ultrasound followed by ObGyn consult to discuss laparoscopy and f/u on plan for care.      Andria expressed understanding and agreement with the plan for care.     A total of 45 minutes was spent on chart review, patient visit, and documentation.     I, Alma Luna, am serving as a scribe; to document services personally performed by KAI Martinez based on data collection and the provider's statements to me.     Alma Luna, BSN, RN  RASHEEDANP DNP Student    I agree with the PFSH and ROS as completed by the KAI Student, except for changes made by me.  The remainder of the encounter was performed by me and scribed by the KAI Student.  The scribed note accurately reflects my personal services and decisions made by me.  Mera Muñoz DNP, APRN, KAI

## 2021-02-26 NOTE — PROGRESS NOTES
Subjective:  Andria Daily is a 35 y.o., , presenting for chronic pelvic pain and concern for endometriosis. She reports intermittent LLQ pain and left lower back pain that tends to present when she is expected to have her period. Pain is consistently on left side but randomly she will get sharp pain in rectum and bilaterally in lower pelvic region. Additional symptoms include painful bowel movements and being unable to hold bowel movements due to pain, dyspareunia, and pain with urination after sex. She is unable to recall when these symptoms started, but the pain has worsened since 2019. She is on continuous COCs which does decrease her symptoms, but does not provide adequate pain control. She has been on COCs for about 4 years and is interested in trying another form of contraception. She reports not being great at remembering to consistently take her pills and she does not want to become pregnant anytime soon. She also uses tylenol and a heating pad to help with pain.    Andria was seen at [facility] on 5/3/2020. She was treated for PID. TVUS was normal. After tx for PID, she reports that she had short term relief of symptoms, but they returned shortly afterwards. Gonorrhea & chlamydia were negative at this time.     Menstrual history: occasional spotting since on continuous COCs. LMP was sometime in summer.     Sexual history: sexually active with 1 male partner in past year. She would like to proceed with STI testing today.    History of depression, anxiety, PTSD, and trauma - currently in mental health group x 3 weeks. Feels like it is helping. PCP is managing her medications. Also has connection with a psychiatrist.    PHQ-9 score:    PHQ 2021   PHQ-9 Total Score 7   Q9: Thoughts of better off dead/self-harm past 2 weeks Not at all     DIANA-7 SCORE 8/15/2019 9/15/2020 2021   Total Score - 13 (moderate anxiety) -   Total Score 10 13 6       Past Medical History:   Diagnosis Date      Anxiety      Chronic fatigue syndrome      Chronic rhinitis      CHHAYA I (cervical intraepithelial neoplasia I) 05/24/2007     Depressive disorder      History of eating disorder      History of substance use     prescription pain killers, alcohol, cocaine     Hypersomnia      PTSD (post-traumatic stress disorder)      Past Surgical History:   Procedure Laterality Date     KNEE SURGERY Left 2018    for osgood-schlatter and tendon repair/anchor     WRIST SURGERY Left 2015    snowboarding injury, fractured, had metal plate initially that was then removed     Family History   Problem Relation Age of Onset     Diabetes Maternal Grandmother      Hyperlipidemia Maternal Grandfather      Hypertension Maternal Grandfather      Heart Disease Maternal Grandfather      Anxiety Disorder Mother      Tremor Mother      Depression Father      Anxiety Disorder Father      Anxiety Disorder Sister         after bad car accident     Breast Cancer No family hx of      Ovarian Cancer No family hx of      Colon Cancer No family hx of      Current Outpatient Medications   Medication     ascorbic acid (VITAMIN C) 500 MG CPCR CR capsule     azelastine (ASTELIN) 0.1 % nasal spray     blood glucose (NO BRAND SPECIFIED) lancets standard     calcium gluconate 500 MG tablet     Cyanocobalamin (B-12) 1000 MCG TBCR     hydrOXYzine (ATARAX) 25 MG tablet     modafinil (PROVIGIL) 200 MG tablet     norgestrel-ethinyl estradiol (LOW-OGESTREL) 0.3-30 MG-MCG tablet     prazosin (MINIPRESS) 1 MG capsule     propranolol (INDERAL) 10 MG tablet     sertraline (ZOLOFT) 50 MG tablet     vitamin D3 (CHOLECALCIFEROL) 25 mcg (80132 units) capsule     blood glucose (NO BRAND SPECIFIED) test strip     blood glucose monitoring (NO BRAND SPECIFIED) meter device kit     No current facility-administered medications for this visit.      Allergies   Allergen Reactions     Milk Protein Extract Other (See Comments)     Nasal problems     No Clinical Screening - See Comments   "    Apples and bananas cause her throat to itch     Seasonal Allergies      Penicillins Rash     Rash on face and threw up       Objective:  /79 (BP Location: Right arm, Patient Position: Chair)   Pulse 77   Ht 1.727 m (5' 8\")   Wt 78.1 kg (172 lb 3.2 oz)   BMI 26.18 kg/m    General: pleasant female in no acute distress  Respiratory: unlabored breathing  Pelvic Exam:  Vulva: No external lesions, normal hair distribution, no adenopathy  Vagina: Moist, pink, no abnormal discharge, well rugated, no lesions  Cervix: deviated to the left, mobile, without CMT; cystic appearing translucent lesion on posterior half of cervix with 2 small opaque nabothian cysts at 5 o'clock and 7 o'clock positions. Pap smear taken.   Uterus: Normal size, anteverted, non-tender, mobile  Ovaries: No mass, non-tender, mobile  Rectal exam: normal anus    Assessment:  Encounter Diagnoses   Name Primary?     Pelvic pain in female Yes     Screen for STD (sexually transmitted disease)      Screening for cervical cancer      Nabothian cyst      Plan:  Counseled on alternative options to manage dysmenorrhea & pelvic pain including addition of Ibuprofen and switching to Nexplanon, Depo provera, or LNG-IUD. Explained to patient that definitive diagnosis of endometriosis is only able to be made by laparoscopy. Pt is interested in pursuing this to get a definitive diagnosis. Ordered pelvic ultrasound to evaluate for other structural cause and/or presence of endometriomas.   At this time, pt desires to continue taking continuous COCs and will take 600mg Ibuprofen q6 hours to manage cyclic pain when present.  Nabothian cysts present on cervix; given size of cystic lesion and last pap smear ~4 yrs ago, pap smear with HPV testing completed today.   STD testing completed today per patient request.    Pt to schedule pelvic ultrasound followed by ObGyn consult to discuss laparoscopy and f/u on plan for care.      Andria expressed understanding and " agreement with the plan for care.     A total of 45 minutes was spent on chart review, patient visit, and documentation.     I, Alma Luna, am serving as a scribe; to document services personally performed by KAI Martinez based on data collection and the provider's statements to me.     Alma Luna, BSN, RN  RASHEEDANP DNP Student    I agree with the PFSH and ROS as completed by the KAI Student, except for changes made by me.  The remainder of the encounter was performed by me and scribed by the KAI Student.  The scribed note accurately reflects my personal services and decisions made by me.  Mera Muñoz DNP, APRN, KAI

## 2021-02-27 LAB
C TRACH DNA SPEC QL NAA+PROBE: NEGATIVE
N GONORRHOEA DNA SPEC QL NAA+PROBE: NEGATIVE
SPECIMEN SOURCE: NORMAL
SPECIMEN SOURCE: NORMAL

## 2021-02-27 ASSESSMENT — ANXIETY QUESTIONNAIRES: GAD7 TOTAL SCORE: 6

## 2021-03-01 DIAGNOSIS — R25.1 TREMOR: ICD-10-CM

## 2021-03-01 NOTE — TELEPHONE ENCOUNTER
Last time prescribed: 1/29/21 , 120 tabs/caps x 0 refills  Last office visit: 11/20/20  Next appointment: No Future Appointment Scheduled    Forwarding to Dr De La Rosa for review - is she to continue this med? What dosing?  Melisa Gamboa RN  [facility]

## 2021-03-02 RX ORDER — PROPRANOLOL HYDROCHLORIDE 10 MG/1
TABLET ORAL
Qty: 120 TABLET | Refills: 0 | OUTPATIENT
Start: 2021-03-02 | End: 2021-04-01

## 2021-03-02 NOTE — TELEPHONE ENCOUNTER
Messaged patient to see if the propranolol has been effective for tremor and what dose she is up to.    Mook De La Rosa MD on 3/2/2021 at 4:04 PM

## 2021-03-03 ENCOUNTER — TELEPHONE (OUTPATIENT)
Dept: PSYCHIATRY | Facility: CLINIC | Age: 36
End: 2021-03-03

## 2021-03-03 LAB
COPATH REPORT: NORMAL
PAP: NORMAL

## 2021-03-03 NOTE — TELEPHONE ENCOUNTER
"PSYCHIATRY CLINIC PHONE INTAKE     SERVICES REQUESTED / INTERESTED IN          Med Management    Presenting Problem and Brief History                              What would you like to be seen for? (brief description):  Pt's PCP at [facility] is currently managing her medications, and can provide bridging until she is seen here. She has a psychiatrist, but has not seen him since 2020 and wants to find a new med provider (self-referral to our clinic).   Pt stated that she is currently \"5 out of 10, pretty stable.\" When her PTSD gets triggered it can set off her depression.   In 2015 she went to [facility] for residential eating disorder treatment. She is doing \"okay\" in regards to her ED -- she currently struggles with appetite due to her depression, but she is aware of the need to stay on top of her recovery.   She is currently in a 3 day per week DBT program, and she did IOP at [facility] last year.    Have you received a mental health diagnosis? Yes   Which one (s): MDD, anxiety, PTSD  Are you currently seeing a mental health provider?  Yes            Who / month last seen:  DBT at [facility] in [place] ; psychiatrist (Alejandro?) through [facility] in [place], last seen in 2020  Do you have mental health records elsewhere?  Yes  Will you sign a release so we can obtain them?  Yes    Have you ever been hospitalized for psychiatric reasons?  Yes  Describe:  In 2007    Do you have current thoughts of self-harm?  No    Do you currently have thoughts of harming others?  No       Substance Use History     Do you have any history of alcohol / illicit drug use?  Yes  Describe:  In her 20s. She is currently sober.  Have you ever received treatment for this?  Yes    Describe:  Went to [facility] for 90 days in 2015     Social History     Do you have any current or past legal issues?  No    OK to leave a detailed voicemail?  Yes    Medical/ Surgical History                                   Patient Active Problem List "   Diagnosis     Chronic fatigue syndrome     Hypersomnia     CHHAYA I (cervical intraepithelial neoplasia I)     Chronic rhinitis     Recurrent major depressive disorder (H)     Anxiety     History of eating disorder     History of substance use     PTSD (post-traumatic stress disorder)     DIANA (generalized anxiety disorder)          Medications             Current Outpatient Medications   Medication Sig Dispense Refill     ascorbic acid (VITAMIN C) 500 MG CPCR CR capsule        azelastine (ASTELIN) 0.1 % nasal spray Spray 1 spray into both nostrils 2 times daily 1 Bottle 3     blood glucose (NO BRAND SPECIFIED) lancets standard Use to test blood sugar 1 times daily or as directed. 100 each 3     blood glucose (NO BRAND SPECIFIED) test strip Use to test blood sugar 1 times daily or as directed. 100 each 3     blood glucose monitoring (NO BRAND SPECIFIED) meter device kit Use to test blood sugar 1 times daily or as directed. 1 kit 0     calcium gluconate 500 MG tablet Take 500 mg by mouth 2 times daily       Cyanocobalamin (B-12) 1000 MCG TBCR        hydrOXYzine (ATARAX) 25 MG tablet Take 25 mg by mouth 3 times daily as needed for itching or other (sinus)       modafinil (PROVIGIL) 200 MG tablet Take 1 tablet by mouth daily  1     norgestrel-ethinyl estradiol (LOW-OGESTREL) 0.3-30 MG-MCG tablet TAKE 1 TABLET BY MOUTH EVERY DAY ONLY TAKING ACTIVE TABLETS 28 tablet 3     prazosin (MINIPRESS) 1 MG capsule Take 1 mg by mouth At Bedtime       propranolol (INDERAL) 10 MG tablet Take 1 tablet (10 mg) by mouth 2 times daily for 10 days, THEN 2 tablets (20 mg) 2 times daily for 10 days, THEN 3 tablets (30 mg) 2 times daily for 10 days. 120 tablet 0     sertraline (ZOLOFT) 50 MG tablet Take 1 tablet (50 mg) by mouth daily 90 tablet 0     vitamin D3 (CHOLECALCIFEROL) 25 mcg (85258 units) capsule Take 1 capsule by mouth daily           DISPOSITION      Completed phone screen with pt. She would prefer a female provider, but said  that if there is a sooner opening with a male to please call her. Added pt to [facility].    -Renata Alvarez, Intake Coodinator

## 2021-03-04 LAB
FINAL DIAGNOSIS: NORMAL
HPV HR 12 DNA CVX QL NAA+PROBE: NEGATIVE
HPV16 DNA SPEC QL NAA+PROBE: NEGATIVE
HPV18 DNA SPEC QL NAA+PROBE: NEGATIVE
SPECIMEN DESCRIPTION: NORMAL
SPECIMEN SOURCE CVX/VAG CYTO: NORMAL

## 2021-03-15 ENCOUNTER — TELEPHONE (OUTPATIENT)
Dept: OBGYN | Facility: CLINIC | Age: 36
End: 2021-03-15

## 2021-03-15 DIAGNOSIS — R10.2 PELVIC PAIN IN FEMALE: ICD-10-CM

## 2021-03-15 RX ORDER — NORGESTREL-ETHINYL ESTRADIOL 0.3-0.03MG
TABLET ORAL
Qty: 28 TABLET | Refills: 3 | OUTPATIENT
Start: 2021-03-15

## 2021-03-15 NOTE — TELEPHONE ENCOUNTER
----- Message from Ana Dietrich RN sent at 3/15/2021 12:31 PM CDT -----  Regarding: no males  Pt wants to make sure there will be no males in the clinic at her appointment.

## 2021-03-15 NOTE — TELEPHONE ENCOUNTER
Assured Andria that she will be seeing female providers, no male providers are in clinic tomorrow.  Pt indicated understanding and agreed with plan.

## 2021-03-15 NOTE — TELEPHONE ENCOUNTER
"Requested Prescriptions   Pending Prescriptions Disp Refills     norgestrel-ethinyl estradiol (CRYSELLE-28) 0.3-30 MG-MCG tablet [Pharmacy Med Name: CRYSELLE TABLETS 28] 28 tablet 3     Sig: TAKE 1 TABLET BY MOUTH EVERY DAILY       Contraceptives Protocol Passed - 3/15/2021  3:18 AM        Passed - Patient is not a current smoker if age is 35 or older        Passed - Recent (12 mo) or future (30 days) visit within the authorizing provider's specialty     Patient has had an office visit with the authorizing provider or a provider within the authorizing providers department within the previous 12 mos or has a future within next 30 days. See \"Patient Info\" tab in inbasket, or \"Choose Columns\" in Meds & Orders section of the refill encounter.              Passed - Medication is active on med list        Passed - No active pregnancy on record        Passed - No positive pregnancy test in past 12 months           Has appt tomorrow with a different ob/gyn provider  Ofelia Lopez RN on 3/15/2021 at 9:07 AM    "

## 2021-03-16 ENCOUNTER — ANCILLARY PROCEDURE (OUTPATIENT)
Dept: ULTRASOUND IMAGING | Facility: CLINIC | Age: 36
End: 2021-03-16
Attending: NURSE PRACTITIONER
Payer: COMMERCIAL

## 2021-03-16 ENCOUNTER — OFFICE VISIT (OUTPATIENT)
Dept: OBGYN | Facility: CLINIC | Age: 36
End: 2021-03-16
Attending: OBSTETRICS & GYNECOLOGY
Payer: COMMERCIAL

## 2021-03-16 VITALS — WEIGHT: 172.7 LBS | BODY MASS INDEX: 26.18 KG/M2 | HEIGHT: 68 IN

## 2021-03-16 DIAGNOSIS — R10.2 PELVIC PAIN IN FEMALE: ICD-10-CM

## 2021-03-16 DIAGNOSIS — R10.2 PELVIC PAIN IN FEMALE: Primary | ICD-10-CM

## 2021-03-16 PROCEDURE — 76830 TRANSVAGINAL US NON-OB: CPT

## 2021-03-16 PROCEDURE — 99213 OFFICE O/P EST LOW 20 MIN: CPT | Mod: GC | Performed by: OBSTETRICS & GYNECOLOGY

## 2021-03-16 PROCEDURE — G0463 HOSPITAL OUTPT CLINIC VISIT: HCPCS

## 2021-03-16 PROCEDURE — 76830 TRANSVAGINAL US NON-OB: CPT | Mod: 26 | Performed by: OBSTETRICS & GYNECOLOGY

## 2021-03-16 RX ORDER — NORETHINDRONE ACETATE 5 MG
5 TABLET ORAL DAILY
Qty: 90 TABLET | Refills: 1 | Status: SHIPPED | OUTPATIENT
Start: 2021-03-16 | End: 2021-11-24

## 2021-03-16 ASSESSMENT — MIFFLIN-ST. JEOR: SCORE: 1526.86

## 2021-03-16 NOTE — PATIENT INSTRUCTIONS
Start aygestin (norethindrone acetate) 5mg daily. If no improvement can increase dose, send [employer] message to discuss.     Consider depo lupron [email or web address]. Usual treatment is 6 months. Can use aygestin to decrease menopausal symptoms.     Diagnostic laparoscopy with tubal dye study would be other option for definitive diagnosis.     Other things to consider:   - Elimination diet  - Acupuncture for pelvic pain

## 2021-03-16 NOTE — LETTER
3/16/2021       RE: Andria Daily  [address]  [facility] [address]     Dear Colleague,    Thank you for referring your patient, Andria Daily, to the [facility] [place] at [facility]. Please see a copy of my visit note below.    [facility]  3/16/2021    Reason For Visit: follow up pelvic pain, c/f endometriosis    S: Andria Daily is a 35 year old  here for the above. She was seen in clinic on 21 for the same concern. At that time, she described chronic LLQ pain, dysmenorrhea, dyspareunia, and dyschezia, which have been present for an uncertain amount of time but worsening over the last two years. Also c/o intermittent nausea, no vomiting. She had a TVUS at [facility] for pelvic pain in 2020 that was normal. Patient is taking continuous COCs, and was started on ibuprofen since her last visit to manage her dysmenorrhea.    She previously expressed interest in diagnostic laparoscopy to receive a definitive diagnosis for endometriosis or not. Today, she reports minimal improvement in pain with the use of ibuprofen, heating pads do seem to help a lot however. She thinks that many of her symptoms could be consistent with endometriosis and is interested in knowing for sure, but is nervous about surgery. She also does express interest in future fertility.    ANÍBAL Garcia  Age at menarche: 13  Duration of menses: when not on OCPs, lasted ~7 days  Heavy bleeding: yes  Pain with menses: yes  LMP: ~8-10 months ago, intermittent spotting  Sexually active: yes, intermittent pain with intercourse  History of STIs: none  Contraception: COCs, 2-3 years  Pap Smears: 2021, NILM   Lab Results   Component Value Date    PAP NIL 2021       Past Medical History:   Diagnosis Date     Anxiety      Chronic fatigue syndrome      Chronic rhinitis      CHHAYA I (cervical intraepithelial neoplasia I) 2007     Depressive disorder      History of  "eating disorder      History of substance use     prescription pain killers, alcohol, cocaine     Hypersomnia      PTSD (post-traumatic stress disorder)        Past Surgical History:   Procedure Laterality Date     KNEE SURGERY Left     for osgood-schlatter and tendon repair/anchor     WRIST SURGERY Left     snowboarding injury, fractured, had metal plate initially that was then removed       O:   Ht 1.727 m (5' 8\")   Wt 78.3 kg (172 lb 11.2 oz)   BMI 26.26 kg/m    Exam:  Gen: NAD, well appearing  CV: Well perfused, regular rate  Resp: On room air, no increased work of breathing    Imaging:  3/16/21 TVUS - Uterine findings:              Presence: Visible Size: Normal 4.1 x 4.3 x 3.2 cm.  Endometrium = 2.1 mm.              Cx length = 29.9 mm.              Flexion:  Retroverted  Position: Midline          Margins: Smooth         Shape: Normal              Contour: Regular        Texture: Homogeneous          Cavity: Normal            Masses: Normal     Pelvic findings:               Right Adnexa: Normal              Left Adnexa: Normal              Bladder:  Normal                                                           Cul - de - sac fluid: None     Ovarian follicles:              Right ovary:  2.6 x 2.7 x 1.3cm.              Multiple follicles              Size(s):  <1cm              Left ovary:  2.4 x 1.8 x 1.5cm.              Multiple follicles              Size(s):  <1cm     Comments:  Normal pelvic ultrasound    A/P: 35 year old  here for chronic pelvic pain follow-up with concern for possible endometriosis. We reviewed her normal pelvic ultrasound today and findings consistent with her MARISOL use, as well as inability to rule in/out endometriosis based on this, however it provides reassurance that other structural etiologies non-contributory in terms of her pain. We reviewed the risks, benefits and alternatives to diagnostic laparoscopy with tubal dye study, and she would like to defer surgery " at this time. We discussed progestin only options as potentially better for hormonal suppression, and she is willing to try this in place of COCs. Rx sent for norethindrone acetate 5mg daily, can uptitrate as needed if no improvement. We also reviewed the risks and benefits for lupron to induce ovarian suppression for 6 months, including possible benefit of improvement in pain during and after lupron treatment, and risks of temporary menopausal symptoms such as hot flashes, night sweats, vaginal dryness. Discussed norethindrone can be used to minimize symptoms. Will see if this is covered by her insurance. Patient will notify us if she is interested in starting the lupron, and will keep dx lsc as option for later point in time.    Phone visit in 3-6 months for symptom follow up or RTC as needed if issues arise.    Patient seen with Dr. Jody Solomon MD  ObGyn, PGY-1  03/16/21 8:59 AM     Appreciate note by Dr. Solomon. Patient has been seen and examined by me with the resident, agree with above note.     Isabel Vera MD

## 2021-03-16 NOTE — PROGRESS NOTES
[facility]  3[facility]16[facility]    Reason For Visit: follow up pelvic pain, c/f endometriosis    S: Andria Daily is a 35 year old  here for the above. She was seen in clinic on 21 for the same concern. At that time, she described chronic LLQ pain, dysmenorrhea, dyspareunia, and dyschezia, which have been present for an uncertain amount of time but worsening over the last two years. Also c/o intermittent nausea, no vomiting. She had a TVUS at [facility] for pelvic pain in 2020 that was normal. Patient is taking continuous COCs, and was started on ibuprofen since her last visit to manage her dysmenorrhea.    She previously expressed interest in diagnostic laparoscopy to receive a definitive diagnosis for endometriosis or not. Today, she reports minimal improvement in pain with the use of ibuprofen, heating pads do seem to help a lot however. She thinks that many of her symptoms could be consistent with endometriosis and is interested in knowing for sure, but is nervous about surgery. She also does express interest in future fertility.    ANÍBAL Garcia  Age at menarche: 13  Duration of menses: when not on OCPs, lasted ~7 days  Heavy bleeding: yes  Pain with menses: yes  LMP: ~8-10 months ago, intermittent spotting  Sexually active: yes, intermittent pain with intercourse  History of STIs: none  Contraception: COCs, 2-3 years  Pap Smears: 2021, NILM   Lab Results   Component Value Date    PAP NIL 2021       Past Medical History:   Diagnosis Date     Anxiety      Chronic fatigue syndrome      Chronic rhinitis      CHHAYA I (cervical intraepithelial neoplasia I) 2007     Depressive disorder      History of eating disorder      History of substance use     prescription pain killers, alcohol, cocaine     Hypersomnia      PTSD (post-traumatic stress disorder)        Past Surgical History:   Procedure Laterality Date     KNEE SURGERY Left 2018    for osgood-schlatter and tendon repair/anchor     WRIST SURGERY Left  "    snowboarding injury, fractured, had metal plate initially that was then removed       O:   Ht 1.727 m (5' 8\")   Wt 78.3 kg (172 lb 11.2 oz)   BMI 26.26 kg/m    Exam:  Gen: NAD, well appearing  CV: Well perfused, regular rate  Resp: On room air, no increased work of breathing    Imaging:  3/16/21 TVUS - Uterine findings:              Presence: Visible Size: Normal 4.1 x 4.3 x 3.2 cm.  Endometrium = 2.1 mm.              Cx length = 29.9 mm.              Flexion:  Retroverted  Position: Midline          Margins: Smooth         Shape: Normal              Contour: Regular        Texture: Homogeneous          Cavity: Normal            Masses: Normal     Pelvic findings:               Right Adnexa: Normal              Left Adnexa: Normal              Bladder:  Normal                                                           Cul - de - sac fluid: None     Ovarian follicles:              Right ovary:  2.6 x 2.7 x 1.3cm.              Multiple follicles              Size(s):  <1cm              Left ovary:  2.4 x 1.8 x 1.5cm.              Multiple follicles              Size(s):  <1cm     Comments:  Normal pelvic ultrasound    A/P: 35 year old  here for chronic pelvic pain follow-up with concern for possible endometriosis. We reviewed her normal pelvic ultrasound today and findings consistent with her MARISOL use, as well as inability to rule in/out endometriosis based on this, however it provides reassurance that other structural etiologies non-contributory in terms of her pain. We reviewed the risks, benefits and alternatives to diagnostic laparoscopy with tubal dye study, and she would like to defer surgery at this time. We discussed progestin only options as potentially better for hormonal suppression, and she is willing to try this in place of COCs. Rx sent for norethindrone acetate 5mg daily, can uptitrate as needed if no improvement. We also reviewed the risks and benefits for lupron to induce ovarian " suppression for 6 months, including possible benefit of improvement in pain during and after lupron treatment, and risks of temporary menopausal symptoms such as hot flashes, night sweats, vaginal dryness. Discussed norethindrone can be used to minimize symptoms. Will see if this is covered by her insurance. Patient will notify us if she is interested in starting the lupron, and will keep dx lsc as option for later point in time.    Phone visit in 3-6 months for symptom follow up or RTC as needed if issues arise.    Patient seen with Dr. Jody Solomon MD  ObGyn, PGY-1  03/16/21 8:59 AM     Appreciate note by Dr. Solomon. Patient has been seen and examined by me with the resident, agree with above note.     Isabel Vera MD

## 2021-04-14 ENCOUNTER — VIRTUAL VISIT (OUTPATIENT)
Dept: PSYCHIATRY | Facility: CLINIC | Age: 36
End: 2021-04-14
Attending: PSYCHOLOGIST
Payer: COMMERCIAL

## 2021-04-14 DIAGNOSIS — F33.1 MODERATE EPISODE OF RECURRENT MAJOR DEPRESSIVE DISORDER (H): Primary | ICD-10-CM

## 2021-04-14 DIAGNOSIS — F50.9 EATING DISORDER IN REMISSION: ICD-10-CM

## 2021-04-14 DIAGNOSIS — F43.10 PTSD (POST-TRAUMATIC STRESS DISORDER): ICD-10-CM

## 2021-04-14 DIAGNOSIS — Z87.898 HISTORY OF SUBSTANCE USE DISORDER: ICD-10-CM

## 2021-04-14 PROCEDURE — 90791 PSYCH DIAGNOSTIC EVALUATION: CPT | Mod: 95
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
## 2021-04-21 ENCOUNTER — TELEPHONE (OUTPATIENT)
Dept: PSYCHIATRY | Facility: CLINIC | Age: 36
End: 2021-04-21

## 2021-04-21 ENCOUNTER — VIRTUAL VISIT (OUTPATIENT)
Dept: PSYCHIATRY | Facility: CLINIC | Age: 36
End: 2021-04-21
Attending: PSYCHOLOGIST
Payer: COMMERCIAL

## 2021-04-21 DIAGNOSIS — F43.10 PTSD (POST-TRAUMATIC STRESS DISORDER): Chronic | ICD-10-CM

## 2021-04-21 DIAGNOSIS — R25.1 TREMOR: ICD-10-CM

## 2021-04-21 DIAGNOSIS — F41.1 GAD (GENERALIZED ANXIETY DISORDER): Chronic | ICD-10-CM

## 2021-04-21 PROCEDURE — 99215 OFFICE O/P EST HI 40 MIN: CPT | Mod: 95 | Performed by: NURSE PRACTITIONER

## 2021-04-21 RX ORDER — PRAZOSIN HYDROCHLORIDE 1 MG/1
1 CAPSULE ORAL AT BEDTIME
Qty: 90 CAPSULE | Refills: 0 | Status: SHIPPED | OUTPATIENT
Start: 2021-04-21 | End: 2021-06-18

## 2021-04-21 RX ORDER — HYDROXYZINE HYDROCHLORIDE 25 MG/1
25 TABLET, FILM COATED ORAL 2 TIMES DAILY PRN
Qty: 180 TABLET | Refills: 0 | Status: SHIPPED | OUTPATIENT
Start: 2021-04-21 | End: 2021-06-18
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
## 2021-04-21 NOTE — TELEPHONE ENCOUNTER
On April 21, 2021, at 1:04 PM, writer called patient at [phone] to confirm Virtual Visit. Writer unable to make contact with patient. Writer unable to leave detailed voice mail message due to voice mail box full. Marina Palacio, [facility]

## 2021-05-29 NOTE — PROGRESS NOTES
Subjective:   Andria Daily is a 34 y.o. female  Roomed by:  Kacy CLAYTON LPN     Refills needed? No    Do you have any forms that need to be filled out? No      Chief Complaint   Patient presents with     Abdominal Pain     LLQ pain started yesterday and now worse and radiating across entire lower abdomen and around to back.  Low grade temps off and on.  Abdomen tender to touch   Yesterday afternoon noticed some sharp stabbing pain in the left lower side. Says she has some pain occasionally. Usually has a daily BM. Last BM was earlier today. Says appetite has been down for the past 4 days. Has been able to drink and urinate normally. Admits some nausea in the past 4 days. She is combination OCPs and says she does not miss any pills. Denies any recent vomiting, belly pain, or diarrhea. Admits feeling warmer off and on for the last 1.5 days. Denies any ill contacts. Denies CP or shortness of breath. Admits some post nasal drip.     Review of Systems  See HPI for ROS, otherwise balance of other systems negative    Allergies   Allergen Reactions     (D)-Limonene Flavor      Apples and bananas cause her throat to itch     Other Allergy (See Comments)      Apples and bananas cause her throat to itch     Penicillins        Current Outpatient Medications:      DULoxetine (CYMBALTA) 20 MG capsule, Take 40 mg by mouth.    , Disp: , Rfl:      fluticasone (FLONASE) 50 mcg/actuation nasal spray, 1 spray into each nostril daily., Disp: , Rfl:      hydrOXYzine (VISTARIL) 25 MG capsule, TAKE 1 CAP BY MOUTH THREE TIMES A DAY AS NEEDED FOR ANXIETY., Disp: , Rfl: 0     SPRINTEC, 28, 0.25-35 mg-mcg per tablet, TAKE 1 TAB BY MOUTH DAILY., Disp: , Rfl: 3     benzonatate (TESSALON) 200 MG capsule, Take 1 capsule (200 mg total) by mouth 3 (three) times a day as needed for cough., Disp: 30 capsule, Rfl: 0     citalopram (CELEXA) 40 MG tablet, Take 40 mg by mouth daily., Disp: , Rfl:      diclofenac sodium (VOLTAREN) 1 % Gel, APPLY 2-4 GRAMS  TO AFFECTED AREA TWICE DAILY., Disp: , Rfl: 0     fexofenadine (ALLEGRA) 180 MG tablet, Take 180 mg by mouth daily., Disp: , Rfl:      traZODone (DESYREL) 50 MG tablet, , Disp: , Rfl:   Patient Active Problem List   Diagnosis     Anxiety     Narcolepsy     Allergic Rhinitis     Migraine Headache     Acute Sinusitis     Adverse Effect Of Drug Therapy     No past medical history on file. - if none on file, see Problem List    Objective:     Vitals:    06/20/19 1738   BP: 104/56   Patient Site: Right Arm   Patient Position: Sitting   Cuff Size: Adult Regular   Pulse: 87   Resp: 16   Temp: 98.4  F (36.9  C)   TempSrc: Oral   SpO2: 99%   Weight: 167 lb 12.8 oz (76.1 kg)   Gen - Pt in NAD  Cor - RRR w/o murmur  Lungs - Good air entry, no wheezes or crackles noted  Abdomen: Flat, soft, normal BS, no HSM or masses, no rebound or guarding    Results for orders placed or performed in visit on 06/20/19   Urinalysis-UC if Indicated   Result Value Ref Range    Color, UA Yellow Colorless, Yellow, Straw, Light Yellow    Clarity, UA Clear Clear    Glucose, UA Negative Negative    Bilirubin, UA Small (!) Negative    Ketones, UA Trace (!) Negative    Specific Gravity, UA 1.020 1.005 - 1.030    Blood, UA Negative Negative    pH, UA 7.5 5.0 - 8.0    Protein, UA Trace (!) Negative mg/dL    Urobilinogen, UA 1.0 E.U./dL 0.2 E.U./dL, 1.0 E.U./dL    Nitrite, UA Negative Negative    Leukocytes, UA Negative Negative    Bacteria, UA None Seen None Seen hpf    RBC, UA None Seen None Seen, 0-2 hpf    WBC, UA None Seen None Seen, 0-5 hpf    Squam Epithel, UA 5-10 (!) None Seen, 0-5 lpf    Mucus, UA Moderate (!) None Seen lpf   Pregnancy (Beta-hCG, Qual), Urine   Result Value Ref Range    Pregnancy Test, Urine Negative Negative   Lab result discussed on day of visit.     Xr Abdomen 2 Views    Result Date: 6/20/2019  EXAM DATE:         06/20/2019 EXAM: X-RAY ABDOMEN, 2 VIEWS LOCATION: [facility] DATE/TIME: 6/20/2019 6:00 PM  INDICATION: Lower quadrant pain x4 days with nausea.  Question obstruction. COMPARISON: None. FINDINGS: Negative abdomen. Bowel gas pattern is normal. Nothing for obstruction or free air. No evidence for renal stones.   Radiologist's report discussed with patient on day of visit.      Assessment - Plan   Medical Decision Making -34-year-old woman presents with intermittent sharp left lower quadrant pain x3 to 4 days.  Admitted some nausea but denied any vomiting.  On exam she is afebrile and vital signs are stable.  Her exam was essentially negative.  Pregnancy test is negative.  UA was negative for infectious process.  Abdominal flat and upright was negative for obstruction or renal stones.  Discussed with patient that could not elicit a cause for her left lower quadrant pain.  She states that she will follow-up with either primary her primary or OB/GYN.  See patient instructions.    1. LLQ abdominal pain    2. Abdominal pain, generalized  - Urinalysis-UC if Indicated  - Pregnancy (Beta-hCG, Qual), Urine  - XR Abdomen 2 Views; Future  - XR Abdomen 2 Views      At the conclusion of the encounter, assessment and plan were discussed.   All questions were answered.   The patient or guardian acknowledged understanding and was involved in the decision making regarding the overall care plan.    Patient Instructions   1. Make an appointment for follow up with primary care provider or OB/GYN sometime next week  2. If symptoms are getting worse over time or you have any questions, call the clinic number - it's answered 24/7      Patient Education     Unknown Causes of Abdominal Pain (Female)    The exact cause of your abdominal (stomach) pain is not clear. This does not mean that this is something to worry about. Everyone likes to know the exact cause of the problem, but sometimes with abdominal pain, there is no clear-cut cause, and this could be a good thing. The good news is that your symptoms can be treated, and you will  feel better.   Your condition does not seem serious now; however, sometimes the signs of a serious problem may take more time to appear. For this reason, it is important for you to watch for any new symptoms, problems, or worsening of your condition.  Over the next few days, the abdominal pain may come and go, or be continuous. Other common symptoms can include nausea and vomiting. Sometimes it can be difficult to tell if you feel nauseous, you may just feel bad and not associate that feeling with nausea. Constipation, diarrhea, and a fever may go along with the pain.  The pain may continue even if treated correctly over the following days. Depending on how things go, sometimes the cause can become clear and may require further or different treatment. Additional evaluations, medications, or tests may also be needed.  Home care  Your healthcare provider may prescribe medicine for pain, symptoms, or an infection.  Follow the healthcare provider's instructions for taking these medicines.  General care    Rest as much as you can until your next exam. No strenuous activities.    Try to find positions that ease discomfort. A small pillow placed on the abdomen may help relieve pain.    Something warm on your abdomen (such as a heating pad) may help, but be careful not to burn yourself.  Diet    Do not force yourself to eat, especially if having cramps, vomiting, or diarrhea.    Water is important so you do not get dehydrated. Soup may also be good. Sports drinks may also help, especially if they are not too acidic. Make sure you don't drink sugary drinks as this can make things worse. Take liquids in small amounts. Do not guzzle them.    Caffeine sometimes makes the pain and cramping worse.    Avoid dairy products if you have vomiting or diarrhea.    Don't eat large amounts at a time. Wait a few minutes between bites.    Eat a diet low in fiber (called a low-residue diet). Foods allowed include refined breads, white rice,  fruit and vegetable juices without pulp, tender meats. These foods will pass more easily through the intestine.    Avoid whole-grain foods, whole fruits and vegetables, meats, seeds and nuts, fried or fatty foods, dairy, alcohol and spicy foods until your symptoms go away.  Follow-up care  Follow up with your healthcare provider, or as advised, if your pain does not begin to improve in the next 24 hours.  Call 911  Call 911 if any of these occur:    Trouble breathing    Confusion    Fainting or loss of consciousness    Rapid heart rate    Seizure  When to seek medical advice  Call your healthcare provider right away if any of these occur:    Pain gets worse or moves to the right lower abdomen    New or worsening vomiting or diarrhea    Swelling of the abdomen    Unable to pass stool for more than 3 days    Fever of 100.4 F (38 C) or higher, or as directed by your healthcare provider.    Blood in vomit or bowel movements (dark red or black color)    Jaundice (yellow color of eyes and skin)    Weakness, dizziness    Chest, arm, back, neck or jaw pain    Unexpected vaginal bleeding or missed period    Can't keep down liquids or water and are getting dehydrated  Date Last Reviewed: 12/30/2015 2000-2017 [employer]. [address] [place] [address], [place] [address]. All rights reserved. This information is not intended as a substitute for professional medical care. Always follow your healthcare professional's instructions.

## 2021-05-29 NOTE — PATIENT INSTRUCTIONS - HE
1. Make an appointment for follow up with primary care provider or OB/GYN sometime next week  2. If symptoms are getting worse over time or you have any questions, call the clinic number - it's answered 24/7      Patient Education     Unknown Causes of Abdominal Pain (Female)    The exact cause of your abdominal (stomach) pain is not clear. This does not mean that this is something to worry about. Everyone likes to know the exact cause of the problem, but sometimes with abdominal pain, there is no clear-cut cause, and this could be a good thing. The good news is that your symptoms can be treated, and you will feel better.   Your condition does not seem serious now; however, sometimes the signs of a serious problem may take more time to appear. For this reason, it is important for you to watch for any new symptoms, problems, or worsening of your condition.  Over the next few days, the abdominal pain may come and go, or be continuous. Other common symptoms can include nausea and vomiting. Sometimes it can be difficult to tell if you feel nauseous, you may just feel bad and not associate that feeling with nausea. Constipation, diarrhea, and a fever may go along with the pain.  The pain may continue even if treated correctly over the following days. Depending on how things go, sometimes the cause can become clear and may require further or different treatment. Additional evaluations, medications, or tests may also be needed.  Home care  Your healthcare provider may prescribe medicine for pain, symptoms, or an infection.  Follow the healthcare provider's instructions for taking these medicines.  General care    Rest as much as you can until your next exam. No strenuous activities.    Try to find positions that ease discomfort. A small pillow placed on the abdomen may help relieve pain.    Something warm on your abdomen (such as a heating pad) may help, but be careful not to burn yourself.  Diet    Do not force yourself  to eat, especially if having cramps, vomiting, or diarrhea.    Water is important so you do not get dehydrated. Soup may also be good. Sports drinks may also help, especially if they are not too acidic. Make sure you don't drink sugary drinks as this can make things worse. Take liquids in small amounts. Do not guzzle them.    Caffeine sometimes makes the pain and cramping worse.    Avoid dairy products if you have vomiting or diarrhea.    Don't eat large amounts at a time. Wait a few minutes between bites.    Eat a diet low in fiber (called a low-residue diet). Foods allowed include refined breads, white rice, fruit and vegetable juices without pulp, tender meats. These foods will pass more easily through the intestine.    Avoid whole-grain foods, whole fruits and vegetables, meats, seeds and nuts, fried or fatty foods, dairy, alcohol and spicy foods until your symptoms go away.  Follow-up care  Follow up with your healthcare provider, or as advised, if your pain does not begin to improve in the next 24 hours.  Call 911  Call 911 if any of these occur:    Trouble breathing    Confusion    Fainting or loss of consciousness    Rapid heart rate    Seizure  When to seek medical advice  Call your healthcare provider right away if any of these occur:    Pain gets worse or moves to the right lower abdomen    New or worsening vomiting or diarrhea    Swelling of the abdomen    Unable to pass stool for more than 3 days    Fever of 100.4 F (38 C) or higher, or as directed by your healthcare provider.    Blood in vomit or bowel movements (dark red or black color)    Jaundice (yellow color of eyes and skin)    Weakness, dizziness    Chest, arm, back, neck or jaw pain    Unexpected vaginal bleeding or missed period    Can't keep down liquids or water and are getting dehydrated  Date Last Reviewed: 12/30/2015 2000-2017 [employer]. [address] [place] [address], [place] [address]. All rights reserved. This information is  not intended as a substitute for professional medical care. Always follow your healthcare professional's instructions.

## 2021-05-31 VITALS — WEIGHT: 194 LBS | BODY MASS INDEX: 29.07 KG/M2

## 2021-06-01 DIAGNOSIS — R25.1 TREMOR: ICD-10-CM

## 2021-06-01 NOTE — TELEPHONE ENCOUNTER
Last time prescribed: 3/3/2021, 180 tabs x 1 refills  Last office visit: 11/20/2020  Next appointment: No future appointments    Routing refill request to provider for review/approval because: At last office visit you referred to Neuro for tremor, does not look like she has followed up with that specialty. Continue to refill?    Antonette Curtis RN  06/03/21  11:00 AM

## 2021-06-02 VITALS — BODY MASS INDEX: 25.81 KG/M2 | WEIGHT: 172.25 LBS

## 2021-06-03 VITALS — WEIGHT: 167.8 LBS | BODY MASS INDEX: 25.14 KG/M2

## 2021-06-03 RX ORDER — PROPRANOLOL HYDROCHLORIDE 20 MG/1
20 TABLET ORAL 2 TIMES DAILY
Qty: 180 TABLET | Refills: 1 | Status: SHIPPED | OUTPATIENT
Start: 2021-06-03 | End: 2022-04-26

## 2021-06-14 ENCOUNTER — MYC MEDICAL ADVICE (OUTPATIENT)
Dept: FAMILY MEDICINE | Facility: CLINIC | Age: 36
End: 2021-06-14

## 2021-06-14 DIAGNOSIS — G89.29 CHRONIC BILATERAL THORACIC BACK PAIN: Primary | ICD-10-CM

## 2021-06-14 DIAGNOSIS — M54.50 CHRONIC BILATERAL LOW BACK PAIN, UNSPECIFIED WHETHER SCIATICA PRESENT: ICD-10-CM

## 2021-06-14 DIAGNOSIS — G89.29 CHRONIC BILATERAL LOW BACK PAIN, UNSPECIFIED WHETHER SCIATICA PRESENT: ICD-10-CM

## 2021-06-14 DIAGNOSIS — M54.6 CHRONIC BILATERAL THORACIC BACK PAIN: Primary | ICD-10-CM

## 2021-06-15 NOTE — CONFIDENTIAL NOTE
Last seen for back back 8/27/20  At that time I leaned toward sending to the spine center, but I think Andria's request for PT is very reasonable.  Referral placed.    Mook De La Rosa MD on 6/15/2021 at 8:21 AM

## 2021-06-15 NOTE — PROGRESS NOTES
Chief Complaint   Patient presents with     Cough     x 2 weeks, on and off worst yesterday. runny nose  and sinus pressure         HPI    Patient is here for 2 wks productive cough, with nasal discharge, congestion and pressure, associated with intermittent shortness of breath when climbing stairs. She took OTC meds without relief. No fever, chills, chest pain.    ROS: Pertinent ROS noted in HPI.   Allergies   Allergen Reactions     Penicillins        Patient Active Problem List   Diagnosis     Anxiety     Narcolepsy     Allergic Rhinitis     Migraine Headache     Acute Sinusitis     Adverse Effect Of Drug Therapy       No family history on file.    Social History     Social History     Marital status: Single     Spouse name: N/A     Number of children: N/A     Years of education: N/A     Occupational History     Not on file.     Social History Main Topics     Smoking status: Current Some Day Smoker     Smokeless tobacco: Not on file     Alcohol use Not on file     Drug use: Not on file     Sexual activity: Not on file     Other Topics Concern     Not on file     Social History Narrative         Objective:    Vitals:    01/24/18 0958   BP: 122/68   Pulse: 62   Resp: 16   Temp: 98.1  F (36.7  C)   SpO2: 98%       Gen:NAD  Oropharynx: normal throat, oral mucosa  Ears:Normal TMs and canals.   Nose: no discharge  Sinus: no pain to percussion   Neck: NAD  CV:RRR, no M, R, G  Pulm: CTAB, normal effort        Acute bronchitis  -     doxycycline (VIBRA-TABS) 100 MG tablet; Take 1 tablet (100 mg total) by mouth 2 (two) times a day for 10 days.  -     benzonatate (TESSALON) 200 MG capsule; Take 1 capsule (200 mg total) by mouth 3 (three) times a day as needed for cough.

## 2021-06-17 NOTE — PATIENT INSTRUCTIONS - HE
Patient Instructions by Yue Bennett PA-C at 4/18/2019 10:30 AM     Author: Yue Bennett PA-C Service: -- Author Type: Physician Assistant    Filed: 4/18/2019 11:06 AM Encounter Date: 4/18/2019 Status: Addendum    : Yue Bennett PA-C (Physician Assistant)    Related Notes: Original Note by Yue Bennett PA-C (Physician Assistant) filed at 4/18/2019 11:05 AM         You were seen today for dizziness likely due to a condition known as benign paroxysmal positional vertigo. This will improve on its own over time.    Symptom management:  - Perform the Epley maneuver detailed in the picture below 3 times a day until resolution of symptoms  - May take Meclizine to help reduce spinning sensation    Reasons to be seen for re-evaluation:  - Fever of 100.4 or higher  - Develop new symptoms such as headaches, ear pain, or vomiting  - Symptoms worsen over time  - No symptom improvement in 3 days      Congestion    You were seen today for sinus congestion and/or pain. This is likely due to a viral illness.    Symptoms management:  - May use Tylenol or Ibuprofen for discomfort and/or fever if present  - May try saline irrigation to relieve congestion (see instructions below)  - Use of nasal steroids (Flonase) as prescribed  - If you are experiencing ear fullness, may try an oral decongestant such as sudafed    Reasons to come back for re-evaluation:  - Develop a fever of 100.4F or current fever worsens  - Sudden and severe pain in the face and head  - Troubles seeing or double vision  - Swelling or redness around one or both eyes  - Sfiff neck  - Symptoms have not improved after 7 days    Buffered normal saline nasal irrigation   The benefits   1. Saline (saltwater) washes the mucus and irritants from your nose.   2. The sinus passages are moisturized.   3. Studies have also shown that a nasal irrigation improves cell function (the cells that move the mucus work better).   The recipe   Use a  one-quart glass jar that is thoroughly cleansed.   You may use a large medical syringe (30 cc), water pick with an irrigation tip (preferred method), squeeze bottle, or Neti pot. Do not use a baby bulb syringe. The syringe or pick should be sterilized frequently or replaced every two to three weeks to avoid contamination and infection.   Fill with water that has been distilled, previously boiled, or otherwise sterilized. Plain tap water is not recommended, because it is not necessarily sterile.   Add 1 to 1  heaping teaspoons of pickling/lamont salt. Do not use table salt, because it contains a large number of additives.   Add 1 teaspoon of baking soda (pure bicarbonate).   Mix ingredients together, and store at room temperature. Discard after one week.   You may also make up a solution from premixed packets that are commercially prepared specifically for nasal irrigation.   The instructions   Irrigate your nose with saline one to two times per day.   If you have been told to use nasal medication, you should always use your saline solution first. The nasal medication is much more effective when sprayed onto clean nasal membranes, and the spray will reach deeper into the nose.   Pour the amount of fluid you plan to use into a clean bowl. Do not put your used syringe back into the storage container, because it contaminates your solution.   You may warm the solution slightly in the microwave, but be sure that the solution is not hot.   Bend over the sink (some people do this in the shower) and squirt the solution into each side of your nose, aiming the stream toward the back of your head, not the top of your head. The solution should flow into one nostril and out of the other, but it will not harm you if you swallow a little.   Some people experience a little burning sensation the first few times they use buffered saline solution, but this usually goes away after they adapt to it.

## 2021-06-18 ENCOUNTER — VIRTUAL VISIT (OUTPATIENT)
Dept: PSYCHIATRY | Facility: CLINIC | Age: 36
End: 2021-06-18
Attending: NURSE PRACTITIONER
Payer: COMMERCIAL

## 2021-06-18 DIAGNOSIS — F43.10 PTSD (POST-TRAUMATIC STRESS DISORDER): Chronic | ICD-10-CM

## 2021-06-18 DIAGNOSIS — F41.1 GAD (GENERALIZED ANXIETY DISORDER): Chronic | ICD-10-CM

## 2021-06-18 PROCEDURE — 99213 OFFICE O/P EST LOW 20 MIN: CPT | Mod: 95 | Performed by: NURSE PRACTITIONER

## 2021-06-18 RX ORDER — HYDROXYZINE HYDROCHLORIDE 25 MG/1
25 TABLET, FILM COATED ORAL 2 TIMES DAILY PRN
Qty: 180 TABLET | Refills: 0 | Status: SHIPPED | OUTPATIENT
Start: 2021-06-18 | End: 2021-11-29

## 2021-06-18 RX ORDER — PRAZOSIN HYDROCHLORIDE 1 MG/1
1 CAPSULE ORAL AT BEDTIME
Qty: 90 CAPSULE | Refills: 0 | Status: SHIPPED | OUTPATIENT
Start: 2021-06-18 | End: 2022-01-26

## 2021-06-18 ASSESSMENT — PAIN SCALES - GENERAL: PAINLEVEL: MODERATE PAIN (5)
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
## 2021-06-18 NOTE — PATIENT INSTRUCTIONS
**For crisis resources, please see the information at the end of this document**     Patient Education      Thank you for coming to the [facility] [place] [facility].    Lab Testing:  If you had lab testing today and your results are reassuring or normal they will be mailed to you or sent through [email or web address] within 7 days. If the lab tests need quick action we will call you with the results. The phone number we will call with results is # [phone] (home) . If this is not the best number please call our clinic and change the number.    Medication Refills:  If you need any refills please call your pharmacy and they will contact us. Our fax number for refills is [phone]. Please allow three business for refill processing. If you need to  your refill at a new pharmacy, please contact the new pharmacy directly. The new pharmacy will help you get your medications transferred.     Scheduling:  If you have any concerns about today's visit or wish to schedule another appointment please call our office during normal business hours [phone] (8-5:00 M-F)    Contact Us:  Please call [phone] during business hours (8-5:00 M-F).  If after clinic hours, or on the weekend, please call  [phone].    Financial Assistance [phone]  [facility] Billing [phone]  Central Billing Office, [facility]: [phone]  [facility] Billing [phone]  Medical Records [phone]  [facility] Patient Bill of Rights [email or web address]       MENTAL HEALTH CRISIS NUMBERS:  For a medical emergency please call  911 or go to the nearest ER.     [facility]:   [facility] -[phone]   Crisis Residence [facility] -[phone]   Walk-In Counseling Center [facility] -[phone]   COPE 24/7 [place] Mobile Team -[phone] (adults)/[phone] (child)  CHILD: [facility] needs assessment  team - [phone]      [facility]:   [facility] - [phone]   Walk-in counseling [facility] - [phone]   Walk-in counseling [facility] - [phone]   Crisis Residence [facility] - [phone]  Urgent Care Adult Mental Rpzttb-[phone] mobile unit/ 24/7 crisis line    National Crisis Numbers:   National Suicide Prevention Lifeline: 7-072-271-TALK (333-573-9665)  Poison Control Center - 3-287-530-5818  [employer]/resources for a list of additional resources (SOS)  Trans Lifeline a hotline for transgender people 1-992.321.8368  The [employer] Project a hotline for LGBT youth 6-526-266-2732  Crisis Text Line: For any crisis 24/7   To: 314978  see www.crisistextline.org  - IF MAKING A CALL FEELS TOO HARD, send a text!         Again thank you for choosing [facility] and please let us know how we can best partner with you to improve you and your family's health.    You may be receiving a survey regarding this appointment. We would love to have your feedback, both positive and negative. The survey is done by an external company, so your answers are anonymous.

## 2021-06-27 NOTE — PROGRESS NOTES
Progress Notes by Yue Bennett PA-C at 4/18/2019 10:30 AM     Author: Yue Bennett PA-C Service: -- Author Type: Physician Assistant    Filed: 4/18/2019 11:27 AM Encounter Date: 4/18/2019 Status: Signed    : Yue Bennett PA-C (Physician Assistant)       Assessment:       BPPV.  Sinus congestion.      Plan:       Meclizine per orders.  Epley maneuver demonstrated.  Continue use of at home Flonase and sudafed.  Fluids.  Discussed signs of worsening symptoms and when to follow-up with PCP if no symptom improvement.    Patient Instructions     You were seen today for dizziness likely due to a condition known as benign paroxysmal positional vertigo. This will improve on its own over time.    Symptom management:  - Perform the Epley maneuver detailed in the picture below 3 times a day until resolution of symptoms  - May take Meclizine to help reduce spinning sensation    Reasons to be seen for re-evaluation:  - Fever of 100.4 or higher  - Develop new symptoms such as headaches, ear pain, or vomiting  - Symptoms worsen over time  - No symptom improvement in 3 days      Congestion    You were seen today for sinus congestion and/or pain. This is likely due to a viral illness.    Symptoms management:  - May use Tylenol or Ibuprofen for discomfort and/or fever if present  - May try saline irrigation to relieve congestion (see instructions below)  - Use of nasal steroids (Flonase) as prescribed  - If you are experiencing ear fullness, may try an oral decongestant such as sudafed    Reasons to come back for re-evaluation:  - Develop a fever of 100.4F or current fever worsens  - Sudden and severe pain in the face and head  - Troubles seeing or double vision  - Swelling or redness around one or both eyes  - Sfiff neck  - Symptoms have not improved after 7 days    Buffered normal saline nasal irrigation   The benefits   1. Saline (saltwater) washes the mucus and irritants from your nose.   2. The sinus  passages are moisturized.   3. Studies have also shown that a nasal irrigation improves cell function (the cells that move the mucus work better).   The recipe   Use a one-quart glass jar that is thoroughly cleansed.   You may use a large medical syringe (30 cc), water pick with an irrigation tip (preferred method), squeeze bottle, or Neti pot. Do not use a baby bulb syringe. The syringe or pick should be sterilized frequently or replaced every two to three weeks to avoid contamination and infection.   Fill with water that has been distilled, previously boiled, or otherwise sterilized. Plain tap water is not recommended, because it is not necessarily sterile.   Add 1 to 1  heaping teaspoons of pickling/lamont salt. Do not use table salt, because it contains a large number of additives.   Add 1 teaspoon of baking soda (pure bicarbonate).   Mix ingredients together, and store at room temperature. Discard after one week.   You may also make up a solution from premixed packets that are commercially prepared specifically for nasal irrigation.   The instructions   Irrigate your nose with saline one to two times per day.   If you have been told to use nasal medication, you should always use your saline solution first. The nasal medication is much more effective when sprayed onto clean nasal membranes, and the spray will reach deeper into the nose.   Pour the amount of fluid you plan to use into a clean bowl. Do not put your used syringe back into the storage container, because it contaminates your solution.   You may warm the solution slightly in the microwave, but be sure that the solution is not hot.   Bend over the sink (some people do this in the shower) and squirt the solution into each side of your nose, aiming the stream toward the back of your head, not the top of your head. The solution should flow into one nostril and out of the other, but it will not harm you if you swallow a little.   Some people experience a  little burning sensation the first few times they use buffered saline solution, but this usually goes away after they adapt to it.               Subjective:        Andria Daily is a 34 y.o. female who presents for evaluation of dizziness. The symptoms started 3 days ago and are improved. The attacks occur intermittently and last minutes to hours. Positions that worsen symptoms: sitting up or turning the head. Symptoms improve when the patient lays down. Previous workup/treatments: none. Associated ear symptoms: ear fullness and pressure. Associated CNS symptoms: none. Recent infections: patient was recently treated for a sinusitis 1 month ago with doxycycline. Head trauma: denied. Drug ingestion: none.    The following portions of the patient's history were reviewed and updated as appropriate: allergies, current medications and problem list.    Review of Systems  Pertinent items are noted in HPI.    Allergies  Allergies   Allergen Reactions   ? (D)-Limonene Flavor      Apples and bananas cause her throat to itch   ? Other Allergy (See Comments)      Apples and bananas cause her throat to itch   ? Penicillins           Objective:       /75 (Patient Site: Right Arm, Patient Position: Sitting, Cuff Size: Adult Regular)   Pulse 70   Temp 98  F (36.7  C) (Oral)   Resp 18   Wt 172 lb 4 oz (78.1 kg)   LMP 04/04/2019   SpO2 97%   Breastfeeding? No   General appearance: alert, appears stated age, cooperative, no distress and non-toxic  Head: Normocephalic, without obvious abnormality, atraumatic, sinuses nontender to percussion  Eyes: PERRL. EOM intact. Mild right beating nystagmus seen.  Ears: Right: TM intact with mucoid fluid and bulging, no erythema. Left: TM intact with mild mucoid fluid, no bulging or erythema. External ears normal  Nose: no discharge  Throat: lips, mucosa, and tongue normal; teeth and gums normal  Neck: no adenopathy and supple, symmetrical, trachea midline  Lungs: clear to auscultation  bilaterally   Heart: regular rate and rhythm, normal S1 and S2, no M/R/G  Neurologic: Torri- Hallpike maneuver is positive on the right side

## 2021-07-27 DIAGNOSIS — F41.1 GAD (GENERALIZED ANXIETY DISORDER): Chronic | ICD-10-CM

## 2021-07-27 DIAGNOSIS — F43.10 PTSD (POST-TRAUMATIC STRESS DISORDER): Chronic | ICD-10-CM

## 2021-08-13 ENCOUNTER — THERAPY VISIT (OUTPATIENT)
Dept: PHYSICAL THERAPY | Facility: CLINIC | Age: 36
End: 2021-08-13
Attending: FAMILY MEDICINE
Payer: COMMERCIAL

## 2021-08-13 DIAGNOSIS — M54.6 CHRONIC BILATERAL THORACIC BACK PAIN: ICD-10-CM

## 2021-08-13 DIAGNOSIS — G89.29 CHRONIC BILATERAL THORACIC BACK PAIN: ICD-10-CM

## 2021-08-13 DIAGNOSIS — M54.50 CHRONIC BILATERAL LOW BACK PAIN, UNSPECIFIED WHETHER SCIATICA PRESENT: ICD-10-CM

## 2021-08-13 DIAGNOSIS — G89.29 CHRONIC BACK PAIN, UNSPECIFIED BACK LOCATION, UNSPECIFIED BACK PAIN LATERALITY: ICD-10-CM

## 2021-08-13 DIAGNOSIS — G89.29 CHRONIC BILATERAL LOW BACK PAIN, UNSPECIFIED WHETHER SCIATICA PRESENT: ICD-10-CM

## 2021-08-13 DIAGNOSIS — M54.9 CHRONIC BACK PAIN, UNSPECIFIED BACK LOCATION, UNSPECIFIED BACK PAIN LATERALITY: ICD-10-CM

## 2021-08-13 PROCEDURE — 97162 PT EVAL MOD COMPLEX 30 MIN: CPT | Mod: GP | Performed by: PHYSICAL THERAPIST

## 2021-08-13 PROCEDURE — 97112 NEUROMUSCULAR REEDUCATION: CPT | Mod: GP | Performed by: PHYSICAL THERAPIST

## 2021-08-13 PROCEDURE — 97110 THERAPEUTIC EXERCISES: CPT | Mod: GP | Performed by: PHYSICAL THERAPIST
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
## 2021-08-17 ENCOUNTER — TELEPHONE (OUTPATIENT)
Dept: OBGYN | Facility: CLINIC | Age: 36
End: 2021-08-17

## 2021-08-17 NOTE — TELEPHONE ENCOUNTER
Refill request received for norgestrel-ethinyl estradiol. Medication prescribed by Edmund Mcnelil.     Called and spoke with patient to clarify. States it is the norgestrel-ethinyl estradiol she needs and will reach out to Dr. Mcneill's clinic for refill.

## 2021-08-17 NOTE — TELEPHONE ENCOUNTER
[facility] Call Center    Phone Message    May a detailed message be left on voicemail: yes     Reason for Call: Medication Refill Request    Has the patient contacted the pharmacy for the refill? Yes   Name of medication being requested: norgestrel-ethinyl estradiol (LOW-OGESTREL) 0.3-30 MG-MCG tablet  Provider who prescribed the medication: Dr. Mcneill - Pt wondering if Dr. Vera is able to fill this med for her  Pharmacy: [id number] [address] AT [facility] [address]  Date medication is needed: 08/17/21         Action Taken: Message routed to:  Other: PHIL Rn    Travel Screening: Not Applicable

## 2021-08-20 ENCOUNTER — THERAPY VISIT (OUTPATIENT)
Dept: PHYSICAL THERAPY | Facility: CLINIC | Age: 36
End: 2021-08-20
Payer: COMMERCIAL

## 2021-08-20 DIAGNOSIS — M54.12 CERVICAL RADICULITIS: Primary | ICD-10-CM

## 2021-08-20 PROCEDURE — 97140 MANUAL THERAPY 1/> REGIONS: CPT | Mod: GP | Performed by: PHYSICAL THERAPIST

## 2021-08-20 PROCEDURE — 97112 NEUROMUSCULAR REEDUCATION: CPT | Mod: GP | Performed by: PHYSICAL THERAPIST

## 2021-08-20 PROCEDURE — 97110 THERAPEUTIC EXERCISES: CPT | Mod: GP | Performed by: PHYSICAL THERAPIST

## 2021-09-21 ENCOUNTER — OFFICE VISIT (OUTPATIENT)
Dept: OBGYN | Facility: CLINIC | Age: 36
End: 2021-09-21
Attending: OBSTETRICS & GYNECOLOGY
Payer: COMMERCIAL

## 2021-09-21 VITALS
HEIGHT: 68 IN | DIASTOLIC BLOOD PRESSURE: 77 MMHG | WEIGHT: 181.9 LBS | BODY MASS INDEX: 27.57 KG/M2 | SYSTOLIC BLOOD PRESSURE: 112 MMHG | HEART RATE: 69 BPM

## 2021-09-21 DIAGNOSIS — Z30.430 ENCOUNTER FOR IUD INSERTION: Primary | ICD-10-CM

## 2021-09-21 LAB
HCG UR QL: NEGATIVE
INTERNAL QC OK POCT: NORMAL

## 2021-09-21 PROCEDURE — 250N000011 HC RX IP 250 OP 636: Performed by: OBSTETRICS & GYNECOLOGY

## 2021-09-21 PROCEDURE — G0463 HOSPITAL OUTPT CLINIC VISIT: HCPCS

## 2021-09-21 PROCEDURE — 81025 URINE PREGNANCY TEST: CPT | Performed by: OBSTETRICS & GYNECOLOGY

## 2021-09-21 PROCEDURE — 58300 INSERT INTRAUTERINE DEVICE: CPT

## 2021-09-21 RX ADMIN — LEVONORGESTREL 20 MCG: 52 INTRAUTERINE DEVICE INTRAUTERINE at 11:38

## 2021-09-21 ASSESSMENT — MIFFLIN-ST. JEOR: SCORE: 1563.59

## 2021-09-21 ASSESSMENT — ANXIETY QUESTIONNAIRES
5. BEING SO RESTLESS THAT IT IS HARD TO SIT STILL: SEVERAL DAYS
2. NOT BEING ABLE TO STOP OR CONTROL WORRYING: SEVERAL DAYS
7. FEELING AFRAID AS IF SOMETHING AWFUL MIGHT HAPPEN: SEVERAL DAYS
6. BECOMING EASILY ANNOYED OR IRRITABLE: SEVERAL DAYS
GAD7 TOTAL SCORE: 10
3. WORRYING TOO MUCH ABOUT DIFFERENT THINGS: SEVERAL DAYS
1. FEELING NERVOUS, ANXIOUS, OR ON EDGE: MORE THAN HALF THE DAYS

## 2021-09-21 ASSESSMENT — PATIENT HEALTH QUESTIONNAIRE - PHQ9
5. POOR APPETITE OR OVEREATING: NEARLY EVERY DAY
SUM OF ALL RESPONSES TO PHQ QUESTIONS 1-9: 8

## 2021-09-21 NOTE — LETTER
"2021       RE: Andria Daily  [address]  [place] [address]     Dear Colleague,    Thank you for referring your patient, Andria Daily, to the [facility] [place] [facility]. Please see a copy of my visit note below.      IUD Insertion:  CONSULT:    Is a pregnancy test required: Yes.  Was it positive or negative?  Negative  Was a consent obtained?  Yes    Subjective: Andria Daily is a 36 year old  presents for IUD and desires Mirena type IUD. She has endometriosis which is more recently improved control on OCP + aygestin. Desires IUD as has been given multiple brands of OCP and her body does not respond the same to different forms.     Patient has been given the opportunity to ask questions about all forms of birth control, including all options appropriate for Andria Daily. Discussed that no method of birth control, except abstinence is 100% effective against pregnancy or sexually transmitted infection.     Andria Daily understands she may have the IUD removed at any time. IUD should be removed by a health care provider.    The entire insertion procedure was reviewed with the patient, including care after placement.    No LMP recorded.No allergy to betadine or shellfish.  HCG Qual Urine   Date Value Ref Range Status   2021 Negative Negative Final         /77   Pulse 69   Ht 1.727 m (5' 8\")   Wt 82.5 kg (181 lb 14.4 oz)   BMI 27.66 kg/m      Pelvic Exam:   EG/BUS: normal genital architecture without lesions, erythema or abnormal secretions.   Vagina: moist, pink, rugae with physiologic discharge and secretions  Cervix: nuliparous no lesions and pink, moist, closed, without lesion or CMT  Uterus: midposition, mobile, no pain  Adnexa: within normal limits and no masses, nodularity, tenderness    PROCEDURE NOTE: -- IUD Insertion    Reason for Insertion: contraception and endometriosis    Under sterile " technique, cervix was visualized with speculum and prepped with Betadine solution swab x 3. Tenaculum was placed for stability. The uterus was gently straightened and sounded to 8.0 cm. IUD prepared for placement, and IUD inserted according to 's instructions without difficulty or significant resitance, and deployed at the fundus. The strings were visualized and trimmed to 3.0 cm from the external os. Tenaculum was removed and hemostasis noted. Speculum removed.  Patient tolerated procedure well.    Lot # [id number]     Exp: 11/2023    EBL: minimal    Complications: none    ASSESSMENT:     ICD-10-CM    1. Encounter for IUD insertion  Z30.430 levonorgestrel (MIRENA) 20 MCG/24HR IUD 20 mcg     hCG qual urine POCT        PLAN:    Given 's handouts, including when to have IUD removed, list of danger s/sx, side effects and follow up recommended. Encouraged condom use for prevention of STD. Back up contraception advised for 7 days if progestin method. Advised to call for any fever, for prolonged or severe pain or bleeding, abnormal vaginal discharge, or unable to palpate strings. She was advised to use pain medications (ibuprofen) as needed for mild to moderate pain. Advised to follow-up in clinic in 4-6 weeks for IUD string check if unable to find strings or as directed by provider.     Isabel Vera MD

## 2021-09-21 NOTE — PROGRESS NOTES
"  IUD Insertion:  CONSULT:    Is a pregnancy test required: Yes.  Was it positive or negative?  Negative  Was a consent obtained?  Yes    Subjective: Andria Daily is a 36 year old  presents for IUD and desires Mirena type IUD. She has endometriosis which is more recently improved control on OCP + aygestin. Desires IUD as has been given multiple brands of OCP and her body does not respond the same to different forms.     Patient has been given the opportunity to ask questions about all forms of birth control, including all options appropriate for Andria Daily. Discussed that no method of birth control, except abstinence is 100% effective against pregnancy or sexually transmitted infection.     Andria Daily understands she may have the IUD removed at any time. IUD should be removed by a health care provider.    The entire insertion procedure was reviewed with the patient, including care after placement.    No LMP recorded.No allergy to betadine or shellfish.  HCG Qual Urine   Date Value Ref Range Status   2021 Negative Negative Final         /77   Pulse 69   Ht 1.727 m (5' 8\")   Wt 82.5 kg (181 lb 14.4 oz)   BMI 27.66 kg/m      Pelvic Exam:   EG/BUS: normal genital architecture without lesions, erythema or abnormal secretions.   Vagina: moist, pink, rugae with physiologic discharge and secretions  Cervix: nuliparous no lesions and pink, moist, closed, without lesion or CMT  Uterus: midposition, mobile, no pain  Adnexa: within normal limits and no masses, nodularity, tenderness    PROCEDURE NOTE: -- IUD Insertion    Reason for Insertion: contraception and endometriosis    Under sterile technique, cervix was visualized with speculum and prepped with Betadine solution swab x 3. Tenaculum was placed for stability. The uterus was gently straightened and sounded to 8.0 cm. IUD prepared for placement, and IUD inserted according to 's instructions without difficulty or significant " resitance, and deployed at the fundus. The strings were visualized and trimmed to 3.0 cm from the external os. Tenaculum was removed and hemostasis noted. Speculum removed.  Patient tolerated procedure well.    Lot # [id number]     Exp: 11/2023    EBL: minimal    Complications: none    ASSESSMENT:     ICD-10-CM    1. Encounter for IUD insertion  Z30.430 levonorgestrel (MIRENA) 20 MCG/24HR IUD 20 mcg     hCG qual urine POCT        PLAN:    Given 's handouts, including when to have IUD removed, list of danger s/sx, side effects and follow up recommended. Encouraged condom use for prevention of STD. Back up contraception advised for 7 days if progestin method. Advised to call for any fever, for prolonged or severe pain or bleeding, abnormal vaginal discharge, or unable to palpate strings. She was advised to use pain medications (ibuprofen) as needed for mild to moderate pain. Advised to follow-up in clinic in 4-6 weeks for IUD string check if unable to find strings or as directed by provider.     Isabel Vera MD

## 2021-09-22 ASSESSMENT — ANXIETY QUESTIONNAIRES: GAD7 TOTAL SCORE: 10

## 2021-10-04 ENCOUNTER — HEALTH MAINTENANCE LETTER (OUTPATIENT)
Age: 36
End: 2021-10-04

## 2021-11-23 ENCOUNTER — MYC MEDICAL ADVICE (OUTPATIENT)
Dept: FAMILY MEDICINE | Facility: CLINIC | Age: 36
End: 2021-11-23
Payer: COMMERCIAL

## 2021-11-23 DIAGNOSIS — G93.32 CHRONIC FATIGUE SYNDROME: ICD-10-CM

## 2021-11-23 RX ORDER — MODAFINIL 200 MG/1
200 TABLET ORAL DAILY
Qty: 30 TABLET | Refills: 0 | Status: SHIPPED | OUTPATIENT
Start: 2021-11-23 | End: 2022-01-20

## 2021-11-25 DIAGNOSIS — F41.1 GAD (GENERALIZED ANXIETY DISORDER): Chronic | ICD-10-CM

## 2021-11-29 RX ORDER — HYDROXYZINE HYDROCHLORIDE 25 MG/1
TABLET, FILM COATED ORAL
Qty: 60 TABLET | Refills: 0 | Status: SHIPPED | OUTPATIENT
Start: 2021-11-29 | End: 2022-01-20

## 2021-11-29 NOTE — TELEPHONE ENCOUNTER
ATARAX  25MG  Last refilled: 6/18/21  Qty: 180    Last seen: 6/18/21  RTC: 3 MOS  Cancel: 9/17/21  No-show: 0  Next appt: NONE  Scheduling has been notified to contact the pt for appointment.  30 DAY MARCO Refill pended and routed to the provider for review/determination due to: Pt outside of RTC timeframe WITH CANCEL X 1

## 2021-12-16 NOTE — PROGRESS NOTES
DISCHARGE SUMMARY    Andria Daily was seen   times for evaluation and treatment.  Patient did not return for further treatment and current status is unknown.  Due to short treatment duration, no objective or functional changes were made.  Please see goal flow sheet from episode noted date below and initial evaluation for further information.  Patient is discharged from therapy and therapy episode is resolved as of 12/16/21.      Linked Episodes   Type: Episode: Status: Noted: Resolved: Last update: Updated by:   PHYSICAL THERAPY Back pain 8/13/21 Active 8/20/2021 12/14/2021  8:56 AM Jarad Patel      Comments:

## 2022-01-18 NOTE — PROGRESS NOTES
ASSESSMENT AND PLAN:     (R53.82) Chronic fatigue syndrome  (primary encounter diagnosis)  (G47.10) Hypersomnia  Comment: Came to me with diagnosis of CFS and certainly seems to fit IOM criteria (impaired activities, post-external malaise, unrefreshing sleep, subjective orthostatic intolerance at least) and has features of fibromyalgia which are common to CFS.     I'm less certain on her diagnosis of hypersomnia for which she takes Modafinil. This diagnosis was made at [place] long ago and I don't have access to those records. I think regardless it would be good to reassess that diagnosis, so I have referred her for sleep evaluation.     The modafinil helps with her symptoms but she would be interested in exploring other treatments to see if they are better tolerated.     We discussed that for the CFS there are not great evidence-based treatments but regular low-impact exercise as tolerated, healthy diet, and a good sleep pattern are important for long-term wellbeing.     Plan: modafinil (PROVIGIL) 200 MG tablet  Plan: SLEEP EVALUATION & MANAGEMENT REFERRAL - ADULT     (Z23) Need for vaccination  Comment:   Plan: FLU VAC PRESRV FREE QUAD SPLIT VIR 3+YRS IM    (F41.1) DIANA (generalized anxiety disorder)  Comment: Follows with [facility]. Giving Andria a refill on hydroxyzine while she sets up follow up with them.   Plan: hydrOXYzine (ATARAX) 25 MG tablet          (Z60.9) High risk social situation  Comment: Having some financial issues making ends meet between her two jobs. I will connect Andria with our clinic's  Liliam Kelly.   Plan:     I spent a total of 46 minutes on the day of the visit.   Time spent doing chart review, history and exam, documentation and further activities per the note    Mook De L aRosa MD   [facility]  01/20/2022, 11:04 AM      SUBJECTIVE:   Andria is a 36 year old female who presents to clinic today for a return visit.    - takes Modafinil for CFS which  "helps  - usually takes just 100mg (1/2 of 200mg tablet) because it can make her jittery    - bedtime is usually 10:30-11pm  - but 2-3 times night a week won't be able to go to sleep until 2-4a  - feels like \"brain and body don't shut down\"  - typically wakes up at final time around noon, but this is very inconsistent  - wakes up at least 5 times throughout the night  - can't really tell what wakes her up  - usually not too difficult to fall back to asleep  - usually stays in bed for about 30-60 minutes before getting up   - still feels tired and body feels sore when she gets out of bed    - was in a DBT group last year  - got to be a little too much from a trauma standpoint, taking a break  - opening up more with family, which is hard  - sleep is not great    - follows with Eugenia Velazco at [facility] for psychiatry    - not doing any therapy    - work as [employer] but shows are on hold, still getting paid  - also works at [employer]  - getting financial assistance from mom    - had COVID last year    Current Regimen:   - Modafinil 100mg daily (1/2 of 200mg tablet)  - Propranolol 20mg twice a day for psychomotor agitation, helps  - Sertraline 50mg daily  - Hydroxyzine 25mg PRN anxiety, takes at least once a day. 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  - walking is most consistent exercise      Patient Active Problem List   Diagnosis     Chronic fatigue syndrome     Hypersomnia     CHHAYA I (cervical intraepithelial neoplasia I)     Chronic rhinitis     Recurrent major depressive disorder (H)     Anxiety     History of eating disorder     History of substance " "use     PTSD (post-traumatic stress disorder)     DIANA (generalized anxiety disorder)     Back pain     Current Outpatient Medications   Medication     ascorbic acid (VITAMIN C) 500 MG CPCR CR capsule     azelastine (ASTELIN) 0.1 % nasal spray     blood glucose (NO BRAND SPECIFIED) lancets standard     blood glucose (NO BRAND SPECIFIED) test strip     blood glucose monitoring (NO BRAND SPECIFIED) meter device kit     calcium gluconate 500 MG tablet     Cyanocobalamin (B-12) 1000 MCG TBCR     hydrOXYzine (ATARAX) 25 MG tablet     modafinil (PROVIGIL) 200 MG tablet     norethindrone (AYGESTIN) 5 MG tablet     prazosin (MINIPRESS) 1 MG capsule     propranolol (INDERAL) 20 MG tablet     sertraline (ZOLOFT) 50 MG tablet     vitamin D3 (CHOLECALCIFEROL) 25 mcg (05819 units) capsule     No current facility-administered medications for this visit.       I have reviewed the patient's relevant past medical history.     OBJECTIVE:   /69 (BP Location: Right arm, Patient Position: Sitting, Cuff Size: Adult Regular)   Pulse 71   Temp (!) 96.6  F (35.9  C) (Temporal)   Ht 1.75 m (5' 8.9\")   Wt 80.9 kg (178 lb 4 oz)   SpO2 98%   BMI 26.40 kg/m      Constitutional: well-appearing, appears stated age  Eyes: conjunctivae without erythema, sclera anicteric.   Skin: no rashes, lesions, or wounds  Psych: affect is full and appropriate, speech is fluent and non-pressured  "

## 2022-01-20 ENCOUNTER — OFFICE VISIT (OUTPATIENT)
Dept: FAMILY MEDICINE | Facility: CLINIC | Age: 37
End: 2022-01-20
Payer: COMMERCIAL

## 2022-01-20 VITALS
SYSTOLIC BLOOD PRESSURE: 101 MMHG | TEMPERATURE: 96.6 F | WEIGHT: 178.25 LBS | HEART RATE: 71 BPM | OXYGEN SATURATION: 98 % | DIASTOLIC BLOOD PRESSURE: 69 MMHG | BODY MASS INDEX: 26.4 KG/M2 | HEIGHT: 69 IN

## 2022-01-20 DIAGNOSIS — Z60.9 HIGH RISK SOCIAL SITUATION: ICD-10-CM

## 2022-01-20 DIAGNOSIS — G93.32 CHRONIC FATIGUE SYNDROME: Primary | ICD-10-CM

## 2022-01-20 DIAGNOSIS — Z23 NEED FOR VACCINATION: ICD-10-CM

## 2022-01-20 DIAGNOSIS — F41.1 GAD (GENERALIZED ANXIETY DISORDER): Chronic | ICD-10-CM

## 2022-01-20 DIAGNOSIS — G47.10 HYPERSOMNIA: ICD-10-CM

## 2022-01-20 RX ORDER — MODAFINIL 200 MG/1
100-200 TABLET ORAL DAILY
Qty: 30 TABLET | Refills: 0 | Status: SHIPPED | OUTPATIENT
Start: 2022-01-20 | End: 2022-03-14

## 2022-01-20 RX ORDER — HYDROXYZINE HYDROCHLORIDE 25 MG/1
TABLET, FILM COATED ORAL
Qty: 60 TABLET | Refills: 1 | Status: SHIPPED | OUTPATIENT
Start: 2022-01-20 | End: 2022-01-26

## 2022-01-20 SDOH — SOCIAL STABILITY - SOCIAL INSECURITY: PROBLEM RELATED TO SOCIAL ENVIRONMENT, UNSPECIFIED: Z60.9

## 2022-01-20 ASSESSMENT — ANXIETY QUESTIONNAIRES
7. FEELING AFRAID AS IF SOMETHING AWFUL MIGHT HAPPEN: SEVERAL DAYS
6. BECOMING EASILY ANNOYED OR IRRITABLE: MORE THAN HALF THE DAYS
2. NOT BEING ABLE TO STOP OR CONTROL WORRYING: MORE THAN HALF THE DAYS
3. WORRYING TOO MUCH ABOUT DIFFERENT THINGS: MORE THAN HALF THE DAYS
IF YOU CHECKED OFF ANY PROBLEMS ON THIS QUESTIONNAIRE, HOW DIFFICULT HAVE THESE PROBLEMS MADE IT FOR YOU TO DO YOUR WORK, TAKE CARE OF THINGS AT HOME, OR GET ALONG WITH OTHER PEOPLE: SOMEWHAT DIFFICULT
5. BEING SO RESTLESS THAT IT IS HARD TO SIT STILL: SEVERAL DAYS
1. FEELING NERVOUS, ANXIOUS, OR ON EDGE: MORE THAN HALF THE DAYS
GAD7 TOTAL SCORE: 13

## 2022-01-20 ASSESSMENT — PATIENT HEALTH QUESTIONNAIRE - PHQ9
SUM OF ALL RESPONSES TO PHQ QUESTIONS 1-9: 11
5. POOR APPETITE OR OVEREATING: NEARLY EVERY DAY

## 2022-01-20 ASSESSMENT — MIFFLIN-ST. JEOR: SCORE: 1561.3

## 2022-01-20 NOTE — NURSING NOTE
Prior to immunization administration, verified patients identity using patient s name and date of birth. Please see Immunization Activity for additional information.     Screening Questionnaire for Adult Immunization    Are you sick today?   No   Do you have allergies to medications, food, a vaccine component or latex?   No   Have you ever had a serious reaction after receiving a vaccination?   No   Do you have a long-term health problem with heart, lung, kidney, or metabolic disease (e.g., diabetes), asthma, a blood disorder, no spleen, complement component deficiency, a cochlear implant, or a spinal fluid leak?  Are you on long-term aspirin therapy?   No   Do you have cancer, leukemia, HIV/AIDS, or any other immune system problem?   No   Do you have a parent, brother, or sister with an immune system problem?   No   In the past 3 months, have you taken medications that affect  your immune system, such as prednisone, other steroids, or anticancer drugs; drugs for the treatment of rheumatoid arthritis, Crohn s disease, or psoriasis; or have you had radiation treatments?   No   Have you had a seizure, or a brain or other nervous system problem?   No   During the past year, have you received a transfusion of blood or blood    products, or been given immune (gamma) globulin or antiviral drug?   No   For women: Are you pregnant or is there a chance you could become       pregnant during the next month?   No   Have you received any vaccinations in the past 4 weeks?   No     Immunization questionnaire answers were all negative.        Per orders of Dr. De La Rosa , injection of  Flu shot   given by Fanta Ortiz. Patient instructed to remain in clinic for 15 minutes afterwards, and to report any adverse reaction to me immediately.       Screening performed by Fanta Ortiz on 1/20/2022 at 9:22 AM.

## 2022-01-20 NOTE — NURSING NOTE
"36 year old  Chief Complaint   Patient presents with     Recheck Medication     f/u management med / refills provigils        Blood pressure 101/69, pulse 71, temperature (!) 96.6  F (35.9  C), temperature source Temporal, height 1.75 m (5' 8.9\"), weight 80.9 kg (178 lb 4 oz), SpO2 98 %, not currently breastfeeding. Body mass index is 26.4 kg/m .  Patient Active Problem List   Diagnosis     Chronic fatigue syndrome     Hypersomnia     CHHAYA I (cervical intraepithelial neoplasia I)     Chronic rhinitis     Recurrent major depressive disorder (H)     Anxiety     History of eating disorder     History of substance use     PTSD (post-traumatic stress disorder)     DIANA (generalized anxiety disorder)     Back pain       Wt Readings from Last 2 Encounters:   22 80.9 kg (178 lb 4 oz)   21 82.5 kg (181 lb 14.4 oz)     BP Readings from Last 3 Encounters:   22 101/69   21 112/77   21 114/79         Current Outpatient Medications   Medication     ascorbic acid (VITAMIN C) 500 MG CPCR CR capsule     azelastine (ASTELIN) 0.1 % nasal spray     blood glucose (NO BRAND SPECIFIED) lancets standard     blood glucose (NO BRAND SPECIFIED) test strip     blood glucose monitoring (NO BRAND SPECIFIED) meter device kit     calcium gluconate 500 MG tablet     Cyanocobalamin (B-12) 1000 MCG TBCR     hydrOXYzine (ATARAX) 25 MG tablet     modafinil (PROVIGIL) 200 MG tablet     norethindrone (AYGESTIN) 5 MG tablet     prazosin (MINIPRESS) 1 MG capsule     propranolol (INDERAL) 20 MG tablet     sertraline (ZOLOFT) 50 MG tablet     vitamin D3 (CHOLECALCIFEROL) 25 mcg (56615 units) capsule     norgestrel-ethinyl estradiol (LOW-OGESTREL) 0.3-30 MG-MCG tablet     No current facility-administered medications for this visit.       Social History     Tobacco Use     Smoking status: Former Smoker     Quit date:      Years since quittin.0     Smokeless tobacco: Never Used   Substance Use Topics     Alcohol use: Not " Currently     Alcohol/week: 0.0 standard drinks     Drug use: Yes     Frequency: 7.0 times per week     Types: Marijuana     Comment: sober from cocaine since 01/2018,        Health Maintenance Due   Topic Date Due     ADVANCE CARE PLANNING  Never done     DEPRESSION ACTION PLAN  Never done     PREVENTIVE CARE VISIT  08/15/2020     INFLUENZA VACCINE (1) 09/01/2021       Lab Results   Component Value Date    PAP NIL 02/26/2021 January 20, 2022 8:20 AM

## 2022-01-20 NOTE — LETTER
My Depression Action Plan  Name: Andria Daily   Date of Birth 1985  Date: 1/20/2022    My doctor: Mook De La Rosa   My clinic: [address]  [facility] [address]  [phone]          GREEN    ZONE   Good Control    What it looks like:     Things are going generally well. You have normal ups and downs. You may even feel depressed from time to time, but bad moods usually last less than a day.   What you need to do:  1. Continue to care for yourself (see self care plan)  2. Check your depression survival kit and update it as needed  3. Follow your physician s recommendations including any medication.  4. Do not stop taking medication unless you consult with your physician first.           YELLOW         ZONE Getting Worse    What it looks like:     Depression is starting to interfere with your life.     It may be hard to get out of bed; you may be starting to isolate yourself from others.    Symptoms of depression are starting to last most all day and this has happened for several days.     You may have suicidal thoughts but they are not constant.   What you need to do:     1. Call your care team. Your response to treatment will improve if you keep your care team informed of your progress. Yellow periods are signs an adjustment may need to be made.     2. Continue your self-care.  Just get dressed and ready for the day.  Don't give yourself time to talk yourself out of it.    3. Talk to someone in your support network.    4. Open up your Depression Self-Care Plan/Wellness Kit.           RED    ZONE Medical Alert - Get Help    What it looks like:     Depression is seriously interfering with your life.     You may experience these or other symptoms: You can t get out of bed most days, can t work or engage in other necessary activities, you have trouble taking care of basic hygiene, or basic responsibilities, thoughts of suicide or death  that will not go away, self-injurious behavior.     What you need to do:  1. Call your care team and request a same-day appointment. If they are not available (weekends or after hours) call your local crisis line, emergency room or 911.          Depression Self-Care Plan / Wellness Kit    Many people find that medication and therapy are helpful treatments for managing depression. In addition, making small changes to your everyday life can help to boost your mood and improve your wellbeing. Below are some tips for you to consider. Be sure to talk with your medical provider and/or behavioral health consultant if your symptoms are worsening or not improving.     Sleep   Sleep hygiene  means all of the habits that support good, restful sleep. It includes maintaining a consistent bedtime and wake time, using your bedroom only for sleeping or sex, and keeping the bedroom dark and free of distractions like a computer, smartphone, or television.     Develop a Healthy Routine  Maintain good hygiene. Get out of bed in the morning, make your bed, brush your teeth, take a shower, and get dressed. Don t spend too much time viewing media that makes you feel stressed. Find time to relax each day.    Exercise  Get some form of exercise every day. This will help reduce pain and release endorphins, the  feel good  chemicals in your brain. It can be as simple as just going for a walk or doing some gardening, anything that will get you moving.      Diet  Strive to eat healthy foods, including fruits and vegetables. Drink plenty of water. Avoid excessive sugar, caffeine, alcohol, and other mood-altering substances.     Stay Connected with Others  Stay in touch with friends and family members.    Manage Your Mood  Try deep breathing, massage therapy, biofeedback, or meditation. Take part in fun activities when you can. Try to find something to smile about each day.     Psychotherapy  Be open to working with a therapist if your provider  recommends it.     Medication  Be sure to take your medication as prescribed. Most anti-depressants need to be taken every day. It usually takes several weeks for medications to work. Not all medicines work for all people. It is important to follow-up with your provider to make sure you have a treatment plan that is working for you. Do not stop your medication abruptly without first discussing it with your provider.    Crisis Resources   These hotlines are for both adults and children. They and are open 24 hours a day, 7 days a week unless noted otherwise.      National Suicide Prevention Lifeline   5-017-909-WOTK (3160)      Crisis Text Line    www.crisistextline.org  Text HOME to 271357 from anywhere in the [place], anytime, about any type of crisis. A live, trained crisis counselor will receive the text and respond quickly.      [place] Lifeline for LGBTQ Youth  A national crisis intervention and suicide lifeline for LGBTQ youth under 25. Provides a safe place to talk without judgement. Call 1-649.388.8371; text START to 756904 or visit www.thetrevorproject.org to talk to a trained counselor.      For [facility] crisis numbers, visit the [facility] website at:  https://[place].gov/dhs/people-we-serve/adults/health-care/mental-health/resources/crisis-contacts.jsp

## 2022-01-21 ASSESSMENT — ANXIETY QUESTIONNAIRES: GAD7 TOTAL SCORE: 13

## 2022-01-23 ENCOUNTER — HEALTH MAINTENANCE LETTER (OUTPATIENT)
Age: 37
End: 2022-01-23

## 2022-01-26 ENCOUNTER — VIRTUAL VISIT (OUTPATIENT)
Dept: PSYCHIATRY | Facility: CLINIC | Age: 37
End: 2022-01-26
Attending: NURSE PRACTITIONER
Payer: COMMERCIAL

## 2022-01-26 DIAGNOSIS — F43.10 PTSD (POST-TRAUMATIC STRESS DISORDER): Chronic | ICD-10-CM

## 2022-01-26 DIAGNOSIS — F41.1 GAD (GENERALIZED ANXIETY DISORDER): Chronic | ICD-10-CM

## 2022-01-26 PROCEDURE — 99213 OFFICE O/P EST LOW 20 MIN: CPT | Mod: 95 | Performed by: NURSE PRACTITIONER

## 2022-01-26 RX ORDER — HYDROXYZINE HYDROCHLORIDE 25 MG/1
TABLET, FILM COATED ORAL
Qty: 60 TABLET | Refills: 2 | Status: SHIPPED | OUTPATIENT
Start: 2022-01-26 | End: 2023-01-25

## 2022-01-26 RX ORDER — PRAZOSIN HYDROCHLORIDE 1 MG/1
1 CAPSULE ORAL AT BEDTIME
Qty: 90 CAPSULE | Refills: 1 | Status: SHIPPED | OUTPATIENT
Start: 2022-01-26 | End: 2023-01-25

## 2022-01-26 ASSESSMENT — PATIENT HEALTH QUESTIONNAIRE - PHQ9
SUM OF ALL RESPONSES TO PHQ QUESTIONS 1-9: 6
SUM OF ALL RESPONSES TO PHQ QUESTIONS 1-9: 6
10. IF YOU CHECKED OFF ANY PROBLEMS, HOW DIFFICULT HAVE THESE PROBLEMS MADE IT FOR YOU TO DO YOUR WORK, TAKE CARE OF THINGS AT HOME, OR GET ALONG WITH OTHER PEOPLE: SOMEWHAT DIFFICULT
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
## 2022-01-27 ASSESSMENT — PATIENT HEALTH QUESTIONNAIRE - PHQ9: SUM OF ALL RESPONSES TO PHQ QUESTIONS 1-9: 6

## 2022-02-11 ENCOUNTER — OFFICE VISIT (OUTPATIENT)
Dept: URGENT CARE | Facility: URGENT CARE | Age: 37
End: 2022-02-11
Payer: COMMERCIAL

## 2022-02-11 ENCOUNTER — TELEPHONE (OUTPATIENT)
Dept: FAMILY MEDICINE | Facility: CLINIC | Age: 37
End: 2022-02-11

## 2022-02-11 VITALS
RESPIRATION RATE: 18 BRPM | WEIGHT: 175 LBS | HEART RATE: 67 BPM | DIASTOLIC BLOOD PRESSURE: 64 MMHG | SYSTOLIC BLOOD PRESSURE: 97 MMHG | TEMPERATURE: 98.5 F | BODY MASS INDEX: 25.92 KG/M2 | OXYGEN SATURATION: 98 %

## 2022-02-11 DIAGNOSIS — R07.0 THROAT PAIN: ICD-10-CM

## 2022-02-11 DIAGNOSIS — N30.00 ACUTE CYSTITIS WITHOUT HEMATURIA: Primary | ICD-10-CM

## 2022-02-11 DIAGNOSIS — R30.0 DYSURIA: ICD-10-CM

## 2022-02-11 LAB
ALBUMIN UR-MCNC: NEGATIVE MG/DL
APPEARANCE UR: ABNORMAL
BACTERIA #/AREA URNS HPF: ABNORMAL /HPF
BILIRUB UR QL STRIP: NEGATIVE
COLOR UR AUTO: YELLOW
DEPRECATED S PYO AG THROAT QL EIA: NEGATIVE
GLUCOSE UR STRIP-MCNC: NEGATIVE MG/DL
GROUP A STREP BY PCR: NOT DETECTED
HGB UR QL STRIP: ABNORMAL
KETONES UR STRIP-MCNC: NEGATIVE MG/DL
LEUKOCYTE ESTERASE UR QL STRIP: ABNORMAL
NITRATE UR QL: NEGATIVE
PH UR STRIP: 6 [PH] (ref 5–7)
RBC #/AREA URNS AUTO: ABNORMAL /HPF
SP GR UR STRIP: 1.01 (ref 1–1.03)
SQUAMOUS #/AREA URNS AUTO: ABNORMAL /LPF
UROBILINOGEN UR STRIP-ACNC: 0.2 E.U./DL
WBC #/AREA URNS AUTO: ABNORMAL /HPF

## 2022-02-11 PROCEDURE — 87086 URINE CULTURE/COLONY COUNT: CPT | Performed by: PHYSICIAN ASSISTANT

## 2022-02-11 PROCEDURE — 81001 URINALYSIS AUTO W/SCOPE: CPT | Performed by: PHYSICIAN ASSISTANT

## 2022-02-11 PROCEDURE — 99214 OFFICE O/P EST MOD 30 MIN: CPT | Performed by: PHYSICIAN ASSISTANT

## 2022-02-11 PROCEDURE — 87186 SC STD MICRODIL/AGAR DIL: CPT | Performed by: PHYSICIAN ASSISTANT

## 2022-02-11 PROCEDURE — 87651 STREP A DNA AMP PROBE: CPT | Performed by: PHYSICIAN ASSISTANT

## 2022-02-11 RX ORDER — CEFDINIR 300 MG/1
300 CAPSULE ORAL 2 TIMES DAILY
Qty: 20 CAPSULE | Refills: 0 | Status: SHIPPED | OUTPATIENT
Start: 2022-02-11 | End: 2023-01-05

## 2022-02-11 NOTE — TELEPHONE ENCOUNTER
Called patient because she is scheduled for a video visit for sore throat. There was no answer. LVM to call clinic and speak to clinic RN.     Triage symptoms and determine if home care advice or be seen in a clinic. Did patient have COVID test done?    Antonette Curtis, MS RN-BC  02/11/22  2:58 PM

## 2022-02-11 NOTE — PROGRESS NOTES
Chief Complaint   Patient presents with     Pharyngitis     have a sore throat-woke up with it yesterday, woke up today with an earache in right side, think might have a bladder infection d/t painful and not alot coming out        Results for orders placed or performed in visit on 02/11/22   UA macro with reflex to Microscopic and Culture - Clinc Collect     Status: Abnormal    Specimen: Urine, Clean Catch   Result Value Ref Range    Color Urine Yellow Colorless, Straw, Light Yellow, Yellow    Appearance Urine Slightly Cloudy (A) Clear    Glucose Urine Negative Negative mg/dL    Bilirubin Urine Negative Negative    Ketones Urine Negative Negative mg/dL    Specific Gravity Urine 1.010 1.003 - 1.035    Blood Urine Trace (A) Negative    pH Urine 6.0 5.0 - 7.0    Protein Albumin Urine Negative Negative mg/dL    Urobilinogen Urine 0.2 0.2, 1.0 E.U./dL    Nitrite Urine Negative Negative    Leukocyte Esterase Urine Small (A) Negative   Urine Microscopic     Status: Abnormal   Result Value Ref Range    Bacteria Urine Few (A) None Seen /HPF    RBC Urine 2-5 (A) 0-2 /HPF /HPF    WBC Urine 5-10 (A) 0-5 /HPF /HPF    Squamous Epithelials Urine Few (A) None Seen /LPF    Narrative    Urine Culture not indicated   Streptococcus A Rapid Screen w/Reflex to PCR - Clinic Collect     Status: Normal    Specimen: Throat; Swab   Result Value Ref Range    Group A Strep antigen Negative Negative           ASSESSMENT:     ICD-10-CM    1. Acute cystitis without hematuria  N30.00 cefdinir (OMNICEF) 300 MG capsule     Urine Culture Aerobic Bacterial - lab collect   2. Throat pain  R07.0 Streptococcus A Rapid Screen w/Reflex to PCR - Clinic Collect     Group A Streptococcus PCR Throat Swab     cefdinir (OMNICEF) 300 MG capsule   3. Dysuria  R30.0 UA macro with reflex to Microscopic and Culture - Clinc Collect     Urine Microscopic     cefdinir (OMNICEF) 300 MG capsule           PLAN: Vital signs stable.  UTI, treated with cefdinir.  Low risk of  cross-reactivity with penicillin family.  Penicillin caused a rash on her face and she vomited.  Cefdinir will also cover strep should the further strep test come back positive.  Right tonsil had a tonsillar stone versus some exudate.  Salt water gargles.  Urine culture and further strep test pending.  I have discussed clinical findings with patient.  Side effects of medications discussed.  Symptomatic care is discussed.  I have discussed the possibility of  worsening symptoms and indication to RTC or go to the ER if they occur.  All questions are answered, patient indicates understanding of these issues and is in agreement with plan.   Patient care instructions are discussed/given at the end of visit.   Lots of rest and fluids.      Domitila Zarate PA-C      SUBJECTIVE:  36-year-old female presents for sore throat since yesterday, right ear pain that started today intermittent urinary dysuria over the past week.  History of tonsil stones.  Mild nausea.  No vomiting.  Very mild headache.  No abdominal pain or flank pain.  No fever.  IUD for contraception.      covid vacc x3    Allergies   Allergen Reactions     Dairy Products [Milk Protein Extract] Other (See Comments)     Nasal problems     No Clinical Screening - See Comments      Apples and bananas cause her throat to itch     Seasonal Allergies      Penicillins Rash     Rash on face and threw up       Past Medical History:   Diagnosis Date     Anxiety      Chronic fatigue syndrome      Chronic rhinitis      CHHAYA I (cervical intraepithelial neoplasia I) 05/24/2007     Depressive disorder      History of eating disorder      History of substance use     prescription pain killers, alcohol, cocaine     Hypersomnia      PTSD (post-traumatic stress disorder)        ascorbic acid (VITAMIN C) 500 MG CPCR CR capsule,   azelastine (ASTELIN) 0.1 % nasal spray, Spray 1 spray into both nostrils 2 times daily  blood glucose (NO BRAND SPECIFIED) lancets standard, Use to test  blood sugar 1 times daily or as directed.  blood glucose (NO BRAND SPECIFIED) test strip, Use to test blood sugar 1 times daily or as directed.  blood glucose monitoring (NO BRAND SPECIFIED) meter device kit, Use to test blood sugar 1 times daily or as directed.  calcium gluconate 500 MG tablet, Take 500 mg by mouth 2 times daily  Cyanocobalamin (B-12) 1000 MCG TBCR,   hydrOXYzine (ATARAX) 25 MG tablet, Take 1 tablet (25 mg) 2 times daily as needed for other (anxiety)  modafinil (PROVIGIL) 200 MG tablet, Take 0.5-1 tablets (100-200 mg) by mouth daily  norethindrone (AYGESTIN) 5 MG tablet, Take 1 tablet (5 mg) by mouth daily  prazosin (MINIPRESS) 1 MG capsule, Take 1 capsule (1 mg) by mouth At Bedtime  propranolol (INDERAL) 20 MG tablet, Take 1 tablet (20 mg) by mouth 2 times daily  sertraline (ZOLOFT) 50 MG tablet, Take 1 tablet (50 mg) by mouth daily  vitamin D3 (CHOLECALCIFEROL) 25 mcg (74065 units) capsule, Take 1 capsule by mouth daily    No current facility-administered medications on file prior to visit.      Social History     Tobacco Use     Smoking status: Former Smoker     Quit date:      Years since quittin.1     Smokeless tobacco: Never Used   Substance Use Topics     Alcohol use: Not Currently     Alcohol/week: 0.0 standard drinks       ROS:  CONSTITUTIONAL: Negative for fatigue or fever.  EYES: Negative for eye problems.  ENT: As above.  RESP: Neg. For SOB.  CV: Negative for chest pains.  GI: Negative for vomiting.  MUSCULOSKELETAL:  Negative for significant muscle or joint pains.  NEUROLOGIC: Negative for headaches.  SKIN: Negative for rash.  PSYCH: Normal mentation for age.  : positive dysuria    OBJECTIVE:  BP 97/64 (BP Location: Left arm, Patient Position: Sitting, Cuff Size: Adult Regular)   Pulse 67   Temp 98.5  F (36.9  C) (Oral)   Resp 18   Wt 79.4 kg (175 lb)   SpO2 98%   BMI 25.92 kg/m    GENERAL APPEARANCE: Healthy, alert and no distress.  EYES:Conjunctiva/sclera  clear.  EARS: No cerumen.   Ear canals w/o erythema.  TM's intact w/o erythema.    NOSE/MOUTH: Nose without ulcers, erythema or lesions.  SINUSES: No maxillary sinus tenderness.  THROAT: Mild to moderate erythema with 1+ tonsillar enlargement bilaterally.  Looks like there is a tonsil stone on the right versus exudate.  Uvula is midline.  NECK: Supple, nontender, no lymphadenopathy.  RESP: Lungs clear to auscultation - no rales, rhonchi or wheezes  CV: Regular rate and rhythm, normal S1 S2, no murmur noted.  NEURO: Awake, alert    SKIN: No rashes  Abdomen-soft, nontender.  No hepatosplenomegaly.  Normal active bowel sounds.      Domitila Zarate PA-C

## 2022-02-11 NOTE — TELEPHONE ENCOUNTER
Patient reports sore throat x 2 days, white patches on left side of throat, swelling of left side. Denies fever or feeling feverish. Also with ear pain on left side. Also with painful urination, frequent urination. Advised patient to be seen in the clinic and not virtually. No appointments available for in clinic now, but advised patient to seek urgent care either this evening or tomorrow. She is unsure where to go because of her insurance. She does have her insurance card and plans to call Member Services to find a location under her plan. Call back if there is a problem.   Antonette Curtis MS RN-BC  02/11/22  3:32 PM

## 2022-02-14 LAB — BACTERIA UR CULT: ABNORMAL

## 2022-03-14 DIAGNOSIS — G93.32 CHRONIC FATIGUE SYNDROME: ICD-10-CM

## 2022-03-14 RX ORDER — MODAFINIL 200 MG/1
100-200 TABLET ORAL DAILY
Qty: 30 TABLET | Refills: 0 | Status: SHIPPED | OUTPATIENT
Start: 2022-03-14 | End: 2022-05-05

## 2022-03-14 NOTE — TELEPHONE ENCOUNTER
Medication requested: MODAFINIL 200 MG  Last office visit: 1/20/22  [facility] appointments: 4/25/22  Medication last refilled: 1/26/22 PER  REVIEW  Last qualifying labs: N/A    Routing refill request to provider for review/approval because: Drug not on the [facility] refill protocol   Antonette Curtis MS RN-BC  03/14/22  1:48 PM

## 2022-04-25 DIAGNOSIS — R25.1 TREMOR: ICD-10-CM

## 2022-04-25 NOTE — TELEPHONE ENCOUNTER
Last office visit was on 01/20/2022, no future visit scheduled.  Medication last filled on 06/03/2021, with 180 tablets and 1 refill.    Prescription approved per [facility] Refill Protocol.  Melisa Gamboa RN  [facility]

## 2022-04-26 RX ORDER — PROPRANOLOL HYDROCHLORIDE 20 MG/1
20 TABLET ORAL 2 TIMES DAILY
Qty: 180 TABLET | Refills: 1 | Status: SHIPPED | OUTPATIENT
Start: 2022-04-26 | End: 2023-01-05

## 2022-05-05 DIAGNOSIS — G93.32 CHRONIC FATIGUE SYNDROME: ICD-10-CM

## 2022-05-05 RX ORDER — MODAFINIL 200 MG/1
100-200 TABLET ORAL DAILY
Qty: 30 TABLET | Refills: 0 | Status: SHIPPED | OUTPATIENT
Start: 2022-05-05 | End: 2022-07-13

## 2022-05-05 NOTE — TELEPHONE ENCOUNTER
Last visit 1/20/22  Last refill modafanil 3/16/22 - checked   Melisa Gamboa RN  [facility]

## 2022-05-05 NOTE — CONFIDENTIAL NOTE
Alert for interaction with norethindrone.  Has IUD for primary contraception    Mook De La Rosa MD on 5/5/2022 at 6:22 PM

## 2022-05-10 ENCOUNTER — TELEPHONE (OUTPATIENT)
Dept: FAMILY MEDICINE | Facility: CLINIC | Age: 37
End: 2022-05-10
Payer: COMMERCIAL

## 2022-05-10 NOTE — TELEPHONE ENCOUNTER
Prior Authorization Retail Medication Request    Medication/Dose : modafanil 200 mg  ICD code (if different than what is on RX):  same  Previously Tried and Failed:    Rationale:      Insurance Name:  same  Insurance ID:  same      Pharmacy Information (if different than what is on RX)  Name:  [facility]  Phone:  [phone]    Melisa Gamboa RN  [facility]

## 2022-05-11 NOTE — TELEPHONE ENCOUNTER
Central Prior Authorization Team   Phone: [phone]    PA Initiation    Medication: Modafanil 200 mg  Insurance Company: [employer] - Phone [phone] Fax [phone]  Pharmacy Filling the Rx: [id number] [place], [place] - [address] AT [facility] [address]  Filling Pharmacy Phone: [phone]  Filling Pharmacy Fax: [phone]  Start Date: 5/11/2022

## 2022-05-12 NOTE — TELEPHONE ENCOUNTER
Prior Authorization Approval    Authorization Effective Date: 5/11/2022  Authorization Expiration Date: 5/10/2023  Medication: Modafanil 200 mg-PA APPROVED   Approved Dose/Quantity:   Reference #:     Insurance Company: [email or web address] - Phone [phone] Fax [phone]  Expected CoPay:       CoPay Card Available:      Foundation Assistance Needed:    Which Pharmacy is filling the prescription (Not needed for infusion/clinic administered): [id number] [place] [id number] AT [address]  Pharmacy Notified: Yes- **Instructed pharmacy to notify patient when script is ready to /ship.**  Patient Notified: Yes    Received call from [employer] stating that PA has been Approved with Effective Dates: 5/11/2022-5/10/2023. No PA Approval Letter was provided, Approval was given by phone.

## 2022-07-12 DIAGNOSIS — G93.32 CHRONIC FATIGUE SYNDROME: ICD-10-CM

## 2022-07-12 NOTE — TELEPHONE ENCOUNTER
Last visit 1/20/22, no future visit  Last refill 5/13/22 - unable to find date in  - called [facility] pharmacy and they verified last refill 5/13/22.  Routing refill request to provider for review/approval because:  Drug not on the [facility] refill protocol   Melisa Gamboa RN  [facility]

## 2022-07-13 RX ORDER — MODAFINIL 200 MG/1
100-200 TABLET ORAL DAILY
Qty: 30 TABLET | Refills: 0 | Status: SHIPPED | OUTPATIENT
Start: 2022-07-13 | End: 2022-09-06

## 2022-08-08 DIAGNOSIS — F43.10 PTSD (POST-TRAUMATIC STRESS DISORDER): Chronic | ICD-10-CM

## 2022-08-08 DIAGNOSIS — F41.1 GAD (GENERALIZED ANXIETY DISORDER): Chronic | ICD-10-CM

## 2022-08-09 NOTE — TELEPHONE ENCOUNTER
sertraline (ZOLOFT) 50 MG  Last refilled: 1/26/22  Qty: 90  : 1    Last seen: 1/26/22  RTC: 3 MOS  Cancel: 4/5  No-show: 0  Next appt: NONE  Scheduling has been notified to contact the pt for appointment.  30 DAY MARCO Refill pended and routed to the provider for review/determination due to : Pt outside of RTC timeframe WITH CANCEL X 1

## 2022-09-04 DIAGNOSIS — G93.32 CHRONIC FATIGUE SYNDROME: ICD-10-CM

## 2022-09-06 RX ORDER — MODAFINIL 200 MG/1
TABLET ORAL
Qty: 30 TABLET | Refills: 2 | Status: SHIPPED | OUTPATIENT
Start: 2022-09-06 | End: 2023-03-13

## 2022-09-06 NOTE — TELEPHONE ENCOUNTER
Last visit 1/20/22, no future visit  Last refill modafinil 200 mg 7/16/22 - checked   Routing refill request to provider for review/approval because:  Drug not on the [facility] refill protocol   Melisa Gamboa RN  [facility]

## 2022-09-11 ENCOUNTER — HEALTH MAINTENANCE LETTER (OUTPATIENT)
Age: 37
End: 2022-09-11

## 2022-10-07 ENCOUNTER — VIRTUAL VISIT (OUTPATIENT)
Dept: SLEEP MEDICINE | Facility: CLINIC | Age: 37
End: 2022-10-07
Payer: COMMERCIAL

## 2022-10-07 VITALS — HEIGHT: 68 IN | WEIGHT: 165 LBS | BODY MASS INDEX: 25.01 KG/M2

## 2022-10-07 DIAGNOSIS — G47.10 HYPERSOMNIA: ICD-10-CM

## 2022-10-07 DIAGNOSIS — F41.9 ANXIETY AND DEPRESSION: ICD-10-CM

## 2022-10-07 DIAGNOSIS — R06.83 SNORING: ICD-10-CM

## 2022-10-07 DIAGNOSIS — F32.A ANXIETY AND DEPRESSION: ICD-10-CM

## 2022-10-07 DIAGNOSIS — F51.04 CHRONIC INSOMNIA: Primary | ICD-10-CM

## 2022-10-07 DIAGNOSIS — G47.9 SLEEP DISTURBANCE: ICD-10-CM

## 2022-10-07 DIAGNOSIS — F43.10 PTSD (POST-TRAUMATIC STRESS DISORDER): ICD-10-CM

## 2022-10-07 DIAGNOSIS — F51.5 NIGHTMARES: ICD-10-CM

## 2022-10-07 DIAGNOSIS — G47.21 DELAYED SLEEP PHASE SYNDROME: ICD-10-CM

## 2022-10-07 PROCEDURE — 99205 OFFICE O/P NEW HI 60 MIN: CPT | Mod: 95 | Performed by: INTERNAL MEDICINE

## 2022-10-07 ASSESSMENT — SLEEP AND FATIGUE QUESTIONNAIRES
HOW LIKELY ARE YOU TO NOD OFF OR FALL ASLEEP WHILE SITTING QUIETLY AFTER LUNCH WITHOUT ALCOHOL: WOULD NEVER DOZE
HOW LIKELY ARE YOU TO NOD OFF OR FALL ASLEEP IN A CAR, WHILE STOPPED FOR A FEW MINUTES IN TRAFFIC: WOULD NEVER DOZE
HOW LIKELY ARE YOU TO NOD OFF OR FALL ASLEEP WHILE SITTING AND READING: SLIGHT CHANCE OF DOZING
HOW LIKELY ARE YOU TO NOD OFF OR FALL ASLEEP WHILE SITTING AND TALKING TO SOMEONE: WOULD NEVER DOZE
HOW LIKELY ARE YOU TO NOD OFF OR FALL ASLEEP WHILE LYING DOWN TO REST IN THE AFTERNOON WHEN CIRCUMSTANCES PERMIT: MODERATE CHANCE OF DOZING
HOW LIKELY ARE YOU TO NOD OFF OR FALL ASLEEP WHILE SITTING INACTIVE IN A PUBLIC PLACE: WOULD NEVER DOZE
HOW LIKELY ARE YOU TO NOD OFF OR FALL ASLEEP WHEN YOU ARE A PASSENGER IN A CAR FOR AN HOUR WITHOUT A BREAK: SLIGHT CHANCE OF DOZING
HOW LIKELY ARE YOU TO NOD OFF OR FALL ASLEEP WHILE WATCHING TV: MODERATE CHANCE OF DOZING

## 2022-10-07 ASSESSMENT — PAIN SCALES - GENERAL: PAINLEVEL: NO PAIN (0)

## 2022-10-07 NOTE — PROGRESS NOTES
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 results found for this or any previous visit from the past 365 days.      PFT: Most Recent [facility] Pulmonary Function Testing    No results found for: 20001    Alexander Johnson MD 10/7/2022

## 2022-10-13 NOTE — PATIENT INSTRUCTIONS
"      Am I having a sleep study at a sleep center?  --->Due to normal delays, you will be contacted within 2-4 weeks to schedule    Frequently asked questions:  1. What is Obstructive Sleep Apnea (JOHANNY)? JOHANNY is the most common type of sleep apnea. Apnea means, \"without breath.\"  Apnea is most often caused by narrowing or collapse of the upper airway as muscles relax during sleep.   Almost everyone has occasional apneas. Most people with sleep apnea have had brief interruptions at night frequently for many years.  The severity of sleep apnea is related to how frequent and severe the events are.   2. What are the consequences of JOHANNY? Symptoms include: feeling sleepy during the day, snoring loudly, gasping or stopping of breathing, trouble sleeping, and occasionally morning headaches or heartburn at night.  Sleepiness can be serious and even increase the risk of falling asleep while driving. Other health consequences may include development of high blood pressure and other cardiovascular disease in persons who are susceptible. Untreated JOHANNY  can contribute to heart disease, stroke and diabetes.   3. What are the treatment options? In most situations, sleep apnea is a lifelong disease that must be managed with daily therapy. Medications are not effective for sleep apnea and surgery is generally not considered until other therapies have been tried. Your treatment is your choice . Continuous Positive Airway (CPAP) works right away and is the therapy that is effective in nearly everyone. An oral device to hold your jaw forward is usually the next most reliable option. Other options include postioning devices (to keep you off your back), weight loss, and surgery including a tongue pacing device. There is more detail about some of these options below.  4. Are my sleep studies covered by insurance? Although we will request verification of coverage, we advise you also check in advance of the study to ensure there is " coverage.    Important tips for those choosing CPAP and similar devices   Know your equipment:  CPAP is continuous positive airway pressure that prevents obstructive sleep apnea by keeping the throat from collapsing while you are sleeping. In most cases, the device is  smart  and can slowly self-adjusts if your throat collapses and keeps a record every day of how well you are treated-this information is available to you and your care team.  BPAP is bilevel positive airway pressure that keeps your throat open and also assists each breath with a pressure boost to maintain adequate breathing.  Special kinds of BPAP are used in patients who have inadequate breathing from lung or heart disease. In most cases, the device is  smart  and can slowly self-adjusts to assist breathing. Like CPAP, the device keeps a record of how well you are treated.  Your mask is your connection to the device. You get to choose what feels most comfortable and the staff will help to make sure if fits. Here: are some examples of the different masks that are available:       Key points to remember on your journey with sleep apnea:  Sleep study.  PAP devices often need to be adjusted during a sleep study to show that they are effective and adjusted right.  Good tips to remember: Try wearing just the mask during a quiet time during the day so your body adapts to wearing it. A humidifier is recommended for comfort in most cases to prevent drying of your nose and throat. Allergy medication from your provider may help you if you are having nasal congestion.  Getting settled-in. It takes more than one night for most of us to get used to wearing a mask. Try wearing just the mask during a quiet time during the day so your body adapts to wearing it. A humidifier is recommended for comfort in most cases. Our team will work with you carefully on the first day and will be in contact within 4 days and again at 2 and 4 weeks for advice and remote device  adjustments. Your therapy is evaluated by the device each day.   Use it every night. The more you are able to sleep naturally for 7-8 hours, the more likely you will have good sleep and to prevent health risks or symptoms from sleep apnea. Even if you use it 4 hours it helps. Occasionally all of us are unable to use a medical therapy, in sleep apnea, it is not dangerous to miss one night.   Communicate. Call our skilled team on the number provided on the first day if your visit for problems that make it difficult to wear the device. Over 2 out of 3 patients can learn to wear the device long-term with help from our team. Remember to call our team or your sleep providers if you are unable to wear the device as we may have other solutions for those who cannot adapt to mask CPAP therapy. It is recommended that you sleep your sleep provider within the first 3 months and yearly after that if you are not having problems.   Use it for your health. We encourage use of CPAP masks during daytime quiet periods to allow your face and brain to adapt to the sensation of CPAP so that it will be a more natural sensation to awaken to at night or during naps. This can be very useful during the first few weeks or months of adapting to CPAP though it does not help medically to wear CPAP during wakefulness and  should not be used as a strategy just to meet guidelines.  Take care of your equipment. Make sure you clean your mask and tubing using directions every day and that your filter and mask are replaced as recommended or if they are not working.     BESIDES CPAP, WHAT OTHER THERAPIES ARE THERE?    Positioning Device  Positioning devices are generally used when sleep apnea is mild and only occurs on your back.This example shows a pillow that straps around the waist. It may be appropriate for those whose sleep study shows milder sleep apnea that occurs primarily when lying flat on one's back. Preliminary studies have shown benefit but  effectiveness at home may need to be verified by a home sleep test. These devices are generally not covered by medical insurance.  Examples of devices that maintain sleeping on the back to prevent snoring and mild sleep apnea.    Belt type body positioner  http://zzomaosa.com/    Electronic reminder  http://nightshifttherapy.com/            Oral Appliance  What is oral appliance therapy?  An oral appliance device fits on your teeth at night like a retainer used after having braces. The device is made by a specialized dentist and requires several visits over 1-2 months before a manufactured device is made to fit your teeth and is adjusted to prevent your sleep apnea. Once an oral device is working properly, snoring should be improved. A home sleep test may be recommended at that time if to determine whether the sleep apnea is adequately treated.       Some things to remember:  -Oral devices are often, but not always, covered by your medical insurance. Be sure to check with your insurance provider.   -If you are referred for oral therapy, you will be given a list of specialized dentists to consider or you may choose to visit the Web site of the American Academy of Dental Sleep Medicine  -Oral devices are less likely to work if you have severe sleep apnea or are extremely overweight.     More detailed information  An oral appliance is a small acrylic device that fits over the upper and lower teeth  (similar to a retainer or a mouth guard). This device slightly moves jaw forward, which moves the base of the tongue forward, opens the airway, improves breathing for effective treat snoring and obstructive sleep apnea in perhaps 7 out of 10 people .  The best working devices are custom-made by a dental device  after a mold is made of the teeth 1, 2, 3.  When is an oral appliance indicated?  Oral appliance therapy is recommended as a first-line treatment for patients with primary snoring, mild sleep apnea, and  for patients with moderate sleep apnea who prefer appliance therapy to use of CPAP4, 5. Severity of sleep apnea is determined by sleep testing and is based on the number of respiratory events per hour of sleep.   How successful is oral appliance therapy?  The success rate of oral appliance therapy in patients with mild sleep apnea is 75-80% while in patients with moderate sleep apnea it is 50-70%. The chance of success in patients with severe sleep apnea is 40-50%. The research also shows that oral appliances have a beneficial effect on the cardiovascular health of JOHANNY patients at the same magnitude as CPAP therapy7.  Oral appliances should be a second-line treatment in cases of severe sleep apnea, but if not completely successful then a combination therapy utilizing CPAP plus oral appliance therapy may be effective. Oral appliances tend to be effective in a broad range of patients although studies show that the patients who have the highest success are females, younger patients, those with milder disease, and less severe obesity. 3, 6.   Finding a dentist that practices dental sleep medicine  Specific training is available through the American Academy of Dental Sleep Medicine for dentists interested in working in the field of sleep. To find a dentist who is educated in the field of sleep and the use of oral appliances, near you, visit the Web site of the American Academy of Dental Sleep Medicine.    References  1. Ronda, et al. Objectively measured vs self-reported compliance during oral appliance therapy for sleep-disordered breathing. Chest 2013; 144(5): 1831-9995.  2. Pearl, et al. Objective measurement of compliance during oral appliance therapy for sleep-disordered breathing. Thorax 2013; 68(1): 91-96.  3. Deric et al. Mandibular advancement devices in 620 men and women with JOHANNY and snoring: tolerability and predictors of treatment success. Chest 2004; 125: 2042-0258.  4. Margot et al. Oral  appliances for snoring and JOHANNY: a review. Sleep 2006; 29: 244-262.  5. Eden et al. Oral appliance treatment for JOHANNY: an update. J Clin Sleep Med 2014; 10(2): 215-227.  6. Laury et al. Predictors of OSAH treatment outcome. J Dent Res 2007; 86: 8980-0471.      Weight Loss:    Weight loss is a long-term strategy that may improve sleep apnea in some patients.    Weight management is a personal decision and the decision should be based on your interest and the potential benefits.  If you are interested in exploring weight loss strategies, the following discussion covers the impact on weight loss on sleep apnea and the approaches that may be successful.    Being overweight does not necessarily mean you will have health consequences.  Those who have BMI over 35 or over 27 with existing medical conditions carries greater risk.   Weight loss decreases severity of sleep apnea in most people with obesity. For those with mild obesity who have developed snoring with weight gain, even 15-30 pound weight loss can improve and occasionally eliminate sleep apnea.  Structured and life-long dietary and health habits are necessary to lose weight and keep healthier weight levels.     Though there may be significant health benefits from weight loss, long-term weight loss is very difficult to achieve- studies show success with dietary management in less than 10% of people. In addition, substantial weight loss may require years of dietary control and may be difficult if patients have severe obesity. In these cases, surgical management may be considered.  Finally, older individuals who have tolerated obesity without health complications may be less likely to benefit from weight loss strategies.         Surgery:    Surgery for obstructive sleep apnea is considered generally only when other therapies fail to work. Surgery may be discussed with you if you are having a difficult time tolerating CPAP and or when there is an abnormal  structure that requires surgical correction.  Nose and throat surgeries often enlarge the airway to prevent collapse.  Most of these surgeries create pain for 1-2 weeks and up to half of the most common surgeries are not effective throughout life.  You should carefully discuss the benefits and drawbacks to surgery with your sleep provider and surgeon to determine if it is the best solution for you.   More information  Surgery for JOHANNY is directed at areas that are responsible for narrowing or complete obstruction of the airway during sleep.  There are a wide range of procedures available to enlarge and/or stabilize the airway to prevent blockage of breathing in the three major areas where it can occur: the palate, tongue, and nasal regions.  Successful surgical treatment depends on the accurate identification of the factors responsible for obstructive sleep apnea in each person.  A personalized approach is required because there is no single treatment that works well for everyone.  Because of anatomic variation, consultation with an examination by a sleep surgeon is a critical first step in determining what surgical options are best for each patient.  In some cases, examination during sedation may be recommended in order to guide the selection of procedures.  Patients will be counseled about risks and benefits as well as the typical recovery course after surgery. Surgery is typically not a cure for a person s JOHANNY.  However, surgery will often significantly improve one s JOHANNY severity (termed  success rate ).  Even in the absence of a cure, surgery will decrease the cardiovascular risk associated with OSA7; improve overall quality of life8 (sleepiness, functionality, sleep quality, etc).      Palate Procedures:  Patients with JOHANNY often have narrowing of their airway in the region of their tonsils and uvula.  The goals of palate procedures are to widen the airway in this region as well as to help the tissues resist  collapse.  Modern palate procedure techniques focus on tissue conservation and soft tissue rearrangement, rather than tissue removal.  Often the uvula is preserved in this procedure. Residual sleep apnea is common in patient after pharyngoplasty with an average reduction in sleep apnea events of 33%2.      Tongue Procedures:  ExamWhile patients are awake, the muscles that surround the throat are active and keep this region open for breathing. These muscles relax during sleep, allowing the tongue and other structures to collapse and block breathing.  There are several different tongue procedures available.  Selection of a tongue base procedure depends on characteristics seen on physical exam.  Generally, procedures are aimed at removing bulky tissues in this area or preventing the back of the tongue from falling back during sleep.  Success rates for tongue surgery range from 50-62%3.    Hypoglossal Nerve Stimulation:  Hypoglossal nerve stimulation has recently received approval from the [place] Food and Drug Administration for the treatment of obstructive sleep apnea.  This is based on research showing that the system was safe and effective in treating sleep apnea6.  Results showed that the median AHI score decreased 68%, from 29.3 to 9.0. This therapy uses an implant system that senses breathing patterns and delivers mild stimulation to airway muscles, which keeps the airway open during sleep.  The system consists of three fully implanted components: a small generator (similar in size to a pacemaker), a breathing sensor, and a stimulation lead.  Using a small handheld remote, a patient turns the therapy on before bed and off upon awakening.    Candidates for this device must be greater than 22 years of age, have moderate to severe JOHANNY (AHI between 20-65), BMI less than 32, have tried CPAP/oral appliance without success, and have appropriate upper airway anatomy (determined by a sleep endoscopy performed by   Hsia).    Hypoglossal Nerve Stimulation Pathway:    The sleep surgeon s office will work with the patient through the insurance prior-authorization process (including communications and appeals).    Nasal Procedures:  Nasal obstruction can interfere with nasal breathing during the day and night.  Studies have shown that relief of nasal obstruction can improve the ability of some patients to tolerate positive airway pressure therapy for obstructive sleep apnea1.  Treatment options include medications such as nasal saline, topical corticosteroid and antihistamine sprays, and oral medications such as antihistamines or decongestants. Non-surgical treatments can include external nasal dilators for selected patients. If these are not successful by themselves, surgery can improve the nasal airway either alone or in combination with these other options.      Combination Procedures:  Combination of surgical procedures and other treatments may be recommended, particularly if patients have more than one area of narrowing or persistent positional disease.  The success rate of combination surgery ranges from 66-80%2,3.    References  Isai MURRIETA. The Role of the Nose in Snoring and Obstructive Sleep Apnoea: An Update.  Eur Arch Otorhinolaryngol. 2011; 268: 1365-73.   Johann SM; Carroll JA; Carlos JR; Pallanch JF; Puma MB; Misha SG; Lew ALTAMIRANOD. Surgical modifications of the upper airway for obstructive sleep apnea in adults: a systematic review and meta-analysis. SLEEP 2010;33(10):9933-2755. [place] AGUILA. Hypopharyngeal surgery in obstructive sleep apnea: an evidence-based medicine review.  Arch Otolaryngol Head Neck Surg. 2006 Feb;132(2):206-13.  Marlon YH1, Babak Y, Jaime BELINDA. The efficacy of anatomically based multilevel surgery for obstructive sleep apnea. Otolaryngol Head Neck Surg. 2003 Oct;129(4):327-35.  [place] AGUILA, Goldberg A. Hypopharyngeal Surgery in Obstructive Sleep Apnea: An Evidence-Based Medicine Review. Arch  Otolaryngol Head Neck Surg. 2006 Feb;132(2):206-13.  Fred PJ et al. Upper-Airway Stimulation for Obstructive Sleep Apnea.  N Engl J Med. 2014 Jan 9;370(2):139-49.  Morena Y et al. Increased Incidence of Cardiovascular Disease in Middle-aged Men with Obstructive Sleep Apnea. Am J Respir Crit Care Med; 2002 166: 159-165  Payan EM et al. Studying Life Effects and Effectiveness of Palatopharyngoplasty (SLEEP) study: Subjective Outcomes of Isolated Uvulopalatopharyngoplasty. Otolaryngol Head Neck Surg. 2011; 144: 623-631.        WHAT IF I ONLY HAVE SNORING?    Mandibular advancement devices, lateral sleep positioning, long-term weight loss and treatment of nasal allergies have been shown to improve snoring.  Exercising tongue muscles with a game ([employer]://apps.Top Rops/Routehappy/hope/soundly-reduce-snoring/no3319682665) or stimulating the tongue during the day with a device (https://doi.org/10.3390/bkz92802197) have improved snoring in some individuals.    Remember to Drive Safe... Drive Alive     Sleep health profoundly affects your health, mood, and your safety.  Thirty three percent of the population (one in three of us) is not getting enough sleep and many have a sleep disorder. Not getting enough sleep or having an untreated / undertreated sleep condition may make us sleepy without even knowing it. In fact, our driving could be dramatically impaired due to our sleep health. As your provider, here are some things I would like you to know about driving:     Here are some warning signs for impairment and dangerous drowsy driving:              -Having been awake more than 16 hours               -Looking tired               -Eyelid drooping              -Head nodding (it could be too late at this point)              -Driving for more than 30 minutes     Some things you could do to make the driving safer if you are experiencing some drowsiness:              -Stop driving and rest              -Call for transportation               -Make sure your sleep disorder is adequately treated     Some things that have been shown NOT to work when experiencing drowsiness while driving:              -Turning on the radio              -Opening windows              -Eating any  distracting  /  entertaining  foods (e.g., sunflower seeds, candy, or any other)              -Talking on the phone      Your decision may not only impact your life, but also the life of others. Please, remember to drive safe for yourself and all of us.      Insomnia and Behavioral Sleep Medicine Program    The [facility] Insomnia and Behavioral Sleep [facility] Program provides non-drug treatment for sleep problems including:    Cognitive-behavioral Therapies for Insomnia (CBT-I)  Management of Shift-work and Jet Lag  Management of Delayed, Advanced and Irregular Circadian Rhythm Sleep Disorders  Imagery Rehearsal Therapy (IRT) for Nightmare Disorder  PAP Therapy Desensitization    You have been referred for consultation with a sleep psychologist who specializes in behavioral sleep medicine and treatment of insomnia.  The [facility]omnia [facility] offers individualized telehealth services through our [facility] and online CBT-I.    Preparing for your Consultation    You will need to keep a Sleep Diary for at least a week prior to your visit. Complete the sleep diary each day first thing after you get up by answering a few key questions about your sleep using our convenient mobile leanne or paper sleep diary.  Your answers should be based on your recall of the past 24 hours.  Avoid watching the clock or recording data during the night.     Insomnia  Leanne    The Insomnia  mobile leanne  is a convenient way to keep track of your sleep prior to your sleep consultation.  Simply download the free leanne on your Apple or Android phone and record your information each morning.  The leanne includes training, self-assessment,  and sleep schedule recommendations.  Prior to your consultation we recommend you use only the sleep diary function. You can e-mail yourself a copy of your sleep diary data by going to the Settings section and using the [employer] User Data function.  During your consultation your provider will review the data with you.           [employer] Sleep Diary    You can also track your sleep using the [employer] paper sleep diary.  You can upload your sleep diary and send it via a [employer] message, fax it to [phone], or have it with you at the time of your consultation.            CBT-I:  Frequently Asked Questions    What is CBT-I?    Cognitive Behavioral Therapy for Insomnia, also known as CBT-I, is a highly effective non-drug treatment for insomnia. The American College of Physicians recommends CBT-I as the first treatment for chronic insomnia.  Research has shown CBT-I to be safer and more effective long term than sleeping pills.    What does CBT-I involve?     CBT-I targets behaviors that lead to chronic insomnia:  Habits that weaken the bed as a cue for sleep  Habits that weaken your body's sleep drive and sleep/wake clock   Unhelpful sleep thoughts that increase sleep-related worry and arousal.    The process involves 3-6 telehealth visits that guide you to implement proven strategies to get a better night's sleep.    People often see improvement in their sleep within a few weeks. Research shows if you keep practicing the skills you learn your sleep is likely to continue to improve 6-12 months after treatment.    Does this program prescribe or manage sleep medication?    No.  Your prescribing provider is responsible to assist you in managing your sleep medications.  Some people choose to stop using sleep medication prior to or during CBT-I.  Our program can work with your prescribing provider to help reduce or eliminate use of sleep medications.     Getting Started Today!    If you haven't already done  so, we recommend you consider making the following changes to your sleep habits prior to your sleep consultation:     Reduce your consumption of caffeine and alcohol.  Both can disrupt sleep and make strengthening your sleep more difficult.  Specifically:    - Avoid caffeine within 6 hours of bedtime   - No more than 3 caffeinated beverages per day (e.g. 8 oz. cup coffee or 12 oz. cup soda)            - No alcohol within 3 hours of bedtime    Make sure your bedroom is quiet, comfortable and dark.  Noise, light and an uncomfortable sleep space can harm your sleep.      Keep the same sleep schedule 7 days a week.unless you do shift work.      Online CBT-I     If you want to get started today, research indicates that online CBT-I can be effective for some individuals. These programs requires comfort with hope-based or online learning.  However, digital CBT-I programs are not for everyone.  Contraindications include:    Seizure disorders,   Bipolar disorder,   Unstable medical or mental health conditions,   Frailty or risk of falling  Pregnancy    You should consult a sleep specialist before using these resources if you have:    Sleep Apnea  Restless Leg Syndrome  Sleep Walking  REM behavior disorder  Night Terrors  Excessive Daytime Sleepiness  Are engaged in shift work  Use prescription sleep medication    Our Online CBT-I program    If your sleep provider recommends online CBT-I for you , the cost for an entire 6-week program is $40.    To get started, copy and paste the link below which will take you to the landing page to register:                           [email or web address]               If you wish to complete the online CBT-I program but do not plan to follow-up with a sleep provider, you are set to begin the program.    If you are planning to work with an [facility] sleep provider, there are a couple of extra steps you can take to share your sleep data with your sleep provider.  To share  sleep log data, go to the left side navigation and click on the  share sleep log  button:         You will be taken to the page below where you will enter  the provider code [facility] into the box.          Once you press the locate button, the information for [facility] [employer] [facility] will pop up as below.  By pressing the Submit button your data will be sent to our  secure [facility] sleep program portal for review by your sleep provider. You will only need to do this step once.                                  Self-help Workbooks for Insomnia    If you have found self-help books useful in the past, you may want to consider reading one of the following books prior to your consultation:    Say Chelsey to Insomnia: The Six-Week, Drug-Free Program Developed at [employer].  Jose F Escobar MD. Available in paperback, Shalom, and audiobook.    Overcoming Insomnia: A Cognitive-Behavioral Therapy Approach, Workbook.  Jhonathan Carballo, PhD  and Eden Falcon, PhD.  Available in paperback and Shalom.    Quiet Your Mind and Get to Sleep: Solutions to Insomnia for Those with Depression, Anxiety, or Chronic Pain.  Demetrice Day PhD and Eden Falcon, PhD.  Available in paperback and Shalom

## 2022-12-14 DIAGNOSIS — R10.2 PELVIC PAIN IN FEMALE: ICD-10-CM

## 2022-12-14 RX ORDER — NORETHINDRONE ACETATE 5 MG
5 TABLET ORAL DAILY
Qty: 90 TABLET | Refills: 3 | Status: SHIPPED | OUTPATIENT
Start: 2022-12-14 | End: 2023-03-03

## 2022-12-14 NOTE — TELEPHONE ENCOUNTER
Routed refill to Dr. Vera due to an interaction with another medication that she is currently on .    Last PAP and HPV done on 2-26-21 and both negative.

## 2022-12-23 DIAGNOSIS — F43.10 PTSD (POST-TRAUMATIC STRESS DISORDER): Chronic | ICD-10-CM

## 2022-12-23 DIAGNOSIS — F41.1 GAD (GENERALIZED ANXIETY DISORDER): Chronic | ICD-10-CM

## 2022-12-23 NOTE — TELEPHONE ENCOUNTER
Last Seen: 1/26/22  RTC: 12 wks  Cancel: x1 4/25/22  No-Show: 0  Next Appt: None    Incoming Refill From pharmacy via fax    Medication Requested: sertraline (ZOLOFT) 50 MG tablet  Directions: Take 1 tablet (50 mg) by mouth daily   Qty: 90  Last Refill: 9/14/22    Refill request came from [facility] on [facility] [facility] in [place], but med was last sent to [facility] on [address] in [place].    Called pt at mobile to verify preferred pharmacy. Lvm for callback.

## 2022-12-27 ENCOUNTER — TELEPHONE (OUTPATIENT)
Dept: SLEEP MEDICINE | Facility: CLINIC | Age: 37
End: 2022-12-27

## 2022-12-27 NOTE — TELEPHONE ENCOUNTER
Pt was called to reschedule appt with Dr Wyatt on 12/30/22. Pt is new and was scheduled in a return slot. [facility] for them to call back

## 2022-12-27 NOTE — TELEPHONE ENCOUNTER
Called pt back at mobile to verify preferred pharmacy. Writer silvia stating med would be filled at the requesting pharmacy - [facility]. Pt may call clinic or pharmacy if pharmacy change is needed. Routed to provider and scheduling.

## 2022-12-28 NOTE — TELEPHONE ENCOUNTER
Scheduled pt with Dr Wyatt on his next available appt.LVM for them to call back if that doesn't work for them

## 2023-01-05 DIAGNOSIS — R25.1 TREMOR: ICD-10-CM

## 2023-01-05 RX ORDER — PROPRANOLOL HYDROCHLORIDE 20 MG/1
20 TABLET ORAL 2 TIMES DAILY
Qty: 180 TABLET | Refills: 0 | Status: SHIPPED | OUTPATIENT
Start: 2023-01-05 | End: 2023-04-17

## 2023-01-05 NOTE — TELEPHONE ENCOUNTER
Medication requested: propranolol (INDERAL) 20 MG tablet  Last office visit: 1/20/22  [facility] appointments: none  Medication last refilled: 4/26/22; 180 + 1 refill  Last qualifying labs: N/A    Medication is being filled for 1 time refill only due to:  Pt is due for physical in early February 2023     Message sent to pt to schedule physical in February to ensure continuing prescription management.    Travis SCHMITT, RN  01/05/23 2:57 PM

## 2023-01-25 ENCOUNTER — VIRTUAL VISIT (OUTPATIENT)
Dept: PSYCHIATRY | Facility: CLINIC | Age: 38
End: 2023-01-25
Attending: NURSE PRACTITIONER
Payer: COMMERCIAL

## 2023-01-25 DIAGNOSIS — F41.1 GAD (GENERALIZED ANXIETY DISORDER): Chronic | ICD-10-CM

## 2023-01-25 DIAGNOSIS — F43.10 PTSD (POST-TRAUMATIC STRESS DISORDER): Chronic | ICD-10-CM

## 2023-01-25 DIAGNOSIS — R25.1 TREMOR: ICD-10-CM

## 2023-01-25 PROCEDURE — 99443 PR PHYSICIAN TELEPHONE EVALUATION 21-30 MIN: CPT | Mod: TEL | Performed by: NURSE PRACTITIONER

## 2023-01-25 RX ORDER — HYDROXYZINE HYDROCHLORIDE 25 MG/1
TABLET, FILM COATED ORAL
Qty: 60 TABLET | Refills: 2 | Status: SHIPPED | OUTPATIENT
Start: 2023-01-25 | End: 2023-09-05

## 2023-01-25 RX ORDER — SERTRALINE HYDROCHLORIDE 25 MG/1
25 TABLET, FILM COATED ORAL DAILY
Qty: 90 TABLET | Refills: 3 | Status: SHIPPED | OUTPATIENT
Start: 2023-01-25 | End: 2023-11-09

## 2023-01-25 RX ORDER — PRAZOSIN HYDROCHLORIDE 1 MG/1
1 CAPSULE ORAL AT BEDTIME
Qty: 90 CAPSULE | Refills: 3 | Status: SHIPPED | OUTPATIENT
Start: 2023-01-25 | End: 2024-03-04

## 2023-01-25 ASSESSMENT — ANXIETY QUESTIONNAIRES
3. WORRYING TOO MUCH ABOUT DIFFERENT THINGS: SEVERAL DAYS
GAD7 TOTAL SCORE: 10
IF YOU CHECKED OFF ANY PROBLEMS ON THIS QUESTIONNAIRE, HOW DIFFICULT HAVE THESE PROBLEMS MADE IT FOR YOU TO DO YOUR WORK, TAKE CARE OF THINGS AT HOME, OR GET ALONG WITH OTHER PEOPLE: SOMEWHAT DIFFICULT
GAD7 TOTAL SCORE: 10
7. FEELING AFRAID AS IF SOMETHING AWFUL MIGHT HAPPEN: SEVERAL DAYS
1. FEELING NERVOUS, ANXIOUS, OR ON EDGE: MORE THAN HALF THE DAYS
6. BECOMING EASILY ANNOYED OR IRRITABLE: SEVERAL DAYS
5. BEING SO RESTLESS THAT IT IS HARD TO SIT STILL: SEVERAL DAYS
2. NOT BEING ABLE TO STOP OR CONTROL WORRYING: SEVERAL DAYS
8. IF YOU CHECKED OFF ANY PROBLEMS, HOW DIFFICULT HAVE THESE MADE IT FOR YOU TO DO YOUR WORK, TAKE CARE OF THINGS AT HOME, OR GET ALONG WITH OTHER PEOPLE?: SOMEWHAT DIFFICULT
7. FEELING AFRAID AS IF SOMETHING AWFUL MIGHT HAPPEN: SEVERAL DAYS
4. TROUBLE RELAXING: NEARLY EVERY DAY
GAD7 TOTAL SCORE: 10

## 2023-01-25 ASSESSMENT — PATIENT HEALTH QUESTIONNAIRE - PHQ9
10. IF YOU CHECKED OFF ANY PROBLEMS, HOW DIFFICULT HAVE THESE PROBLEMS MADE IT FOR YOU TO DO YOUR WORK, TAKE CARE OF THINGS AT HOME, OR GET ALONG WITH OTHER PEOPLE: SOMEWHAT DIFFICULT
SUM OF ALL RESPONSES TO PHQ QUESTIONS 1-9: 8
SUM OF ALL RESPONSES TO PHQ QUESTIONS 1-9: 8
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
## 2023-01-29 PROBLEM — Z30.431 INTRAUTERINE DEVICE SURVEILLANCE: Chronic | Status: ACTIVE | Noted: 2023-01-29
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
## 2023-01-31 ENCOUNTER — OFFICE VISIT (OUTPATIENT)
Dept: FAMILY MEDICINE | Facility: CLINIC | Age: 38
End: 2023-01-31
Payer: COMMERCIAL

## 2023-01-31 DIAGNOSIS — M25.512 CHRONIC LEFT SHOULDER PAIN: ICD-10-CM

## 2023-01-31 DIAGNOSIS — F41.1 GAD (GENERALIZED ANXIETY DISORDER): Chronic | ICD-10-CM

## 2023-01-31 DIAGNOSIS — F33.41 RECURRENT MAJOR DEPRESSIVE DISORDER, IN PARTIAL REMISSION (H): Chronic | ICD-10-CM

## 2023-01-31 DIAGNOSIS — N87.0 CIN I (CERVICAL INTRAEPITHELIAL NEOPLASIA I): Chronic | ICD-10-CM

## 2023-01-31 DIAGNOSIS — Z23 NEED FOR PROPHYLACTIC VACCINATION AND INOCULATION AGAINST INFLUENZA: ICD-10-CM

## 2023-01-31 DIAGNOSIS — G47.10 HYPERSOMNIA: Chronic | ICD-10-CM

## 2023-01-31 DIAGNOSIS — J30.89 NON-SEASONAL ALLERGIC RHINITIS, UNSPECIFIED TRIGGER: ICD-10-CM

## 2023-01-31 DIAGNOSIS — Z00.00 ANNUAL PHYSICAL EXAM: Primary | ICD-10-CM

## 2023-01-31 DIAGNOSIS — G89.29 CHRONIC LEFT SHOULDER PAIN: ICD-10-CM

## 2023-01-31 DIAGNOSIS — G93.32 CHRONIC FATIGUE SYNDROME: Chronic | ICD-10-CM

## 2023-01-31 DIAGNOSIS — F43.10 PTSD (POST-TRAUMATIC STRESS DISORDER): Chronic | ICD-10-CM

## 2023-01-31 DIAGNOSIS — Z13.1 SCREENING FOR DIABETES MELLITUS: ICD-10-CM

## 2023-01-31 DIAGNOSIS — Z23 NEED FOR HEPATITIS B VACCINATION: ICD-10-CM

## 2023-01-31 RX ORDER — FLUTICASONE PROPIONATE 50 MCG
1 SPRAY, SUSPENSION (ML) NASAL DAILY
Qty: 31.6 ML | Refills: 3 | Status: SHIPPED | OUTPATIENT
Start: 2023-01-31 | End: 2023-04-21

## 2023-01-31 ASSESSMENT — ENCOUNTER SYMPTOMS
DIARRHEA: 0
COUGH: 1
BREAST MASS: 0
MYALGIAS: 1
SHORTNESS OF BREATH: 0
CHILLS: 0
NAUSEA: 0
SORE THROAT: 0
WEAKNESS: 0
FEVER: 0
CONSTIPATION: 0
NERVOUS/ANXIOUS: 0
DYSURIA: 0
ABDOMINAL PAIN: 0
FREQUENCY: 0
HEARTBURN: 0
HEMATOCHEZIA: 0
DIZZINESS: 0
PALPITATIONS: 0
ARTHRALGIAS: 1
EYE PAIN: 0
HEMATURIA: 0
PARESTHESIAS: 1
HEADACHES: 0
JOINT SWELLING: 0

## 2023-01-31 ASSESSMENT — ANXIETY QUESTIONNAIRES
IF YOU CHECKED OFF ANY PROBLEMS ON THIS QUESTIONNAIRE, HOW DIFFICULT HAVE THESE PROBLEMS MADE IT FOR YOU TO DO YOUR WORK, TAKE CARE OF THINGS AT HOME, OR GET ALONG WITH OTHER PEOPLE: SOMEWHAT DIFFICULT
GAD7 TOTAL SCORE: 11
2. NOT BEING ABLE TO STOP OR CONTROL WORRYING: MORE THAN HALF THE DAYS
GAD7 TOTAL SCORE: 11
1. FEELING NERVOUS, ANXIOUS, OR ON EDGE: MORE THAN HALF THE DAYS
7. FEELING AFRAID AS IF SOMETHING AWFUL MIGHT HAPPEN: SEVERAL DAYS
6. BECOMING EASILY ANNOYED OR IRRITABLE: SEVERAL DAYS
5. BEING SO RESTLESS THAT IT IS HARD TO SIT STILL: SEVERAL DAYS
3. WORRYING TOO MUCH ABOUT DIFFERENT THINGS: MORE THAN HALF THE DAYS

## 2023-01-31 ASSESSMENT — PATIENT HEALTH QUESTIONNAIRE - PHQ9
10. IF YOU CHECKED OFF ANY PROBLEMS, HOW DIFFICULT HAVE THESE PROBLEMS MADE IT FOR YOU TO DO YOUR WORK, TAKE CARE OF THINGS AT HOME, OR GET ALONG WITH OTHER PEOPLE: SOMEWHAT DIFFICULT
5. POOR APPETITE OR OVEREATING: MORE THAN HALF THE DAYS
SUM OF ALL RESPONSES TO PHQ QUESTIONS 1-9: 9
SUM OF ALL RESPONSES TO PHQ QUESTIONS 1-9: 9

## 2023-01-31 NOTE — NURSING NOTE
Prior to immunization administration, verified patients identity using patient s name and date of birth. Please see Immunization Activity for additional information.     Screening Questionnaire for Adult Immunization    Are you sick today?   No   Do you have allergies to medications, food, a vaccine component or latex?   No   Have you ever had a serious reaction after receiving a vaccination?   No   Do you have a long-term health problem with heart, lung, kidney, or metabolic disease (e.g., diabetes), asthma, a blood disorder, no spleen, complement component deficiency, a cochlear implant, or a spinal fluid leak?  Are you on long-term aspirin therapy?   No   Do you have cancer, leukemia, HIV/AIDS, or any other immune system problem?   No   Do you have a parent, brother, or sister with an immune system problem?   No   In the past 3 months, have you taken medications that affect  your immune system, such as prednisone, other steroids, or anticancer drugs; drugs for the treatment of rheumatoid arthritis, Crohn s disease, or psoriasis; or have you had radiation treatments?   No   Have you had a seizure, or a brain or other nervous system problem?   No   During the past year, have you received a transfusion of blood or blood    products, or been given immune (gamma) globulin or antiviral drug?   No   For women: Are you pregnant or is there a chance you could become       pregnant during the next month?   No   Have you received any vaccinations in the past 4 weeks?   No     Immunization questionnaire answers were all negative.        Per orders of Dr. De La Rosa, injection of Influenza and Hepatitis B given by Anali Collier. Patient instructed to remain in clinic for 15 minutes afterwards, and to report any adverse reaction to me immediately.       Screening performed by Anali Collier on 1/31/2023 at 12:17 PM.

## 2023-01-31 NOTE — PATIENT INSTRUCTIONS
1) Get a COVID booster.     2) Schedule an appointment with Dr. Matthew Duval (sports medicine doctor) about neck and shoulder pain.

## 2023-02-16 ENCOUNTER — OFFICE VISIT (OUTPATIENT)
Dept: OBGYN | Facility: CLINIC | Age: 38
End: 2023-02-16
Attending: OBSTETRICS & GYNECOLOGY
Payer: COMMERCIAL

## 2023-02-16 VITALS
BODY MASS INDEX: 27.87 KG/M2 | HEIGHT: 68 IN | DIASTOLIC BLOOD PRESSURE: 67 MMHG | HEART RATE: 61 BPM | SYSTOLIC BLOOD PRESSURE: 99 MMHG | WEIGHT: 183.9 LBS

## 2023-02-16 DIAGNOSIS — R10.2 PELVIC PAIN IN FEMALE: Primary | ICD-10-CM

## 2023-02-16 PROCEDURE — G0463 HOSPITAL OUTPT CLINIC VISIT: HCPCS | Performed by: OBSTETRICS & GYNECOLOGY

## 2023-02-16 PROCEDURE — 99213 OFFICE O/P EST LOW 20 MIN: CPT | Performed by: OBSTETRICS & GYNECOLOGY

## 2023-02-16 ASSESSMENT — ANXIETY QUESTIONNAIRES
1. FEELING NERVOUS, ANXIOUS, OR ON EDGE: SEVERAL DAYS
IF YOU CHECKED OFF ANY PROBLEMS ON THIS QUESTIONNAIRE, HOW DIFFICULT HAVE THESE PROBLEMS MADE IT FOR YOU TO DO YOUR WORK, TAKE CARE OF THINGS AT HOME, OR GET ALONG WITH OTHER PEOPLE: SOMEWHAT DIFFICULT
3. WORRYING TOO MUCH ABOUT DIFFERENT THINGS: SEVERAL DAYS
6. BECOMING EASILY ANNOYED OR IRRITABLE: SEVERAL DAYS
GAD7 TOTAL SCORE: 7
4. TROUBLE RELAXING: SEVERAL DAYS
7. FEELING AFRAID AS IF SOMETHING AWFUL MIGHT HAPPEN: SEVERAL DAYS
5. BEING SO RESTLESS THAT IT IS HARD TO SIT STILL: SEVERAL DAYS
8. IF YOU CHECKED OFF ANY PROBLEMS, HOW DIFFICULT HAVE THESE MADE IT FOR YOU TO DO YOUR WORK, TAKE CARE OF THINGS AT HOME, OR GET ALONG WITH OTHER PEOPLE?: SOMEWHAT DIFFICULT
7. FEELING AFRAID AS IF SOMETHING AWFUL MIGHT HAPPEN: SEVERAL DAYS
GAD7 TOTAL SCORE: 7
2. NOT BEING ABLE TO STOP OR CONTROL WORRYING: SEVERAL DAYS

## 2023-02-16 NOTE — PATIENT INSTRUCTIONS
Thank you for trusting us with your care!     If you need to contact us for questions about:  Symptoms, Scheduling & Medical Questions; Non-urgent (2-3 day response) [place] message, Urgent (needing response today) [phone] (if after 3:30pm next day response)   Prescriptions: Please call your Pharmacy   Billing: [facility] [phone] or [facility]:[phone]

## 2023-02-16 NOTE — LETTER
"2/16/2023       RE: Andria Daily  [address]  [facility] [address]     Dear Colleague,    Thank you for referring your patient, Andria Daily, to the [facility] [place] at [facility]. Please see a copy of my visit note below.    [facility]    CC: Follow up pelvic pain    S: 37 year old  here for follow up of chronic pelvic pain, possible endometriosis. Pain has been somewhat controlled on Mirena IUD, had also used additional aygestin which was less helpful. Continues to have pain, bloating and bowel issues that seem cyclic. Is amenorrheic from IUD. Denies blood in stool, constipation or diarrhea, but does have pain with bowel movements. No recent changes with diet. She has considered laparoscopy in past for definitive diagnosis and would now like to proceed with this. She is also interested in her tubal function as she thinks she still desires fertility. She was treated for possible PID in 2020.     Past Medical History:   Diagnosis Date     Anxiety      Chronic fatigue syndrome      Chronic rhinitis      CHHAYA I (cervical intraepithelial neoplasia I) 05/24/2007     Depressive disorder      History of eating disorder      History of substance use     prescription pain killers, alcohol, cocaine     Hypersomnia      PTSD (post-traumatic stress disorder)      Past Surgical History:   Procedure Laterality Date     KNEE SURGERY Left 2018    for osgood-schlatter and tendon repair/anchor     [place] Teeth Removal  2001     WRIST SURGERY Left 2015    snowboarding injury, fractured, had metal plate initially that was then removed         O: BP 99/67 (BP Location: Left arm, Patient Position: Chair)   Pulse 61   Ht 1.727 m (5' 8\")   Wt 83.4 kg (183 lb 14.4 oz)   LMP  (LMP Unknown)   BMI 27.96 kg/m    General: No distress  Abdomen: Soft, non-tender, non-distended, no masses      A:37 year old with chronic pelvic " pain, possible endometriosis    P: Reviewed possible etiologies of her pelvic pain including endometriosis or GI cause. It is reasonable to proceed with diagnostic laparoscopy for evaluation of endometriosis and tubal dye study. Reviewed that if she had endo found we would not do full excision, and if she preferred surgery to include this I would recommend Dr. Major at [facility]. As there is no definitive evidence of endo- she prefers to stick with diagnostic laparoscopy to start. Also reviewed possibility of bowel cause for her pain as it includes dyschezia. If no endo found at surgery will follow up with GI. Surgical risks and benefits reviewed in detail. All questions answered.       Isabel Vera MD FACOG  Answers for HPI/ROS submitted by the patient on 2/16/2023  DIANA 7 TOTAL SCORE: 7

## 2023-02-17 RX ORDER — ACETAMINOPHEN 325 MG/1
975 TABLET ORAL ONCE
Status: CANCELLED | OUTPATIENT
Start: 2023-02-17 | End: 2023-02-17

## 2023-02-18 NOTE — PROGRESS NOTES
"[facility]    CC: Follow up pelvic pain    S: 37 year old  here for follow up of chronic pelvic pain, possible endometriosis. Pain has been somewhat controlled on Mirena IUD, had also used additional aygestin which was less helpful. Continues to have pain, bloating and bowel issues that seem cyclic. Is amenorrheic from IUD. Denies blood in stool, constipation or diarrhea, but does have pain with bowel movements. No recent changes with diet. She has considered laparoscopy in past for definitive diagnosis and would now like to proceed with this. She is also interested in her tubal function as she thinks she still desires fertility. She was treated for possible PID in 2020.     Past Medical History:   Diagnosis Date     Anxiety      Chronic fatigue syndrome      Chronic rhinitis      CHHAYA I (cervical intraepithelial neoplasia I) 05/24/2007     Depressive disorder      History of eating disorder      History of substance use     prescription pain killers, alcohol, cocaine     Hypersomnia      PTSD (post-traumatic stress disorder)      Past Surgical History:   Procedure Laterality Date     KNEE SURGERY Left 2018    for osgood-schlatter and tendon repair/anchor     [place] Teeth Removal  2001     WRIST SURGERY Left 2015    snowboarding injury, fractured, had metal plate initially that was then removed         O: BP 99/67 (BP Location: Left arm, Patient Position: Chair)   Pulse 61   Ht 1.727 m (5' 8\")   Wt 83.4 kg (183 lb 14.4 oz)   LMP  (LMP Unknown)   BMI 27.96 kg/m    General: No distress  Abdomen: Soft, non-tender, non-distended, no masses      A:37 year old with chronic pelvic pain, possible endometriosis    P: Reviewed possible etiologies of her pelvic pain including endometriosis or GI cause. It is reasonable to proceed with diagnostic laparoscopy for evaluation of endometriosis and tubal dye study. Reviewed that if she had endo found we would not do full excision, and if she " preferred surgery to include this I would recommend [place]. Major at [facility]. As there is no definitive evidence of endo- she prefers to stick with diagnostic laparoscopy to start. Also reviewed possibility of bowel cause for her pain as it includes dyschezia. If no endo found at surgery will follow up with GI. Surgical risks and benefits reviewed in detail. All questions answered.       Isabel Vera MD FACOG  Answers for HPI/ROS submitted by the patient on 2/16/2023  DIANA 7 TOTAL SCORE: 7

## 2023-02-20 ENCOUNTER — TELEPHONE (OUTPATIENT)
Dept: OBGYN | Facility: CLINIC | Age: 38
End: 2023-02-20
Payer: COMMERCIAL

## 2023-02-20 PROBLEM — R10.2 PELVIC PAIN IN FEMALE: Status: ACTIVE | Noted: 2023-02-20

## 2023-02-20 NOTE — TELEPHONE ENCOUNTER
Spoke with patient, scheduled surgery. Patient is aware of date, time, location, prep instructions, need for H&P within 30 days.    Type of surgery: laparoscopy, possible biopsy or fulguration of endometriosis, chromopertubation  Location of surgery: [facility]  Date and time of surgery: 3/22/23 at 10:30am  Surgeon: Jody  Pre-Op Appt Date: 3/3/23  Post-Op Appt Date:  4/11/23  Packet sent out: Yes  Pre-cert/Authorization completed:  Not Applicable  Date: 2/20/23    Yadi Howell  Clinical Services Assistant

## 2023-03-02 ASSESSMENT — ENCOUNTER SYMPTOMS
SINUS PAIN: 1
HEARTBURN: 0
MUSCLE CRAMPS: 1
EYE PAIN: 1
MUSCLE WEAKNESS: 0
WEAKNESS: 0
DECREASED CONCENTRATION: 1
EYE WATERING: 1
DIARRHEA: 1
NAUSEA: 0
NECK PAIN: 1
NUMBNESS: 1
NERVOUS/ANXIOUS: 1
COUGH DISTURBING SLEEP: 1
VOMITING: 0
STIFFNESS: 1
MYALGIAS: 1
RECTAL PAIN: 1
DIZZINESS: 1
SINUS CONGESTION: 1
TROUBLE SWALLOWING: 1
DEPRESSION: 1
FLANK PAIN: 0
MEMORY LOSS: 1
HEMOPTYSIS: 0
DYSPNEA ON EXERTION: 1
PANIC: 1
POOR WOUND HEALING: 0
NECK MASS: 0
SKIN CHANGES: 0
NAIL CHANGES: 0
EYE REDNESS: 0
LOSS OF CONSCIOUSNESS: 0
PARALYSIS: 0
SPEECH CHANGE: 0
SPUTUM PRODUCTION: 1
HOARSE VOICE: 1
BACK PAIN: 1
JOINT SWELLING: 0
DIFFICULTY URINATING: 1
ABDOMINAL PAIN: 1
BLOOD IN STOOL: 0
JAUNDICE: 0
TREMORS: 1
DECREASED LIBIDO: 1
ARTHRALGIAS: 1
SNORES LOUDLY: 1
BLOATING: 1
EYE IRRITATION: 1
SMELL DISTURBANCE: 0
DOUBLE VISION: 0
BOWEL INCONTINENCE: 0
WHEEZING: 0
CONSTIPATION: 1
HOT FLASHES: 0
POSTURAL DYSPNEA: 0
SEIZURES: 0
HEMATURIA: 0
INSOMNIA: 1
SORE THROAT: 0
DYSURIA: 0
DISTURBANCES IN COORDINATION: 1
HEADACHES: 1
TINGLING: 1
SHORTNESS OF BREATH: 0
COUGH: 1
TASTE DISTURBANCE: 0

## 2023-03-02 ASSESSMENT — ANXIETY QUESTIONNAIRES
7. FEELING AFRAID AS IF SOMETHING AWFUL MIGHT HAPPEN: SEVERAL DAYS
4. TROUBLE RELAXING: MORE THAN HALF THE DAYS
GAD7 TOTAL SCORE: 8
6. BECOMING EASILY ANNOYED OR IRRITABLE: SEVERAL DAYS
7. FEELING AFRAID AS IF SOMETHING AWFUL MIGHT HAPPEN: SEVERAL DAYS
1. FEELING NERVOUS, ANXIOUS, OR ON EDGE: SEVERAL DAYS
8. IF YOU CHECKED OFF ANY PROBLEMS, HOW DIFFICULT HAVE THESE MADE IT FOR YOU TO DO YOUR WORK, TAKE CARE OF THINGS AT HOME, OR GET ALONG WITH OTHER PEOPLE?: SOMEWHAT DIFFICULT
GAD7 TOTAL SCORE: 8
IF YOU CHECKED OFF ANY PROBLEMS ON THIS QUESTIONNAIRE, HOW DIFFICULT HAVE THESE PROBLEMS MADE IT FOR YOU TO DO YOUR WORK, TAKE CARE OF THINGS AT HOME, OR GET ALONG WITH OTHER PEOPLE: SOMEWHAT DIFFICULT
3. WORRYING TOO MUCH ABOUT DIFFERENT THINGS: SEVERAL DAYS
5. BEING SO RESTLESS THAT IT IS HARD TO SIT STILL: SEVERAL DAYS
2. NOT BEING ABLE TO STOP OR CONTROL WORRYING: SEVERAL DAYS

## 2023-03-02 NOTE — PROGRESS NOTES
[facility] PROFESSIONAL BLDG  3RD FLR,[address]  [address]  [facility] [address]  [facility] [address]  Phone: [phone]  Fax: [phone]  Primary Provider: Mook De La Rosa  Pre-op Performing Provider: ANDRIA HOLDEN    PREOPERATIVE EVALUATION:  Today's date: 3/3/2023    Andria Daily is a 37 year old female who presents for a preoperative evaluation.    Surgical Information:  Surgery/Procedure: laparoscopy, possible biopsy or fulguration of endometriosis, chromopertubation  Surgery Location: [facility]  Surgeon: Dr. Vera  Surgery Date: 3/22/2023  Time of Surgery: 9:25am  Where patient plans to recover: At home with family  Fax number for surgical facility: Note does not need to be faxed, will be available electronically in Epic.    Type of Anesthesia Anticipated: General    Assessment & Plan     The proposed surgical procedure is considered INTERMEDIATE risk.    Preop general physical exam  Pelvic pain in female  Plans on diagnostic laparoscopy.     Chronic fatigue syndrome  DIANA (generalized anxiety disorder)  Recurrent major depressive disorder, in partial remission (H)    Medication Instructions:  Patient is to take all scheduled medications on the day of surgery EXCEPT for modifications listed below:  - HOLD (do not take) modafinil or hydroxyzine on the morning of surgery.   - STOP taking all vitamins and herbal supplements 14 days before surgery.    RECOMMENDATION:  APPROVAL GIVEN to proceed with proposed procedure, without further diagnostic evaluation.      Subjective     HPI related to upcoming procedure:   Andria has chronic pelvic pain. Possible endometriosis. Diag lap planned for evaluation of endo & tubal dye study.     1. What is your level of physical activity? Fairly regular exercise, a few times per week  2. Do you have chest pain when you re physically active? No  3. Do you currently have a cold, bronchitis or symptoms of other respiratory (head and chest)  infections? No  4. Do you have a cough, shortness of breath, or wheezing? Occasional dry cough in the morning, needs to resume her Flonase nasal spray b/c thinks it's due to post-nasal drainage     5. Have you ever had anemia or been told to take iron pills? No  6. Have you had any abnormal blood loss such as black, tarry or bloody stools, or abnormal vaginal bleeding? No  7. Have you ever had a blood transfusion? No  8. Are you willing to have a blood transfusion if it is medically needed before, during or after your surgery? Yes  9. Have you ever had a heart attack or stroke? No  10. Have you ever had surgery on your heart or blood vessels, such as a stent, coronary (heart) bypass, or surgery on an artery in the head, neck, heart or legs? No  11. Do you have a history of heart failure? No  12. Do you have sleep apnea, excessive snoring or daytime drowsiness? Yes - does have daytime drowsiness, but does not have sleep apnea diagnosed - a sleep study is planned    13. Do you or anyone in your family have a history of blood clots? No  14. Do you or anyone in your family have a serious bleeding problem, such as longlasting bleeding after surgeries or cuts? No  15. Have you or anyone in your family ever had problems with anesthesia (sedation for surgery)? No    16. Do you have any artificial heart valves or other implanted medical devices, such as a pacemaker, defibrillator or continuous glucose monitor? No  17. Do you have any artificial joints? No  18. Are you allergic to latex? No  19. Is there any chance that you may be pregnant? No    Preoperative Review of :   reviewed - controlled substances reflected in medication list.    Status of Chronic Conditions:  DEPRESSION - Patient has a long history of Depression of moderate severity requiring medication for control with recent symptoms being stable.    SLEEP PROBLEM - Patient has a longstanding history of fatigue. Taking modafinil daily. Plans for a sleep  study.         Review of Systems  Answers for HPI/ROS submitted by the patient on 3/2/2023  DIANA 7 TOTAL SCORE: 8  General Symptoms: No  Skin Symptoms: Yes  HENT Symptoms: Yes  EYE SYMPTOMS: Yes  HEART SYMPTOMS: No  LUNG SYMPTOMS: Yes  INTESTINAL SYMPTOMS: Yes  URINARY SYMPTOMS: Yes  GYNECOLOGIC SYMPTOMS: Yes  BREAST SYMPTOMS: No  SKELETAL SYMPTOMS: Yes  BLOOD SYMPTOMS: No  NERVOUS SYSTEM SYMPTOMS: Yes  MENTAL HEALTH SYMPTOMS: Yes  Congestion: Yes - related to postnasal drainage  Voice hoarseness: Yes - related to postnasal drainage   Tooth pain: No  Gum tenderness: No  Bleeding gums: No  Change in taste: No  Change in sense of smell: No  Dry mouth: No  Hearing aid used: No  Neck lump: No  Changes in hair: Yes  Changes in moles/birth marks: No  Itching: Yes  Changes in nails: No  Acne: Yes  Hair in places you don't want it: No  Change in facial hair: No  Warts: No  Non-healing sores: No  Scarring: No  Flaking of skin: Yes  Color changes of hands/feet in cold : No  Sun sensitivity: Yes  Skin thickening: No  Vision loss: No  Dry eyes: Yes  Watery eyes: Yes  Eye bulging: No  Double vision: No  Flashing of lights: No  Spots: Yes  Floaters: Yes  Crossed eyes: No  Tunnel Vision: No  Yellowing of eyes: No  Eye irritation: Yes  Sputum or phlegm: Yes  Coughing up blood: No  Snoring: Yes  Difficulty breathing on exertion: Yes - if she overdoes it on her exercise  Nighttime Cough: Yes  Difficulty breathing when lying flat: No  Bloating: Yes  Blood in stool: No  Black stools: No  Fecal incontinence: No  Yellowing of skin or eyes: No  Vomit with blood: No  Change in stools: No  Trouble holding urine or incontinence: Yes  Increased frequency of urination: No  Decreased frequency of urination: No  Frequent nighttime urination: No  Bleeding or spotting between periods: No  Heavy or painful periods: Yes  Irregular periods: No  Hot flashes: No  Vaginal dryness: Yes  Reduced libido: Yes  Difficulty with sexual arousal:  Yes  Post-menopausal bleeding: No  Bone pain: Yes  Muscle cramps: Yes  Muscle weakness: No  Joint stiffness: Yes  Bone fracture: No  Trouble with coordination: Yes  Fainting or black-out spells: No  Memory loss: Yes  Speech problems: No  Tingling: Yes  Difficulty walking: No  Paralysis: No  Depression: Yes  Trouble sleeping: Yes  Mood changes: Yes  Panic attacks: Yes      Patient Active Problem List   Diagnosis     Chronic fatigue syndrome     Hypersomnia     CHHAYA I (cervical intraepithelial neoplasia I)     Chronic rhinitis     Recurrent major depressive disorder (H)     History of eating disorder     History of substance use     PTSD (post-traumatic stress disorder)     DIANA (generalized anxiety disorder)     Back pain     Intrauterine device surveillance     Pelvic pain in female       Past Medical History:   Diagnosis Date     Anxiety      Chronic fatigue syndrome      Chronic rhinitis      CHHAYA I (cervical intraepithelial neoplasia I) 05/24/2007     Depressive disorder      History of eating disorder      History of substance use     prescription pain killers, alcohol, cocaine     Hypersomnia      PTSD (post-traumatic stress disorder)      Past Surgical History:   Procedure Laterality Date     KNEE SURGERY Left 2018    for osgood-schlatter and tendon repair/anchor     [place] Teeth Removal  2001     WRIST SURGERY Left 2015    snowboarding injury, fractured, had metal plate initially that was then removed     Current Outpatient Medications   Medication Sig Dispense Refill     fluticasone (FLONASE) 50 MCG/ACT nasal spray Spray 1 spray into both nostrils daily 31.6 mL 3     hydrOXYzine (ATARAX) 25 MG tablet Take 1 tablet (25 mg) 2 times daily as needed for other (anxiety) 60 tablet 2     levonorgestrel (MIRENA) 20 MCG/DAY IUD 1 each by Intrauterine route once       modafinil (PROVIGIL) 200 MG tablet TAKE 1/2 TO 1 TABLET(100  MG) BY MOUTH DAILY 30 tablet 2     prazosin (MINIPRESS) 1 MG capsule Take 1 capsule (1  "mg) by mouth At Bedtime 90 capsule 3     propranolol (INDERAL) 20 MG tablet Take 1 tablet (20 mg) by mouth 2 times daily 180 tablet 0     sertraline (ZOLOFT) 25 MG tablet Take 1 tablet (25 mg) by mouth daily 90 tablet 3       Allergies   Allergen Reactions     Dairy Products [Milk Protein Extract] Other (See Comments)     Nasal problems, stomach pain      No Clinical Screening - See Comments      Apples and bananas cause her throat to itch     Seasonal Allergies      Penicillins Rash     Rash on face and threw up     SH: Former tobacco use. Minimal alcohol use. Smokes marijuana daily.          Objective     /67   Pulse 65   Ht 1.727 m (5' 8\")   Wt 80.5 kg (177 lb 8 oz)   LMP  (LMP Unknown)   BMI 26.99 kg/m      Physical Exam     GENERAL APPEARANCE: healthy, alert and no distress     EYES: EOMI, PERRL     HENT: ear canals and TM's normal and nose and mouth without ulcers or lesions     NECK: no adenopathy, no asymmetry, masses, or scars and thyroid normal to palpation     RESP: lungs clear to auscultation - no rales, rhonchi or wheezes     CV: regular rates and rhythm, normal S1 S2, no S3 or S4 and no murmur, click or rub     ABDOMEN:  soft, nontender, no HSM or masses     MS: extremities normal- no gross deformities noted, no evidence of inflammation in joints, FROM in all extremities.     SKIN: no suspicious lesions or rashes     NEURO: Normal strength and tone, sensory exam grossly normal, mentation intact and speech normal     PSYCH: mentation appears normal. and affect normal/bright     LYMPHATICS: No cervical adenopathy    Diagnostics:  No labs were ordered during this visit.   No EKG required, no history of coronary heart disease, significant arrhythmia, peripheral arterial disease or other structural heart disease.    Revised Cardiac Risk Index (RCRI):  The patient has the following serious cardiovascular risks for perioperative complications:   - No serious cardiac risks = 0 points     RCRI " Interpretation: 0 points: Class I (very low risk - 0.4% complication rate)       Signed Electronically by: Adela Limon MD  Copy of this evaluation report is provided to requesting physician.

## 2023-03-02 NOTE — PATIENT INSTRUCTIONS
Thank you for trusting us with your care!     If you need to contact us for questions about:  Symptoms, Scheduling & Medical Questions; Non-urgent (2-3 day response) Candelaria dunbar, Urgent (needing response today) [phone] (if after 3:30pm next day response)   Prescriptions: Please call your Pharmacy   Billing: [facility] [phone] or  Physicians:[phone]    For informational purposes only. Not to replace the advice of your health care provider. Copyright   ,  [facility]. All rights reserved. Clinically reviewed by Jennifer Jhaveri MD. [employer] [id number] - REV .  Preparing for Your Surgery  Getting started  A nurse will call you to review your health history and instructions. They will give you an arrival time based on your scheduled surgery time. Please be ready to share:  Your doctor's clinic name and phone number  Your medical, surgical, and anesthesia history  A list of allergies and sensitivities  A list of medicines, including herbal treatments and over-the-counter drugs  Whether the patient has a legal guardian (ask how to send us the papers in advance)  Please tell us if you're pregnant--or if there's any chance you might be pregnant. Some surgeries may injure a fetus (unborn baby), so they require a pregnancy test. Surgeries that are safe for a fetus don't always need a test, and you can choose whether to have one.   If you have a child who's having surgery, please ask for a copy of Preparing for Your Child's Surgery.    Preparing for surgery  Within 10 to 30 days of surgery: Have a pre-op exam (sometimes called an H&P, or History and Physical). This can be done at a clinic or pre-operative center.  If you're having a , you may not need this exam. Talk to your care team.  At your pre-op exam, talk to your care team about all medicines you take. If you need to stop any medicines before surgery, ask when to start taking them again.  We do this for your safety. Many  medicines can make you bleed too much during surgery. Some change how well surgery (anesthesia) drugs work.  Call your insurance company to let them know you're having surgery. (If you don't have insurance, call [phone].)  Call your clinic if there's any change in your health. This includes signs of a cold or flu (sore throat, runny nose, cough, rash, fever). It also includes a scrape or scratch near the surgery site.  If you have questions on the day of surgery, call your hospital or surgery center.  Eating and drinking guidelines  For your safety: Unless your surgeon tells you otherwise, follow the guidelines below.  Eat and drink as usual until 8 hours before you arrive for surgery. After that, no food or milk.  Drink clear liquids until 2 hours before you arrive. These are liquids you can see through, like water, Gatorade, and Propel Water. They also include plain black coffee and tea (no cream or milk), candy, and breath mints. You can spit out gum when you arrive.  If you drink alcohol: Stop drinking it the night before surgery.  If your care team tells you to take medicine on the morning of surgery, it's okay to take it with a sip of water.  Preventing infection  Shower or bathe the night before and morning of your surgery. Follow the instructions your clinic gave you. (If no instructions, use regular soap.)  Don't shave or clip hair near your surgery site. We'll remove the hair if needed.  Don't smoke or vape the morning of surgery. You may chew nicotine gum up to 2 hours before surgery. A nicotine patch is okay.  Note: Some surgeries require you to completely quit smoking and nicotine. Check with your surgeon.  Your care team will make every effort to keep you safe from infection. We will:  Clean our hands often with soap and water (or an alcohol-based hand rub).  Clean the skin at your surgery site with a special soap that kills germs.  Give you a special gown to keep you warm. (Cold raises the risk  of infection.)  Wear special hair covers, masks, gowns and gloves during surgery.  Give antibiotic medicine, if prescribed. Not all surgeries need antibiotics.  What to bring on the day of surgery  Photo ID and insurance card  Copy of your health care directive, if you have one  Glasses and hearing aids (bring cases)  You can't wear contacts during surgery  Inhaler and eye drops, if you use them (tell us about these when you arrive)  CPAP machine or breathing device, if you use them  A few personal items, if spending the night  If you have . . .  A pacemaker, ICD (cardiac defibrillator) or other implant: Bring the ID card.  An implanted stimulator: Bring the remote control.  A legal guardian: Bring a copy of the certified (court-stamped) guardianship papers.  Please remove any jewelry, including body piercings. Leave jewelry and other valuables at home.  If you're going home the day of surgery  You must have a responsible adult drive you home. They should stay with you overnight as well.  If you don't have someone to stay with you, and you aren't safe to go home alone, we may keep you overnight. Insurance often won't pay for this.  After surgery  If it's hard to control your pain or you need more pain medicine, please call your surgeon's office.  Questions?   If you have any questions for your care team, list them here: _________________________________________________________________________________________________________________________________________________________________________ ____________________________________ ____________________________________ ____________________________________    How to Take Your Medication Before Surgery  - Take all of your medications before surgery except as noted below  - HOLD (do not take) modafinil or hydroxyzine on the morning of surgery.   - HOLD (do not take) ibuprofen or naproxen for one week prior to surgery. You can take Tylenol (acetaminophen) if needed.   - STOP  taking all vitamins and herbal supplements 14 days before surgery.    No marijuana the day prior to the procedure.

## 2023-03-03 ENCOUNTER — OFFICE VISIT (OUTPATIENT)
Dept: FAMILY MEDICINE | Facility: CLINIC | Age: 38
End: 2023-03-03
Attending: FAMILY MEDICINE
Payer: COMMERCIAL

## 2023-03-03 VITALS
SYSTOLIC BLOOD PRESSURE: 100 MMHG | BODY MASS INDEX: 26.9 KG/M2 | HEIGHT: 68 IN | WEIGHT: 177.5 LBS | DIASTOLIC BLOOD PRESSURE: 67 MMHG | HEART RATE: 65 BPM

## 2023-03-03 DIAGNOSIS — F41.1 GAD (GENERALIZED ANXIETY DISORDER): Chronic | ICD-10-CM

## 2023-03-03 DIAGNOSIS — Z01.818 PREOP GENERAL PHYSICAL EXAM: Primary | ICD-10-CM

## 2023-03-03 DIAGNOSIS — F33.41 RECURRENT MAJOR DEPRESSIVE DISORDER, IN PARTIAL REMISSION (H): Chronic | ICD-10-CM

## 2023-03-03 DIAGNOSIS — R10.2 PELVIC PAIN IN FEMALE: ICD-10-CM

## 2023-03-03 DIAGNOSIS — G93.32 CHRONIC FATIGUE SYNDROME: Chronic | ICD-10-CM

## 2023-03-03 PROCEDURE — 99214 OFFICE O/P EST MOD 30 MIN: CPT | Performed by: FAMILY MEDICINE

## 2023-03-03 PROCEDURE — G0463 HOSPITAL OUTPT CLINIC VISIT: HCPCS | Performed by: FAMILY MEDICINE

## 2023-03-06 ENCOUNTER — ANCILLARY PROCEDURE (OUTPATIENT)
Dept: ULTRASOUND IMAGING | Facility: CLINIC | Age: 38
End: 2023-03-06
Attending: OBSTETRICS & GYNECOLOGY
Payer: COMMERCIAL

## 2023-03-06 DIAGNOSIS — R10.2 PELVIC PAIN IN FEMALE: ICD-10-CM

## 2023-03-06 PROCEDURE — 76830 TRANSVAGINAL US NON-OB: CPT

## 2023-03-06 PROCEDURE — 76830 TRANSVAGINAL US NON-OB: CPT | Mod: 26 | Performed by: STUDENT IN AN ORGANIZED HEALTH CARE EDUCATION/TRAINING PROGRAM

## 2023-03-13 DIAGNOSIS — G93.32 CHRONIC FATIGUE SYNDROME: ICD-10-CM

## 2023-03-13 RX ORDER — MODAFINIL 200 MG/1
200 TABLET ORAL DAILY
Qty: 30 TABLET | Refills: 2 | Status: SHIPPED | OUTPATIENT
Start: 2023-03-13 | End: 2023-07-10

## 2023-03-13 NOTE — TELEPHONE ENCOUNTER
Modafinil (Provigil) 200 mg    Last Office Visit: 3/3/23  Future [facility] Appointments: None  Medication last refilled: 9/6/22 #30 with 2 refill(s)     verified - last fill date: 1/26/23 #30    Vital Signs 2/11/2022 2/16/2023 3/3/2023   Systolic 97 99 100   Diastolic 64 67 67     Routing refill request to provider for review/approval because:  Drug not on the G refill protocol     OSKAR Frankel, RN, CCM

## 2023-03-21 ENCOUNTER — ANESTHESIA EVENT (OUTPATIENT)
Dept: SURGERY | Facility: AMBULATORY SURGERY CENTER | Age: 38
End: 2023-03-21
Payer: COMMERCIAL

## 2023-03-21 NOTE — OP NOTE
[facility]  Operative Note  Name: Andria Daily  MRN: [id number]  : 1985     Date of Surgery: 2023    Surgeon: Isabel Vera MD    Assistants: Charlene Ballard MD PGY-1    Preoperative diagnosis: Chronic pelvic pain, dyschezia      Postoperative diagnosis:  Same as above, s/p below procedure    Procedure(s): Diagnostic laparoscopy, tubal dye study    Anesthesia: GETA and Local    EBL: 20 mL  IVF: 650 mL  UOP: 200 mL clear urine    Complications: None apparent    Specimens: None    Indications: Andria Daily is a 38 year old  who presented with chronic pelvic pain. She had medical management with Mirena IUD and Aygestin but continued to have pain and dyschezia. She was also interested in tubal function as she was treated for possible PID in .  Risks, benefits, and alternatives to the procedure were discussed with the patient who elected to proceed.  All questions were answered and an informed consent was obtained.    Findings: EUA revealed retroverted uterus. No adnexal masses noted. External genitalia, vaginal vault and cervix appeared normal. On laparoscopy, normal appearing liver edge, stomach omentum, appendix and bowel with filmy adhesions of cecum to abdominal side wall. Normal appearing uterus, bilateral fallopian tubes and ovaries. No endometriosis lesions appreciated on thorough inspection of the abdominal cavity. Spillage noted from bilateral fallopian tubes on tubal dye study. Hemostatic surgical sites at end of case.    Procedure in detail:   The patient was taken to the operating room where GETA was administered and found to adequate. She was prepped and draped in usual sterile manner in dorsal lithotomy position. A time out was performed. SCDs were in place for VTE prophylaxis. A denton catheter was placed.    A speculum was inserted into the vagina and an acorn uterine manipulator was placed. Attention was then turned to the abdomen where 0.25%  Marcaine plain was used to infiltrate the inferior aspect of the umbilicus. An 11-blade scalpel was used to make a 5 mm vertical incision in the umbilicus. A Veress needle was placed with saline noted to flow freely through the attached syringe. The CO2 gas was attached with an opening pressure of 7. With attempted insufflation the pressure was noted to rapidly rise. The Veress needle was removed and abdominal entry was attempted via the Bryant technique. The skin incision was slightly extended. S retractors were used to bluntly dissect down to the fascia which was then grasped with two Allis clamps. The fascia was incised with Estrada scissors and the opening extended with blunt pressure. The laparoscope was used to place the port through the fascial incision via direct entry.  The abdomen was insufflated to a pressure of 15 mmHg. The abdominal contents directly below the port site were thoroughly inspected without evidence of injury upon entry. Minimal amount of pre-peritoneal insufflation was noted along the anterior abdominal wall.  The abdomen and pelvis were then explored with the above noted findings. An additional 5 mm port was placed in the right lower quadrant under direct visualization. The abdomen and pelvis were explored with the above noted findings.      Dilute methylene blue was instilled into the uterus through the acorn manipulator and spillage was observed from bilateral fallopian tubes. The abdomen was desufflated and the ports were removed. The fasica of umbilical port was closed using 0-Vicryl. The skin of port sites was closed with 4-0 Monocryl and surgical glue. The uterine manipulator was removed.     Sponge and needle counts were correct x2. The patient tolerated the procedure well and was taken to the recovery area in stable condition. Dr. Vera was present and scrubbed for the procedure.    Charlene Ballard MD  Ob/Gyn PGY-1  03/22/23 5:57 PM    I was present and scrubbed for entire procedure.    Isabel Vera MD

## 2023-03-22 ENCOUNTER — ANESTHESIA (OUTPATIENT)
Dept: SURGERY | Facility: AMBULATORY SURGERY CENTER | Age: 38
End: 2023-03-22
Payer: COMMERCIAL

## 2023-03-22 ENCOUNTER — HOSPITAL ENCOUNTER (OUTPATIENT)
Facility: AMBULATORY SURGERY CENTER | Age: 38
Discharge: HOME | End: 2023-03-22
Attending: OBSTETRICS & GYNECOLOGY
Payer: COMMERCIAL

## 2023-03-22 VITALS
SYSTOLIC BLOOD PRESSURE: 107 MMHG | HEIGHT: 68 IN | HEART RATE: 74 BPM | RESPIRATION RATE: 14 BRPM | DIASTOLIC BLOOD PRESSURE: 65 MMHG | OXYGEN SATURATION: 96 % | WEIGHT: 177 LBS | BODY MASS INDEX: 26.83 KG/M2 | TEMPERATURE: 97.6 F

## 2023-03-22 DIAGNOSIS — Z98.890 S/P LAPAROSCOPY: Primary | ICD-10-CM

## 2023-03-22 DIAGNOSIS — R10.2 PELVIC PAIN IN FEMALE: ICD-10-CM

## 2023-03-22 LAB
HCG UR QL: NEGATIVE
INTERNAL QC OK POCT: NORMAL
POCT KIT EXPIRATION DATE: NORMAL
POCT KIT LOT NUMBER: NORMAL

## 2023-03-22 PROCEDURE — 81025 URINE PREGNANCY TEST: CPT | Performed by: PATHOLOGY

## 2023-03-22 PROCEDURE — 58350 REOPEN FALLOPIAN TUBE: CPT | Mod: GC | Performed by: OBSTETRICS & GYNECOLOGY

## 2023-03-22 PROCEDURE — 49320 DIAG LAPARO SEPARATE PROC: CPT | Mod: GC | Performed by: OBSTETRICS & GYNECOLOGY

## 2023-03-22 PROCEDURE — 58350 REOPEN FALLOPIAN TUBE: CPT

## 2023-03-22 PROCEDURE — 49320 DIAG LAPARO SEPARATE PROC: CPT

## 2023-03-22 RX ORDER — DEXAMETHASONE SODIUM PHOSPHATE 4 MG/ML
INJECTION, SOLUTION INTRA-ARTICULAR; INTRALESIONAL; INTRAMUSCULAR; INTRAVENOUS; SOFT TISSUE PRN
Status: DISCONTINUED | OUTPATIENT
Start: 2023-03-22 | End: 2023-03-22

## 2023-03-22 RX ORDER — SODIUM CHLORIDE, SODIUM LACTATE, POTASSIUM CHLORIDE, CALCIUM CHLORIDE 600; 310; 30; 20 MG/100ML; MG/100ML; MG/100ML; MG/100ML
INJECTION, SOLUTION INTRAVENOUS CONTINUOUS
Status: DISCONTINUED | OUTPATIENT
Start: 2023-03-22 | End: 2023-03-22 | Stop reason: HOSPADM

## 2023-03-22 RX ORDER — ACETAMINOPHEN 325 MG/1
975 TABLET ORAL ONCE
Status: DISCONTINUED | OUTPATIENT
Start: 2023-03-22 | End: 2023-03-23 | Stop reason: HOSPADM

## 2023-03-22 RX ORDER — FENTANYL CITRATE 50 UG/ML
50 INJECTION, SOLUTION INTRAMUSCULAR; INTRAVENOUS EVERY 5 MIN PRN
Status: DISCONTINUED | OUTPATIENT
Start: 2023-03-22 | End: 2023-03-22 | Stop reason: HOSPADM

## 2023-03-22 RX ORDER — IBUPROFEN 200 MG
600 TABLET ORAL EVERY 6 HOURS PRN
COMMUNITY
Start: 2023-03-22 | End: 2024-05-28

## 2023-03-22 RX ORDER — HYDROMORPHONE HYDROCHLORIDE 1 MG/ML
0.4 INJECTION, SOLUTION INTRAMUSCULAR; INTRAVENOUS; SUBCUTANEOUS EVERY 5 MIN PRN
Status: DISCONTINUED | OUTPATIENT
Start: 2023-03-22 | End: 2023-03-22 | Stop reason: HOSPADM

## 2023-03-22 RX ORDER — ONDANSETRON 4 MG/1
4 TABLET, ORALLY DISINTEGRATING ORAL EVERY 30 MIN PRN
Status: DISCONTINUED | OUTPATIENT
Start: 2023-03-22 | End: 2023-03-22 | Stop reason: HOSPADM

## 2023-03-22 RX ORDER — LIDOCAINE HYDROCHLORIDE 20 MG/ML
INJECTION, SOLUTION INFILTRATION; PERINEURAL PRN
Status: DISCONTINUED | OUTPATIENT
Start: 2023-03-22 | End: 2023-03-22

## 2023-03-22 RX ORDER — GABAPENTIN 300 MG/1
300 CAPSULE ORAL
Status: DISCONTINUED | OUTPATIENT
Start: 2023-03-22 | End: 2023-03-22 | Stop reason: HOSPADM

## 2023-03-22 RX ORDER — BUPIVACAINE HYDROCHLORIDE 2.5 MG/ML
INJECTION, SOLUTION INFILTRATION; PERINEURAL PRN
Status: DISCONTINUED | OUTPATIENT
Start: 2023-03-22 | End: 2023-03-22 | Stop reason: HOSPADM

## 2023-03-22 RX ORDER — ACETAMINOPHEN 325 MG/1
975 TABLET ORAL ONCE
Status: COMPLETED | OUTPATIENT
Start: 2023-03-22 | End: 2023-03-22

## 2023-03-22 RX ORDER — LIDOCAINE 40 MG/G
CREAM TOPICAL
Status: DISCONTINUED | OUTPATIENT
Start: 2023-03-22 | End: 2023-03-22 | Stop reason: HOSPADM

## 2023-03-22 RX ORDER — PROPOFOL 10 MG/ML
INJECTION, EMULSION INTRAVENOUS PRN
Status: DISCONTINUED | OUTPATIENT
Start: 2023-03-22 | End: 2023-03-22

## 2023-03-22 RX ORDER — OXYCODONE HYDROCHLORIDE 5 MG/1
5 TABLET ORAL EVERY 6 HOURS PRN
Qty: 12 TABLET | Refills: 0 | Status: SHIPPED | OUTPATIENT
Start: 2023-03-22 | End: 2023-03-25

## 2023-03-22 RX ORDER — FENTANYL CITRATE 50 UG/ML
25 INJECTION, SOLUTION INTRAMUSCULAR; INTRAVENOUS EVERY 5 MIN PRN
Status: DISCONTINUED | OUTPATIENT
Start: 2023-03-22 | End: 2023-03-22 | Stop reason: HOSPADM

## 2023-03-22 RX ORDER — ONDANSETRON 2 MG/ML
4 INJECTION INTRAMUSCULAR; INTRAVENOUS EVERY 30 MIN PRN
Status: DISCONTINUED | OUTPATIENT
Start: 2023-03-22 | End: 2023-03-22 | Stop reason: HOSPADM

## 2023-03-22 RX ORDER — ONDANSETRON 2 MG/ML
INJECTION INTRAMUSCULAR; INTRAVENOUS PRN
Status: DISCONTINUED | OUTPATIENT
Start: 2023-03-22 | End: 2023-03-22

## 2023-03-22 RX ORDER — KETOROLAC TROMETHAMINE 30 MG/ML
INJECTION, SOLUTION INTRAMUSCULAR; INTRAVENOUS PRN
Status: DISCONTINUED | OUTPATIENT
Start: 2023-03-22 | End: 2023-03-22

## 2023-03-22 RX ORDER — FENTANYL CITRATE 50 UG/ML
INJECTION, SOLUTION INTRAMUSCULAR; INTRAVENOUS PRN
Status: DISCONTINUED | OUTPATIENT
Start: 2023-03-22 | End: 2023-03-22

## 2023-03-22 RX ORDER — HYDROMORPHONE HYDROCHLORIDE 1 MG/ML
0.2 INJECTION, SOLUTION INTRAMUSCULAR; INTRAVENOUS; SUBCUTANEOUS EVERY 5 MIN PRN
Status: DISCONTINUED | OUTPATIENT
Start: 2023-03-22 | End: 2023-03-22 | Stop reason: HOSPADM

## 2023-03-22 RX ORDER — IBUPROFEN 200 MG
800 TABLET ORAL ONCE
Status: DISCONTINUED | OUTPATIENT
Start: 2023-03-22 | End: 2023-03-23 | Stop reason: HOSPADM

## 2023-03-22 RX ORDER — PROPOFOL 10 MG/ML
INJECTION, EMULSION INTRAVENOUS CONTINUOUS PRN
Status: DISCONTINUED | OUTPATIENT
Start: 2023-03-22 | End: 2023-03-22

## 2023-03-22 RX ORDER — ACETAMINOPHEN 325 MG/1
650 TABLET ORAL EVERY 6 HOURS PRN
Qty: 50 TABLET | Refills: 0 | COMMUNITY
Start: 2023-03-22 | End: 2024-05-28

## 2023-03-22 RX ORDER — OXYCODONE HYDROCHLORIDE 5 MG/1
5 TABLET ORAL
Status: COMPLETED | OUTPATIENT
Start: 2023-03-22 | End: 2023-03-22

## 2023-03-22 RX ADMIN — ACETAMINOPHEN 975 MG: 325 TABLET ORAL at 07:53

## 2023-03-22 RX ADMIN — LIDOCAINE HYDROCHLORIDE 100 MG: 20 INJECTION, SOLUTION INFILTRATION; PERINEURAL at 09:35

## 2023-03-22 RX ADMIN — ONDANSETRON 4 MG: 2 INJECTION INTRAMUSCULAR; INTRAVENOUS at 09:48

## 2023-03-22 RX ADMIN — FENTANYL CITRATE 50 MCG: 50 INJECTION, SOLUTION INTRAMUSCULAR; INTRAVENOUS at 09:33

## 2023-03-22 RX ADMIN — FENTANYL CITRATE 50 MCG: 50 INJECTION, SOLUTION INTRAMUSCULAR; INTRAVENOUS at 10:03

## 2023-03-22 RX ADMIN — DEXAMETHASONE SODIUM PHOSPHATE 4 MG: 4 INJECTION, SOLUTION INTRA-ARTICULAR; INTRALESIONAL; INTRAMUSCULAR; INTRAVENOUS; SOFT TISSUE at 09:48

## 2023-03-22 RX ADMIN — Medication 10 MG: at 10:10

## 2023-03-22 RX ADMIN — SODIUM CHLORIDE, SODIUM LACTATE, POTASSIUM CHLORIDE, CALCIUM CHLORIDE: 600; 310; 30; 20 INJECTION, SOLUTION INTRAVENOUS at 09:14

## 2023-03-22 RX ADMIN — Medication 50 MG: at 09:35

## 2023-03-22 RX ADMIN — PROPOFOL 200 MCG/KG/MIN: 10 INJECTION, EMULSION INTRAVENOUS at 09:35

## 2023-03-22 RX ADMIN — OXYCODONE HYDROCHLORIDE 5 MG: 5 TABLET ORAL at 10:55

## 2023-03-22 RX ADMIN — PROPOFOL 150 MG: 10 INJECTION, EMULSION INTRAVENOUS at 09:35

## 2023-03-22 RX ADMIN — PROPOFOL 50 MG: 10 INJECTION, EMULSION INTRAVENOUS at 09:37

## 2023-03-22 RX ADMIN — KETOROLAC TROMETHAMINE 30 MG: 30 INJECTION, SOLUTION INTRAMUSCULAR; INTRAVENOUS at 10:33

## 2023-03-22 RX ADMIN — Medication 0.2 MG: at 10:06

## 2023-03-22 NOTE — ADDENDUM NOTE
Addendum  created 03/22/23 1128 by Hanna Singh MD    Attestation recorded in Intraprocedure, Intraprocedure Attestations filed

## 2023-03-22 NOTE — ANESTHESIA POSTPROCEDURE EVALUATION
Patient: Andria Daily    Procedure: Procedure(s):  laparoscopy, chromopertubation       Anesthesia Type:  General    Note:  Disposition: Outpatient   Postop Pain Control: Uneventful            Sign Out: Well controlled pain   PONV: No   Neuro/Psych: Uneventful            Sign Out: Acceptable/Baseline neuro status   Airway/Respiratory: Uneventful            Sign Out: Acceptable/Baseline resp. status   CV/Hemodynamics: Uneventful            Sign Out: Acceptable CV status; No obvious hypovolemia; No obvious fluid overload   Other NRE: NONE   DID A NON-ROUTINE EVENT OCCUR? No           Last vitals:  Vitals Value Taken Time   /74 03/22/23 1100   Temp 36.7  C (98  F) 03/22/23 1100   Pulse 63 03/22/23 1100   Resp 19 03/22/23 1100   SpO2 98 % 03/22/23 1100       Electronically Signed By: Hanna Singh MD  March 22, 2023  11:27 AM

## 2023-03-22 NOTE — ANESTHESIA PREPROCEDURE EVALUATION
Anesthesia Pre-Procedure Evaluation    Patient: Andria Daily   MRN: [id number] : 1985        Procedure : Procedure(s):  laparoscopy, possible biopsy or fulguration of endometriosis, chromopertubation          Past Medical History:   Diagnosis Date     Anxiety      Chronic fatigue syndrome      Chronic rhinitis      CHHAYA I (cervical intraepithelial neoplasia I) 2007     Depressive disorder      History of eating disorder      History of substance use     prescription pain killers, alcohol, cocaine     Hypersomnia      PTSD (post-traumatic stress disorder)       Past Surgical History:   Procedure Laterality Date     KNEE SURGERY Left 2018    for osgood-schlatter and tendon repair/anchor     Cope Teeth Removal       WRIST SURGERY Left 2015    snowboarding injury, fractured, had metal plate initially that was then removed      Allergies   Allergen Reactions     Dairy Products [Milk Protein Extract] Other (See Comments)     Nasal problems, stomach pain      No Clinical Screening - See Comments      Apples and bananas cause her throat to itch     Seasonal Allergies      Penicillins Rash     Rash on face and threw up      Social History     Tobacco Use     Smoking status: Former     Types: Cigarettes     Quit date:      Years since quittin.2     Smokeless tobacco: Never   Substance Use Topics     Alcohol use: Yes     Comment: Rarely (3-4 times /months)      Wt Readings from Last 1 Encounters:   23 80.3 kg (177 lb)        Anesthesia Evaluation   Pt has had prior anesthetic.         ROS/MED HX  ENT/Pulmonary:  - neg pulmonary ROS     Neurologic:  - neg neurologic ROS     Cardiovascular:  - neg cardiovascular ROS     METS/Exercise Tolerance:     Hematologic:  - neg hematologic  ROS     Musculoskeletal:  - neg musculoskeletal ROS     GI/Hepatic:  - neg GI/hepatic ROS     Renal/Genitourinary: Comment: Pelvic pain      Endo:  - neg endo ROS     Psychiatric/Substance Use: Comment:        PTSD on  prazosin     On propranolol for psychomotor agitation     History of substance use         (+) psychiatric history anxiety and depression     Infectious Disease:  - neg infectious disease ROS     Malignancy:  - neg malignancy ROS     Other:               OUTSIDE LABS:  CBC:   Lab Results   Component Value Date    WBC 5.5 02/02/2017    HGB 13.8 02/02/2017    HCT 41.7 02/02/2017     02/02/2017     BMP: No results found for: NA, POTASSIUM, CHLORIDE, CO2, BUN, CR, GLC  COAGS: No results found for: PTT, INR, FIBR  POC:   Lab Results   Component Value Date    HCG Negative 03/22/2023     HEPATIC: No results found for: ALBUMIN, PROTTOTAL, ALT, AST, GGT, ALKPHOS, BILITOTAL, BILIDIRECT, TERESA  OTHER:   Lab Results   Component Value Date    CRP <2.9 02/02/2017       Anesthesia Plan    ASA Status:  2   NPO Status:  NPO Appropriate    Anesthesia Type: General.     - Airway: ETT   Induction: Intravenous.   Maintenance: Balanced.        Consents    Anesthesia Plan(s) and associated risks, benefits, and realistic alternatives discussed. Questions answered and patient/representative(s) expressed understanding.    - Discussed:     - Discussed with:  Patient         Postoperative Care    Pain management: IV analgesics, Oral pain medications.   PONV prophylaxis: Ondansetron (or other 5HT-3), Dexamethasone or Solumedrol, Background Propofol Infusion     Comments:           H&P reviewed: Unable to attach H&P to encounter due to EHR limitations. H&P Update: appropriate H&P reviewed, patient examined. No interval changes since H&P (within 30 days).         Hanna Singh MD

## 2023-03-22 NOTE — ANESTHESIA CARE TRANSFER NOTE
Patient: Andria Daily    Procedure: Procedure(s):  laparoscopy, chromopertubation       Diagnosis: Pelvic pain in female [R10.2]  Diagnosis Additional Information: No value filed.    Anesthesia Type:   General     Note:    Oropharynx: oropharynx clear of all foreign objects and spontaneously breathing  Level of Consciousness: awake  Oxygen Supplementation: face mask  Level of Supplemental Oxygen (L/min / FiO2): 6  Independent Airway: airway patency satisfactory and stable  Dentition: dentition unchanged  Vital Signs Stable: post-procedure vital signs reviewed and stable  Report to RN Given: handoff report given  Patient transferred to: PACU    Handoff Report: Identifed the Patient, Identified the Reponsible Provider, Reviewed the pertinent medical history, Discussed the surgical course, Reviewed Intra-OP anesthesia mangement and issues during anesthesia, Set expectations for post-procedure period and Allowed opportunity for questions and acknowledgement of understanding      Vitals:  Vitals Value Taken Time   BP 94/80 03/22/23 1045   Temp 98.0    Pulse 72 03/22/23 1047   Resp 14 03/22/23 1047   SpO2 98 % 03/22/23 1047   Vitals shown include unvalidated device data.    Electronically Signed By: REVA CALDWELL  March 22, 2023  10:49 AM

## 2023-03-22 NOTE — ANESTHESIA PROCEDURE NOTES
Airway       Patient location during procedure: OR       Procedure Start/Stop Times: 3/22/2023 9:38 AM  Staff -        CRNA: Johana Angulo       Performed By: CRNAIndications and Patient Condition       Indications for airway management: estela-procedural       Induction type:intravenous       Mask difficulty assessment: 1 - vent by mask    Final Airway Details       Final airway type: endotracheal airway       Successful airway: ETT - single  Endotracheal Airway Details        ETT size (mm): 7.0       Cuffed: yes       Successful intubation technique: direct laryngoscopy       DL Blade Type: MAC 3       Grade View of Cords: 1       Adjucts: stylet       Position: Right       Measured from: lips       Secured at (cm): 23       Bite block used: None    Post intubation assessment        Placement verified by: capnometry, equal breath sounds and chest rise        Number of attempts at approach: 1       Number of other approaches attempted: 0       Secured with: pink tape       Ease of procedure: easy       Dentition: Intact    Medication(s) Administered   Medication Administration Time: 3/22/2023 9:38 AM

## 2023-03-22 NOTE — DISCHARGE INSTRUCTIONS
[facility]  Home Care Following Anesthesia  For 24 hours after surgery:  Get plenty of rest.  A responsible adult must stay with you for at least 24 hours after you leave the surgery center.  Do not drive or use heavy equipment.  If you have weakness or tingling, don't drive or use heavy equipment until this feeling goes away.   Do not drink alcohol.   Avoid strenuous or risky activities.  Ask for help when climbing stairs.  You may feel lightheaded.  IF so, sit for a few minutes before standing.  Have someone help you get up.   If you have nausea (feel sick to your stomach): Drink only clear liquids such as apple juice, ginger ale, broth or 7-Up.  Rest may also help.  Be sure to drink enough fluids.  Move to a regular diet as you feel able.   You may have a slight fever.  Call the doctor if your fever is over 100 F (37.7 C) (taken under the tongue) or lasts longer than 24 hours.  You may have a dry mouth, a sore throat, muscle aches or trouble sleeping. These should go away after 24 hours.  Do not make important or legal decisions.   It is recommended to avoid smoking.   If you use hormonal birth control (such as the pill, patch, ring or implants):  You will need a second form of birth control for 7 days (condoms, a diaphragm or contraceptive foam).  While in the surgery center, you received a medicine called Sugammadex.  Hormonal birth control (such as the pill, patch, ring or implants) will not work as well for a week after taking this medicine.         Tips for taking pain medications  To get the best pain relief possible, remember these points:  Take pain medications as directed, before pain becomes severe.  Pain medication can upset your stomach: taking it with food may help.  Constipation is a common side effect of pain medication. Drink plenty of  fluids.  Eat foods high in fiber. Take a stool softener if recommended by your doctor or pharmacist.  Do not drink alcohol,  drive or operate machinery while taking pain medications.  Ask about other ways to control pain, such as with heat, ice or relaxation.    Tylenol/Acetaminophen Consumption  To help encourage the safe use of acetaminophen, the makers of TYLENOL  have lowered the maximum daily dose for single-ingredient Extra Strength TYLENOL  (acetaminophen) products sold in the [place] from 8 pills per day (4,000 mg) to 6 pills per day (3,000 mg). The dosing interval has also changed from 2 pills every 4-6 hours to 2 pills every 6 hours.  If you feel your pain relief is insufficient, you may take Tylenol/Acetaminophen in addition to your narcotic pain medication.   Be careful not to exceed 3,000 mg of Tylenol/Acetaminophen in a 24 hour period from all sources.  If you are taking extra strength Tylenol/acetaminophen (500 mg), the maximum dose is 6 tablets in 24 hours.  If you are taking regular strength acetaminophen (325 mg), the maximum dose is 9 tablets in 24 hours.  975mg Tylenol (acetaminophen) last given at 8am, next dose available at 2pm    Call a doctor for any of the following:  Signs of infection (fever, growing tenderness at the surgery site, a large amount of drainage or bleeding, severe pain, foul-smelling drainage, redness, swelling).  It has been over 8 to 10 hours since surgery and you are still not able to urinate (pass water).  Headache for over 24 hours.  Numbness, tingling or weakness the day after surgery (if you had spinal anesthesia).  Signs of Covid-19 infection (temperature over 100 degrees, shortness of breath, cough, loss of taste/smell, generalized body aches, persistent headache, chills, sore throat, nausea/vomiting/diarrhea)  Your doctor is:  Dr. Isabel Vera, OB/GYN: [phone]                    Or dial [phone] and ask for the resident on call for:  OB/GYN  For emergency care, call the:  [facility]:  [phone] (TTY for hearing impaired: [phone])

## 2023-03-22 NOTE — BRIEF OP NOTE
[facility]  Brief Operative Note    Pre-operative diagnosis: Pelvic pain in female [R10.2]  Post-operative diagnosis Same as pre-operative diagnosis    Procedure: Procedure(s):  laparoscopy, chromopertubation  Surgeon: Surgeon(s) and Role:     * Isabel Vera MD - Primary     * Charlene Ballard MD - Resident - Assisting  Anesthesia: General   Estimated Blood Loss: 20 mL    Specimens: * No specimens in log *     Findings:     EUA revealed retroverted uterus. No adnexal masses noted. External genitalia, vaginal vault and cervix appeared normal. On laparoscopy, normal appearing liver edge, stomach omentum, appendix and bowel with filmy adhesions of cecum to abdominal side wall. Normal appearing uterus, bilateral fallopian tubes and ovaries. No endometriosis lesions appreciated on thorough inspection of the abdominal cavity. Spillage noted from bilateral fallopian tubes on tubal dye study. Hemostatic surgical sites at end of case.    Complications: None.    Charlene Ballard MD  Ob/Gyn PGY-1  03/22/23 10:44 AM

## 2023-04-17 DIAGNOSIS — R25.1 TREMOR: ICD-10-CM

## 2023-04-17 RX ORDER — PROPRANOLOL HYDROCHLORIDE 20 MG/1
20 TABLET ORAL 2 TIMES DAILY
Qty: 180 TABLET | Refills: 1 | Status: SHIPPED | OUTPATIENT
Start: 2023-04-17 | End: 2023-10-25

## 2023-04-17 NOTE — TELEPHONE ENCOUNTER
Medication requested: propranolol (INDERAL) 20 MG tablet  Last office visit: 3/3/23  [facility] appointments: none  Medication last refilled: 1/5/23; 180 + 0 refills  Last qualifying labs:   BP Readings from Last 3 Encounters:   03/22/23 107/65   03/03/23 100/67   02/16/23 99/67     Routing refill request to provider for review/approval because:  Drug interaction warning - prazosin and propranolol    Travis SCHMITT, RN  04/17/23 1:01 PM

## 2023-04-19 NOTE — PROGRESS NOTES
ASSESSMENT AND PLAN:     (M61.242) Attention deficit  (primary encounter diagnosis)  Comment: Chronic, stable.  I agree with Andria that her symptoms could be consistent with ADHD and these untreated symptoms could be contributing negatively to her overall mental health. I think formal neuropsych evaluation would be beneficial given the complexity of her situation and have provided her with resources to pursue that testing.    We briefly discussed that ADHD treatment typically involves stimulant treatment and I wouldn't want her on both modafinil and an ADHD stimulant. We would attempt to just use the ADHD stimulant and see if this also kept her hypersomnia in check.     Plan to follow up after testing to discuss treatment further.   Plan:       Mook De La Rosa MD   [facility]  04/21/2023, 5:28 PM      SUBJECTIVE:   Andria is a 38 year old female who presents to clinic today for a return visit.      - feels that despite management of depressive and anxious symptoms she still struggles with keeping up with daily chore, keeping a clean space, finish chores, finish tests, forgetting things  - feels overwhelmed  - wondering about possible ADHD  - these issues were present even before depression started    - does notice both improved wakefullness but also improved functioning since starting on modafinil  - takes modafinil 100mg in the morning and another half in the afternoon  - if working late will sometimes do a third half        Patient Active Problem List   Diagnosis     Chronic fatigue syndrome     Hypersomnia     CHHAYA I (cervical intraepithelial neoplasia I)     Chronic rhinitis     Recurrent major depressive disorder (H)     History of eating disorder     History of substance use     PTSD (post-traumatic stress disorder)     DIANA (generalized anxiety disorder)     Back pain     Intrauterine device surveillance     Pelvic pain in female     Current Outpatient Medications   Medication      acetaminophen (TYLENOL) 325 MG tablet     hydrOXYzine (ATARAX) 25 MG tablet     ibuprofen (ADVIL/MOTRIN) 200 MG tablet     levonorgestrel (MIRENA) 20 MCG/DAY IUD     modafinil (PROVIGIL) 200 MG tablet     prazosin (MINIPRESS) 1 MG capsule     propranolol (INDERAL) 20 MG tablet     sertraline (ZOLOFT) 25 MG tablet     No current facility-administered medications for this visit.       I have reviewed the patient's relevant past medical history.     OBJECTIVE:   /76 (BP Location: Left arm, Patient Position: Left side, Cuff Size: Adult Regular)   Pulse 82   Temp 97.6  F (36.4  C) (Temporal)   Resp 17   SpO2 99%     Constitutional: well-appearing, appears stated age  Eyes: conjunctivae without erythema, sclera anicteric.   Skin: no rashes, lesions, or wounds  Psych: affect is full and appropriate, speech is fluent and non-pressured

## 2023-04-21 ENCOUNTER — OFFICE VISIT (OUTPATIENT)
Dept: FAMILY MEDICINE | Facility: CLINIC | Age: 38
End: 2023-04-21
Payer: COMMERCIAL

## 2023-04-21 VITALS
HEART RATE: 82 BPM | DIASTOLIC BLOOD PRESSURE: 76 MMHG | SYSTOLIC BLOOD PRESSURE: 111 MMHG | OXYGEN SATURATION: 99 % | TEMPERATURE: 97.6 F | RESPIRATION RATE: 17 BRPM

## 2023-04-21 DIAGNOSIS — R41.840 ATTENTION DEFICIT: Primary | ICD-10-CM

## 2023-04-21 ASSESSMENT — PATIENT HEALTH QUESTIONNAIRE - PHQ9
5. POOR APPETITE OR OVEREATING: MORE THAN HALF THE DAYS
SUM OF ALL RESPONSES TO PHQ QUESTIONS 1-9: 11

## 2023-04-21 ASSESSMENT — ANXIETY QUESTIONNAIRES
1. FEELING NERVOUS, ANXIOUS, OR ON EDGE: MORE THAN HALF THE DAYS
3. WORRYING TOO MUCH ABOUT DIFFERENT THINGS: MORE THAN HALF THE DAYS
7. FEELING AFRAID AS IF SOMETHING AWFUL MIGHT HAPPEN: SEVERAL DAYS
5. BEING SO RESTLESS THAT IT IS HARD TO SIT STILL: SEVERAL DAYS
GAD7 TOTAL SCORE: 12
2. NOT BEING ABLE TO STOP OR CONTROL WORRYING: MORE THAN HALF THE DAYS
6. BECOMING EASILY ANNOYED OR IRRITABLE: MORE THAN HALF THE DAYS
IF YOU CHECKED OFF ANY PROBLEMS ON THIS QUESTIONNAIRE, HOW DIFFICULT HAVE THESE PROBLEMS MADE IT FOR YOU TO DO YOUR WORK, TAKE CARE OF THINGS AT HOME, OR GET ALONG WITH OTHER PEOPLE: SOMEWHAT DIFFICULT
GAD7 TOTAL SCORE: 12

## 2023-04-21 NOTE — PATIENT INSTRUCTIONS
Neuropsychological Testing for ADHD    [facility]   Psychiatry Clinic  [facility]  Floor 2, [address]  [address] [place] [id number]  Appointments: [phone]    [place] [address] [facility], [address]  [place], [place] [address] [phone]  ADHD testing eval for only 18+    [facility] - [phone] Children and adult learners  [facility] - [phone]  (Multiple Locations) Children and adults  [facility] - [phone]  (Multiple Locations) Children 6+ and adults  [facility] - [phone] ([place])  [facility] - [phone] ([place])

## 2023-04-21 NOTE — NURSING NOTE
38 year old  Chief Complaint   Patient presents with     Recheck Medication     Wanting to talk about ADHD testing and getting off of depression meds.  Modafinil -- up to 300 mg every day and not helping like it did in the past.       Blood pressure 111/76, pulse 82, temperature 97.6  F (36.4  C), temperature source Temporal, resp. rate 17, SpO2 99 %, not currently breastfeeding. There is no height or weight on file to calculate BMI.  Patient Active Problem List   Diagnosis     Chronic fatigue syndrome     Hypersomnia     CHHAYA I (cervical intraepithelial neoplasia I)     Chronic rhinitis     Recurrent major depressive disorder (H)     History of eating disorder     History of substance use     PTSD (post-traumatic stress disorder)     DIANA (generalized anxiety disorder)     Back pain     Intrauterine device surveillance     Pelvic pain in female       Wt Readings from Last 2 Encounters:   23 80.3 kg (177 lb)   23 80.5 kg (177 lb 8 oz)     BP Readings from Last 3 Encounters:   23 111/76   23 107/65   23 100/67         Current Outpatient Medications   Medication     acetaminophen (TYLENOL) 325 MG tablet     hydrOXYzine (ATARAX) 25 MG tablet     ibuprofen (ADVIL/MOTRIN) 200 MG tablet     levonorgestrel (MIRENA) 20 MCG/DAY IUD     modafinil (PROVIGIL) 200 MG tablet     prazosin (MINIPRESS) 1 MG capsule     propranolol (INDERAL) 20 MG tablet     sertraline (ZOLOFT) 25 MG tablet     fluticasone (FLONASE) 50 MCG/ACT nasal spray     No current facility-administered medications for this visit.       Social History     Tobacco Use     Smoking status: Former     Types: Cigarettes     Quit date: 2016     Years since quittin.3     Smokeless tobacco: Never   Vaping Use     Vaping status: Never Used   Substance Use Topics     Alcohol use: Yes     Comment: Rarely (3-4 times /months)     Drug use: Yes     Types: Marijuana     Comment: Daily (usually smoke, occoassionally edibles)       Health  Maintenance Due   Topic Date Due     COVID-19 Vaccine (4 - Booster for Pfizer series) 03/03/2022     HEPATITIS B IMMUNIZATION (2 of 3 - 19+ 3-dose series) 02/28/2023       Lab Results   Component Value Date    PAP NIL 02/26/2021 April 21, 2023 11:37 AM

## 2023-05-02 ENCOUNTER — OFFICE VISIT (OUTPATIENT)
Dept: OBGYN | Facility: CLINIC | Age: 38
End: 2023-05-02
Attending: OBSTETRICS & GYNECOLOGY
Payer: COMMERCIAL

## 2023-05-02 VITALS
DIASTOLIC BLOOD PRESSURE: 62 MMHG | HEART RATE: 90 BPM | HEIGHT: 68 IN | SYSTOLIC BLOOD PRESSURE: 98 MMHG | BODY MASS INDEX: 26.91 KG/M2

## 2023-05-02 DIAGNOSIS — Z31.41 FERTILITY TESTING: ICD-10-CM

## 2023-05-02 DIAGNOSIS — K59.00 DYSCHEZIA: Primary | ICD-10-CM

## 2023-05-02 PROCEDURE — 99213 OFFICE O/P EST LOW 20 MIN: CPT | Performed by: OBSTETRICS & GYNECOLOGY

## 2023-05-02 PROCEDURE — G0463 HOSPITAL OUTPT CLINIC VISIT: HCPCS | Performed by: OBSTETRICS & GYNECOLOGY

## 2023-05-02 NOTE — PATIENT INSTRUCTIONS
Thank you for trusting us with your care!     If you need to contact us for questions about:  Symptoms, Scheduling & Medical Questions; Non-urgent (2-3 day response) [place] message, Urgent (needing response today) [phone] (if after 3:30pm next day response)   Prescriptions: Please call your Pharmacy   Billing: [facility] [phone] or [facility]:[phone]

## 2023-05-02 NOTE — PROGRESS NOTES
"[facility]    CC: Post-op     Subjective:   Andria Daily is a 38 year old  who presents for a post-op follow up.     She had some discharge of dye following the procedure, but has not had any vaginal discharge or bleeding since. She notes continued sporadic abdominal pain, which she feels is primarily in the LLQ, which she feels is not directly associated with BMs. She has pain with defecation sometimes. She also notes that she feels this at her \"ovaries,\" but overall feels relieved to not have endometriosis and believes that the pain is less since learning this. Her surgical incisions have healed well without concerns. Denies fevers or chills.    She is wondering about a GI referral and is considering eliminating certain foods from her diet, especially since she has a dairy allergy. She notes no family history of colon or other cancers.    Considering having a child with her partner, but would like to get her mental health under control first.     Medical, surgical and family histories, medications and allergies were reviewed and updated.     Objective:   BP 98/62   Pulse 90   Ht 1.727 m (5' 8\")   BMI 26.91 kg/m    General: Healthy appearing. Alert, oriented. Affect is appropriate.   HEENT: Eyes are normal with clear sclerae. Ears are symmetric.   Abdomen: Flat. Well healed laparoscopic surgical incisions.    3/22 Diagnostic laparoscopy with tubal dye study for chronic pelvic pain. She had medical management with Mirena IUD and Aygestin but continued to have pain and dyschezia. EUA revealed retroverted uterus. No adnexal masses noted. External genitalia, vaginal vault and cervix appeared normal. On laparoscopy, no endometriosis lesions appreciated on thorough inspection of the abdominal cavity. Spillage noted from bilateral fallopian tubes on tubal dye study.     Assessment/Plan: Andria Daily is a 38 year old  who presents for a post op follow up after normal diagnostic " laparoscopy with tubal dye study for chronic pelvic pain on 3/22/2023.     -GI referral for ongoing pain, reviewed trial of elimination diet  -Discussed fertility planning related to age and IUD. Discussed decreasing ovarian function and increase of SAB with age. If desires IUD removal, reviewed process.   -AMH ordered today, to be completed as patient so wishes    Follow-up in 1 year, sooner as needed if any concerns.     Everardo Gonzalez, MS4  Patient was seen and discussed with my attending, Dr. Vera.     Physician Attestation   I, Isabel Vera MD, was present with the medical/LAKEISHA student who participated in the service and in the documentation of the note.  I have verified the history and personally performed the physical exam and medical decision making.  I agree with the assessment and plan of care as documented in the note.      Items personally reviewed: vitals.    Isabel Vera MD

## 2023-05-02 NOTE — LETTER
"2023       RE: Andria Daily  [address]  [facility] [address]     Dear Colleague,    Thank you for referring your patient, Andria Daily, to the [facility]. Please see a copy of my visit note below.    [facility]    CC: Post-op     Subjective:   Andria Daily is a 38 year old  who presents for a post-op follow up.     She had some discharge of dye following the procedure, but has not had any vaginal discharge or bleeding since. She notes continued sporadic abdominal pain, which she feels is primarily in the LLQ, which she feels is not directly associated with BMs. She has pain with defecation sometimes. She also notes that she feels this at her \"ovaries,\" but overall feels relieved to not have endometriosis and believes that the pain is less since learning this. Her surgical incisions have healed well without concerns. Denies fevers or chills.    She is wondering about a GI referral and is considering eliminating certain foods from her diet, especially since she has a dairy allergy. She notes no family history of colon or other cancers.    Considering having a child with her partner, but would like to get her mental health under control first.     Medical, surgical and family histories, medications and allergies were reviewed and updated.     Objective:   BP 98/62   Pulse 90   Ht 1.727 m (5' 8\")   BMI 26.91 kg/m    General: Healthy appearing. Alert, oriented. Affect is appropriate.   HEENT: Eyes are normal with clear sclerae. Ears are symmetric.   Abdomen: Flat. Well healed laparoscopic surgical incisions.    3/22 Diagnostic laparoscopy with tubal dye study for chronic pelvic pain. She had medical management with Mirena IUD and Aygestin but continued to have pain and dyschezia. EUA revealed retroverted uterus. No adnexal masses noted. External genitalia, vaginal vault and cervix " appeared normal. On laparoscopy, no endometriosis lesions appreciated on thorough inspection of the abdominal cavity. Spillage noted from bilateral fallopian tubes on tubal dye study.     Assessment/Plan: Andria Daily is a 38 year old  who presents for a post op follow up after normal diagnostic laparoscopy with tubal dye study for chronic pelvic pain on 3/22/2023.     -GI referral for ongoing pain, reviewed trial of elimination diet  -Discussed fertility planning related to age and IUD. Discussed decreasing ovarian function and increase of SAB with age. If desires IUD removal, reviewed process.   -AMH ordered today, to be completed as patient so wishes    Follow-up in 1 year, sooner as needed if any concerns.     Everardo Gonzalez, MS4  Patient was seen and discussed with my attending, Dr. Vera.     Physician Attestation   I, Isabel Vera MD, was present with the medical/LAKEISHA student who participated in the service and in the documentation of the note.  I have verified the history and personally performed the physical exam and medical decision making.  I agree with the assessment and plan of care as documented in the note.      Items personally reviewed: vitals.    Isabel Vera MD

## 2023-06-05 ENCOUNTER — TELEPHONE (OUTPATIENT)
Dept: FAMILY MEDICINE | Facility: CLINIC | Age: 38
End: 2023-06-05

## 2023-06-05 NOTE — TELEPHONE ENCOUNTER
Prior Authorization Retail Medication Request    Medication/Dose: Modafinil 200 mg  ICD code (if different than what is on RX):  Same   Previously Tried and Failed:  Same  Rationale:  Same    Insurance Name:  Same  Insurance ID:  Same      Pharmacy Information (if different than what is on RX)  Name:  Same  Phone:  Same    PA request came from [email or web address].  PAULINE FrankelN, RN, CCM  RN Care Coordinator  [facility]  06/05/23  11:20 AM  Phone: [phone]

## 2023-06-08 NOTE — TELEPHONE ENCOUNTER
Prior Authorization Approval    Medication: MODAFINIL 200 MG PO TABS  Authorization Effective Date: 6/8/2023  Authorization Expiration Date: 6/7/2024  Approved Dose/Quantity:   Reference #:     Insurance Company: [employer] - Phone [phone] Fax [phone]  Expected CoPay:       CoPay Card Available:      Financial Assistance Needed:   Which Pharmacy is filling the prescription: [id number] [place] [id number] & [facility]  Pharmacy Notified: Yes  Patient Notified: No-Pharmacy will notify patient when ready.    Savage from [employer] called with approval. Pharmacy notified and got a paid claim.

## 2023-06-08 NOTE — TELEPHONE ENCOUNTER
Central Prior Authorization Team   Phone: [phone]      I received a call from the patient. She was calling to relay she is out of the medication and has been for a few days.    I let her know I would send this message to Mandie Sandoval to let her know that this is an urgent case.    I relayed to patient that it was submitted at 8:05 AM    I also mentioned that she could contact [facility] to see if they can supply her with an emergency supply - but she does need to contact them.  The only downside would be the pharmacy not allowing this or allowing the fill, but the authorization gets denied.

## 2023-06-08 NOTE — TELEPHONE ENCOUNTER
Central Prior Authorization Team   Phone: [phone]      PA Initiation    Medication: MODAFINIL 200 MG PO TABS  Insurance Company: [employer] - Phone [phone] Fax [phone]  Pharmacy Filling the Rx: [id number] [place] [id number] [place] [id number] & [facility]  Filling Pharmacy Phone: [phone]  Filling Pharmacy Fax:    Start Date: 6/8/2023

## 2023-07-10 DIAGNOSIS — G93.32 CHRONIC FATIGUE SYNDROME: ICD-10-CM

## 2023-07-10 RX ORDER — MODAFINIL 200 MG/1
200 TABLET ORAL DAILY
Qty: 30 TABLET | Refills: 2 | Status: SHIPPED | OUTPATIENT
Start: 2023-07-10 | End: 2023-11-24

## 2023-07-10 NOTE — TELEPHONE ENCOUNTER
Medication requested: modafinil (PROVIGIL) 200 MG tablet  Last office visit: 4/21/2023  [facility] appointments: none  Medication last refilled: 3/13/2023; 30 + 2 refills    Routing refill request to provider for review/approval because:  Drug not on the [facility] [place] [facility] according to the [facility] refill protocol     OSKAR Gonzales, RN  07/10/23, 3:55 PM

## 2023-09-05 DIAGNOSIS — F41.1 GAD (GENERALIZED ANXIETY DISORDER): Chronic | ICD-10-CM

## 2023-09-05 NOTE — TELEPHONE ENCOUNTER
Last Seen 1/25/23  RTC 12 months or sooner as needed  Cancel None  No-Show None    Next Appt 1/17/24    [facility]    Medication Requested   hydrOXYzine (ATARAX) 25 MG tablet     Directions   Take 1 tablet (25 mg) 2 times daily as needed for other (anxiety)     Qty 60    Last Refill 7/27      Medication Refill Approved Per Refill Protocol

## 2023-09-06 RX ORDER — HYDROXYZINE HYDROCHLORIDE 25 MG/1
TABLET, FILM COATED ORAL
Qty: 60 TABLET | Refills: 2 | Status: SHIPPED | OUTPATIENT
Start: 2023-09-06 | End: 2023-11-09

## 2023-09-10 ENCOUNTER — OFFICE VISIT (OUTPATIENT)
Dept: FAMILY MEDICINE | Facility: CLINIC | Age: 38
End: 2023-09-10
Payer: COMMERCIAL

## 2023-09-10 VITALS
OXYGEN SATURATION: 98 % | SYSTOLIC BLOOD PRESSURE: 110 MMHG | BODY MASS INDEX: 24.33 KG/M2 | DIASTOLIC BLOOD PRESSURE: 72 MMHG | TEMPERATURE: 98.4 F | WEIGHT: 160 LBS | HEART RATE: 73 BPM

## 2023-09-10 DIAGNOSIS — S69.92XA INJURY OF LEFT WRIST, INITIAL ENCOUNTER: ICD-10-CM

## 2023-09-10 DIAGNOSIS — M25.532 LEFT WRIST PAIN: Primary | ICD-10-CM

## 2023-09-10 PROCEDURE — 99213 OFFICE O/P EST LOW 20 MIN: CPT

## 2023-09-10 ASSESSMENT — ENCOUNTER SYMPTOMS: ARTHRALGIAS: 1

## 2023-09-10 NOTE — PATIENT INSTRUCTIONS
Wear the wrist brace and follow up with Orthopedics for imaging and further instructions.  Tylenol and ibuprofen for pain

## 2023-09-10 NOTE — PROGRESS NOTES
Assessment & Plan     Left wrist pain    - Orthopedic  Referral  - Wrist/Arm Supplies Order Wrist Brace; Left; with thumb spica    Injury of left wrist, initial encounter    -Patient was referred to Orthopedics for x-ray imaging and further evaluation.  X-ray not available at the [facility] urgent care clinic.  Patient was provided with a wrist brace for comfort and support.  She will wear the brace at all times until her visit with Orthopedics.  - Orthopedic  Referral  - Wrist/Arm Supplies Order Wrist Brace; Left; with thumb spica       Patient Instructions   Wear the wrist brace and follow up with Orthopedics for imaging and further instructions.  Tylenol and ibuprofen for pain      Return if symptoms worsen or fail to improve.    At the end of the encounter, I discussed results, diagnosis, medications. Discussed red flags for immediate return to clinic/ER, as well as indications for follow up if no improvement. Patient understood and agreed to plan. Patient was stable for discharge.    Cynthia Ayers is a 38 year old female who presents to clinic today for the following health issues:  Chief Complaint   Patient presents with    Wrist Injury     Pt fell and landed near left wrist- having pain scrape x today -aware we have no xray      HPI    Patient reports left wrist pain. She fell onto concrete floor and landed onto the left wrist, scraping the skin on the anterior aspect of wrist. She reports pain with flexing and extension of the wrist. She had prior surgery to the left wrist and is concerned about fracture. Denies numbness and tingling, weakness.     Review of Systems   Musculoskeletal:  Positive for arthralgias.       Problem List:  2023-02: Pelvic pain in female  2023-01: Intrauterine device surveillance  2021-08: Back pain  2020-07: DIANA (generalized anxiety disorder)  2007-05: CHHAYA I (cervical intraepithelial neoplasia I)  Chronic fatigue syndrome  Hypersomnia  Chronic rhinitis  Recurrent  major depressive disorder (H)  Anxiety  History of eating disorder  History of substance use  PTSD (post-traumatic stress disorder)      Past Medical History:   Diagnosis Date    Anxiety     Chronic fatigue syndrome     Chronic rhinitis     CHHAYA I (cervical intraepithelial neoplasia I) 2007    Depressive disorder     History of eating disorder     History of substance use     prescription pain killers, alcohol, cocaine    Hypersomnia     PTSD (post-traumatic stress disorder)        Social History     Tobacco Use    Smoking status: Former     Types: Cigarettes     Quit date: 2016     Years since quittin.6    Smokeless tobacco: Never   Substance Use Topics    Alcohol use: Yes     Comment: Rarely (3-4 times /months)           Objective    /72   Pulse 73   Temp 98.4  F (36.9  C) (Tympanic)   Wt 72.6 kg (160 lb)   SpO2 98%   BMI 24.33 kg/m    Physical Exam  Vitals and nursing note reviewed.   Constitutional:       Appearance: Normal appearance.   Cardiovascular:      Rate and Rhythm: Normal rate and regular rhythm.   Pulmonary:      Effort: Pulmonary effort is normal.      Breath sounds: Normal breath sounds.   Musculoskeletal:         General: Normal range of motion.      Left wrist: Swelling and tenderness present. Normal range of motion.        Arms:       Cervical back: Normal range of motion and neck supple.      Comments: Abrasion on the anterior surface of left wrist  Mild swelling present  Tenderness with palpation on the lateral aspect of wrist area   Skin:     General: Skin is warm and dry.      Findings: No rash.   Neurological:      General: No focal deficit present.      Mental Status: She is alert and oriented to person, place, and time.      Sensory: Sensation is intact.      Motor: Motor function is intact.   Psychiatric:         Mood and Affect: Mood normal.         Behavior: Behavior normal.              Andreina Santiago PA-C

## 2023-09-12 ENCOUNTER — OFFICE VISIT (OUTPATIENT)
Dept: ORTHOPEDICS | Facility: CLINIC | Age: 38
End: 2023-09-12
Payer: COMMERCIAL

## 2023-09-12 ENCOUNTER — ANCILLARY PROCEDURE (OUTPATIENT)
Dept: GENERAL RADIOLOGY | Facility: CLINIC | Age: 38
End: 2023-09-12
Attending: FAMILY MEDICINE
Payer: COMMERCIAL

## 2023-09-12 ENCOUNTER — PRE VISIT (OUTPATIENT)
Dept: ORTHOPEDICS | Facility: CLINIC | Age: 38
End: 2023-09-12

## 2023-09-12 DIAGNOSIS — M25.532 LEFT WRIST PAIN: Primary | ICD-10-CM

## 2023-09-12 DIAGNOSIS — M25.532 LEFT WRIST PAIN: ICD-10-CM

## 2023-09-12 DIAGNOSIS — S69.92XA INJURY OF LEFT WRIST, INITIAL ENCOUNTER: ICD-10-CM

## 2023-09-12 PROCEDURE — 73110 X-RAY EXAM OF WRIST: CPT | Mod: LT | Performed by: RADIOLOGY

## 2023-09-12 PROCEDURE — 99203 OFFICE O/P NEW LOW 30 MIN: CPT | Performed by: FAMILY MEDICINE

## 2023-09-12 NOTE — TELEPHONE ENCOUNTER
DIAGNOSIS: Left wrist pain, Injury of left wrist/ Andreina Santiago @ [facility]/ [facility]/ No Xray    APPOINTMENT DATE: 9/12/23   NOTES STATUS DETAILS   OFFICE NOTE from referring provider Internal 9/10/23 Andreina Messina PA-C   MEDICATION LIST Internal

## 2023-09-12 NOTE — TELEPHONE ENCOUNTER
DIAGNOSIS: Left wrist pain, Injury of left wrist/ Andreina Santiago @ [facility]/ [facility]/ No Xray    APPOINTMENT DATE: 09/12/23   NOTES STATUS DETAILS   OFFICE NOTE from referring provider Internal 09/10/23 - Andreina Santiago @ [facility]   OFFICE NOTE from other specialist Care Everywhere [facility]:  - 01/25/15   OPERATIVE REPORT In process [facility]:  - 02/09/2015, 07/22/16   MEDICATION LIST Internal    XRAYS  & INJECTIONS (IMAGES & REPORTS) PACS [facility]:  - XR L WRIST  - 01/25/15 - Edwardo Alexander, DO

## 2023-09-12 NOTE — PATIENT INSTRUCTIONS
Wrist Stretching/Strengthening Exercises    STRETCHING EXERCISES    Wrist range of motion  Flexion: Gently bend your wrist forward. Hold for 5 seconds. Do 2 sets of 15.  Extension: Gently bend your wrist backward. Hold this position 5 seconds. Do 2 sets of 15.    Side to side: Gently move your wrist from side to side (a handshake motion). Hold for 5 seconds in each direction. Do 2 sets of 15.  Wrist stretch: Press the back of the hand on your injured side with your other hand to help bend your wrist. Hold for 15 to 30 seconds. Next, stretch the hand back by pressing the fingers in a backward direction. Hold for 15 to 30 seconds. Keep the arm on your injured side straight during this exercise. Do 3 sets.    Wrist extension stretch: Stand at a table with your palms down, fingers flat, and elbows straight. Lean your body weight forward. Hold this position for 15 seconds. Repeat 3 times.    Wrist flexion stretch: Stand with the back of your hands on a table, palms facing up, fingers pointing toward your body, and elbows straight. Lean away from the table. Hold this position for 15 to 30 seconds. Repeat 3 times.    Forearm pronation and supination: Bend the elbow of your injured arm 90 degrees, keeping your elbow at your side. Turn your palm up and hold for 5 seconds. Then slowly turn your palm down and hold for 5 seconds. Make sure you keep your elbow at your side and bent 90 degrees while you do the exercise. Do 2 sets of 15.    When this exercise becomes pain free, do it with some weight in your hand such as a soup can or hammer handle.    STRENGTHENING EXERCISES    Wrist flexion: Hold a can or hammer handle in your hand with your palm facing up. Bend your wrist upward. Slowly lower the weight and return to the starting position. Do 2 sets of 15. Gradually increase the weight of the can or weight you are holding.    Wrist extension: Hold a soup can or hammer handle in your hand with your palm facing down. Slowly bend  your wrist up. Slowly lower the weight down into the starting position. Do 2 sets of 15. Gradually increase the weight of the object you are holding.     strengthening: Squeeze a soft rubber ball and hold the squeeze for 5 seconds. Do 2 sets of 15.    Developed by [employer].  Published by [employer].  Copyright  2014 [employer] and/or one of its subsidiaries. All rights reserved.

## 2023-09-12 NOTE — PROGRESS NOTES
CHIEF COMPLAINT:  No chief complaint on file.       HISTORY OF PRESENT ILLNESS  Ms. Daily is a pleasant 38 year old year old female who presents to clinic today with left wrist pain.  Andria explains that she tripped over a speaker and fell FOOSH    Onset: sudden  Location: left wrist  Quality:  aching and sharp  Duration: 2 days   Severity: 3/10 at worst  Timing:constant  Modifying factors:  resting and non-use makes it better, movement and use makes it worse  Associated signs & symptoms: Abrasions noted   Previous similar pain: Yes, left wrist ORIF at [facility] in 2015 and hardware removal in 2016   Treatments to date:Brace     Additional history: as documented    Review of Systems:  Have you recently had a a fever, chills, weight loss? No  Do you have any vision problems? No  Do you have any chest pain or edema? No  Do you have any shortness of breath or wheezing?  No  Do you have stomach problems? No  Do you have any numbness or focal weakness? No  Do you have diabetes? No  Do you have problems with bleeding or clotting? No  Do you have an rashes or other skin lesions? No    MEDICAL HISTORY  Patient Active Problem List   Diagnosis    Chronic fatigue syndrome    Hypersomnia    CHHAYA I (cervical intraepithelial neoplasia I)    Chronic rhinitis    Recurrent major depressive disorder (H)    History of eating disorder    History of substance use    PTSD (post-traumatic stress disorder)    DIANA (generalized anxiety disorder)    Back pain    Intrauterine device surveillance    Pelvic pain in female       Current Outpatient Medications   Medication Sig Dispense Refill    acetaminophen (TYLENOL) 325 MG tablet Take 2 tablets (650 mg) by mouth every 6 hours as needed for mild pain 50 tablet 0    hydrOXYzine (ATARAX) 25 MG tablet Take 1 tablet (25 mg) 2 times daily as needed for other (anxiety) 60 tablet 2    ibuprofen (ADVIL/MOTRIN) 200 MG tablet Take 3 tablets (600 mg) by mouth every 6 hours as needed for other (mild and/or  inflammatory pain)      levonorgestrel (MIRENA) 20 MCG/DAY IUD 1 each by Intrauterine route once      modafinil (PROVIGIL) 200 MG tablet Take 1 tablet (200 mg) by mouth daily 30 tablet 2    prazosin (MINIPRESS) 1 MG capsule Take 1 capsule (1 mg) by mouth At Bedtime 90 capsule 3    propranolol (INDERAL) 20 MG tablet Take 1 tablet (20 mg) by mouth 2 times daily 180 tablet 1    sertraline (ZOLOFT) 25 MG tablet Take 1 tablet (25 mg) by mouth daily 90 tablet 3       Allergies   Allergen Reactions    Dairy Products [Milk Protein] Other (See Comments)     Nasal problems, stomach pain     No Clinical Screening - See Comments      Apples and bananas cause her throat to itch    Seasonal Allergies     Flonase [Fluticasone] Dizziness and Rash    Penicillins Rash     Rash on face and threw up       Family History   Problem Relation Age of Onset    Anxiety Disorder Mother     Tremor Mother     Depression Father     Anxiety Disorder Father     Anxiety Disorder Sister         after bad car accident    Diabetes Maternal Grandmother     Hyperlipidemia Maternal Grandfather     Hypertension Maternal Grandfather     Heart Disease Maternal Grandfather     Breast Cancer No family hx of     Ovarian Cancer No family hx of     Colon Cancer No family hx of     Cerebrovascular Disease No family hx of        Additional medical/Social/Surgical histories reviewed in [facility] and updated as appropriate.       PHYSICAL EXAM  There were no vitals taken for this visit.    General- AOX4, NAD  Musculoskeletal - left wrist  Inspection: normal joint alignment, no obvious deformity, no swelling  Palpation: Tenderness in region of flexor carpi radialis. Mild tenderness at distal radius and radiocarpal joint.  No tenderness at radiocarpal or ulnocarpal joint. No tenderness at ECU or ECR.  No tenderness at the anatomical snuffbox, radial or ulnar styloid.   ROM:  Approximately 90  of wrist flexion, 60  wrist extension with mild discomfort, 30 ulnar deviation and  15  radial deviation without pain.  Normal supination and pronation.   Strength: 5/5  strength, 5/5 flexion, extension, pronation, supination, adduction, abduction  Special tests:  (-) Tinel's  (-) Finkelstein  (-) Phalen  (-) Ca click test  (-) ulnar impaction  Neuro  - no sensory or motor deficit, grossly normal coordination, normal muscle tone  Skin  - no ecchymosis, erythema, warmth, or induration, no obvious rash    IMAGING : XR wrist left 3 views: Final results and radiologist's interpretation, available in the [facility] health record. Images were reviewed with the patient/family members in the office today. My personal interpretation of the performed imaging is evidence for healed distal radius fracture with intra-articular extension.  Evidence for bony signs to suggest ORIF removal.     ASSESSMENT & PLAN  Ms. Daily is a 38 year old year old female hx ORIF left wrist who presents to clinic today with acute wrist injury 2 days ago.    Clinically reassuring overall. Concern for wrist sprain rather than any osseous abnormality.    Diagnosis: Acute pain of left wrist.    Reviewed x-rays in detail with Brisa.  At this time I would like her to continue using her wrist brace which she received 2 days ago at walk-in clinic.  She should start gentle range of motion exercises and I did provide her with a strengthening program as pain subsides.  She will use Advil and discussed consideration for prescription NSAID, but agreed to hold off.    If pain does persist in a similar fashion in 10 days, we can repeat x-rays to rule out occult fracture.  I did reassure her that I believe this will continue to improve as it is already improved in the past 2 days.    It was a pleasure seeing Andria today.    Tyler Parada DO, [facility]  Primary Care Sports Medicine

## 2023-09-12 NOTE — LETTER
9/12/2023      RE: Andria Daily  [address] [address]     Dear Colleague,    Thank you for referring your patient, Andria Daily, to the [facility] [place]. Please see a copy of my visit note below.    CHIEF COMPLAINT:  No chief complaint on file.       HISTORY OF PRESENT ILLNESS  Ms. Daily is a pleasant 38 year old year old female who presents to clinic today with left wrist pain.  Andria explains that she tripped over a speaker and fell FOOSH    Onset: sudden  Location: left wrist  Quality:  aching and sharp  Duration: 2 days   Severity: 3/10 at worst  Timing:constant  Modifying factors:  resting and non-use makes it better, movement and use makes it worse  Associated signs & symptoms: Abrasions noted   Previous similar pain: Yes, left wrist ORIF at [facility] in 2015 and hardware removal in 2016   Treatments to date:Brace     Additional history: as documented    Review of Systems:  Have you recently had a a fever, chills, weight loss? No  Do you have any vision problems? No  Do you have any chest pain or edema? No  Do you have any shortness of breath or wheezing?  No  Do you have stomach problems? No  Do you have any numbness or focal weakness? No  Do you have diabetes? No  Do you have problems with bleeding or clotting? No  Do you have an rashes or other skin lesions? No    MEDICAL HISTORY  Patient Active Problem List   Diagnosis    Chronic fatigue syndrome    Hypersomnia    CHHAYA I (cervical intraepithelial neoplasia I)    Chronic rhinitis    Recurrent major depressive disorder (H)    History of eating disorder    History of substance use    PTSD (post-traumatic stress disorder)    DIANA (generalized anxiety disorder)    Back pain    Intrauterine device surveillance    Pelvic pain in female       Current Outpatient Medications   Medication Sig Dispense Refill    acetaminophen (TYLENOL) 325 MG tablet Take 2 tablets (650 mg) by mouth every 6 hours as needed for  mild pain 50 tablet 0    hydrOXYzine (ATARAX) 25 MG tablet Take 1 tablet (25 mg) 2 times daily as needed for other (anxiety) 60 tablet 2    ibuprofen (ADVIL/MOTRIN) 200 MG tablet Take 3 tablets (600 mg) by mouth every 6 hours as needed for other (mild and/or inflammatory pain)      levonorgestrel (MIRENA) 20 MCG/DAY IUD 1 each by Intrauterine route once      modafinil (PROVIGIL) 200 MG tablet Take 1 tablet (200 mg) by mouth daily 30 tablet 2    prazosin (MINIPRESS) 1 MG capsule Take 1 capsule (1 mg) by mouth At Bedtime 90 capsule 3    propranolol (INDERAL) 20 MG tablet Take 1 tablet (20 mg) by mouth 2 times daily 180 tablet 1    sertraline (ZOLOFT) 25 MG tablet Take 1 tablet (25 mg) by mouth daily 90 tablet 3       Allergies   Allergen Reactions    Dairy Products [Milk Protein] Other (See Comments)     Nasal problems, stomach pain     No Clinical Screening - See Comments      Apples and bananas cause her throat to itch    Seasonal Allergies     Flonase [Fluticasone] Dizziness and Rash    Penicillins Rash     Rash on face and threw up       Family History   Problem Relation Age of Onset    Anxiety Disorder Mother     Tremor Mother     Depression Father     Anxiety Disorder Father     Anxiety Disorder Sister         after bad car accident    Diabetes Maternal Grandmother     Hyperlipidemia Maternal Grandfather     Hypertension Maternal Grandfather     Heart Disease Maternal Grandfather     Breast Cancer No family hx of     Ovarian Cancer No family hx of     Colon Cancer No family hx of     Cerebrovascular Disease No family hx of        Additional medical/Social/Surgical histories reviewed in [facility] and updated as appropriate.       PHYSICAL EXAM  There were no vitals taken for this visit.    General- AOX4, NAD  Musculoskeletal - left wrist  Inspection: normal joint alignment, no obvious deformity, no swelling  Palpation: Tenderness in region of flexor carpi radialis. Mild tenderness at distal radius and radiocarpal  joint.  No tenderness at radiocarpal or ulnocarpal joint. No tenderness at ECU or ECR.  No tenderness at the anatomical snuffbox, radial or ulnar styloid.   ROM:  Approximately 90  of wrist flexion, 60  wrist extension with mild discomfort, 30 ulnar deviation and 15  radial deviation without pain.  Normal supination and pronation.   Strength: 5/5  strength, 5/5 flexion, extension, pronation, supination, adduction, abduction  Special tests:  (-) Tinel's  (-) Finkelstein  (-) Phalen  (-) Ca click test  (-) ulnar impaction  Neuro  - no sensory or motor deficit, grossly normal coordination, normal muscle tone  Skin  - no ecchymosis, erythema, warmth, or induration, no obvious rash    IMAGING : XR wrist left 3 views: Final results and radiologist's interpretation, available in the [facility] health record. Images were reviewed with the patient/family members in the office today. My personal interpretation of the performed imaging is evidence for healed distal radius fracture with intra-articular extension.  Evidence for bony signs to suggest ORIF removal.     ASSESSMENT & PLAN  Ms. Daily is a 38 year old year old female hx ORIF left wrist who presents to clinic today with acute wrist injury 2 days ago.    Clinically reassuring overall. Concern for wrist sprain rather than any osseous abnormality.    Diagnosis: Acute pain of left wrist.    Reviewed x-rays in detail with Brisa.  At this time I would like her to continue using her wrist brace which she received 2 days ago at walk-in clinic.  She should start gentle range of motion exercises and I did provide her with a strengthening program as pain subsides.  She will use Advil and discussed consideration for prescription NSAID, but agreed to hold off.    If pain does persist in a similar fashion in 10 days, we can repeat x-rays to rule out occult fracture.  I did reassure her that I believe this will continue to improve as it is already improved in the past 2  days.    It was a pleasure seeing Andria today.    Tyler Parada DO, [facility]  Primary Care Sports Medicine

## 2023-09-25 ENCOUNTER — TELEPHONE (OUTPATIENT)
Dept: FAMILY MEDICINE | Facility: CLINIC | Age: 38
End: 2023-09-25

## 2023-09-25 NOTE — PROGRESS NOTES
ASSESSMENT AND PLAN:     (R11.0) Nausea  (primary encounter diagnosis)  (R42) Dizziness  Comment: Acute, uncomplicated.    Positional worsening of dizziness could suggest neurologic source but it's not the change in position so much as being in certain positions (head down or lying on right side) that seem to worsen symptoms, which would be less typical for a neurologic source. Nausea and now loose stools could suggest acute gastroenteritis. Still tolerating PO liquids well.    For now, treat symptomatically with ondansetron. I've asked Andria to update me later this week on her symptoms: any changes, new symptoms, worse/better symptoms, to help guide further testing if needed.     Plan: ondansetron (ZOFRAN) 4 MG tablet          (Z13.1) Screening for diabetes mellitus  Comment: Plan: Glucose          (Z13.220) Screening cholesterol level  Comment: Plan: Lipid panel reflex to direct LDL Fasting                        Mook De La Rosa MD   [facility]  09/26/2023, 1:50 PM      SUBJECTIVE:   Andria is a 38 year old female who presents to clinic today for a return visit.    # Dizziness  # Low Appetite  - has been feeling unwell for about 5 days  - started with lightheadedness when she stands    - past 4 days has been having nausea/loss of appetite  - these symptoms have been consistent since then  - no vomiting  - has been able to drink electrolyte drinks, sodas  - ate some ramen last night  - appetite is worse in the morning  - feels more nauseated after eating  - last ate around midnight    - has been having dizziness, balance issues the past 2-3 days  - worse when head is leaning forward  - when she goes to sit in bed, she feels like she is going to lose her balance  - dizziness doesn't worsen with going from sitting to standing like the lightheadedness does  - doesn't improve after eating  - had some movement sensation when lying in bed last night  - dizziness has been pretty constant since it  started, sometimes worse, sometimes better    - took a home UPT test which was negative, has IUD    - no vision changes this week  - no hearing changes, no tinnitus  - no ear pain/pressure  - having rhinorrhea and nasal congestion, worse this past week  - has had a dry cough in the mornings, worse this past week  - throat a little sore    - BMs have been looser for a couple of days, 1-2 times a day  - no fevers. Some chills  - occasional lower pelvic pain, intermittent, not worse this past week    BP Readings from Last 6 Encounters:   09/26/23 103/70   09/10/23 110/72   05/02/23 98/62   04/21/23 111/76   03/22/23 107/65   03/03/23 100/67           Patient Active Problem List   Diagnosis    Chronic fatigue syndrome    Hypersomnia    CHHAYA I (cervical intraepithelial neoplasia I)    Chronic rhinitis    Recurrent major depressive disorder (H)    History of eating disorder    History of substance use    PTSD (post-traumatic stress disorder)    DIANA (generalized anxiety disorder)    Back pain    Intrauterine device surveillance    Pelvic pain in female     Current Outpatient Medications   Medication    acetaminophen (TYLENOL) 325 MG tablet    hydrOXYzine (ATARAX) 25 MG tablet    ibuprofen (ADVIL/MOTRIN) 200 MG tablet    levonorgestrel (MIRENA) 20 MCG/DAY IUD    modafinil (PROVIGIL) 200 MG tablet    ondansetron (ZOFRAN) 4 MG tablet    prazosin (MINIPRESS) 1 MG capsule    propranolol (INDERAL) 20 MG tablet    sertraline (ZOLOFT) 25 MG tablet     No current facility-administered medications for this visit.       I have reviewed the patient's relevant past medical history.     OBJECTIVE:   /70 (BP Location: Right arm, Patient Position: Sitting, Cuff Size: Adult Regular)   Pulse 77   Temp 98.1  F (36.7  C)   Wt 74.4 kg (164 lb)   SpO2 97%   BMI 24.94 kg/m      Constitutional: well-appearing, appears stated age  Eyes: conjunctivae without erythema, sclera anicteric.   Cardiac: regular rate and rhythm, normal S1/S2, no  murmur/rubs/gallops  Abdomen: soft, mild tenderness in RLQ, no guarding    Neuro: CN 2-12 intact, no horizontal or vertical nystagmus, normal cover/uncover test, non-localizing head impulse test. Gait normal.

## 2023-09-25 NOTE — TELEPHONE ENCOUNTER
"Pt calling to report new onset dizziness with accompanying balance disruption, \"no appetite\" and nausea, and breast tenderness x4 days. Pt has been drinking Liquid IV and Gatorade to stay hydrated. She is not eating much, just snacks intermittently over the last several days which is all she can tolerate. She has not vomited since the nausea started. She is wondering if she might be pregnant because these symptoms are consistent with hx of past pregnancy. Mirena IUD placed about 3 years ago with OBGYN. She has no pelvic pain or noticeable spotting. Scheduled pt to see Dr. De La Rosa tomorrow morning at 1100 and asked her to take UPT tonight just in case.    Travis SCHMITT, RN  09/25/23 3:24 PM  "

## 2023-09-26 ENCOUNTER — OFFICE VISIT (OUTPATIENT)
Dept: FAMILY MEDICINE | Facility: CLINIC | Age: 38
End: 2023-09-26
Payer: COMMERCIAL

## 2023-09-26 VITALS
HEART RATE: 77 BPM | DIASTOLIC BLOOD PRESSURE: 70 MMHG | TEMPERATURE: 98.1 F | WEIGHT: 164 LBS | SYSTOLIC BLOOD PRESSURE: 103 MMHG | OXYGEN SATURATION: 97 % | BODY MASS INDEX: 24.94 KG/M2

## 2023-09-26 DIAGNOSIS — Z13.1 SCREENING FOR DIABETES MELLITUS: ICD-10-CM

## 2023-09-26 DIAGNOSIS — R42 DIZZINESS: ICD-10-CM

## 2023-09-26 DIAGNOSIS — Z13.220 SCREENING CHOLESTEROL LEVEL: ICD-10-CM

## 2023-09-26 DIAGNOSIS — R11.0 NAUSEA: Primary | ICD-10-CM

## 2023-09-26 LAB
CHOLEST SERPL-MCNC: 163 MG/DL
ERYTHROCYTE [DISTWIDTH] IN BLOOD BY AUTOMATED COUNT: 13.3 % (ref 10–15)
FASTING STATUS PATIENT QL REPORTED: YES
GLUCOSE SERPL-MCNC: 84 MG/DL (ref 70–99)
HCT VFR BLD AUTO: 42.1 % (ref 35–47)
HDLC SERPL-MCNC: 67 MG/DL
HGB BLD-MCNC: 13.6 G/DL (ref 11.7–15.7)
LDLC SERPL CALC-MCNC: 86 MG/DL
MCH RBC QN AUTO: 30.4 PG (ref 26.5–33)
MCHC RBC AUTO-ENTMCNC: 32.3 G/DL (ref 31.5–36.5)
MCV RBC AUTO: 94 FL (ref 78–100)
NONHDLC SERPL-MCNC: 96 MG/DL
PLATELET # BLD AUTO: 175 10E3/UL (ref 150–450)
RBC # BLD AUTO: 4.48 10E6/UL (ref 3.8–5.2)
TRIGL SERPL-MCNC: 51 MG/DL
WBC # BLD AUTO: 6.2 10E3/UL (ref 4–11)

## 2023-09-26 PROCEDURE — 82947 ASSAY GLUCOSE BLOOD QUANT: CPT | Performed by: FAMILY MEDICINE

## 2023-09-26 PROCEDURE — 80061 LIPID PANEL: CPT | Performed by: FAMILY MEDICINE

## 2023-09-26 RX ORDER — ONDANSETRON 4 MG/1
4 TABLET, FILM COATED ORAL EVERY 8 HOURS PRN
Qty: 20 TABLET | Refills: 0 | Status: SHIPPED | OUTPATIENT
Start: 2023-09-26 | End: 2023-12-12

## 2023-10-18 ENCOUNTER — OFFICE VISIT (OUTPATIENT)
Dept: PSYCHIATRY | Facility: CLINIC | Age: 38
End: 2023-10-18
Attending: PSYCHOLOGIST
Payer: COMMERCIAL

## 2023-10-18 DIAGNOSIS — F90.0 ATTENTION DEFICIT HYPERACTIVITY DISORDER (ADHD), PREDOMINANTLY INATTENTIVE TYPE: ICD-10-CM

## 2023-10-18 DIAGNOSIS — F43.10 PTSD (POST-TRAUMATIC STRESS DISORDER): ICD-10-CM

## 2023-10-18 DIAGNOSIS — F41.1 GAD (GENERALIZED ANXIETY DISORDER): Primary | ICD-10-CM

## 2023-10-18 DIAGNOSIS — F33.1 MODERATE EPISODE OF RECURRENT MAJOR DEPRESSIVE DISORDER (H): ICD-10-CM

## 2023-10-18 PROCEDURE — 90791 PSYCH DIAGNOSTIC EVALUATION: CPT | Performed by: PSYCHOLOGIST

## 2023-10-18 ASSESSMENT — PATIENT HEALTH QUESTIONNAIRE - PHQ9
SUM OF ALL RESPONSES TO PHQ QUESTIONS 1-9: 12
SUM OF ALL RESPONSES TO PHQ QUESTIONS 1-9: 12
10. IF YOU CHECKED OFF ANY PROBLEMS, HOW DIFFICULT HAVE THESE PROBLEMS MADE IT FOR YOU TO DO YOUR WORK, TAKE CARE OF THINGS AT HOME, OR GET ALONG WITH OTHER PEOPLE: SOMEWHAT DIFFICULT
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
## 2023-10-24 DIAGNOSIS — R25.1 TREMOR: ICD-10-CM

## 2023-10-25 ENCOUNTER — OFFICE VISIT (OUTPATIENT)
Dept: PSYCHIATRY | Facility: CLINIC | Age: 38
End: 2023-10-25
Attending: PSYCHOLOGIST
Payer: COMMERCIAL

## 2023-10-25 DIAGNOSIS — F90.0 ATTENTION DEFICIT HYPERACTIVITY DISORDER (ADHD), PREDOMINANTLY INATTENTIVE TYPE: ICD-10-CM

## 2023-10-25 DIAGNOSIS — F43.10 PTSD (POST-TRAUMATIC STRESS DISORDER): ICD-10-CM

## 2023-10-25 DIAGNOSIS — F41.1 GAD (GENERALIZED ANXIETY DISORDER): Primary | ICD-10-CM

## 2023-10-25 DIAGNOSIS — F33.1 MODERATE EPISODE OF RECURRENT MAJOR DEPRESSIVE DISORDER (H): ICD-10-CM

## 2023-10-25 PROCEDURE — 90834 PSYTX W PT 45 MINUTES: CPT | Performed by: PSYCHOLOGIST

## 2023-10-25 RX ORDER — PROPRANOLOL HYDROCHLORIDE 20 MG/1
20 TABLET ORAL 2 TIMES DAILY
Qty: 180 TABLET | Refills: 1 | Status: SHIPPED | OUTPATIENT
Start: 2023-10-25 | End: 2024-05-16

## 2023-10-25 ASSESSMENT — ANXIETY QUESTIONNAIRES
7. FEELING AFRAID AS IF SOMETHING AWFUL MIGHT HAPPEN: SEVERAL DAYS
3. WORRYING TOO MUCH ABOUT DIFFERENT THINGS: NEARLY EVERY DAY
GAD7 TOTAL SCORE: 17
5. BEING SO RESTLESS THAT IT IS HARD TO SIT STILL: MORE THAN HALF THE DAYS
2. NOT BEING ABLE TO STOP OR CONTROL WORRYING: NEARLY EVERY DAY
1. FEELING NERVOUS, ANXIOUS, OR ON EDGE: NEARLY EVERY DAY
IF YOU CHECKED OFF ANY PROBLEMS ON THIS QUESTIONNAIRE, HOW DIFFICULT HAVE THESE PROBLEMS MADE IT FOR YOU TO DO YOUR WORK, TAKE CARE OF THINGS AT HOME, OR GET ALONG WITH OTHER PEOPLE: SOMEWHAT DIFFICULT
GAD7 TOTAL SCORE: 17
6. BECOMING EASILY ANNOYED OR IRRITABLE: NEARLY EVERY DAY
4. TROUBLE RELAXING: MORE THAN HALF THE DAYS

## 2023-10-25 NOTE — TELEPHONE ENCOUNTER
Propranolol (Inderal) 20 mg    Last Office Visit: 9/26/23  Future [facility] Appointments: None  Medication last refilled: 4/17/23 #180 with 1 refill(s)    Vital Signs Systolic Diastolic   9/26/23 103 70   3/22/23 111 76   1/20/22 101 69     Prescription approved per [facility] Refill Protocol.    PAULINE FrankelN, RN, CCM
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
## 2023-11-09 ENCOUNTER — VIRTUAL VISIT (OUTPATIENT)
Dept: PSYCHIATRY | Facility: CLINIC | Age: 38
End: 2023-11-09
Attending: NURSE PRACTITIONER
Payer: COMMERCIAL

## 2023-11-09 VITALS
DIASTOLIC BLOOD PRESSURE: 60 MMHG | WEIGHT: 164.4 LBS | BODY MASS INDEX: 25 KG/M2 | SYSTOLIC BLOOD PRESSURE: 95 MMHG | HEART RATE: 58 BPM

## 2023-11-09 DIAGNOSIS — F43.10 PTSD (POST-TRAUMATIC STRESS DISORDER): Chronic | ICD-10-CM

## 2023-11-09 DIAGNOSIS — F41.1 GAD (GENERALIZED ANXIETY DISORDER): Chronic | ICD-10-CM

## 2023-11-09 PROCEDURE — 99214 OFFICE O/P EST MOD 30 MIN: CPT | Mod: VID | Performed by: NURSE PRACTITIONER

## 2023-11-09 RX ORDER — HYDROXYZINE HYDROCHLORIDE 25 MG/1
TABLET, FILM COATED ORAL
Qty: 60 TABLET | Refills: 2 | Status: SHIPPED | OUTPATIENT
Start: 2023-11-09 | End: 2024-03-04

## 2023-11-09 ASSESSMENT — PAIN SCALES - GENERAL: PAINLEVEL: NO PAIN (0)
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
## 2023-11-22 DIAGNOSIS — G93.32 CHRONIC FATIGUE SYNDROME: ICD-10-CM

## 2023-11-24 RX ORDER — MODAFINIL 200 MG/1
200 TABLET ORAL DAILY
Qty: 30 TABLET | Refills: 3 | Status: SHIPPED | OUTPATIENT
Start: 2023-11-24 | End: 2024-03-25 | Stop reason: ALTCHOICE

## 2023-11-24 NOTE — TELEPHONE ENCOUNTER
Medication requested: modafinil (PROVIGIL) 200 MG tablet   Last office visit: 9/26/23  [facility] appointments: none  Medication last refilled: 7/10/23; 30 + 2 refills, last refill sold 10/15/23 per   Last qualifying labs: N/A    Routing refill request to provider for review/approval because:  Drug not on the [facility] refill protocol     Travis SCHMITT RN  11/24/23 11:52 AM

## 2023-11-27 ENCOUNTER — TELEPHONE (OUTPATIENT)
Dept: FAMILY MEDICINE | Facility: CLINIC | Age: 38
End: 2023-11-27

## 2023-11-27 NOTE — TELEPHONE ENCOUNTER
"Pt stopped taking her sertraline about 4 days ago, she was taking 12.5 mg per day. She reports that she is trying to taper off of sertraline, and has been feeling \"physically off\" since she discontinued it. She has a follow-up appt with Dr. De La Rosa to discuss this further next week. Advised she resume taking the sertraline at 12.5 mg daily until she meets with Dr. De La Rosa and to call back if her symptoms worsen to discuss alternative plan.    Travis SCHMITT, RN  11/27/23 4:29 PM    "

## 2023-12-03 NOTE — PROGRESS NOTES
"ASSESSMENT AND PLAN:     (F90.0) Attention deficit hyperactivity disorder (ADHD), predominantly inattentive type  (primary encounter diagnosis)  Comment: Chronic, progressed.    Discussed pharmacotherapy for ADHD, recommending a stimulant to replace her current modafinil for CFS/excessive daytime sleepiness.    We discussed reasonable expectations for which of her symptoms stimulants can help with and which ones a stimulant is unlikely to be helpful for directly.    We discussed potential side effects of stimulants for ADHD and abuse potential; though, with Andria successfully being on a stimulant (modafanil) for a long period, I'm less concerned.    We discussed types of stimulants, short- and long-acting. Andria would prefer to start with a short-acting stimulant to give her more control over the timing of the stimulant effect.    Start Focalin 2.5mg IR once a day for a week and then increase to 2.5mg twice a day. At this low dose, can continue modafanil for now with cautious that there are overlapping side effects and she may wish to stop modafanil early.     After a week on 2.5mg twice a day, I asked Andria to reach out to me with an update via [employer] and we will continue to adjust from there.     Plan: dexmethylphenidate (FOCALIN) 2.5 MG tablet          (Z23) Needs flu shot  Comment: Plan: INFLUENZA VACCINE IM > 6 MONTHS VALENT IIV4         (FLUZONE), CANCELED: INFLUENZA VACCINE 18-64Y         (FLUBLOK)          (Z23) Need for COVID-19 vaccine  Comment: Plan: COVID-19 12+ (2023-24) (MODERNA)          Mook De La Rosa MD   [facility]  12/05/2023, 4:57 PM      SUBJECTIVE:   Andria is a 38 year old female who presents to clinic today for a return visit.      # ADHD  - underwent diagnostic assessment with Dr. Paul Johnson, PhD on 10/18/23 with follow up session 10/25/23  - Reported she did satisfactory in school (\"mostly C's\"), but recalled attending [facility] services throughout " "school, noted she was diagnosed with dyslexia at age 30     - completed Darci Adult ADHD self-rating scale, boyfriend completed other-report scale, and mother completed other-report of childhood symptoms  - significant inattentive symptoms on self-report and boyfriend's report  - significant hyperactive/impulse symptoms on boyfriend's report  - mother reported childhood symptoms starting at age 7 that did not meet criteria for significance (inattentive 4/9, hyperactive/impulse 2/9)  - Dr. Johnson felt that Andria's symptoms and questionnaires are consistent with ADHD, inattentive     - Andria is concerned about side effects from her Sertraline and is in the process of weaning off of that medication  - currently taking 12.5mg daily. Attempted to discontinue and reports after 3 days restarted due to withdrawal symptoms    - her goals from ADHD treatment are:   \"Normal functioning without minor panic attacks over small things\"  Improve emotional dysregulation  Help with negative internal dialogue, sensitivity to rejection and external stimuli (e.g. easily overwhelmed),  Help with inattentiveness (e.g. at work, social situations)    - thoughts of self-harm and history of self-harm (e.g. hitting self, bulimia, drinking/drug to excess), couple times a month but becoming more intense urges; sensation (e.g. hitting self), with urges usually lasting a couple hours but not into following day  - no suicide thoughts or plans for suicide  - no thoughts of harming others  - coping: crying, deep breathing, calling crisis numbers, talking with partner    - of note, Andria has a history of CFS and excessive daytime sleepiness  - she takes Modafanil 200mg tablets: takes 1-1.5 tablets/day  - finds this helpful for fatigue but not for inattentive symptoms  - there was a plan to repeat a sleep study in 03/2023, but has not completed yet.     - follows with [facility] psychiatry, Eugenia Velazco CNP, for her MDD, PTSD, DIANA  - following with [place] [facility] for therapy     Current Regimen  Sertraline 12.5mg daily  Prazosin 1mg bedtime PRN  Hydroxyzine 25mg bid PRN - at least 1x/day, helpful with anxiety when she takes it  Propranolol 20mg twice a day        4/21/2023    11:40 AM 10/18/2023     8:11 AM 12/5/2023    10:37 AM   PHQ   PHQ-9 Total Score 11 12 8   Q9: Thoughts of better off dead/self-harm past 2 weeks Not at all Not at all Not at all         4/21/2023    11:40 AM 10/25/2023     7:58 AM 12/5/2023    10:37 AM   DIANA-7 SCORE   Total Score  17 (severe anxiety)    Total Score 12 17 11     Patient Active Problem List   Diagnosis    Chronic fatigue syndrome    Hypersomnia    CHHAYA I (cervical intraepithelial neoplasia I)    Chronic rhinitis    Recurrent major depressive disorder (H24)    History of eating disorder    History of substance use    PTSD (post-traumatic stress disorder)    DIANA (generalized anxiety disorder)    Back pain    Intrauterine device surveillance    Pelvic pain in female     Current Outpatient Medications   Medication    acetaminophen (TYLENOL) 325 MG tablet    dexmethylphenidate (FOCALIN) 2.5 MG tablet    hydrOXYzine (ATARAX) 25 MG tablet    ibuprofen (ADVIL/MOTRIN) 200 MG tablet    levonorgestrel (MIRENA) 20 MCG/DAY IUD    modafinil (PROVIGIL) 200 MG tablet    prazosin (MINIPRESS) 1 MG capsule    propranolol (INDERAL) 20 MG tablet    sertraline (ZOLOFT) 50 MG tablet    ondansetron (ZOFRAN) 4 MG tablet     No current facility-administered medications for this visit.       I have reviewed the patient's relevant past medical history.     OBJECTIVE:   BP 92/60   Pulse 68   Temp 98  F (36.7  C)   Ht 1.722 m (5' 7.8\")   Wt 74.8 kg (165 lb)   SpO2 97%   BMI 25.24 kg/m      Constitutional: well-appearing, appears stated age  Eyes: conjunctivae without erythema, sclera anicteric.   Skin: no rashes, lesions, or wounds  Psych: affect is full and appropriate, speech is fluent and non-pressured  "

## 2023-12-05 ENCOUNTER — OFFICE VISIT (OUTPATIENT)
Dept: FAMILY MEDICINE | Facility: CLINIC | Age: 38
End: 2023-12-05
Payer: COMMERCIAL

## 2023-12-05 VITALS
WEIGHT: 165 LBS | HEIGHT: 68 IN | BODY MASS INDEX: 25.01 KG/M2 | HEART RATE: 68 BPM | OXYGEN SATURATION: 97 % | TEMPERATURE: 98 F | SYSTOLIC BLOOD PRESSURE: 92 MMHG | DIASTOLIC BLOOD PRESSURE: 60 MMHG

## 2023-12-05 DIAGNOSIS — Z23 NEED FOR COVID-19 VACCINE: ICD-10-CM

## 2023-12-05 DIAGNOSIS — Z23 NEEDS FLU SHOT: ICD-10-CM

## 2023-12-05 DIAGNOSIS — F90.0 ATTENTION DEFICIT HYPERACTIVITY DISORDER (ADHD), PREDOMINANTLY INATTENTIVE TYPE: Primary | ICD-10-CM

## 2023-12-05 RX ORDER — DEXMETHYLPHENIDATE HYDROCHLORIDE 2.5 MG/1
2.5 TABLET ORAL 2 TIMES DAILY
Qty: 60 TABLET | Refills: 0 | Status: SHIPPED | OUTPATIENT
Start: 2023-12-05 | End: 2023-12-15

## 2023-12-05 ASSESSMENT — PATIENT HEALTH QUESTIONNAIRE - PHQ9
5. POOR APPETITE OR OVEREATING: MORE THAN HALF THE DAYS
SUM OF ALL RESPONSES TO PHQ QUESTIONS 1-9: 8

## 2023-12-05 ASSESSMENT — ANXIETY QUESTIONNAIRES
GAD7 TOTAL SCORE: 11
GAD7 TOTAL SCORE: 11
3. WORRYING TOO MUCH ABOUT DIFFERENT THINGS: MORE THAN HALF THE DAYS
5. BEING SO RESTLESS THAT IT IS HARD TO SIT STILL: SEVERAL DAYS
1. FEELING NERVOUS, ANXIOUS, OR ON EDGE: SEVERAL DAYS
2. NOT BEING ABLE TO STOP OR CONTROL WORRYING: MORE THAN HALF THE DAYS
6. BECOMING EASILY ANNOYED OR IRRITABLE: NEARLY EVERY DAY
IF YOU CHECKED OFF ANY PROBLEMS ON THIS QUESTIONNAIRE, HOW DIFFICULT HAVE THESE PROBLEMS MADE IT FOR YOU TO DO YOUR WORK, TAKE CARE OF THINGS AT HOME, OR GET ALONG WITH OTHER PEOPLE: SOMEWHAT DIFFICULT
7. FEELING AFRAID AS IF SOMETHING AWFUL MIGHT HAPPEN: NOT AT ALL

## 2023-12-05 NOTE — NURSING NOTE
"38 year old  Chief Complaint   Patient presents with    Medication Request     Discuss starting ADHD medication       Blood pressure 92/60, pulse 68, temperature 98  F (36.7  C), height 1.722 m (5' 7.8\"), weight 74.8 kg (165 lb), SpO2 97%, not currently breastfeeding. Body mass index is 25.24 kg/m .  Patient Active Problem List   Diagnosis    Chronic fatigue syndrome    Hypersomnia    CHHAYA I (cervical intraepithelial neoplasia I)    Chronic rhinitis    Recurrent major depressive disorder (H24)    History of eating disorder    History of substance use    PTSD (post-traumatic stress disorder)    DIANA (generalized anxiety disorder)    Back pain    Intrauterine device surveillance    Pelvic pain in female       Wt Readings from Last 2 Encounters:   23 74.8 kg (165 lb)   23 74.4 kg (164 lb)     BP Readings from Last 3 Encounters:   23 92/60   23 103/70   09/10/23 110/72         Current Outpatient Medications   Medication    acetaminophen (TYLENOL) 325 MG tablet    hydrOXYzine (ATARAX) 25 MG tablet    ibuprofen (ADVIL/MOTRIN) 200 MG tablet    levonorgestrel (MIRENA) 20 MCG/DAY IUD    modafinil (PROVIGIL) 200 MG tablet    prazosin (MINIPRESS) 1 MG capsule    propranolol (INDERAL) 20 MG tablet    sertraline (ZOLOFT) 50 MG tablet    ondansetron (ZOFRAN) 4 MG tablet     No current facility-administered medications for this visit.       Social History     Tobacco Use    Smoking status: Former     Types: Cigarettes     Quit date: 2016     Years since quittin.9    Smokeless tobacco: Never   Vaping Use    Vaping Use: Never used   Substance Use Topics    Alcohol use: Yes     Comment: Rarely (3-4 times /months)    Drug use: Yes     Types: Marijuana     Comment: Daily (usually smoke, occoassionally edibles)       Health Maintenance Due   Topic Date Due    HEPATITIS B IMMUNIZATION (2 of 3 - 19+ 3-dose series) 2023    INFLUENZA VACCINE (1) 2023    COVID-19 Vaccine (4 - - season) 2023 "       Lab Results   Component Value Date    PAP NIL 02/26/2021 December 5, 2023 9:18 AM

## 2023-12-05 NOTE — PATIENT INSTRUCTIONS
Start taking Focalin (dexmethylphenidate) 2.5mg once a day for the first week  After a week, if no side effects, increase to 2.5mg twice a day (1 hour before you need to be focused and then a second dose 4 hours later)  After a week, let me know how things are going and we'll talk about increasing the dose

## 2023-12-12 ENCOUNTER — TELEPHONE (OUTPATIENT)
Dept: FAMILY MEDICINE | Facility: CLINIC | Age: 38
End: 2023-12-12

## 2023-12-12 DIAGNOSIS — F90.0 ATTENTION DEFICIT HYPERACTIVITY DISORDER (ADHD), PREDOMINANTLY INATTENTIVE TYPE: Primary | ICD-10-CM

## 2023-12-12 NOTE — TELEPHONE ENCOUNTER
Prior Authorization Retail Medication Request    Medication/Dose: Dexmethylphenidate (FOCALIN) 2.5 MG  Diagnosis and ICD code (if different than what is on RX):  Same  New/renewal/insurance change PA/secondary ins. PA:  Previously Tried and Failed:  Same  Rationale:  Same    Insurance   Primary: Same  Insurance ID:  Same    Secondary (if applicable):Same  Insurance ID:  Same    covermymeds  Key:  [id number]  Last Name:  Abimbola  :  1985    Pharmacy Information (if different than what is on RX)  Name:  Same  Phone:  Same  Fax:Same    PA request came from PAULINE PatN, RN, CCM  RN Care Coordinator  [facility]  23  2:42 PM  Phone: [phone]

## 2023-12-14 NOTE — NURSING NOTE
Chief Complaint   Patient presents with     Allergy Consult     Patient is being seen for consultation of allergies      Antonette Steel CMA at 3:08 PM on 5/8/2017    
Is This A New Presentation, Or A Follow-Up?: Skin Lesion
How Severe Is Your Skin Lesion?: mild
Have Your Skin Lesions Been Treated?: not been treated

## 2023-12-15 RX ORDER — DEXMETHYLPHENIDATE HYDROCHLORIDE 5 MG/1
TABLET ORAL
Qty: 15 TABLET | Refills: 0 | Status: SHIPPED | OUTPATIENT
Start: 2023-12-15 | End: 2024-01-02

## 2023-12-15 NOTE — TELEPHONE ENCOUNTER
Central Prior Authorization Team   Phone: [phone]    PA Initiation    Medication: DEXMETHYLPHENIDATE HCL 2.5 MG PO TABS  Insurance Company: [employer] - Phone [phone] Fax [phone]  Pharmacy Filling the Rx: [id number] [place] [id number] [place] [address] & [facility]  Filling Pharmacy Phone: [phone]  Filling Pharmacy Fax:    Start Date: 12/15/2023

## 2023-12-15 NOTE — TELEPHONE ENCOUNTER
Prior Authorization Not Needed per Insurance    Medication: DEXMETHYLPHENIDATE HCL 2.5 MG PO TABS  Insurance Company: [employer] - Phone [phone] Fax [phone]  Expected CoPay: $    Pharmacy Filling the Rx: [id number] [place] [address] & [facility]  Pharmacy Notified: No  Patient Notified:       Received a call from Mari at [employer], she states a PA is not needed as this insurance covers the 5mg 1/2 tab BID.  They do not cover the 2.5mg  Please send a new RX to the pharmacy for the 5mg.

## 2024-01-17 ENCOUNTER — MYC REFILL (OUTPATIENT)
Dept: FAMILY MEDICINE | Facility: CLINIC | Age: 39
End: 2024-01-17

## 2024-01-17 DIAGNOSIS — F90.0 ATTENTION DEFICIT HYPERACTIVITY DISORDER (ADHD), PREDOMINANTLY INATTENTIVE TYPE: ICD-10-CM

## 2024-01-18 ENCOUNTER — MYC MEDICAL ADVICE (OUTPATIENT)
Dept: PSYCHIATRY | Facility: CLINIC | Age: 39
End: 2024-01-18
Payer: COMMERCIAL

## 2024-01-18 ENCOUNTER — TELEPHONE (OUTPATIENT)
Dept: FAMILY MEDICINE | Facility: CLINIC | Age: 39
End: 2024-01-18

## 2024-01-18 RX ORDER — DEXMETHYLPHENIDATE HYDROCHLORIDE 10 MG/1
TABLET ORAL
Qty: 30 TABLET | Refills: 0 | Status: SHIPPED | OUTPATIENT
Start: 2024-01-18 | End: 2024-04-01

## 2024-01-18 NOTE — TELEPHONE ENCOUNTER
Retail Pharmacy Prior Authorization Team   Phone: [phone]        Covered up to 2 daily from [place] from .    Pharmacy ran 15 per 15 and it did process as no PA is needed.

## 2024-01-18 NOTE — TELEPHONE ENCOUNTER
Dexmethylphenidate (Focalin) 10 mg     Last Office Visit: 12/5/23  Future [facility] Appointments: None  Medication last refilled: 1/3/24 #15 with 0 refill(s)     verified - last fill date: 1/4/24 #15    Routing refill request to provider for review/approval because:  Drug not on the [facility] refill protocol     PAULINE FrankelN, RN, CCM

## 2024-01-18 NOTE — TELEPHONE ENCOUNTER
Retail Pharmacy Prior Authorization Team   Phone: [phone]    PA Initiation    Medication: Dexmethylphenidate (FOCALIN) 10 MG  Insurance Company: [employer] - Phone [phone] Fax [phone]  Pharmacy Filling the Rx: [facility] #[id number] [place] [id number] [place] [address] & [facility]  Filling Pharmacy Phone: [phone]  Filling Pharmacy Fax: [phone]  Start Date: 1/18/2024

## 2024-01-18 NOTE — TELEPHONE ENCOUNTER
PA not needed.  Insurance states medication is covered up to 2 daily.   Script sent to pharmacy was written for 15 tabs.  Pharmacy processed 15 day supply.  A new script for 30 day supply will need to be sent to pharmacy.

## 2024-01-18 NOTE — TELEPHONE ENCOUNTER
Prior Authorization Retail Medication Request    Medication/Dose: Dexmethylphenidate (FOCALIN) 10 MG  Diagnosis and ICD code (if different than what is on RX):  Same  New/renewal/insurance change PA/secondary ins. PA:  Previously Tried and Failed:  Same  Rationale:  Same    Insurance   Primary: Same  Insurance ID:  Same    Secondary (if applicable):Same  Insurance ID:  Same    Pharmacy Information (if different than what is on RX)  Name:  Same  Phone:  Same  Fax:Same      This is for a quantity override.  Her insurance is only allowing #15 tablets at a time.  She is having to refill this prescription every 2 weeks.  That's ridiculous.  Could a PA please be done for a 30 day supply?  She takes a 1/2 tab (5 mg)  twice daily     OSKAR Frankel, RN, [facility]  RN Care Coordinator  [facility]  01/18/24  11:07 AM  Phone: [phone]

## 2024-01-18 NOTE — CONFIDENTIAL NOTE
Per Care Everywhere, patient is at [facility] to be evaluated. Mom is aware that patient is safe.  __________________  Writer reached out to patient after she canceled today's appointment with Eugenia Velazco.  Patient indicates on appointment cancellation message that she is experiencing a crisis and is going to see about going inpatient today.  Writer called patient, LVM and sent qcuehart message.    Writer reached out to:    Altagracia Daily, mom, [phone] - Consent to Communicate on file,  Altagracia returned writer's call. She states that [facility]  Altagracia is concerned because she got a text earlier today and Bobby stated she is not doing well.  She has not been able to reach her at this time.  Altagracia is going to Bobby's home to check on her and will call the clinic after she gets there.    Rg Neal, significant other, [phone].  Writer LVM for Rg.  Per mom, Rg lives with Bobby.  He works from home at times.  Writer will attempt to reach Rg again.  ______  Appointment canceled for Bobby Daily ([id number])  Visit Type: ADULT PSYCHIATRY RETURN  Date        Time      Length    Provider                  Department  1/18/2024    2:30 PM  30 mins.  Eugenia Velazco                 [facility]     Reason for Cancellation: Other     Patient Comments: Mental health crisis. Not suicidal but struggling to function and my body is struggling so I m going to see about inpatient today.

## 2024-01-19 ENCOUNTER — TELEPHONE (OUTPATIENT)
Dept: PSYCHIATRY | Facility: CLINIC | Age: 39
End: 2024-01-19
Payer: COMMERCIAL

## 2024-01-19 RX ORDER — OLANZAPINE 5 MG/1
5 TABLET ORAL AT BEDTIME
COMMUNITY
Start: 2024-01-19 | End: 2024-02-05

## 2024-01-19 NOTE — CONFIDENTIAL NOTE
Discharge Outreach Call    Patient spent the night at [facility].  She was started on Olanzapine 5 mg at .  She went to [facility] for worsening psychiatric symptoms.  She felt dysregulated, poor sleep, agitated, racing thoughts.  Per discharge note, she was attempting to wean herself off zoloft.      Patient was sleeping when writer called - check in was very brief.  She denies any safety concerns.  She reports zyprexa was helpful last night.  Hospital mentioned CBT and PHP program.    Appt with Eugenia Velazco scheduled for 1/25 at 2 pm

## 2024-01-25 ENCOUNTER — VIRTUAL VISIT (OUTPATIENT)
Dept: PSYCHIATRY | Facility: CLINIC | Age: 39
End: 2024-01-25
Attending: NURSE PRACTITIONER
Payer: COMMERCIAL

## 2024-01-25 DIAGNOSIS — F43.10 PTSD (POST-TRAUMATIC STRESS DISORDER): ICD-10-CM

## 2024-01-25 DIAGNOSIS — F41.1 GAD (GENERALIZED ANXIETY DISORDER): Primary | ICD-10-CM

## 2024-01-25 DIAGNOSIS — F33.1 MODERATE EPISODE OF RECURRENT MAJOR DEPRESSIVE DISORDER (H): ICD-10-CM

## 2024-01-25 PROCEDURE — 99214 OFFICE O/P EST MOD 30 MIN: CPT | Mod: 95 | Performed by: NURSE PRACTITIONER

## 2024-01-25 ASSESSMENT — PAIN SCALES - GENERAL: PAINLEVEL: NO PAIN (0)

## 2024-01-25 NOTE — PROGRESS NOTES
"  Assessment & Plan   Problem List Items Addressed This Visit    None  Visit Diagnoses       Chronic congestion of paranasal sinus    -  Primary    Relevant Orders    Adult ENT  Referral           Suspicion for possible structural issue/deviated septum that could be contributing to chronic sinus issues. Referral as noted above. Encouraged Andria to contact me with any questions or concerns.        27 minutes spent on the date of the encounter doing chart review, history and exam, documentation and further activities as noted.        Subjective   Andria is a 38 year old, presenting for the following health issues:  Referral (ENT - has been having difficulty breathing, noticed more snoring and allergy symptoms/) and Consult (Wondering if she qualifies for a tonsillectomy - gets tonsil stones often, and wondering if it is contributing to her ENT issues)    HPI   \"ENT referral\"  - congestion, rhinorrhea  - chronic since childhood  - has had a couple of injuries where she may have broken her nose in the meantime  - thinks symptoms seem to have gotten worse since she had COVID last  - sometimes associated ear pressure or some dizziness    Review of Systems  Constitutional, HEENT, cardiovascular, pulmonary, gi and gu systems are negative, except as otherwise noted.      Objective    BP 98/62 (BP Location: Right arm, Patient Position: Sitting, Cuff Size: Adult Regular)   Pulse 77   Temp 98.5  F (36.9  C) (Skin)   Wt 75.3 kg (166 lb)   SpO2 97%   BMI 25.39 kg/m    Body mass index is 25.39 kg/m .  Physical Exam   GENERAL: alert and no distress  NECK: no adenopathy, no asymmetry, masses, or scars  RESP: lungs clear to auscultation - no rales, rhonchi or wheezes  CV: regular rate and rhythm, normal S1 S2, no S3 or S4, no murmur, click or rub, no peripheral edema  ABDOMEN: soft, nontender, no hepatosplenomegaly, no masses and bowel sounds normal  MS: no gross musculoskeletal defects noted, no edema            Signed " Electronically by: Savage Garcia MD

## 2024-01-25 NOTE — PATIENT INSTRUCTIONS
**For crisis resources, please see the information at the end of this document**   Patient Education    Thank you for coming to the [facility] [place] [facility].     Lab Testing:  If you had lab testing today and your results are reassuring or normal they will be mailed to you or sent through [employer] within 7 days. If the lab tests need quick action we will call you with the results. The phone number we will call with results is # [phone]. If this is not the best number please call our clinic and change the number.     Medication Refills:  If you need any refills please call your pharmacy and they will contact us. Our fax number for refills is [phone].   Three business days of notice are needed for general medication refill requests.   Five business days of notice are needed for controlled substance refill requests.   If you need to change to a different pharmacy, please contact the new pharmacy directly. The new pharmacy will help you get your medications transferred.     Contact Us:  Please call [phone] during business hours (8-5:00 M-F).   If you have medication related questions after clinic hours, or on the weekend, please call [phone].     Financial Assistance [phone]   Medical Records [phone]       MENTAL HEALTH CRISIS RESOURCES:  For a emergency help, please call 911 or go to the nearest Emergency Department.     Emergency Walk-In Options:   EmPATH Unit @ [facility] ([place]): [phone] - Specialized mental health emergency area designed to be calming  [facility] ([place]): [phone]  [facility] ([place]): [phone]  [facility]): [phone]    [facility] Crisis Information:   [place]: [phone]  [place]: [phone]  [place] (YOUNG) - Adult: [phone]     Child: [phone]  [place] - Adult: [phone]     Child: [phone]  [place]:  [phone]  List of all [facility] resources:   https://[place].gov/dhs/people-we-serve/adults/health-care/mental-health/resources/crisis-contacts.jsp    National Crisis Information:   Crisis Text Line: Text  [place]  to 026010  Suicide & Crisis Lifeline: 988  National Suicide Prevention Lifeline: 6-148-823-TALK (1-848.564.9517)       For online chat options, visit https://suicidepreventionlifeline.org/chat/  Poison Control Center: 9-113-738-6170  Trans Lifeline: 8-221-567-7472 - Hotline for transgender people of all ages  The [employer] Project: 4-261-732-1932 - Hotline for LGBT youth     For Non-Emergency Support:   Fast Tracker: Mental Health & Substance Use Disorder Resources -   https://www.[employer].org/

## 2024-01-25 NOTE — PROGRESS NOTES
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  Vaccinations:  PCP, please administer pneumococcal vaccination.  Referral: none  Plan: Bring glucometer to next visit. Monitor blood sugars. Proceed with lifestyle modifications.   Follow-up: 1/9/2024   Time Spent with Patient:  I have spent 50 minutes with the patient today.  Electronically signed by Chelle Barnes CNP on 11/07/23   Followed protocol

## 2024-01-25 NOTE — NURSING NOTE
Is the patient currently in the state of [place]? YES    Visit mode:VIDEO    If the visit is dropped, the patient can be reconnected by: VIDEO VISIT: Text to cell phone:   Telephone Information:   Mobile [phone]       Will anyone else be joining the visit? NO  (If patient encounters technical issues they should call [phone]    How would you like to obtain your AVS? MyChart    Are changes needed to the allergy or medication list? No    Will do QNRS on own through mychart    Reason for visit: LIVIER PINEDAF

## 2024-01-26 ENCOUNTER — OFFICE VISIT (OUTPATIENT)
Dept: FAMILY MEDICINE | Facility: CLINIC | Age: 39
End: 2024-01-26
Payer: COMMERCIAL

## 2024-01-26 VITALS
SYSTOLIC BLOOD PRESSURE: 98 MMHG | OXYGEN SATURATION: 97 % | TEMPERATURE: 98.5 F | DIASTOLIC BLOOD PRESSURE: 62 MMHG | WEIGHT: 166 LBS | HEART RATE: 77 BPM | BODY MASS INDEX: 25.39 KG/M2

## 2024-01-26 DIAGNOSIS — J32.9 CHRONIC CONGESTION OF PARANASAL SINUS: Primary | ICD-10-CM

## 2024-01-26 NOTE — NURSING NOTE
Andria  38 year old    Chief Complaint   Patient presents with    Referral     ENT - has been having difficulty breathing, noticed more snoring and allergy symptoms      Consult     Wondering if she qualifies for a tonsillectomy - gets tonsil stones often, and wondering if it is contributing to her ENT issues            Blood pressure 98/62, pulse 77, temperature 98.5  F (36.9  C), temperature source Skin, weight 75.3 kg (166 lb), SpO2 97%, not currently breastfeeding. Body mass index is 25.39 kg/m .    Patient Active Problem List   Diagnosis    Chronic fatigue syndrome    Hypersomnia    CHHAYA I (cervical intraepithelial neoplasia I)    Chronic rhinitis    Recurrent major depressive disorder (H24)    History of eating disorder    History of substance use    PTSD (post-traumatic stress disorder)    DIANA (generalized anxiety disorder)    Back pain    Intrauterine device surveillance    Pelvic pain in female              Wt Readings from Last 2 Encounters:   24 75.3 kg (166 lb)   23 74.8 kg (165 lb)       BP Readings from Last 3 Encounters:   24 98/62   23 92/60   23 103/70                Current Outpatient Medications   Medication    acetaminophen (TYLENOL) 325 MG tablet    dexmethylphenidate (FOCALIN) 10 MG tablet    hydrOXYzine (ATARAX) 25 MG tablet    ibuprofen (ADVIL/MOTRIN) 200 MG tablet    levonorgestrel (MIRENA) 20 MCG/DAY IUD    OLANZapine (ZYPREXA) 5 MG tablet    propranolol (INDERAL) 20 MG tablet    modafinil (PROVIGIL) 200 MG tablet    prazosin (MINIPRESS) 1 MG capsule     No current facility-administered medications for this visit.              Social History     Tobacco Use    Smoking status: Former     Types: Cigarettes     Quit date:      Years since quittin.0    Smokeless tobacco: Never   Vaping Use    Vaping Use: Never used   Substance Use Topics    Alcohol use: Yes     Comment: Rarely (3-4 times /months); 2-3drinks/time    Drug use: Yes     Types: Marijuana      Comment: Daily (usually smoke, occoassionally edibles)              Health Maintenance Due   Topic Date Due    HEPATITIS B IMMUNIZATION (2 of 3 - 19+ 3-dose series) 02/28/2023    YEARLY PREVENTIVE VISIT  01/31/2024              Lab Results   Component Value Date    PAP NIL 02/26/2021 January 26, 2024 2:00 PM

## 2024-02-27 ENCOUNTER — MYC REFILL (OUTPATIENT)
Dept: FAMILY MEDICINE | Facility: CLINIC | Age: 39
End: 2024-02-27

## 2024-02-27 DIAGNOSIS — F90.0 ATTENTION DEFICIT HYPERACTIVITY DISORDER (ADHD), PREDOMINANTLY INATTENTIVE TYPE: ICD-10-CM

## 2024-02-27 RX ORDER — DEXMETHYLPHENIDATE HYDROCHLORIDE 10 MG/1
TABLET ORAL
Qty: 30 TABLET | Refills: 0 | Status: CANCELLED | OUTPATIENT
Start: 2024-02-27

## 2024-02-28 NOTE — TELEPHONE ENCOUNTER
Medication requested: dexmethylphenidate (FOCALIN) 10 MG tablet   Last office visit: 1/26/2024  [facility] appointments: none  Medication last refilled: 1/3/2024; 15 + 0 refills; last sold #15 on 2/17/2024 per   Last qualifying labs: n/a    Routing refill request to provider for review/approval because:  Drug not on the [facility] refill protocol     OSKAR Gonzales, RN  02/28/24, 10:10 AM

## 2024-03-01 RX ORDER — DEXMETHYLPHENIDATE HYDROCHLORIDE 5 MG/1
5 TABLET ORAL
Qty: 30 TABLET | Refills: 0 | Status: SHIPPED | OUTPATIENT
Start: 2024-03-01 | End: 2024-03-31

## 2024-03-01 RX ORDER — DEXMETHYLPHENIDATE HYDROCHLORIDE 10 MG/1
10 TABLET ORAL
Qty: 30 TABLET | Refills: 0 | Status: SHIPPED | OUTPATIENT
Start: 2024-03-01 | End: 2024-03-31

## 2024-03-04 ENCOUNTER — VIRTUAL VISIT (OUTPATIENT)
Dept: PSYCHIATRY | Facility: CLINIC | Age: 39
End: 2024-03-04
Attending: NURSE PRACTITIONER
Payer: COMMERCIAL

## 2024-03-04 DIAGNOSIS — F41.1 GAD (GENERALIZED ANXIETY DISORDER): Chronic | ICD-10-CM

## 2024-03-04 DIAGNOSIS — F33.9 EPISODE OF RECURRENT MAJOR DEPRESSIVE DISORDER, UNSPECIFIED DEPRESSION EPISODE SEVERITY (H): Chronic | ICD-10-CM

## 2024-03-04 DIAGNOSIS — F43.10 PTSD (POST-TRAUMATIC STRESS DISORDER): Chronic | ICD-10-CM

## 2024-03-04 PROCEDURE — 99443 PR PHYSICIAN TELEPHONE EVALUATION 21-30 MIN: CPT | Mod: 93 | Performed by: NURSE PRACTITIONER

## 2024-03-04 RX ORDER — HYDROXYZINE HYDROCHLORIDE 25 MG/1
TABLET, FILM COATED ORAL
Qty: 60 TABLET | Refills: 0 | Status: SHIPPED | OUTPATIENT
Start: 2024-03-04 | End: 2024-04-04

## 2024-03-04 RX ORDER — OLANZAPINE 5 MG/1
2.5 TABLET ORAL 2 TIMES DAILY PRN
Qty: 30 TABLET | Refills: 0 | Status: SHIPPED | OUTPATIENT
Start: 2024-03-04 | End: 2024-03-31

## 2024-03-04 ASSESSMENT — ANXIETY QUESTIONNAIRES
7. FEELING AFRAID AS IF SOMETHING AWFUL MIGHT HAPPEN: SEVERAL DAYS
GAD7 TOTAL SCORE: 14
3. WORRYING TOO MUCH ABOUT DIFFERENT THINGS: NEARLY EVERY DAY
1. FEELING NERVOUS, ANXIOUS, OR ON EDGE: SEVERAL DAYS
5. BEING SO RESTLESS THAT IT IS HARD TO SIT STILL: MORE THAN HALF THE DAYS
GAD7 TOTAL SCORE: 14
7. FEELING AFRAID AS IF SOMETHING AWFUL MIGHT HAPPEN: SEVERAL DAYS
4. TROUBLE RELAXING: SEVERAL DAYS
8. IF YOU CHECKED OFF ANY PROBLEMS, HOW DIFFICULT HAVE THESE MADE IT FOR YOU TO DO YOUR WORK, TAKE CARE OF THINGS AT HOME, OR GET ALONG WITH OTHER PEOPLE?: SOMEWHAT DIFFICULT
2. NOT BEING ABLE TO STOP OR CONTROL WORRYING: NEARLY EVERY DAY
6. BECOMING EASILY ANNOYED OR IRRITABLE: NEARLY EVERY DAY
GAD7 TOTAL SCORE: 14
IF YOU CHECKED OFF ANY PROBLEMS ON THIS QUESTIONNAIRE, HOW DIFFICULT HAVE THESE PROBLEMS MADE IT FOR YOU TO DO YOUR WORK, TAKE CARE OF THINGS AT HOME, OR GET ALONG WITH OTHER PEOPLE: SOMEWHAT DIFFICULT

## 2024-03-04 ASSESSMENT — PATIENT HEALTH QUESTIONNAIRE - PHQ9
SUM OF ALL RESPONSES TO PHQ QUESTIONS 1-9: 9
SUM OF ALL RESPONSES TO PHQ QUESTIONS 1-9: 9
10. IF YOU CHECKED OFF ANY PROBLEMS, HOW DIFFICULT HAVE THESE PROBLEMS MADE IT FOR YOU TO DO YOUR WORK, TAKE CARE OF THINGS AT HOME, OR GET ALONG WITH OTHER PEOPLE: SOMEWHAT DIFFICULT
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
## 2024-03-04 NOTE — PATIENT INSTRUCTIONS
**For crisis resources, please see the information at the end of this document**   Patient Education    Thank you for coming to the [facility] [place] [facility].     Lab Testing:  If you had lab testing today and your results are reassuring or normal they will be mailed to you or sent through [employer] within 7 days. If the lab tests need quick action we will call you with the results. The phone number we will call with results is # [phone]. If this is not the best number please call our clinic and change the number.     Medication Refills:  If you need any refills please call your pharmacy and they will contact us. Our fax number for refills is [phone].   Three business days of notice are needed for general medication refill requests.   Five business days of notice are needed for controlled substance refill requests.   If you need to change to a different pharmacy, please contact the new pharmacy directly. The new pharmacy will help you get your medications transferred.     Contact Us:  Please call [phone] during business hours (8-5:00 M-F).   If you have medication related questions after clinic hours, or on the weekend, please call [phone].     Financial Assistance [phone]   Medical Records [phone]       MENTAL HEALTH CRISIS RESOURCES:  For a emergency help, please call 911 or go to the nearest Emergency Department.     Emergency Walk-In Options:   EmPATH Unit @ [facility] ([place]): [phone] - Specialized mental health emergency area designed to be calming  [facility] ([place]): [phone]  [facility] [place] [facility] Services ([place]): [phone]  [facility]): [phone]    [facility] Crisis Information:   [place]: [phone]  [place]: [phone]  [place] (YOUNG) - Adult: [phone]     Child: [phone]  [place] - Adult: [phone]     Child: [phone]  [place]:  [phone]  List of all [facility] resources:   https://[place].gov/dhs/people-we-serve/adults/health-care/mental-health/resources/crisis-contacts.jsp    National Crisis Information:   Crisis Text Line: Text  [place]  to 831194  Suicide & Crisis Lifeline: 988  National Suicide Prevention Lifeline: 7-101-370-TALK (1-450.999.5647)       For online chat options, visit https://suicidepreventionlifeline.org/chat/  Poison Control Center: 5-720-809-4835  Trans Lifeline: 1-013-992-6054 - Hotline for transgender people of all ages  The [employer] Project: 7-351-784-1781 - Hotline for LGBT youth     For Non-Emergency Support:   Fast Tracker: Mental Health & Substance Use Disorder Resources -   https://www.[employer]DocSendn.org/

## 2024-03-25 ENCOUNTER — OFFICE VISIT (OUTPATIENT)
Dept: SLEEP MEDICINE | Facility: CLINIC | Age: 39
End: 2024-03-25
Payer: COMMERCIAL

## 2024-03-25 VITALS
DIASTOLIC BLOOD PRESSURE: 67 MMHG | HEART RATE: 98 BPM | HEIGHT: 69 IN | SYSTOLIC BLOOD PRESSURE: 102 MMHG | RESPIRATION RATE: 14 BRPM | BODY MASS INDEX: 25.03 KG/M2 | OXYGEN SATURATION: 99 % | WEIGHT: 169 LBS

## 2024-03-25 DIAGNOSIS — G47.21 DELAYED SLEEP PHASE SYNDROME: ICD-10-CM

## 2024-03-25 DIAGNOSIS — R06.83 SNORING: Primary | ICD-10-CM

## 2024-03-25 DIAGNOSIS — G47.52 DREAM ENACTMENT BEHAVIOR: ICD-10-CM

## 2024-03-25 DIAGNOSIS — F51.5 NIGHTMARES: ICD-10-CM

## 2024-03-25 DIAGNOSIS — G47.00 INSOMNIA, UNSPECIFIED TYPE: ICD-10-CM

## 2024-03-25 DIAGNOSIS — G47.10 HYPERSOMNIA: Chronic | ICD-10-CM

## 2024-03-25 DIAGNOSIS — R53.82 CHRONIC FATIGUE: ICD-10-CM

## 2024-03-25 PROCEDURE — 99215 OFFICE O/P EST HI 40 MIN: CPT

## 2024-03-25 RX ORDER — ZOLPIDEM TARTRATE 5 MG/1
TABLET ORAL
Qty: 1 TABLET | Refills: 0 | Status: SHIPPED | OUTPATIENT
Start: 2024-03-25 | End: 2024-07-08

## 2024-03-25 RX ORDER — PRAZOSIN HYDROCHLORIDE 1 MG/1
1 CAPSULE ORAL AT BEDTIME
COMMUNITY

## 2024-03-25 ASSESSMENT — SLEEP AND FATIGUE QUESTIONNAIRES
HOW LIKELY ARE YOU TO NOD OFF OR FALL ASLEEP WHILE SITTING AND READING: SLIGHT CHANCE OF DOZING
HOW LIKELY ARE YOU TO NOD OFF OR FALL ASLEEP WHILE SITTING INACTIVE IN A PUBLIC PLACE: SLIGHT CHANCE OF DOZING
HOW LIKELY ARE YOU TO NOD OFF OR FALL ASLEEP WHILE LYING DOWN TO REST IN THE AFTERNOON WHEN CIRCUMSTANCES PERMIT: MODERATE CHANCE OF DOZING
HOW LIKELY ARE YOU TO NOD OFF OR FALL ASLEEP WHILE SITTING QUIETLY AFTER LUNCH WITHOUT ALCOHOL: SLIGHT CHANCE OF DOZING
HOW LIKELY ARE YOU TO NOD OFF OR FALL ASLEEP WHILE WATCHING TV: HIGH CHANCE OF DOZING
HOW LIKELY ARE YOU TO NOD OFF OR FALL ASLEEP WHEN YOU ARE A PASSENGER IN A CAR FOR AN HOUR WITHOUT A BREAK: SLIGHT CHANCE OF DOZING
HOW LIKELY ARE YOU TO NOD OFF OR FALL ASLEEP IN A CAR, WHILE STOPPED FOR A FEW MINUTES IN TRAFFIC: SLIGHT CHANCE OF DOZING
HOW LIKELY ARE YOU TO NOD OFF OR FALL ASLEEP WHILE SITTING AND TALKING TO SOMEONE: SLIGHT CHANCE OF DOZING

## 2024-03-25 NOTE — NURSING NOTE
Scheduled PSG for 5/5/2024 at , with follow up on 7/8/2024 with Hiren celeste.     [facility]   Clinic Assistant  [facility]

## 2024-03-25 NOTE — PROGRESS NOTES
Sleep Apnea - Follow-up Visit:    Impression/Plan:  (R06.83) Snoring (primary encounter diagnosis) (G47.10) Hypersomnia (R53.82) Chronic fatigue (F51.5) Nightmares (G47.00) Insomnia, unspecified type (G47.21) Delayed sleep phase syndrome (G47.52) Dream enactment behavior   Comment: Andria is a 39-year-old female who presents to the sleep clinic with symptoms of snoring, witnessed apneic spells, nightmares, night sweats, and excessive daytime sleepiness. She was last seen on 10/07/2022 by Dr. Johnson for snoring, non restorative sleep, nightmares, fatigue, and excessive daytime sleepiness. She has previously been diagnosed with JOHANNY and hypersomnia. Per patient, she was first diagnosed with obstructive sleep apnea in 2010 or 2011, and she used CPAP for maybe 1-2 years. She tried multiple different mask styles. She did not use CPAP consistently. She had her last sleep study with MSLT in 2014 with [facility], and she believes she was told she didn't have apnea and stopped using CPAP at that time. Andria is prescribed dexmethylphenidate for her ADHD and she was previously taking modafinil 200 mg for her chronic fatigue syndrome. She thinks the dexmethylphenidate has helped a little bit, but she still always has some degree of fatigue or sleepiness throughout the day. She is taking prazosin 1 mg at bedtime for nightmares. The last few nights it has been working, and she hasn't had any nightmares or night sweats. She thinks she is sometimes acting out dreams with hand movements. This was reported back in 2022 as well. She has experienced sleep paralysis maybe 5 times in her life.           Plan: Comprehensive Sleep Study, zolpidem (AMBIEN) 5 MG tablet  Recommended a repeat polysomnogram given her history of apnea and her current symptoms. Study will include 4 limb EMG to evaluate for REM sleep without atonia given her reports of possible dream enactment. Andria's nightmares/night sweats seem to be well controlled at  this time with the 1 mg prazosin. Her chronic insomnia seems multifactorial with anxiety, possible JOHANNY, poor sleep hygiene, and a delayed sleep phase all contributing. She drinks caffeine until 5-6 PM. We did review other treatment options for JOHANNY. Andria would maybe be interested in an oral appliance depending on the results of her sleep study.      Andria Daily will follow up 2-3 months after her sleep study.    Total time spent reviewing medical records, history and physical examination, review of previous testing and interpretation as well as documentation on this date: 49 minutes     CC: Mook De La Rosa MD    History of Present Illness:  Chief Complaint   Patient presents with    Insomnia     Andria Daily presents to the sleep clinic with symptoms of snoring, nightmares, night sweats, and excessive daytime sleepiness. She was last seen on 10/07/2022 by Dr. Johnson for snoring, non restorative sleep, nightmares, fatigue, and excessive daytime sleepiness. She was first diagnosed with obstructive sleep apnea in 2010 or 2011, and she used CPAP maybe 1-2 years. She tried multiple different mask styles. She had her last sleep study with MSLT in 2014 with [facility], and she believes she was told she didn't have apnea and stopped using CPAP.      PMH is significant for DIANA, chronic fatigue syndrome, PTSD, ADHD, depression, history of eating disorder and substance abuse. She has also been previously diagnosed with JOHANNY.     She is prescribed dexmethylphenidate for her ADHD and she was previously taking modafinil 200 mg for her chronic fatigue syndrome. She thinks the dexmethylphenidate has helped a little bit, but she still always has some degree of fatigue or sleepiness throughout the day.      She is taking prazosin 1 mg at bedtime for nightmares. The last few nights it has been working, and she hasn't had any nightmares or night sweats.      She reports that she underwent 4 sleep studies so far  ([place] and [facility]).    The reports are available in epic include the following:  Titration PSG obtained on 03/01/2011- CPAP at 8 cmH2O was effective in controlling sleep apnea.  On May 25, 2011 she underwent hypersomnia work-up that included polysomnography with use of CPAP all night long which showed good control of sleep disordered breathing/UARS followed by MSLT that showed mean sleep latency of 5 minutes. 5 minutes to all of the 5 naps without any sleep onset REM periods.  In March 2014 she underwent hypersomnia work-up at [facility]: Actigraphy with concomitant sleep logs from 2/26/2014 through March 11, 2014 that showed irregular sleep-wake pattern with increased time in bed and impaired sleep efficiency. Patient underwent polysomnography with CPAP all night long that showed good control of the sleep disordered breathing. MSLT was obtained after the completion of the polysomnography and was reported as normal. Urine tox screen was positive for THC.    Do you use a CPAP Machine at home: No    What is your typical bedtime: 11 PM to 2 AM   How long does it take you to go to sleep: at least 30 minutes sometimes longer   What time do you typically get out of bed for the day: between 10:30 AM to 12:30 PM   Do you feel well rested in the morning: No   She says waking up is really difficult.     She wakes 3-5 times per night. She can wake due to anxiety, bathroom a couple nights per week, or uncertain reasons.     She tries to avoid naps. When she does they are 1-2 hours. She can unintentionally doze while watching a movie or sitting on the couch.    She has rare snort/gasp arousals. Her partner has said she may stop breathing in the night. She has occasional morning headaches.     Denies restless legs. She reports bruxism, and she has used a mouthguard in the past. She thinks she is sometimes acting out dreams with hand movements. This was reported back in 2022 as well. She has experienced sleep  paralysis maybe 5 times in her life.      Pt denies sleep talking and sleep walking. Pt denies hypnagogue and cataplexy.    She will drink some caffeine throughout the day. Last caffeine use is usually around 5-6 PM.      EPWORTH SLEEPINESS SCALE         3/25/2024     8:53 AM    [place] Sleepiness Scale ( ESTHELA Whalen  [id number]<br>ESS - [place]/English - Final version - 21 Nov 07 - [facility] [employer].)   Sitting and reading Slight chance of dozing   Watching TV High chance of dozing   Sitting, inactive in a public place (e.g. a theatre or a meeting) Slight chance of dozing   As a passenger in a car for an hour without a break Slight chance of dozing   Lying down to rest in the afternoon when circumstances permit Moderate chance of dozing   Sitting and talking to someone Slight chance of dozing   Sitting quietly after a lunch without alcohol Slight chance of dozing   In a car, while stopped for a few minutes in traffic Slight chance of dozing   Flushing Score (MC) 11   Flushing Score (Sleep) 11       INSOMNIA SEVERITY INDEX (KASIA)          3/25/2024     8:52 AM   Insomnia Severity Index (KASIA)   Difficulty falling asleep 2   Difficulty staying asleep 3   Problems waking up too early 4   How SATISFIED/DISSATISFIED are you with your CURRENT sleep pattern? 4   How NOTICEABLE to others do you think your sleep problem is in terms of impairing the quality of your life? 2   How WORRIED/DISTRESSED are you about your current sleep problem? 4   To what extent do you consider your sleep problem to INTERFERE with your daily functioning (e.g. daytime fatigue, mood, ability to function at work/daily chores, concentration, memory, mood, etc.) CURRENTLY? 4   KASIA Total Score 23       Guidelines for Scoring/Interpretation:  Total score categories:  0-7 = No clinically significant insomnia   8-14 = Subthreshold insomnia   15-21 = Clinical insomnia (moderate severity)  22-28 = Clinical insomnia (severe)  Used via courtesy of  www.[email or web address].[place].gov with permission from Ilia Mcfadden PhD., [facility]      Past medical/surgical history, family history, social history, medications and allergies were reviewed.        Problem List:  Patient Active Problem List    Diagnosis Date Noted    Pelvic pain in female 02/20/2023     Priority: Medium     Added automatically from request for surgery [id number]      Back pain 08/13/2021     Priority: Medium    DIANA (generalized anxiety disorder) 07/13/2020     Priority: Medium    PTSD (post-traumatic stress disorder)      Priority: Medium    Chronic fatigue syndrome      Priority: Medium    Hypersomnia      Priority: Medium    Chronic rhinitis      Priority: Medium    Recurrent major depressive disorder (H24)      Priority: Medium     Primary psychiatrist is Dr. Alejandro Palacio with [facility] ([phone])  Attended IOP at [facility] 4/27/20 to 6/18/20  Started TMS with [facility] 05/2020    Current Psych Meds (as of 6/11/20):  Modafinil 200mg in morning  Sertraline 50mg daily  Hydroxyzine 25mg tid PRN  Prazosin 1mg bedtime  Lorazepam 0.5-1mg PRN    Previous Psych Meds:  Gabapentin - caused tremors  Buspar  Cymbalta - over-activation at 40mg  Celexa - lost benefit over time  Wellbutrin - tremors  Effexor - night sweats  Mirtazapine - dizziness, night sweats  Zoloft 100mg - night sweats      History of eating disorder      Priority: Medium    History of substance use      Priority: Medium     - history of prescription pain killers, alcohol, cocaine, and marijuana abuse  - history of outpatient treatment at [address] residential treatment 2 months, IOP  - sober from cocaine since about 01/2018  - uses MJ daily, smokes      CHHAYA I (cervical intraepithelial neoplasia I) 05/24/2007     Priority: Medium     [] repeat co-testing 02/2026 2/6/21 Pap NIL, hrHPV negative  5/22/17 Pap NIL, hrHPV negative  11/3/14 Pap NIL  5/19/11 Pap NIL  10/6/10 Pap NIL  9/14/09 ASCUS hrHPV  "negative  5/7/08 Pap NIL  5/24/07 Culpo CIN1      Intrauterine device surveillance 01/29/2023     Priority: Low     - Mirena placed 09/2021          /67   Pulse 98   Resp 14   Ht 1.74 m (5' 8.5\")   Wt 76.7 kg (169 lb)   SpO2 99%   BMI 25.32 kg/m      Lily Frey, DAHIANA CNP 3/25/2024  "

## 2024-03-25 NOTE — PROGRESS NOTES
"    Chief Complaint   Patient presents with    Insomnia       Initial There were no vitals taken for this visit. Estimated body mass index is 25.39 kg/m  as calculated from the following:    Height as of 12/5/23: 1.722 m (5' 7.8\").    Weight as of 1/26/24: 75.3 kg (166 lb).    Medication Reconciliation: complete    Neck circumference: 14 inches / 36 centimeters.    DME:     Camelia Barros [facility]         "

## 2024-03-26 ENCOUNTER — TELEPHONE (OUTPATIENT)
Dept: FAMILY MEDICINE | Facility: CLINIC | Age: 39
End: 2024-03-26

## 2024-03-26 DIAGNOSIS — F33.41 RECURRENT MAJOR DEPRESSIVE DISORDER, IN PARTIAL REMISSION (H): Primary | ICD-10-CM

## 2024-03-26 DIAGNOSIS — F41.1 GAD (GENERALIZED ANXIETY DISORDER): Chronic | ICD-10-CM

## 2024-03-26 DIAGNOSIS — F43.10 PTSD (POST-TRAUMATIC STRESS DISORDER): ICD-10-CM

## 2024-03-26 NOTE — TELEPHONE ENCOUNTER
Who is calling? Andria  What do they need? Referral for psychiatrist  Is this an insurance referral? No  Does caller need a call back? Yes  Additional Comments:

## 2024-03-26 NOTE — TELEPHONE ENCOUNTER
Andria has been in the PHP program with [facility], ended program at end of February, and needs to get established with psychiatry. She was having difficulty getting appointments scheduled with [facility] and wanted to know if she could streamline her care through [facility]. Will place psych referral and provided contact number for scheduling. Assigned Andria to Dr. Hernandez as new PCP and will discuss refills as needed while she is waiting to get established with psych. Encouraged Andria to schedule appt with Dr. Hernandez in late April/early May.    Travis SCHMITT, RN  03/26/24 1:05 PM

## 2024-03-28 NOTE — TELEPHONE ENCOUNTER
"FUTURE VISIT INFORMATION      FUTURE VISIT INFORMATION:  Date:  6/6/24  Time:  3:20pm  Location: [facility]  REFERRAL INFORMATION:  Referring provider:  Savage Garcia MD.  Referring providers clinic:  [facility]   Reason for visit/diagnosis  Chronic congestion of paranasal sinus [J32.9]. Ref by Savage Garcia MD. [facility] verified     RECORDS REQUESTED FROM:       Clinic name Comments Records Status Imaging Status   [facility]  1/26/24- OV Savage Garcia MD. [facility] 8/17/20, 5/4/20 - OV Dr. Dowd [facility]     Imaging  7/23/20- CT Facial Bones  [facility]  PACS            \"Please notify/message CSS if patient completed outside imaging prior to scheduled appointment and/or any outside records that might have been missed at pre visit -Thank you\"  "

## 2024-03-31 ENCOUNTER — MYC REFILL (OUTPATIENT)
Dept: FAMILY MEDICINE | Facility: CLINIC | Age: 39
End: 2024-03-31

## 2024-03-31 ENCOUNTER — MYC REFILL (OUTPATIENT)
Dept: PSYCHIATRY | Facility: CLINIC | Age: 39
End: 2024-03-31
Payer: COMMERCIAL

## 2024-03-31 DIAGNOSIS — F41.1 GAD (GENERALIZED ANXIETY DISORDER): Chronic | ICD-10-CM

## 2024-03-31 DIAGNOSIS — F33.9 EPISODE OF RECURRENT MAJOR DEPRESSIVE DISORDER, UNSPECIFIED DEPRESSION EPISODE SEVERITY (H): Chronic | ICD-10-CM

## 2024-03-31 DIAGNOSIS — F43.10 PTSD (POST-TRAUMATIC STRESS DISORDER): Chronic | ICD-10-CM

## 2024-03-31 DIAGNOSIS — F90.0 ATTENTION DEFICIT HYPERACTIVITY DISORDER (ADHD), PREDOMINANTLY INATTENTIVE TYPE: ICD-10-CM

## 2024-04-01 ENCOUNTER — MYC REFILL (OUTPATIENT)
Dept: FAMILY MEDICINE | Facility: CLINIC | Age: 39
End: 2024-04-01

## 2024-04-01 DIAGNOSIS — F90.0 ATTENTION DEFICIT HYPERACTIVITY DISORDER (ADHD), PREDOMINANTLY INATTENTIVE TYPE: ICD-10-CM

## 2024-04-01 RX ORDER — OLANZAPINE 5 MG/1
2.5 TABLET ORAL 2 TIMES DAILY PRN
Qty: 30 TABLET | Refills: 0 | Status: SHIPPED | OUTPATIENT
Start: 2024-04-01 | End: 2024-06-06

## 2024-04-01 RX ORDER — DEXMETHYLPHENIDATE HYDROCHLORIDE 5 MG/1
5 TABLET ORAL
Qty: 30 TABLET | Refills: 0 | Status: SHIPPED | OUTPATIENT
Start: 2024-04-01 | End: 2024-04-25

## 2024-04-01 RX ORDER — DEXMETHYLPHENIDATE HYDROCHLORIDE 10 MG/1
TABLET ORAL
Qty: 30 TABLET | Refills: 0 | Status: CANCELLED | OUTPATIENT
Start: 2024-04-01

## 2024-04-01 RX ORDER — DEXMETHYLPHENIDATE HYDROCHLORIDE 10 MG/1
10 TABLET ORAL
Qty: 30 TABLET | Refills: 0 | Status: SHIPPED | OUTPATIENT
Start: 2024-04-01 | End: 2024-05-07

## 2024-04-01 NOTE — TELEPHONE ENCOUNTER
Dexmethylphenidate (Focalin) 5 mg + 10 mg    Last Office Visit: 1/26/24  Future [facility] Appointments: None  Medication last refilled:   3/1/24 #30 with 0 refill(s) - Dexmethylphenidate 10 mg  3/1/24 #30 with 0 refill(s) - Dexmethylphenidate  5 mg     verified - last fill date:   3/4/24 #30 - Dexmethylphenidate 10 mg   3/4/24 #30 - Dexmethylphenidate 5 mg    Routing refill request to provider for review/approval because:  Drug not on the [facility] refill protocol     PAULINE FrankelN, RN, CCM

## 2024-04-01 NOTE — TELEPHONE ENCOUNTER
Last seen: 3/4/24  RTC: n/a- transferring care  Cancel: none  No-show: none  Next appt: none     Incoming refill from Patient via [employer]    Medication requested:   Pending Prescriptions:                       Disp   Refills    OLANZapine (ZYPREXA) 5 MG tablet          30 tab*0            Sig: Take 0.5 tablets (2.5 mg) by mouth 2 times daily           as needed (mood)    From chart note:   -for now, she chooses to trial olanzapine to 2.5mg BID PRN (mood stability)     Medication unable to be refilled by RN due to criteria not met as indicated.                 []Eligibility - not seen in the last year              []Supervision - no future appointment              []Compliance - no shows, cancellations or lapse in therapy              [x]Verification - order discrepancy- patient taking differently than prescribed              []Controlled medication              []Medication not included in policy              []90-day supply request              []Other:      Eveline Bautista RN on 4/1/2024 at 8:28 AM

## 2024-04-04 ENCOUNTER — MYC REFILL (OUTPATIENT)
Dept: PSYCHIATRY | Facility: CLINIC | Age: 39
End: 2024-04-04
Payer: COMMERCIAL

## 2024-04-04 DIAGNOSIS — F41.1 GAD (GENERALIZED ANXIETY DISORDER): Chronic | ICD-10-CM

## 2024-04-04 RX ORDER — HYDROXYZINE HYDROCHLORIDE 25 MG/1
TABLET, FILM COATED ORAL
Qty: 60 TABLET | Refills: 0 | Status: SHIPPED | OUTPATIENT
Start: 2024-04-04 | End: 2024-05-25

## 2024-04-04 NOTE — TELEPHONE ENCOUNTER
Last seen: 3/4/24  RTC: as needed  Cancel: none  No-show: none  Next appt: none  **Pt has 6/26/24 appt scheduled with Dr Mello at [facility] new     Incoming refill from Patient via [employer]    Medication requested:   Pending Prescriptions:                       Disp   Refills    hydrOXYzine HCl (ATARAX) 25 MG tablet     60 tab*0            Sig: Take 1 tablet (25 mg) 2 times daily as needed for           other (anxiety)      From chart note:   hydroxyzine HCL 25mg 1-2x daily PRN      Medication unable to be refilled by RN due to criteria not met as indicated.                 []Eligibility - not seen in the last year              [x]Supervision - no future appointment              []Compliance - no shows, cancellations or lapse in therapy              []Verification - order discrepancy              []Controlled medication              []Medication not included in policy              []90-day supply request              []Other:

## 2024-04-25 ENCOUNTER — MYC REFILL (OUTPATIENT)
Dept: FAMILY MEDICINE | Facility: CLINIC | Age: 39
End: 2024-04-25

## 2024-04-25 DIAGNOSIS — F90.0 ATTENTION DEFICIT HYPERACTIVITY DISORDER (ADHD), PREDOMINANTLY INATTENTIVE TYPE: ICD-10-CM

## 2024-04-25 NOTE — TELEPHONE ENCOUNTER
Dexmethylphenidate (Focalin) 5 mg    Last Office Visit: 24  [facility] [place] None  Medication last refilled: 24 #30 with 0 refill(s)     verified - last fill date: 24 #30    CSA on File - , 21    Routing refill request to provider for review/approval because:  Drug not on the [facility] refill protocol     *Hold until 24    (Has psychiatry appointment on 24)    PAULINE FrankelN, RN, CCM

## 2024-04-29 RX ORDER — DEXMETHYLPHENIDATE HYDROCHLORIDE 5 MG/1
5 TABLET ORAL
Qty: 30 TABLET | Refills: 0 | Status: SHIPPED | OUTPATIENT
Start: 2024-04-29 | End: 2024-05-25

## 2024-05-04 ENCOUNTER — HEALTH MAINTENANCE LETTER (OUTPATIENT)
Age: 39
End: 2024-05-04

## 2024-05-07 ENCOUNTER — MYC REFILL (OUTPATIENT)
Dept: FAMILY MEDICINE | Facility: CLINIC | Age: 39
End: 2024-05-07

## 2024-05-07 DIAGNOSIS — F90.0 ATTENTION DEFICIT HYPERACTIVITY DISORDER (ADHD), PREDOMINANTLY INATTENTIVE TYPE: ICD-10-CM

## 2024-05-07 RX ORDER — DEXMETHYLPHENIDATE HYDROCHLORIDE 10 MG/1
10 TABLET ORAL
Qty: 30 TABLET | Refills: 0 | Status: SHIPPED | OUTPATIENT
Start: 2024-05-07 | End: 2024-06-01

## 2024-05-07 NOTE — TELEPHONE ENCOUNTER
Medication requested: dexmethylphenidate (FOCALIN) 10 MG tablet   Last office visit: 1/26/24  [facility] appointments: none  Medication last refilled: 4/1/24; 30 + 0 refills, last sold 4/2/24 per   Last qualifying labs: N/A    Routing refill request to provider for review/approval because:  Drug not on the [facility] refill protocol     Travis SCHMITT, RN  05/07/24 1:04 PM

## 2024-05-08 ENCOUNTER — THERAPY VISIT (OUTPATIENT)
Dept: SLEEP MEDICINE | Facility: CLINIC | Age: 39
End: 2024-05-08
Payer: COMMERCIAL

## 2024-05-08 DIAGNOSIS — R06.83 SNORING: ICD-10-CM

## 2024-05-08 DIAGNOSIS — G47.10 HYPERSOMNIA: Chronic | ICD-10-CM

## 2024-05-08 DIAGNOSIS — R53.82 CHRONIC FATIGUE: ICD-10-CM

## 2024-05-08 DIAGNOSIS — F51.5 NIGHTMARES: ICD-10-CM

## 2024-05-08 DIAGNOSIS — G47.52 DREAM ENACTMENT BEHAVIOR: ICD-10-CM

## 2024-05-08 DIAGNOSIS — G47.00 INSOMNIA, UNSPECIFIED TYPE: ICD-10-CM

## 2024-05-08 DIAGNOSIS — G47.21 DELAYED SLEEP PHASE SYNDROME: ICD-10-CM

## 2024-05-08 PROCEDURE — 95810 POLYSOM 6/> YRS 4/> PARAM: CPT | Performed by: INTERNAL MEDICINE

## 2024-05-08 ASSESSMENT — SLEEP AND FATIGUE QUESTIONNAIRES
HOW LIKELY ARE YOU TO NOD OFF OR FALL ASLEEP WHEN YOU ARE A PASSENGER IN A CAR FOR AN HOUR WITHOUT A BREAK: MODERATE CHANCE OF DOZING
HOW LIKELY ARE YOU TO NOD OFF OR FALL ASLEEP WHILE SITTING QUIETLY AFTER LUNCH WITHOUT ALCOHOL: SLIGHT CHANCE OF DOZING
HOW LIKELY ARE YOU TO NOD OFF OR FALL ASLEEP WHILE LYING DOWN TO REST IN THE AFTERNOON WHEN CIRCUMSTANCES PERMIT: SLIGHT CHANCE OF DOZING
HOW LIKELY ARE YOU TO NOD OFF OR FALL ASLEEP WHILE SITTING AND TALKING TO SOMEONE: WOULD NEVER DOZE
HOW LIKELY ARE YOU TO NOD OFF OR FALL ASLEEP WHILE SITTING INACTIVE IN A PUBLIC PLACE: MODERATE CHANCE OF DOZING
HOW LIKELY ARE YOU TO NOD OFF OR FALL ASLEEP WHILE WATCHING TV: HIGH CHANCE OF DOZING
HOW LIKELY ARE YOU TO NOD OFF OR FALL ASLEEP WHILE SITTING AND READING: MODERATE CHANCE OF DOZING
HOW LIKELY ARE YOU TO NOD OFF OR FALL ASLEEP IN A CAR, WHILE STOPPED FOR A FEW MINUTES IN TRAFFIC: SLIGHT CHANCE OF DOZING

## 2024-05-16 DIAGNOSIS — R25.1 TREMOR: ICD-10-CM

## 2024-05-16 RX ORDER — PROPRANOLOL HYDROCHLORIDE 20 MG/1
20 TABLET ORAL 2 TIMES DAILY
Qty: 180 TABLET | Refills: 1 | Status: SHIPPED | OUTPATIENT
Start: 2024-05-16

## 2024-05-16 NOTE — TELEPHONE ENCOUNTER
Medication requested: propranolol (INDERAL) 20 MG tablet   Last office visit: 1/26/24  [facility] appointments: none  Medication last refilled: 10/25/23; 180 + 1 refills  Last qualifying labs:   BP Readings from Last 3 Encounters:   03/25/24 102/67   01/26/24 98/62   12/05/23 92/60     Prescription approved per [facility] Refill Protocol.    Travis SCHMITT, RN  05/16/24 3:02 PM

## 2024-05-25 ENCOUNTER — MYC REFILL (OUTPATIENT)
Dept: PSYCHIATRY | Facility: CLINIC | Age: 39
End: 2024-05-25
Payer: COMMERCIAL

## 2024-05-25 ENCOUNTER — MYC REFILL (OUTPATIENT)
Dept: FAMILY MEDICINE | Facility: CLINIC | Age: 39
End: 2024-05-25

## 2024-05-25 DIAGNOSIS — F41.1 GAD (GENERALIZED ANXIETY DISORDER): Chronic | ICD-10-CM

## 2024-05-25 DIAGNOSIS — F90.0 ATTENTION DEFICIT HYPERACTIVITY DISORDER (ADHD), PREDOMINANTLY INATTENTIVE TYPE: ICD-10-CM

## 2024-05-28 RX ORDER — DEXMETHYLPHENIDATE HYDROCHLORIDE 5 MG/1
5 TABLET ORAL
Qty: 30 TABLET | Refills: 0 | Status: SHIPPED | OUTPATIENT
Start: 2024-05-28 | End: 2024-07-01

## 2024-05-28 RX ORDER — HYDROXYZINE HYDROCHLORIDE 25 MG/1
TABLET, FILM COATED ORAL
Qty: 60 TABLET | Refills: 0 | Status: SHIPPED | OUTPATIENT
Start: 2024-05-28 | End: 2024-07-13

## 2024-05-28 NOTE — TELEPHONE ENCOUNTER
Last seen: 3/4  RTC: as needed   Cancel: none   No-show: none   Next appt: none     Incoming refill from patient via Rx Auth     Disp Refills Start End CLAUDIA    hydrOXYzine HCl (ATARAX) 25 MG tablet 60 tablet 0 4/4/2024 -- No   Sig: Take 1 tablet (25 mg) 2 times daily as needed for other (anxiety)   Sent to pharmacy as: hydrOXYzine HCl 25 MG Oral Tablet (ATARAX)   Class: E-Prescribe   Order: [id number]   E-Prescribing Status: Receipt confirmed by pharmacy (4/4/2024 11:49 AM CDT)       Last Visit Treatment Plan     1) PSYCHOTROPIC MEDICATIONS:  1) reviewed writer's conservative prescribing style, low BPs wth Propranolol (tremor), Modafanil's stimulating effect (CFS) with Focalin (ADHD), she chooses to seek care with PCP or in community; for now, she chooses to trial olanzapine to 2.5mg BID PRN (mood stability), continue Focalin 5mg (has RF waiting), Propranolol 20mg BID PRN (tremor in hand and arms, anxiety), hydroxyzine HCL 25mg 1-2x daily PRN     Refill decision: Refill pended and routed to the provider for review/determination due to the following criteria not met:     Medication unable to be refilled by RN due to criteria not met as indicated.                 []Eligibility - not seen in the last year              []Supervision - no future appointment              []Compliance - no shows, cancellations or lapse in therapy              []Verification - order discrepancy              []Controlled medication              []Medication not included in policy              []90-day supply request              [x]Other: Will route to provider for approval

## 2024-05-28 NOTE — TELEPHONE ENCOUNTER
Medication requested: dexmethylphenidate (FOCALIN) 5 MG tablet   Last office visit: 1/26/24  [facility] appointments: none  Medication last refilled: 4/29/24; 30 + 0 refills, last sold 4/29/24 per   Last qualifying labs: N/A    Routing refill request to provider for review/approval because:  Drug not on the [facility] refill protocol     Travis SCHMITT, RN  05/28/24 1:53 PM

## 2024-05-29 LAB — SLPCOMP: NORMAL

## 2024-05-30 ENCOUNTER — MYC REFILL (OUTPATIENT)
Dept: PSYCHIATRY | Facility: CLINIC | Age: 39
End: 2024-05-30
Payer: COMMERCIAL

## 2024-05-30 DIAGNOSIS — F43.10 PTSD (POST-TRAUMATIC STRESS DISORDER): Chronic | ICD-10-CM

## 2024-05-30 DIAGNOSIS — F33.9 EPISODE OF RECURRENT MAJOR DEPRESSIVE DISORDER, UNSPECIFIED DEPRESSION EPISODE SEVERITY (H): Chronic | ICD-10-CM

## 2024-05-30 DIAGNOSIS — F41.1 GAD (GENERALIZED ANXIETY DISORDER): Chronic | ICD-10-CM

## 2024-05-30 RX ORDER — OLANZAPINE 5 MG/1
2.5 TABLET ORAL 2 TIMES DAILY PRN
Qty: 30 TABLET | Refills: 0 | OUTPATIENT
Start: 2024-05-30

## 2024-05-30 NOTE — TELEPHONE ENCOUNTER
Last seen: 03/04/2024  RTC: as needed  Cancel: 0  No-show: 0  Next appt: None     Incoming refill from Patient via mychart    Medication requested:   Pending Prescriptions:                       Disp   Refills    OLANZapine (ZYPREXA) 5 MG tablet          30 tab*0            Sig: Take 0.5 tablets (2.5 mg) by mouth 2 times daily           as needed (mood)    From chart note:   she chooses to trial olanzapine to 2.5mg BID PRN        Medication unable to be refilled by RN due to criteria not met as indicated.                 []Eligibility - not seen in the last year              [x]Supervision - no future appointment              []Compliance - no shows, cancellations or lapse in therapy              []Verification - order discrepancy              []Controlled medication              []Medication not included in policy              []90-day supply request              []Other:

## 2024-06-01 ENCOUNTER — MYC REFILL (OUTPATIENT)
Dept: FAMILY MEDICINE | Facility: CLINIC | Age: 39
End: 2024-06-01

## 2024-06-01 DIAGNOSIS — F90.0 ATTENTION DEFICIT HYPERACTIVITY DISORDER (ADHD), PREDOMINANTLY INATTENTIVE TYPE: ICD-10-CM

## 2024-06-03 NOTE — TELEPHONE ENCOUNTER
Medication requested: dexmethylphenidate (FOCALIN) 10 MG tablet   Last office visit: 1/26/2024  [facility] appointments: none  Medication last refilled: 5/7/2024; 30 + 0 refills; last sold #30 on 5/8/2024 per   Last qualifying labs: n/a    Routing refill request to provider for review/approval because:  Drug not on the [facility] refill protocol     Hold until 6/5/2024    OSKAR Gonzales, RN  06/03/24, 11:08 AM

## 2024-06-05 RX ORDER — DEXMETHYLPHENIDATE HYDROCHLORIDE 10 MG/1
10 TABLET ORAL
Qty: 30 TABLET | Refills: 0 | Status: SHIPPED | OUTPATIENT
Start: 2024-06-05 | End: 2024-06-06

## 2024-06-06 ENCOUNTER — OFFICE VISIT (OUTPATIENT)
Dept: OTOLARYNGOLOGY | Facility: CLINIC | Age: 39
End: 2024-06-06
Attending: FAMILY MEDICINE
Payer: COMMERCIAL

## 2024-06-06 ENCOUNTER — VIRTUAL VISIT (OUTPATIENT)
Dept: PSYCHIATRY | Facility: CLINIC | Age: 39
End: 2024-06-06
Payer: COMMERCIAL

## 2024-06-06 VITALS
WEIGHT: 166 LBS | SYSTOLIC BLOOD PRESSURE: 113 MMHG | DIASTOLIC BLOOD PRESSURE: 78 MMHG | BODY MASS INDEX: 24.59 KG/M2 | HEART RATE: 69 BPM | HEIGHT: 69 IN | OXYGEN SATURATION: 97 %

## 2024-06-06 DIAGNOSIS — F33.1 MODERATE EPISODE OF RECURRENT MAJOR DEPRESSIVE DISORDER (H): Primary | Chronic | ICD-10-CM

## 2024-06-06 DIAGNOSIS — F43.10 PTSD (POST-TRAUMATIC STRESS DISORDER): Chronic | ICD-10-CM

## 2024-06-06 DIAGNOSIS — F33.9 EPISODE OF RECURRENT MAJOR DEPRESSIVE DISORDER, UNSPECIFIED DEPRESSION EPISODE SEVERITY (H): Chronic | ICD-10-CM

## 2024-06-06 DIAGNOSIS — J32.9 CHRONIC CONGESTION OF PARANASAL SINUS: ICD-10-CM

## 2024-06-06 DIAGNOSIS — G47.00 INSOMNIA, UNSPECIFIED TYPE: ICD-10-CM

## 2024-06-06 DIAGNOSIS — F41.1 GAD (GENERALIZED ANXIETY DISORDER): Chronic | ICD-10-CM

## 2024-06-06 DIAGNOSIS — F90.0 ATTENTION DEFICIT HYPERACTIVITY DISORDER (ADHD), PREDOMINANTLY INATTENTIVE TYPE: ICD-10-CM

## 2024-06-06 PROCEDURE — 31231 NASAL ENDOSCOPY DX: CPT | Performed by: STUDENT IN AN ORGANIZED HEALTH CARE EDUCATION/TRAINING PROGRAM

## 2024-06-06 PROCEDURE — 99417 PROLNG OP E/M EACH 15 MIN: CPT | Mod: 95 | Performed by: NURSE PRACTITIONER

## 2024-06-06 PROCEDURE — 99202 OFFICE O/P NEW SF 15 MIN: CPT | Mod: 25 | Performed by: STUDENT IN AN ORGANIZED HEALTH CARE EDUCATION/TRAINING PROGRAM

## 2024-06-06 PROCEDURE — 99215 OFFICE O/P EST HI 40 MIN: CPT | Mod: 95 | Performed by: NURSE PRACTITIONER

## 2024-06-06 RX ORDER — MIRTAZAPINE 15 MG/1
TABLET, FILM COATED ORAL
Qty: 60 TABLET | Refills: 1 | Status: SHIPPED | OUTPATIENT
Start: 2024-06-06 | End: 2024-06-11

## 2024-06-06 RX ORDER — OLANZAPINE 5 MG/1
2.5 TABLET ORAL 2 TIMES DAILY PRN
Qty: 30 TABLET | Refills: 1 | Status: SHIPPED | OUTPATIENT
Start: 2024-06-06

## 2024-06-06 RX ORDER — AZELASTINE HCL 205.5 UG/1
2 SPRAY NASAL DAILY
Qty: 17 ML | Refills: 1 | Status: SHIPPED | OUTPATIENT
Start: 2024-06-06 | End: 2024-08-21

## 2024-06-06 RX ORDER — DEXMETHYLPHENIDATE HYDROCHLORIDE 10 MG/1
10 TABLET ORAL
Qty: 30 TABLET | Refills: 0 | Status: SHIPPED | OUTPATIENT
Start: 2024-06-06 | End: 2024-07-22

## 2024-06-06 ASSESSMENT — ANXIETY QUESTIONNAIRES
GAD7 TOTAL SCORE: 13
3. WORRYING TOO MUCH ABOUT DIFFERENT THINGS: MORE THAN HALF THE DAYS
2. NOT BEING ABLE TO STOP OR CONTROL WORRYING: MORE THAN HALF THE DAYS
GAD7 TOTAL SCORE: 13
7. FEELING AFRAID AS IF SOMETHING AWFUL MIGHT HAPPEN: MORE THAN HALF THE DAYS
5. BEING SO RESTLESS THAT IT IS HARD TO SIT STILL: MORE THAN HALF THE DAYS
IF YOU CHECKED OFF ANY PROBLEMS ON THIS QUESTIONNAIRE, HOW DIFFICULT HAVE THESE PROBLEMS MADE IT FOR YOU TO DO YOUR WORK, TAKE CARE OF THINGS AT HOME, OR GET ALONG WITH OTHER PEOPLE: VERY DIFFICULT
8. IF YOU CHECKED OFF ANY PROBLEMS, HOW DIFFICULT HAVE THESE MADE IT FOR YOU TO DO YOUR WORK, TAKE CARE OF THINGS AT HOME, OR GET ALONG WITH OTHER PEOPLE?: VERY DIFFICULT
1. FEELING NERVOUS, ANXIOUS, OR ON EDGE: MORE THAN HALF THE DAYS
7. FEELING AFRAID AS IF SOMETHING AWFUL MIGHT HAPPEN: MORE THAN HALF THE DAYS
6. BECOMING EASILY ANNOYED OR IRRITABLE: SEVERAL DAYS
GAD7 TOTAL SCORE: 13
4. TROUBLE RELAXING: MORE THAN HALF THE DAYS

## 2024-06-06 ASSESSMENT — PATIENT HEALTH QUESTIONNAIRE - PHQ9
SUM OF ALL RESPONSES TO PHQ QUESTIONS 1-9: 17
SUM OF ALL RESPONSES TO PHQ QUESTIONS 1-9: 17
10. IF YOU CHECKED OFF ANY PROBLEMS, HOW DIFFICULT HAVE THESE PROBLEMS MADE IT FOR YOU TO DO YOUR WORK, TAKE CARE OF THINGS AT HOME, OR GET ALONG WITH OTHER PEOPLE: VERY DIFFICULT

## 2024-06-06 ASSESSMENT — PAIN SCALES - GENERAL: PAINLEVEL: NO PAIN (0)

## 2024-06-06 NOTE — NURSING NOTE
"Chief Complaint   Patient presents with    Consult   Blood pressure 113/78, pulse 69, height 1.74 m (5' 8.5\"), weight 75.3 kg (166 lb), SpO2 97%, not currently breastfeeding. Leonard Wilson, EMT    "

## 2024-06-06 NOTE — NURSING NOTE
Is the patient currently in the state of [place]? YES    Visit mode:VIDEO    If the visit is dropped, the patient can be reconnected by: VIDEO VISIT: Text to cell phone:   Telephone Information:   Mobile [phone]       Will anyone else be joining the visit? NO  (If patient encounters technical issues they should call [phone] :[phone])    How would you like to obtain your AVS? MyChart    Are changes needed to the allergy or medication list? Pt stated no changes to allergies and Pt stated no med changes    Are refills needed on medications prescribed by this physician?     Reason for visit: Consult    Leisa Epstein F      Care team has reviewed attendance agreement with patient. Patient advised that two failed appointments within 6 months may lead to termination of current episode of care.

## 2024-06-06 NOTE — PROGRESS NOTES
Virtual Visit Details    Type of service:  Video Visit   Video Start Time:  1034  Video End Time: 1123    Originating Location (pt. Location): Other Home    Distant Location (provider location):  Off-site  Platform used for Video Visit: F[employer]  Answers submitted by the patient for this visit:  Patient Health Questionnaire (Submitted on 6/6/2024)  If you checked off any problems, how difficult have these problems made it for you to do your work, take care of things at home, or get along with other people?: Very difficult  PHQ9 TOTAL SCORE: 17  DIANA-7 (Submitted on 6/6/2024)  DIANA 7 TOTAL SCORE: 13

## 2024-06-06 NOTE — PROGRESS NOTES
[facility]  New Patient Visit      Encounter date:   June 6, 2024    Referring Provider:   Savage Garcia MD  [address] [place] [address]    Reason for Visit: New Patient      History of Present Illness:      Andria Daily is a 39 year old female who presents for consultation regarding chronic sinusitis. Patient endorses difficulty breathing, nasal drainage, and gets recurrent tonsil stones. Her nasal obstruction has made it difficult for her to breathe at night.  She notes of increased snoring and has been working with Sleep Medicine for possible sleep apnea. Most recent sleep study did not demonstrate obvious JOHANNY but she does endorse significant issues with sleep. Has a history of environmental allergies which she feels contributes to her symptoms. Has had prior testing. Has trialed Flonase in the past but was unable to tolerate the medication due to an allergy in the spray. Her regimen currently consists of Zyrtec.  Worried that she may have a deviated septum based on prior trauma to the nose.     Number of episodes per year: chronic  Facial pain/pressure:  Nasal drainage: yes  Nasal obstruction: yes  Changes in smell or taste: n/a  Epistaxis: n/a    History of asthma/allergy: yes    Prior medication trials: Flonase, Zyrtec  Prior sinus surgery: n/a  Prior allergy testing with positives: n/a  Prior CT scans: yes 7/23/20    Other otolaryngic complaints:    Sino-Nasal Outcome Test (SNOT - 22)  DNT       Review of Systems:  A comprehensive 14-point review of systems was performed with positives and pertinent negatives listed in the HPI.    Past Medical/Surgical History:  Reviewed today with the patient. No history of major medical comorbidities. Does not take any blood thinners. No prior history of sinonasal surgery or trauma.    Allergies:       Allergies   Allergen Reactions    Dairy Products [Milk Protein] Other (See Comments)     Nasal problems, stomach pain     No Clinical Screening -  See Comments      Apples and bananas cause her throat to itch    Seasonal Allergies     Flonase [Fluticasone] Dizziness and Rash    Penicillins Rash     Rash on face and threw up       Medical History:  Past Medical History:   Diagnosis Date    ADHD (attention deficit hyperactivity disorder)     completed assessment    Anxiety     Chronic fatigue syndrome     Chronic rhinitis     CHHAYA I (cervical intraepithelial neoplasia I) 2007    Depressive disorder     History of eating disorder     History of substance use     prescription pain killers, alcohol, cocaine    Hypersomnia     PTSD (post-traumatic stress disorder)         Surgical History:   Past Surgical History:   Procedure Laterality Date    KNEE SURGERY Left     for osgood-schlatter and tendon repair/anchor    LAPAROSCOPY DIAGNOSTIC (GYN) N/A 2023    Procedure: laparoscopy, chromopertubation;  Surgeon: Isabel Vera MD;  Location: [facility] OR    [place] Teeth Removal      WRIST SURGERY Left     snowboarding injury, fractured, had metal plate initially that was then removed        Family History:  Family History   Problem Relation Age of Onset    Anxiety Disorder Mother     Tremor Mother     Depression Father     Anxiety Disorder Father     Sleep Apnea Father     Diabetes Maternal Grandmother     Hyperlipidemia Maternal Grandfather     Hypertension Maternal Grandfather     Coronary Artery Disease Maternal Grandfather 55    Anxiety Disorder Sister         after bad car accident    Breast Cancer No family hx of     Ovarian Cancer No family hx of     Colon Cancer No family hx of     Cerebrovascular Disease No family hx of         Social History:   Social History     Socioeconomic History    Marital status: Single   Tobacco Use    Smoking status: Former     Current packs/day: 0.00     Types: Cigarettes     Quit date:      Years since quittin.4    Smokeless tobacco: Never   Vaping Use    Vaping status: Never Used   Substance and  Sexual Activity    Alcohol use: Yes     Comment: Rarely    Drug use: Yes     Types: Marijuana     Comment: Daily (usually smoke)    Sexual activity: Yes     Partners: Male     Birth control/protection: I.U.D.     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    Currently working on physical and mental health until she is able to work again    Did school for theater    Lives with boyfriend, cat (Naomi)     Social Determinants of Health     Financial Resource Strain: Not on File (2/29/2024)    [facility] from [facility]     Financial Resource Strain     Financial Resource Strain: 0   Food Insecurity: Not on File (2/29/2024)    [facility] from [facility]     Food Insecurity     Food: 0   Recent Concern: Food Insecurity - High Risk (1/25/2024)    Food Insecurity     Within the past 12 months, did you worry that your food would run out before you got money to buy more?: Yes     Within the past 12 months, did the food you bought just not last and you didn t have money to get more?: Yes   Transportation Needs: Not on File (2/29/2024)    [facility] from [facility]     Transportation Needs     Transportation: 0   Physical Activity: Not on File (2/29/2024)    [facility] from [facility]     Physical Activity     Physical Activity: 0   Stress: Not on File (2/29/2024)    [facility] from [facility]     Stress     Stress: 0   Social Connections: Not on File (2/29/2024)    [facility] from [facility]     Social Connections     Social Connections and Isolation: 0   Interpersonal Safety: Low Risk  (9/26/2023)    Interpersonal Safety     Do you feel physically and emotionally safe where you currently live?: Yes     Within the past 12 months, have you been hit, slapped, kicked or otherwise physically hurt by someone?: No     Within the past 12 months, have you been humiliated or emotionally abused in other ways by your  "partner or ex-partner?: No   Housing Stability: Not on File (2024)    [facility]     Housing Stability     Housin            Family History:  Reviewed with patient. No pertinent positives.    Physical Examination:  /78 (BP Location: Right arm, Patient Position: Sitting, Cuff Size: Adult Regular)   Pulse 69   Ht 1.74 m (5' 8.5\")   Wt 75.3 kg (166 lb)   SpO2 97%   BMI 24.87 kg/m      Constitutional/Psychiatric: This is a well-appearing, well-dressed patient in no acute distress. female communicates easily with no obvious speech issues. Oriented to self and environment with appropriate conversation. Does not appear depressed, anxious, or agitated.  Head: Normocephalic. Overall inspection reveals no prior scars, lesions, or masses. Facial motion is symmetric. No sinus tenderness.  Eyes: Extra-ocular motions are intact with symmetric gaze.   Ears: Auricles without masses or lesions bilaterally. External auditory canals clear with minimal non-obstructive cerumen. Tympanic membranes intact without middle ear fluid or retraction. Hearing intact grossly at conversational volume.  Oral Cavity/Oropharynx: Lips, gingiva and teeth appear healthy.   Neck/Lymphatic: Flat  Respiratory: No increased work of breathing or respiratory distress. No audible stridor or stertor.  Peripheral/Cardiovascular: Brisk capillary refill bilaterally.   Neurologic: Cranial nerves II-XII grossly intact as tested.    Nasal examination: No lesions, masses, or scars of the external nose are visualized. I do not appreciate any external deviation of the bony nasal vault. External valves patent without inspiratory alar collapse. Columella is midline.      Imaging Review:  CT FACIAL BONES WITHOUT CONTRAST 2020   Impression: No evidence of sinusitis.       Procedure Note: Diagnostic Nasal Endoscopy, CPT 58373  Date of Service: 2024  Provider: Zain Turner MD   Presumptive Diagnosis: No primary diagnosis " found.  Anesthesia: Topical lidocaine and oxymetazoline via atomizer    Indication:  Evaluation of nasal/sinus complaints; inadequate visualization with anterior rhinoscopy    Description of procedure:  After obtaining consent for the procedure from the patient, the sinonasal cavity was sprayed with topical anesthetic. A rigid 30-degree nasal endoscope was used to first used to visualize the nasal floor and the nasopharynx on the left. A second pass was then made to visualize the middle meatus and sphenoethmoid recess. Finally, the scope was turned 90-degrees and used to visualize the olfactory cleft and frontal outflow tract. A similar approach was used for the contralateral side. She tolerated the procedure well without difficulty.    Endoscopic Findings:    [place] Endoscopic Scoring System  Endoscopic observation Right Left   Polyps in middle meatus (0 = absent, 1 = restricted to middle meatus, 2 = Beyond middle meatus) 0 0   Discharge (0 = absent, 1 = thin and clear, 2 = thick, purulent) 0 0   Edema (0 = absent, 1 = mild-moderate, 2 = moderate-severe) 0 0   Crusting (0 = absent, 1 = mild-moderate, 2 = moderate-severe) 0 0   Scarring (0= absent, 1 = mild-moderate, 2 = moderate-severe) 0 0   Total 0 0     Findings:  LT/RT: Bilateral sinonasal passages patent without evidence of infection, polyps, or mass.      Final Assessment/Plan:  Exam today is unremarkable. No evidence of septal deviation other than small left anterior spur. Will try to have the patient on a consistent nasal regimen for her symptoms.  She should continue to use daily Zyrtec. Additionally, we will have the patient start on daily Azelastine nasal spray (2 sprays per nostril) as well as daily sinus rinses (1-2 times daily) with NeilMed bottle.  Plan to follow up in 2.5-3 months with her to see how she is doing.           Scribe Disclosure:   Anu CHEEK, am serving as a scribe; to document services personally performed by Zain Turner MD  -based on data collection and the provider's statements to me.     Provider Disclosure:  I agree with above History, Review of Systems, Physical exam and Plan.  I have reviewed the content of the documentation and have edited it as needed. I have personally performed the services documented here and the documentation accurately represents those services and the decisions I have made.      Electronically signed by:  Zain Turner MD    [facility]  Rhinology  Endoscopic Skull Base Surgery  [facility]  Department of Otolaryngology - Head & Neck Surgery

## 2024-06-06 NOTE — PATIENT INSTRUCTIONS
You were seen in the ENT Clinic today by Dr. Turner. If you have any questions or concerns after your appointment, please contact us (see below)       2.   The following has been recommended based upon your appointment today:  -Start Zyrtec one tablet by mouth daily.  -Perform sinus rinses 1-2 times per day.  -Astelin (azelastine) nasal spray: 2 sprays per nostril daily.      3.   Plan to return to the ENT clinic in 2.5-3 months           How to Contact Us:  Send a [employer] message to your provider. Our team will respond to you via [employer]. Occasionally, we will need to call you to get further information.  For urgent matters (Monday-Friday), call the ENT Clinic: [phone] and speak with a call center team member - they will route your call appropriately.   If you'd like to speak directly with a nurse, please find our contact information below. We do our best to check voicemail frequently throughout the day, and will work to call you back within 1-2 days. For urgent matters, please use the general clinic phone numbers listed above.     Eileen FAITH RN  Direct: [phone]  Maren LCAYTON LPN  Direct: [phone]         [facility]  Department of Otolaryngology       Cleaning of the Nasal or Sinus Cavity    Please follow all three steps.  Nasal irrigation (cleaning) should be done 2 times/day.    Preparation:  1.  Wash your hands  2.  We suggest that you buy the NeilMed Sinus Rinse Kit  3.  Use distilled water or tap water that has been boiled and brought to room temperature.  4.  Fill the irrigation bottle with room temperature water (distilled or boiled tap water) and add mixture (pre made packet or homemade recipe).        If you wish to make a homemade recipe:        Mix 1/4 teaspoon (kosher, non-iodine) salt with 1/4 teaspoon baking soda in each bottle.    Cleansing Irrigation/Sinus Rinse:    1.  Lean forward over a sink and insert the rinse bottle applicator into the right side of your nose.    2.  Tilt head  down and to the left side.  With your mouth open (breathing through your mouth), gently direct the water around the inside of your nose until clear fluid starts to drain from the opposite nostril.  This is called flushing or irrigation.    3.  When you have used 1/4 to 1/2 or the solution, switch to the left nostril.    4.  To irrigate the left nostril, tilt your head down and to the right side.  With your mouth open (breathing through your mouth),  gently direct the water around the inside of your nose until clear fluid starts to drain from the opposite nostril.     Cleaning the Equipment:  1.  Throw away any leftover solution  2.  Clean the rinse bottle and cap with clear water. Air dry.      Call the ENT Clinic if you have any questions or concerns at [phone]

## 2024-06-06 NOTE — PROGRESS NOTES
"PSYCHIATRIC DIAGNOSTIC ASSESSMENT      Name:  Andria Daily  : 1985    Andria Daily is a 39 year old female who is being evaluated via a billable Video visit.      Telemedicine Visit: The patient's condition can be safely assessed and treated via synchronous audio and visual telemedicine encounter.      Reason for Telemedicine Visit: COVID 19 pandemic and the social and physical recommendations by the CDC and [facility] Patient has requested telehealth visit, and Patient unable to travel      Originating Site (Patient Location): Patient's home    Distant Site (Provider Location): [facility] Outpatient Setting: [facility]    Consent:  The patient/guardian has verbally consented to: the potential risks and benefits of telemedicine (video visit or phone) versus in person care; bill my insurance or make self-payment for services provided; and responsibility for payment of non-covered services.     Mode of Communication:  CallMiner platform     As the provider I attest to compliance with applicable laws and regulations related to telemedicine.                                              CHIEF COMPLAINT     \"To establish long term psychiatric care\"  HISTORY OF PRESENT ILLNESS     Patient is a 39 year old,  White Choose not to answer female with a history notable for moderate episode of recurrent major depressive disorder, PTSD, generalized anxiety disorder, ADHD, predominantly inattentive type, and insomnia, unspecified type who presents for initial psychiatric evaluation for the medication management of ongoing psychiatric symptoms related to worsening depression and anxiety following referral by her primary physician, Alma Butt MD.  Patient reports she has been struggling with depression and anxiety as well as mood and emotional reactivities since she was a teenager but stating symptoms have become significantly worsening for the past several months of which medication has not been able to " effectively manage.  Patient reports she cries all the time and feeling hopeless in addition to low motivation as well as increased anxiety.  Patient also reported she worries all the time due to intrusive passive suicidal thoughts.  Patient reported she graduated from PHP earlier this year but has not getting much benefit from it.  Patient reported she was supposed to start day treatment program with [facility] but had trouble with her car.  Patient endorsed history of several failed psychotropic trials that were discontinued due to poor efficacy and tolerability.  Patient endorsed history of psychiatric hospitalization when teenagers and early 20s due to suicidal ideation and attempts by way of medication overdose.  Patient also reports history of substance abuse and chemical dependency treatment at [facility] in 2015.  Patient reports she currently smokes marijuana once or twice a day.  Patient stated she currently attends individual psychotherapy.  PSYCHIATRIC HISTORY:   History of Psychiatric Hospitalizations:   - Inpatient: X2 in teenager and early 20s  - IOP/PHP/Day treatment: PHP in 2024  History of Suicidal Ideation: Positive  History of Suicide Attempts:  Positive with medication overdose    History of Self-injurious Behavior: Denies a history of SIB.  Current:  No  History of Violence/Aggression: Negative  History of Commitment? Negative  Electroconvulsive Therapy (ECT):Negative  TMS: 2020  PSYCHIATRIC REVIEW OF SYSTEMS:   Psychiatric Review of Systems:   Depression:   Reportsaf: depressed mood, suicidal ideation, decreased interest, changes in sleep, changes in appetite, guilt, hopelessness, helplessness, impaired concentration, decreased energy, irritability.  [place]:   Denies: sleeplessness, increased goal-directed activities, abrupt increase in energy pressured speech  Psychosis:   Denies: visual hallucinations, auditory hallucinations, paranoia  Anxiety:   Reports: excessive worries that are  difficult to control, panic attacks  PTSD:   Reports: re-experiencing past trauma, nightmares, increased arousal, avoidance of traumatic stimuli, impaired function.  Denies: re-experiencing past trauma, nightmares, increased arousal, avoidance of traumatic stimuli, impaired function.  OCD:   Denies: obsessions, checking, symmetry, cleaning, skin picking.  Eating Disorder: Hx of Anorexia and Bulimia  Denies: restriction, binging, purging.    Sleep: Struggles with sleep      MEDICATIONS                                                                                                Current Outpatient Medications   Medication Sig Dispense Refill    dexmethylphenidate (FOCALIN) 10 MG tablet Take 1 tablet (10 mg) by mouth daily (with breakfast) 30 tablet 0    dexmethylphenidate (FOCALIN) 5 MG tablet Take 1 tablet (5 mg) by mouth daily at 2 pm 30 tablet 0    hydrOXYzine HCl (ATARAX) 25 MG tablet Take 1 tablet (25 mg) 2 times daily as needed for other (anxiety) 60 tablet 0    levonorgestrel (MIRENA) 20 MCG/DAY IUD 1 each by Intrauterine route once      OLANZapine (ZYPREXA) 5 MG tablet Take 0.5 tablets (2.5 mg) by mouth 2 times daily as needed (mood) 30 tablet 0    prazosin (MINIPRESS) 1 MG capsule Take 1 mg by mouth at bedtime      propranolol (INDERAL) 20 MG tablet Take 1 tablet (20 mg) by mouth 2 times daily 180 tablet 1    zolpidem (AMBIEN) 5 MG tablet Take tablet by mouth 15 minutes prior to sleep, for Sleep Study 1 tablet 0     No current facility-administered medications for this visit.       DRUG MONITORING:  [place] Prescription Monitoring Program evaluating controlled substances in the last year in [place]:  The [place] Prescription Monitoring Program has been reviewed and there are no current concerns with: diversionary activity, early refill requests, and or obtaining the medication from multiple providers       PAST PSYCHOTROPIC MEDICATIONS:      V- Lexapro  - Effexor (night sweats)  - Buspar 7.5-15mg  -  "Wellbutrin (worsened tremor)  - Celexa 20mg  - Cymbalta (trialed with Celexa for pain, noted memory issues)  - trazodone 50mg   - Zoloft 50mg (higher doses associated with night sweats)  - Prazosin 1mg (not well tolerated due to hypotension)ITALS   There were no vitals taken for this visit.     BP Readings from Last 1 Encounters:   03/25/24 102/67     Pulse Readings from Last 1 Encounters:   03/25/24 98     Wt Readings from Last 1 Encounters:   03/25/24 76.7 kg (169 lb)     Ht Readings from Last 1 Encounters:   03/25/24 1.74 m (5' 8.5\")     Estimated body mass index is 25.32 kg/m  as calculated from the following:    Height as of 3/25/24: 1.74 m (5' 8.5\").    Weight as of 3/25/24: 76.7 kg (169 lb).      PERTINENT HISTORY   PAST MEDICAL HISTORY:   Past Medical History:   Diagnosis Date    ADHD (attention deficit hyperactivity disorder)     completed assessment    Anxiety     Chronic fatigue syndrome     Chronic rhinitis     CHHAYA I (cervical intraepithelial neoplasia I) 05/24/2007    Depressive disorder     History of eating disorder     History of substance use     prescription pain killers, alcohol, cocaine    Hypersomnia     PTSD (post-traumatic stress disorder)        PAST SURGICAL HISTORY:   Past Surgical History:   Procedure Laterality Date    KNEE SURGERY Left 2018    for osgood-schlatter and tendon repair/anchor    LAPAROSCOPY DIAGNOSTIC (GYN) N/A 03/22/2023    Procedure: laparoscopy, chromopertubation;  Surgeon: Isabel Vera MD;  Location: [facility] OR    Souris Teeth Removal  2001    WRIST SURGERY Left 2015    snowboarding injury, fractured, had metal plate initially that was then removed       FAMILY HISTORY:   Family History   Problem Relation Age of Onset    Anxiety Disorder Mother     Tremor Mother     Depression Father     Anxiety Disorder Father     Sleep Apnea Father     Diabetes Maternal Grandmother     Hyperlipidemia Maternal Grandfather     Hypertension Maternal Grandfather     Coronary Artery " "Disease Maternal Grandfather 55    Anxiety Disorder Sister         after bad car accident    Breast Cancer No family hx of     Ovarian Cancer No family hx of     Colon Cancer No family hx of     Cerebrovascular Disease No family hx of        SOCIAL HISTORY:   Social History     Tobacco Use    Smoking status: Former     Current packs/day: 0.00     Types: Cigarettes     Quit date:      Years since quittin.4    Smokeless tobacco: Never   Substance Use Topics    Alcohol use: Yes     Comment: Rarely         Seizures or Head Injury: Denies history of head injury. Denies history of seizures.  History of cardiac disease, rheumatic fever, fainting or dizziness, especially with exercise, seizures, chest pain or shortness of breath with exercise, unexplained change in exercise tolerance, palpitations, high blood pressure, or heart murmur?   No    LABS & IMAGING                                                                                                                Personally reviewed  Recent Labs   Lab Test 23  1130 17  0916   WBC 6.2 5.5   HGB 13.6 13.8   HCT 42.1 41.7   MCV 94 91    185   ANEU  --  3.5     Recent Labs   Lab Test 23  1130   GLC 84     Recent Labs   Lab Test 23  1130   CHOL 163   LDL 86   HDL 67   TRIG 51     No lab results found.  No results found for: \"WFW736\", \"QRPY417\", \"AZWI41CTJVN\", \"VITD3\", \"D2VIT\", \"D3VIT\", \"DTOT\", \"[id number]\", \"[id number]\", \"[id number]\", \"[id number]\", \"[id number]\", \"[id number]\"     ALLERGY & IMMUNIZATIONS       Allergies   Allergen Reactions    Dairy Products [Milk Protein] Other (See Comments)     Nasal problems, stomach pain     No Clinical Screening - See Comments      Apples and bananas cause her throat to itch    Seasonal Allergies     Flonase [Fluticasone] Dizziness and Rash    Penicillins Rash     Rash on face and threw up       FAMILY MEDICAL HISTORY:     Family History       Problem (# of Occurrences) Relation (Name,Age of Onset) "    Anxiety Disorder (3) Mother, Father, Sister: after bad car accident    Depression (1) Father    Diabetes (1) Maternal Grandmother    Hypertension (1) Maternal Grandfather    Hyperlipidemia (1) Maternal Grandfather    Coronary Artery Disease (1) Maternal Grandfather (55)    Sleep Apnea (1) Father    Tremor (1) Mother           Negative family history of: Breast Cancer, Ovarian Cancer, Colon Cancer, Cerebrovascular Disease              Family history of sudden or unexplained death or an event requiring resuscitation in children or young adults, cardiac arrhythmias (eg, Janelle-Parkinson-White syndrome), long QT syndrome, catecholaminergic paroxysmal ventricular tachycardia, Brugada syndrome, arrhythmogenic right ventricular dysplasia, hypertrophic cardiomyopathy, dilated cardiomyopathy, or Marfan syndrome?  No    FAMILY PSYCHIATRIC HISTORY:   Psychiatry:Father, mother sister with anxiety, father with depression  Substance use history in family: Denies  Family suicide history:Negative      SIGNIFICANT SOCIAL/FAMILY HISTORY:                                           Born and raised in: [place]  Relationship status: Single living with boyfriend  Children: None    Highest education level was:Associate degree   Service:Denies  Employment status: Works part time at the [employer]  LEGAL:     SUBSTANCE USE HISTORY      Alcohol- fewer urges to drink, mindful with med and risks of alcohol   - treatment in 2015  Nicotine- none  Caffeine- 1-2 cups coffee daily, 1-2 energy drinks a week, can increase shaking if she doesn't eat enough  Opioids- sober since 2015   Cannabis- smoking 1-2x daily  - treated for cannabis use in 2015   Other Illicit Drugs- sober from cocaine and hallucinogens since 2015    MEDICAL REVIEW OF SYSTEMS:   Ten system review was completed with pertinent positives noted above    MENTAL STATUS EXAM:   Mental Status Examination (limited due to video virtual visit format):  Vital Signs: There were  "no vitals taken for this virtual visit.  Appearance: adequately groomed, appears stated age, and in no apparent distress.  Attitude: cooperative   Eye Contact: good to the extent that can be determined in a video visit  Muscle Strength and Tone: no gross abnormalities based on remote observation  Psychomotor Behavior:  no evidence of tardive dyskinesia, dystonia, or tics based on remote observation  Gait and Station: normal, no gross abnormalities based on remote observation  Speech: clear, coherent, normal prosody, regular rate, regular rhythm and fluent  Associations: No loosening of associations  Thought Process: coherent and goal directed  Thought Content: no evidence of suicidal ideation or homicidal ideation, no evidence of psychotic thought, no auditory hallucinations present and no visual hallucinations present  Mood: \"Neutral\"  Affect: appropriate and in normal range  Insight: good  Judgment: intact, adequate for safety  Impulse Control: intact  Oriented to: time, place, person and situation  Attention Span and Concentration: normal  Language: Intact  Recent and Remote Memory: intact to interview. Not formally assessed. No amnesia.  Fund of Knowledge: appropriate        SAFETY   Feels safe in home: Yes   Suicidal ideation: Denies  History of suicide attempts:  No   Hx of impulsivity: No     DSM 5 DIAGNOSIS:   1. Moderate episode of recurrent major depressive disorder (H)    2. PTSD (post-traumatic stress disorder)    3. DIANA (generalized anxiety disorder)    4. Attention deficit hyperactivity disorder (ADHD), predominantly inattentive type    5. Insomnia, unspecified type      ASSESSMENT AND PLAN    Patient is a 39 year old,  White Choose not to answer female with a history notable for moderate episode of recurrent major depressive disorder, PTSD, generalized anxiety disorder, ADHD, predominantly inattentive type, and insomnia, unspecified type who presents for initial psychiatric evaluation for the " medication management of ongoing psychiatric symptoms related to worsening depression and anxiety following referral by her primary physician, Alma Butt MD. Patient with a longstanding history of depression and anxiety, psychiatric hospitalization due to worsening depression by way of suicidal attempt in the setting of medication overdose who has had chemical dependency treatment in 2015 continues to struggle with depressive and anxiety symptoms in the form of increased irritability, hopelessness and helplessness, excessive worry, intrusive thoughts, and excessive crying.  She is not currently on any antidepressant as she has not had success with several antidepressant she has tried in the past.  She takes propranolol on an as-needed basis.  She has not taken Zyprexa for over a week due to running out.  She smokes marijuana about twice daily.  She has completed PHP early this year but not able to continue with the day treatment program due to transportation problems.  Since patient not currently on any antidepressant at this time, I suggested start the patient on Remeron 15 mg at bedtime to help with mood, depression, anxiety as well as sleep.  I discussed medications risk, benefit, and side effects.  Patient verbalized good understanding and was amenable to taking Remeron at bedtime.  Patient reports sometimes she felt as though she is hearing voices or seeing people that were not there and become paranoia.I spent extensive time educating patient about her marijuana use and letting her know that mood and mind altering substance like marijuana will make symptoms worse by causing rebound anxiety and paranoia.  I also made patient aware that it is not appropriate and safe to be taking stimulants with marijuana.  I encouraged patient to stop using marijuana especially while taking stimulant.  Patient verbalized good understanding.  Patient endorsed passive SI but denied plans or intent.  Patient report normal no  hypomania.  Patient to return to clinic in 6-week for follow-up.    Plan:  1.Patient will take the medications as prescribed.   Medications: Remeron 15 mg - Take half  tablet (7.5 mg) for 3 nights then take 1 tablet (15 mg) after third night  Continue Focalin 10 mg in the morning and Focalin 5 mg around 2 pm  Zyprexa 2.5 mg twice daily as needed for mood  Prazosin 1 mg at bedtime  Propranolol 20 mg twice daily as needed for anxiety  Patient will not stop taking medications or adjust them without consulting with the provider.  2.Patient will call with any problems between visits.  3.Patient will go to the emergency room if not feeling safe , unable to function in the community, or if suicidal, homicidal or hearing voices or having paranoia.  4.Patient will abstain from drugs and alcohol./Pt denies use .  5.Patient will not drive if sedated on medications or under influence of any substance.   6.Patient will not mix psychiatric medications with drugs and alcohol.   7.Patient will watch his diet and exercise.  8.Patient will see non psychiatric providers for non psychiatric disorders.  9. Next appointment in 5 weeks    Risk Assessment:     Andria has notable risk factors for self-harm, including, single status, anxiety, mood dysregulation, substance use,  and passive SI. However, risk is mitigated by ability to volunteer a safety plan and history of seeking help when needed. Additional steps taken to minimize risk include making medication adjustment, asking patient to call 911 and go to the ER if not able to stay safe at home,  Therefore, based on all available evidence including the factors cited above, Andria does not appear to be an imminent danger to self or others and does not meet criteria 72 hour hold. However, if patient uses substances or is non-adherent with medication, their risk of decompensation and SI/HI will be elevated. This was discussed with the patient as she verbalized good understanding.    CONSULTS/REFERRALS:   Continue therapy  None at this time  Coordinate care with therapist as needed    MEDICAL:   None at this time  Coordinate care with PCP (Alma Hernandez) as needed  Follow up with primary care provider as planned or for acute medical concerns.    PSYCHOEDUCATION:  Medication side effects and alternatives reviewed. Health promotion activities recommended and reviewed today. All questions addressed. Education and counseling completed regarding risks and benefits of medications and psychotherapy options.  Consent provided by patient/guardian  Call the psychiatric nurse line with medication questions or concerns at [phone].  GenomeQuesthart may be used to communicate with your provider, but this is not intended to be used for emergencies.  BLACK BOX WARNING: Discussed the Food and Drug Administration (FDA) requires that all antidepressants carry a warning that some children, adolescents and young adults may be at increased risk of suicide when taking antidepressants. Anyone taking an antidepressant should be watched closely for worsening depression or unusual behavior especially in the first few weeks after starting an SSRI. Keep in mind, antidepressants are more likely to reduce suicide risk in the long run by improving mood.   SEROTONIN SYNDROME:  Discussed risks of Serotonin syndrome (ie, serotonin toxicity) which is a potentially life-threatening condition associated with increased serotonergic activity in the central nervous system (CNS). It is seen with therapeutic medication use, inadvertent interactions between drugs, and intentional self-poisoning. Serotonin syndrome may involve a spectrum of clinical findings, which often include mental status changes, autonomic hyperactivity, and neuromuscular abnormalities.    BENZODIAZEPINE:  discussion on how benzos work and the need to use them short term due to potential of anxiety getting.  This is a controlled substance with risk for abuse, need to  keep in a safe keep place and cannot replace lost scripts.    HYPNOTIC USE: Hypnotic use, risk for CNS depression, sleep-walking, not to mix with ETOH or other CNS depressant, need for six hours of sleep, stop if change in mood.  This is a controlled substance with risk for abuse, need to keep in a safe keep place and cannot replace lost scripts.  FIRST GENERATION ANTIPSYCHOTIC/ SECOND GENERATION ANTIPSYCHOTIC USE:  Atypical need for cardiometabolic monitoring with medication- B/P, weight, blood sugar, cholesterol.  Need to monitor for abnormal movements taught  SAFETY:  We all care about your loved one's safety. To reduce the risk of self-harm, remove access to all:  Firearms, Medicines (both prescribed and over-the-counter), Knives and other sharp objects, Ropes and like materials, and Alcohol  SLEEP HYGIENE: establish a sleep routine, limit screen time 1 hour prior to bed, use bed for sleep only, take sleep/medications on time (including sleepy time tea, trazadone or herbal treatments such as melatonin), aroma therapy, limit caffeine/sugar, yoga, guided imagery, stretch, meditation, limit naps to 20 minutes, make a temperature change in the room, white noise, be mindful of slowing down breathing, take a warm bath/shower, frequently wash sheets, and journaling.   Medlineplus.gov is information for patients.  It is run by the National Library of Medicine and it contains information about all disorders, diseases and all medications.      COMMUNITY RESOURCES:    CRISIS NUMBERS: Provided in AVS 6/6/2024  National Suicide Prevention Lifeline: 5-273-212-TALK (855-962-9463)  Inspire/resources for a list of additional resources (SOS)            [facility] - [phone]   Urgent Care Adult Mental [facility]pdexc-036-928-7900 mobile unit/ 24/7 crisis line  [facility] -[phone]   COPE 24/7 [place] Mobile Team -[phone] (adults)/ [phone] (child)  Poison Control Center -  7-949-161-1795    OR  go to nearest ER  Crisis Text Line for any crisis  send this-   To: 106314   [facility] ER  [phone]  National Suicide Prevention Lifeline: 439.591.7173 (TTY: 870.410.9489). Call anytime for help.  (www.suicidepreventionlifeline.org)  [place] on [place] Illness (www.iman.org): 635.375.7892 or 381-435-1495.   Mental Health Association (www.mentalhealth.org): 861.246.6182 or 215-301-0420.  [place] Crisis Text Line: Text [place] to 089177  Suicide LifeLine Chat: suicideQM Scientific.org/chat    ADMINISTRATIVE BILLIN min spent interviewing patient, reviewing referral documents, obtaining and reviewing outside records, communication with other health specialists, and preparing this report on this day: 24    Video/Phone Start Time:  1034  Video/Phone End Time:   1123    Greater than 50% of time was spent in counseling and coordination of care regarding above diagnoses and treatment plan.    Signed:   Reddy Mello, MSN, APRN, PMHNP-BC  Long Term Outpatient Psychiatry  Chart documentation done in part with Dragon Voice Recognition software.  Although reviewed after completion, some word and grammatical errors may remain.   Answers submitted by the patient for this visit:  Patient Health Questionnaire (Submitted on 2024)  If you checked off any problems, how difficult have these problems made it for you to do your work, take care of things at home, or get along with other people?: Very difficult  PHQ9 TOTAL SCORE: 17  DIANA-7 (Submitted on 2024)  DIANA 7 TOTAL SCORE: 13

## 2024-06-06 NOTE — LETTER
6/6/2024       RE: Andria Daily  [address]  [facility] [address]     Dear Colleague,    Thank you for referring your patient, Andria Daily, to the [facility] [place] at [facility]. Please see a copy of my visit note below.      [facility]  New Patient Visit      Encounter date:   June 6, 2024    Referring Provider:   Savage Garcia MD  [address] [place] [id number]    Reason for Visit: New Patient      History of Present Illness:      Andria Daily is a 39 year old female who presents for consultation regarding chronic sinusitis. Patient endorses difficulty breathing, nasal drainage, and gets recurrent tonsil stones. Her nasal obstruction has made it difficult for her to breathe at night.  She notes of increased snoring and has been working with Sleep Medicine for possible sleep apnea. Most recent sleep study did not demonstrate obvious JOHANNY but she does endorse significant issues with sleep. Has a history of environmental allergies which she feels contributes to her symptoms. Has had prior testing. Has trialed Flonase in the past but was unable to tolerate the medication due to an allergy in the spray. Her regimen currently consists of Zyrtec.  Worried that she may have a deviated septum based on prior trauma to the nose.     Number of episodes per year: chronic  Facial pain/pressure:  Nasal drainage: yes  Nasal obstruction: yes  Changes in smell or taste: n/a  Epistaxis: n/a    History of asthma/allergy: yes    Prior medication trials: Flonase, Zyrtec  Prior sinus surgery: n/a  Prior allergy testing with positives: n/a  Prior CT scans: yes 7/23/20    Other otolaryngic complaints:    Sino-Nasal Outcome Test (SNOT - 22)  DNT       Review of Systems:  A comprehensive 14-point review of systems was performed with positives and pertinent negatives listed in the HPI.    Past Medical/Surgical  History:  Reviewed today with the patient. No history of major medical comorbidities. Does not take any blood thinners. No prior history of sinonasal surgery or trauma.    Allergies:       Allergies   Allergen Reactions     Dairy Products [Milk Protein] Other (See Comments)     Nasal problems, stomach pain      No Clinical Screening - See Comments      Apples and bananas cause her throat to itch     Seasonal Allergies      Flonase [Fluticasone] Dizziness and Rash     Penicillins Rash     Rash on face and threw up       Medical History:  Past Medical History:   Diagnosis Date     ADHD (attention deficit hyperactivity disorder)     completed assessment     Anxiety      Chronic fatigue syndrome      Chronic rhinitis      CHHAYA I (cervical intraepithelial neoplasia I) 05/24/2007     Depressive disorder      History of eating disorder      History of substance use     prescription pain killers, alcohol, cocaine     Hypersomnia      PTSD (post-traumatic stress disorder)         Surgical History:   Past Surgical History:   Procedure Laterality Date     KNEE SURGERY Left 2018    for osgood-schlatter and tendon repair/anchor     LAPAROSCOPY DIAGNOSTIC (GYN) N/A 03/22/2023    Procedure: laparoscopy, chromopertubation;  Surgeon: Isabel Vera MD;  Location: [facility] OR     [place] Teeth Removal  2001     WRIST SURGERY Left 2015    snowboarding injury, fractured, had metal plate initially that was then removed        Family History:  Family History   Problem Relation Age of Onset     Anxiety Disorder Mother      Tremor Mother      Depression Father      Anxiety Disorder Father      Sleep Apnea Father      Diabetes Maternal Grandmother      Hyperlipidemia Maternal Grandfather      Hypertension Maternal Grandfather      Coronary Artery Disease Maternal Grandfather 55     Anxiety Disorder Sister         after bad car accident     Breast Cancer No family hx of      Ovarian Cancer No family hx of      Colon Cancer No family hx of       Cerebrovascular Disease No family hx of         Social History:   Social History     Socioeconomic History     Marital status: Single   Tobacco Use     Smoking status: Former     Current packs/day: 0.00     Types: Cigarettes     Quit date: 2016     Years since quittin.4     Smokeless tobacco: Never   Vaping Use     Vaping status: Never Used   Substance and Sexual Activity     Alcohol use: Yes     Comment: Rarely     Drug use: Yes     Types: Marijuana     Comment: Daily (usually smoke)     Sexual activity: Yes     Partners: Male     Birth control/protection: I.U.D.     Comment: monogamous   Social History Narrative    Previously worked at [employer] as an [employer] until mental health concerns prevented her from working effectively    Currently working on physical and mental health until she is able to work again    Did school for theater    Lives with boyfriend, cat (Taft)     Social Determinants of Health     Financial Resource Strain: Not on File (2024)    Received from [facility]     Financial Resource Strain      Financial Resource Strain: 0   Food Insecurity: Not on File (2024)    Received from [facility]     Food Insecurity      Food: 0   Recent Concern: Food Insecurity - High Risk (2024)    Food Insecurity      Within the past 12 months, did you worry that your food would run out before you got money to buy more?: Yes      Within the past 12 months, did the food you bought just not last and you didn t have money to get more?: Yes   Transportation Needs: Not on File (2024)    Received from [facility]     Transportation Needs      Transportation: 0   Physical Activity: Not on File (2024)    Received from [facility]     Physical Activity      Physical Activity: 0   Stress: Not on File (2024)    Received from [facility]     Stress      Stress: 0   Social Connections: Not on File (2024)    Received from [facility]     Social Connections      Social  "Connections and Isolation: 0   Interpersonal Safety: Low Risk  (2023)    Interpersonal Safety      Do you feel physically and emotionally safe where you currently live?: Yes      Within the past 12 months, have you been hit, slapped, kicked or otherwise physically hurt by someone?: No      Within the past 12 months, have you been humiliated or emotionally abused in other ways by your partner or ex-partner?: No   Housing Stability: Not on File (2024)    Received from [facility]     Housing Stability      Housin            Family History:  Reviewed with patient. No pertinent positives.    Physical Examination:  /78 (BP Location: Right arm, Patient Position: Sitting, Cuff Size: Adult Regular)   Pulse 69   Ht 1.74 m (5' 8.5\")   Wt 75.3 kg (166 lb)   SpO2 97%   BMI 24.87 kg/m      Constitutional/Psychiatric: This is a well-appearing, well-dressed patient in no acute distress. female communicates easily with no obvious speech issues. Oriented to self and environment with appropriate conversation. Does not appear depressed, anxious, or agitated.  Head: Normocephalic. Overall inspection reveals no prior scars, lesions, or masses. Facial motion is symmetric. No sinus tenderness.  Eyes: Extra-ocular motions are intact with symmetric gaze.   Ears: Auricles without masses or lesions bilaterally. External auditory canals clear with minimal non-obstructive cerumen. Tympanic membranes intact without middle ear fluid or retraction. Hearing intact grossly at conversational volume.  Oral Cavity/Oropharynx: Lips, gingiva and teeth appear healthy.   Neck/Lymphatic: Flat  Respiratory: No increased work of breathing or respiratory distress. No audible stridor or stertor.  Peripheral/Cardiovascular: Brisk capillary refill bilaterally.   Neurologic: Cranial nerves II-XII grossly intact as tested.    Nasal examination: No lesions, masses, or scars of the external nose are visualized. I do not appreciate any " external deviation of the bony nasal vault. External valves patent without inspiratory alar collapse. Columella is midline.      Imaging Review:  CT FACIAL BONES WITHOUT CONTRAST 7/23/2020   Impression: No evidence of sinusitis.       Procedure Note: Diagnostic Nasal Endoscopy, CPT 64075  Date of Service: June 6, 2024  Provider: Zain Turner MD   Presumptive Diagnosis: No primary diagnosis found.  Anesthesia: Topical lidocaine and oxymetazoline via atomizer    Indication:  Evaluation of nasal/sinus complaints; inadequate visualization with anterior rhinoscopy    Description of procedure:  After obtaining consent for the procedure from the patient, the sinonasal cavity was sprayed with topical anesthetic. A rigid 30-degree nasal endoscope was used to first used to visualize the nasal floor and the nasopharynx on the left. A second pass was then made to visualize the middle meatus and sphenoethmoid recess. Finally, the scope was turned 90-degrees and used to visualize the olfactory cleft and frontal outflow tract. A similar approach was used for the contralateral side. She tolerated the procedure well without difficulty.    Endoscopic Findings:    Oscar-Los Endoscopic Scoring System  Endoscopic observation Right Left   Polyps in middle meatus (0 = absent, 1 = restricted to middle meatus, 2 = Beyond middle meatus) 0 0   Discharge (0 = absent, 1 = thin and clear, 2 = thick, purulent) 0 0   Edema (0 = absent, 1 = mild-moderate, 2 = moderate-severe) 0 0   Crusting (0 = absent, 1 = mild-moderate, 2 = moderate-severe) 0 0   Scarring (0= absent, 1 = mild-moderate, 2 = moderate-severe) 0 0   Total 0 0     Findings:  LT/RT: Bilateral sinonasal passages patent without evidence of infection, polyps, or mass.      Final Assessment/Plan:  Exam today is unremarkable. No evidence of septal deviation other than small left anterior spur. Will try to have the patient on a consistent nasal regimen for her symptoms.  She should  continue to use daily Zyrtec. Additionally, we will have the patient start on daily Azelastine nasal spray (2 sprays per nostril) as well as daily sinus rinses (1-2 times daily) with NeilMed bottle.  Plan to follow up in 2.5-3 months with her to see how she is doing.           Scribe Disclosure:   I, Anu Hunt, am serving as a scribe; to document services personally performed by Zain Turner MD -based on data collection and the provider's statements to me.     Provider Disclosure:  I agree with above History, Review of Systems, Physical exam and Plan.  I have reviewed the content of the documentation and have edited it as needed. I have personally performed the services documented here and the documentation accurately represents those services and the decisions I have made.      Electronically signed by:  Zain Turner MD    [facility]  Rhinology  Endoscopic Skull Base Surgery  [facility]  Department of Otolaryngology - Head & Neck Surgery

## 2024-06-06 NOTE — PATIENT INSTRUCTIONS
"Patient Education   The Panel Psychiatry Program  What to Expect  Here's what to expect in the Panel Psychiatry Program.   About the program  You'll be meeting with a psychiatric doctor to check your mental health. A psychiatric doctor helps you deal with troubling thoughts and feelings by giving you medicine. They'll make sure you know the plan for your care. You may see them for a long time. When you're feeling better, they may refer you back to seeing your family doctor.   If you have any questions, we'll be glad to talk to you.  About visits  Be open  At your visits, please talk openly about your problems. It may feel hard, but it's the best way for us to help you.  Cancelling visits  If you can't come to your visit, please call us right away at [phone]. If you don't cancel at least 24 hours (1 full day) before your visit, that's \"late cancellation.\"  Not showing up for your visits  Being very late is the same as not showing up. You'll be a \"no show\" if:  You're more than 15 minutes late for a 30-minute (half hour) visit.  You're more than 30 minutes late for a 60-minute (full hour) visit.  If you cancel late or don't show up 2 times within 6 months, we may end your care.  Getting help between visits  If you need help between visits, you can call us Monday to Friday from 8 a.m. to 4:30 p.m. at [phone].  Emergency care  Call 911 or go to the nearest emergency department if your life or someone else's life is in danger.  Call 988 anytime to reach the national Suicide and Crisis hotline.  Medicine refills  To refill your medicine, call your pharmacy. You can also call [facility] at [phone], Monday to Friday, 8 a.m. to 4:30 p.m. It can take 1 to 3 business days to get a refill.   Forms, letters, and tests  You may have papers to fill out, like FMLA, short-term disability, and workability. We can help you with these forms at your visits, but you must have an " appointment. You may need more than 1 visit for this, to be in an intensive therapy program, or both.  Before we can give you medicine for ADHD, we may refer you to get tested for it or confirm it another way.  We may not be able to give you an emotional support animal letter.  We don't do mental health checks ordered by the court.   We don't do mental health testing, but we can refer you to get tested.   Thank you for choosing us for your care.  For informational purposes only. Not to replace the advice of your health care provider. Copyright   2022 [facility]. All rights reserved. Rowl 519776 - 12/22.     Plan:  1.Patient will take the medications as prescribed.   Medications: Remeron 15 mg - Take half  tablet (7.5 mg) for 3 nights then take 1 tablet (15 mg) after third night  Continue Focalin 10 mg in the morning and Focalin 5 mg around 2 pm  Zyprexa 2.5 mg twice daily as needed for mood  Prazosin 1 mg at bedtime  Propranolol 20 mg twice daily as needed for anxiety  Patient will not stop taking medications or adjust them without consulting with the provider.  2.Patient will call with any problems between visits.  3.Patient will go to the emergency room if not feeling safe , unable to function in the community, or if suicidal, homicidal or hearing voices or having paranoia.  4.Patient will abstain from drugs and alcohol./Pt denies use .  5.Patient will not drive if sedated on medications or under influence of any substance.   6.Patient will not mix psychiatric medications with drugs and alcohol.   7.Patient will watch his diet and exercise.  8.Patient will see non psychiatric providers for non psychiatric disorders.  9. Next appointment in 5 weeks

## 2024-06-10 RX ORDER — OLANZAPINE 5 MG/1
TABLET ORAL
Qty: 30 TABLET | Refills: 1 | OUTPATIENT
Start: 2024-06-10

## 2024-07-01 ENCOUNTER — MYC REFILL (OUTPATIENT)
Dept: FAMILY MEDICINE | Facility: CLINIC | Age: 39
End: 2024-07-01

## 2024-07-01 DIAGNOSIS — F90.0 ATTENTION DEFICIT HYPERACTIVITY DISORDER (ADHD), PREDOMINANTLY INATTENTIVE TYPE: ICD-10-CM

## 2024-07-02 RX ORDER — DEXMETHYLPHENIDATE HYDROCHLORIDE 5 MG/1
5 TABLET ORAL
Qty: 30 TABLET | Refills: 0 | Status: SHIPPED | OUTPATIENT
Start: 2024-07-02 | End: 2024-07-22

## 2024-07-02 NOTE — TELEPHONE ENCOUNTER
Dexmethylphenidate (Focalin) 5 mg    Last Office Visit: 12/5/23  [facility] [place] None  Medication last refilled: 5/28/24 #30 with 0 refill(s)     verified - last fill date: 5/31/24 #30    CSA on File - None    Routing refill request to provider for review/approval because:  Drug not on the [facility] refill protocol     OSKAR Frankel, RN, CCM

## 2024-07-08 ENCOUNTER — OFFICE VISIT (OUTPATIENT)
Dept: SLEEP MEDICINE | Facility: CLINIC | Age: 39
End: 2024-07-08
Payer: COMMERCIAL

## 2024-07-08 VITALS
WEIGHT: 166 LBS | OXYGEN SATURATION: 98 % | RESPIRATION RATE: 18 BRPM | DIASTOLIC BLOOD PRESSURE: 67 MMHG | SYSTOLIC BLOOD PRESSURE: 102 MMHG | BODY MASS INDEX: 24.59 KG/M2 | HEIGHT: 69 IN | HEART RATE: 75 BPM

## 2024-07-08 DIAGNOSIS — R06.83 SNORING: ICD-10-CM

## 2024-07-08 DIAGNOSIS — F51.04 PSYCHOPHYSIOLOGICAL INSOMNIA: Primary | ICD-10-CM

## 2024-07-08 PROCEDURE — 99214 OFFICE O/P EST MOD 30 MIN: CPT | Performed by: NURSE PRACTITIONER

## 2024-07-08 NOTE — PATIENT INSTRUCTIONS
Behavioral Sleep Medicine Program    The [facility] Behavioral Sleep Medicine Program,  provides non-drug treatment for sleep problems as part of our multi-discipline sleep medicine program. Services offered include:    Cognitive-behavioral Therapies for Insomnia (CBT-I)  Management of Shift-work and Jet Lag Sleep Disorders  Management of Delayed, Advanced and Irregular Circadian Rhythm Sleep Disorders  Imagery Rehearsal Therapy (IRT) for Nightmare Disorder  Adaptation to PAP Therapy for Obstructive Sleep Apnea  Behavioral strategies to manage hypersomnia and fatigue    You have been referred for consultation with a sleep psychologist who specializes in behavioral sleep medicine and treatment of insomnia.  The [facility] Behavioral Sleep Medicine Program offers individualized telehealth services through our [facility] and online CBT-I.    Preparing for your Consultation    We recommmend you keep a Sleep Diary for at least a week prior to your visit. Complete the sleep diary each day first thing after you get up by answering a few key questions about your sleep using our convenient mobile leanne or paper sleep diary.  Your answers should be based on your recall of the past 24 hours.  Avoid watching the clock or recording data during the night.     Insomnia  Leanne    The Insomnia  mobile leanne  is a convenient way to keep track of your sleep prior to your sleep consultation.  Simply download the free leanne on your Apple or Android phone and record your information each morning.  The leanne includes training, self-assessment, and sleep schedule recommendations.  Prior to your consultation we recommend you use only the sleep diary function. You can e-mail yourself a copy of your sleep diary data by going to the Settings section and using the [place] User Data function.  During your consultation your provider will review the data with you.          [facility] Sleep Diary    You  can also track your sleep using the [facility] paper sleep diary.  You can upload your sleep diary and send it via a [employer] message, fax it to [phone], or have it with you at the time of your consultation.            CBT-I:  Frequently Asked Questions    What is CBT-I?    Cognitive Behavioral Therapy for Insomnia, also known as CBT-I, is a highly effective non-drug treatment for insomnia. The American College of Physicians recommends CBT-I as the first treatment for chronic insomnia.  Research has shown CBT-I to be safer and more effective long term than sleeping pills.    What does CBT-I involve?     CBT-I targets behaviors that lead to chronic insomnia:  Habits that weaken the bed as a cue for sleep  Habits that weaken your body's sleep drive and sleep/wake clock   Unhelpful sleep thoughts that increase sleep-related worry and arousal.    The process involves 3-6 telehealth visits that guide you to implement proven strategies to get a better night's sleep.    People often see improvement in their sleep within a few weeks. Research shows if you keep practicing the skills you learn your sleep is likely to continue to improve 6-12 months after treatment.    Does this program prescribe or manage sleep medication?    No.  Your prescribing provider is responsible to assist you in managing your sleep medications.  Some people choose to stop using sleep medication prior to or during CBT-I.  Our program can work with your prescribing provider to help reduce or eliminate use of sleep medications.     Getting Started Today!    If you haven't already done so, we recommend you consider making the following changes to your sleep habits prior to your sleep consultation:     Reduce your consumption of caffeine and alcohol.  Both can disrupt sleep and make strengthening your sleep more difficult.  Specifically:    - Avoid caffeine within 6 hours of bedtime   - No more than 3 caffeinated beverages per day (e.g. 8 oz.  "cup coffee or 12 oz. cup soda)            - No alcohol within 3 hours of bedtime    Make sure your bedroom is quiet, comfortable and dark.  Noise, light and an uncomfortable sleep space can harm your sleep.      Keep the same sleep schedule 7 days a week.unless you do shift work.      Our Online CBT-I Program    If you want to get started today, research indicates that online CBT-I can be effective for some individuals. These programs requires comfort with hope-based or online learning.  However, digital CBT-I programs are not for everyone.  Contraindications include:    Seizure disorders,   Bipolar disorder,   Unstable medical or mental health conditions,   Frailty or risk of falling  Pregnancy    You should consult a sleep specialist before using these resources if you have:    Sleep Apnea  Restless Leg Syndrome  Sleep Walking  REM behavior disorder  Night Terrors  Excessive Daytime Sleepiness  Are engaged in shift work  Use prescription sleep medication    Click on the link below to get started today:                                  [email or web address]             Once you are registered you can share your sleep log data with [facility] where your health provider will be able to review your sleep log and progress.  Once you begin the program, go to the left side navigation bar and click on the  share sleep log  button:                                        This takes you to the next page where you enter the provider code [email or web address] and click Locate:                 This brings you to the verification page where you should see [facility] identified as your provider                                                                           By clicking \"Submit\" your sleep log data will be sent to our secure [facility] portal for review by you provider.     For Help Contact Customer Care At:    [email or web address]  If your sleep provider recommends online " CBT-I for you , the cost for an entire 6-week program is $40.    To get started, copy and paste the link below which will take you to the landing page to register:                              Self-help Workbooks for Insomnia    If you have found self-help books useful in the past, you may want to consider reading one of the following books prior to your consultation:    Say Chelsey to Insomnia: The Six-Week, Drug-Free Program Developed at [facility].  Jose F Escobar MD. Available in paperback, Shalom, and audiobook.    Overcoming Insomnia: A Cognitive-Behavioral Therapy Approach, Workbook.  Jhonathan Carballo, PhD  and Eden Falcon, PhD.  Available in paperback and Shalom.    Quiet Your Mind and Get to Sleep: Solutions to Insomnia for Those with Depression, Anxiety, or Chronic Pain.  Demetrice Day, PhD and Eden Falcon, PhD.  Available in paperback and Shalom           Your BMI is Body mass index is 24.87 kg/m .    What is BMI?  Body mass index (BMI) is one way to tell whether you are at a healthy weight, overweight, or obese. It measures your weight in relation to your height.  A BMI of 18.5 to 24.9 is in the healthy range. A person with a BMI of 25 to 29.9 is considered overweight, and someone with a BMI of 30 or greater is considered obese.  Another way to find out if you are at risk for health problems caused by overweight and obesity is to measure your waist. If you are a woman and your waist is more than 35 inches, or if you are a man and your waist is more than 40 inches, your risk of disease may be higher.  More than two-thirds of American adults are considered overweight or obese. Being overweight or obese increases the risk for further weight gain.  Excess weight may lead to heart disease and diabetes. Creating and following plans for healthy eating and physical activity may help you improve your health.    Methods for maintaining or losing weight.  Weight control is part of healthy lifestyle  and includes exercise, emotional health, and healthy eating habits.  Careful eating habits lifelong is the mainstay of weight control.  Though there are significant health benefits from weight loss, long-term weight loss with diet alone may be very difficult to achieve- studies show long-term success with dietary management in less than 10% of people. Attaining a healthy weight may be especially difficult to achieve in those with severe obesity. In some cases, medications, devices and surgical management might be considered.    What can you do?  If you are overweight or obese and are interested in methods for weight loss, you should discuss this with your provider. In addition, we recommend that you review healthy life styles and methods for weight loss available through the National Institutes of Health patient information sites:   http://win.niddk.nih.gov/publications/index.htm

## 2024-07-08 NOTE — PROGRESS NOTES
Sleep Study Follow-Up Visit:    Date on this visit: 7/8/2024    Andria Daily is a 39-year-old female with a PMH significant for DIANA, chronic fatigue syndrome, PTSD, ADHD, depression, history of eating disorder and substance abuse who comes in today for follow-up of her sleep study done on 5/08/2024 at the [facility] [place] for possible sleep apnea.            These findings were reviewed with patient.     Past medical/surgical history, family history, social history, medications and allergies were reviewed.      Problem List:  Patient Active Problem List    Diagnosis Date Noted    Pelvic pain in female 02/20/2023     Priority: Medium     Added automatically from request for surgery [id number]      Back pain 08/13/2021     Priority: Medium    DIANA (generalized anxiety disorder) 07/13/2020     Priority: Medium    PTSD (post-traumatic stress disorder)      Priority: Medium    Chronic fatigue syndrome      Priority: Medium    Hypersomnia      Priority: Medium    Chronic rhinitis      Priority: Medium    Recurrent major depressive disorder (H24)      Priority: Medium     Primary psychiatrist is Dr. Alejandro Palacio with [facility] ([phone])  Attended IOP at [facility] 4/27/20 to 6/18/20  Started TMS with [facility] 05/2020    Current Psych Meds (as of 6/11/20):  Modafinil 200mg in morning  Sertraline 50mg daily  Hydroxyzine 25mg tid PRN  Prazosin 1mg bedtime  Lorazepam 0.5-1mg PRN    Previous Psych Meds:  Gabapentin - caused tremors  Buspar  Cymbalta - over-activation at 40mg  Celexa - lost benefit over time  Wellbutrin - tremors  Effexor - night sweats  Mirtazapine - dizziness, night sweats  Zoloft 100mg - night sweats      History of eating disorder      Priority: Medium    History of substance use      Priority: Medium     - history of prescription pain killers, alcohol, cocaine, and marijuana abuse  - history of outpatient treatment at [address]  "treatment 2 months, IOP  - sober from cocaine since about 01/2018  - uses MJ daily, smokes      CHHAYA I (cervical intraepithelial neoplasia I) 05/24/2007     Priority: Medium     [] repeat co-testing 02/2026 2/6/21 Pap NIL, hrHPV negative  5/22/17 Pap NIL, hrHPV negative  11/3/14 Pap NIL  5/19/11 Pap NIL  10/6/10 Pap NIL  9/14/09 ASCUS hrHPV negative  5/7/08 Pap NIL  5/24/07 Culpo CIN1      Intrauterine device surveillance 01/29/2023     Priority: Low     - Mirena placed 09/2021        Current Outpatient Medications   Medication Sig Dispense Refill    azelastine (ASTEPRO) 0.15 % nasal spray Spray 2 sprays into both nostrils daily for 124 days 17 mL 1    dexmethylphenidate (FOCALIN) 10 MG tablet Take 1 tablet (10 mg) by mouth daily (with breakfast) 30 tablet 0    dexmethylphenidate (FOCALIN) 5 MG tablet Take 1 tablet (5 mg) by mouth daily at 2 pm 30 tablet 0    hydrOXYzine HCl (ATARAX) 25 MG tablet Take 1 tablet (25 mg) 2 times daily as needed for other (anxiety) 60 tablet 0    levonorgestrel (MIRENA) 20 MCG/DAY IUD 1 each by Intrauterine route once      mirtazapine (REMERON) 15 MG tablet Take half tablet (7.5 mg) daily for 4 nights 2 tablet 0    mirtazapine (REMERON) 30 MG tablet Take Half tablet (15 mg) nightly at bedtime. Start after done taking the 7.5 mg dose for 4 nights. 15 tablet 1    OLANZapine (ZYPREXA) 5 MG tablet Take 0.5 tablets (2.5 mg) by mouth 2 times daily as needed (mood) 30 tablet 1    prazosin (MINIPRESS) 1 MG capsule Take 1 mg by mouth at bedtime      propranolol (INDERAL) 20 MG tablet Take 1 tablet (20 mg) by mouth 2 times daily 180 tablet 1     No current facility-administered medications for this visit.     /67   Pulse 75   Resp 18   Ht 1.74 m (5' 8.5\")   Wt 75.3 kg (166 lb)   SpO2 98%   BMI 24.87 kg/m      Impression/Plan:  Snoring  - Negative for clinically significant Obstructive Sleep Apnea.   - Sleep associated hypoxemia was not present.  - Abundant limb movements with " infrequent arousals  Psychophysiological insomnia  - Behavioral Sleep Medicine  Referral; Future    Treatment options discussed today including   referral to sleep psychology for CBT-I .    Elected treatment with Referral to Sleep Psychology for CBT-I.  Information was provided in the AVS regarding cognitive behavioral therapy for insomnia.  Briefly discussed sleep hygiene guidelines.    She will follow up as needed.    37 minutes spent with patient, all of which were spent face-to-face counseling, consulting, chart review/documentation, and coordinating plan of care on the date of the encounter.      DAHIANA Smith CNP  Sleep Medicine      CC: Alma Hernandez     This note was written with the assistance of the Dragon voice-dictation technology software. The final document, although reviewed, may contain errors. For corrections, please contact the office.

## 2024-07-10 ENCOUNTER — PRE VISIT (OUTPATIENT)
Dept: OTOLARYNGOLOGY | Facility: CLINIC | Age: 39
End: 2024-07-10

## 2024-07-13 ENCOUNTER — MYC REFILL (OUTPATIENT)
Dept: PSYCHIATRY | Facility: CLINIC | Age: 39
End: 2024-07-13
Payer: COMMERCIAL

## 2024-07-13 DIAGNOSIS — F41.1 GAD (GENERALIZED ANXIETY DISORDER): Chronic | ICD-10-CM

## 2024-07-15 RX ORDER — HYDROXYZINE HYDROCHLORIDE 25 MG/1
TABLET, FILM COATED ORAL
Qty: 60 TABLET | Refills: 0 | Status: SHIPPED | OUTPATIENT
Start: 2024-07-15 | End: 2024-08-23

## 2024-07-15 NOTE — TELEPHONE ENCOUNTER
Date of Last Office Visit: 6/6/2024  Date of Next Office Visit:  7/22/2024  No shows since last visit: No  More than one patient-initiated cancellation (with reschedule) since last seen in clinic? No, 7/11/2024 - d/t schedule conflict    []Medication refilled per  Medication Refill in Ambulatory Care  policy.  [x]Medication unable to be refilled by RN due to criteria not met as indicated below:    []Eligibility: has not had a provider visit within last 6 months   []Supervision: no future appointment; < 7 days before next appointment   [x]Compliance: no shows; cancellations; lapse in therapy   []Verification: order discrepancy; may need modification...   []90-day supply request   []Advanced refill request: > 7 days before refill date   []Controlled medication   []Medication not included in policy   []Review: new med; med adjusted ? 30 days; safety alert; requires lab monitoring...   []Scope of Practice: refill request processed by LPN/MA   [x]Other: - This medication is not mentioned in the treatment plna      Medication(s) requested:   -  hydroxyzine HCl (ATARAX) 25 MG tablet   Date last ordered: 5/28/2024  Qty: 60  Refills: 0  Take 1 tablet (25 mg) 2 times daily as needed for other (anxiety)   Filled previously by Eugenia Velazco CNP at [facility]         Any Controlled Substance(s)? No    Requested medication(s) verified as identical to current order? Yes    Any lapse in adherence to medication(s) greater than 5 days? N/A, prn medication     Additional action taken? routed to provider for review.    Last visit treatment plan:   ASSESSMENT AND PLAN    Patient is a 39 year old,  White Choose not to answer female with a history notable for moderate episode of recurrent major depressive disorder, PTSD, generalized anxiety disorder, ADHD, predominantly inattentive type, and insomnia, unspecified type who presents for initial psychiatric evaluation for the medication management of ongoing psychiatric symptoms related to  worsening depression and anxiety following referral by her primary physician, Alma Butt MD. Patient with a longstanding history of depression and anxiety, psychiatric hospitalization due to worsening depression by way of suicidal attempt in the setting of medication overdose who has had chemical dependency treatment in 2015 continues to struggle with depressive and anxiety symptoms in the form of increased irritability, hopelessness and helplessness, excessive worry, intrusive thoughts, and excessive crying.  She is not currently on any antidepressant as she has not had success with several antidepressant she has tried in the past.  She takes propranolol on an as-needed basis.  She has not taken Zyprexa for over a week due to running out.  She smokes marijuana about twice daily.  She has completed PHP early this year but not able to continue with the day treatment program due to transportation problems.  Since patient not currently on any antidepressant at this time, I suggested start the patient on Remeron 15 mg at bedtime to help with mood, depression, anxiety as well as sleep.  I discussed medications risk, benefit, and side effects.  Patient verbalized good understanding and was amenable to taking Remeron at bedtime.  Patient reports sometimes she felt as though she is hearing voices or seeing people that were not there and become paranoia.I spent extensive time educating patient about her marijuana use and letting her know that mood and mind altering substance like marijuana will make symptoms worse by causing rebound anxiety and paranoia.  I also made patient aware that it is not appropriate and safe to be taking stimulants with marijuana.  I encouraged patient to stop using marijuana especially while taking stimulant.  Patient verbalized good understanding.  Patient endorsed passive SI but denied plans or intent.  Patient report normal no hypomania.  Patient to return to clinic in 6-week for follow-up.      Plan:  1.Patient will take the medications as prescribed.   Medications: Remeron 15 mg - Take half  tablet (7.5 mg) for 3 nights then take 1 tablet (15 mg) after third night  Continue Focalin 10 mg in the morning and Focalin 5 mg around 2 pm  Zyprexa 2.5 mg twice daily as needed for mood  Prazosin 1 mg at bedtime  Propranolol 20 mg twice daily as needed for anxiety  Patient will not stop taking medications or adjust them without consulting with the provider.  2.Patient will call with any problems between visits.  3.Patient will go to the emergency room if not feeling safe , unable to function in the community, or if suicidal, homicidal or hearing voices or having paranoia.  4.Patient will abstain from drugs and alcohol./Pt denies use .  5.Patient will not drive if sedated on medications or under influence of any substance.   6.Patient will not mix psychiatric medications with drugs and alcohol.   7.Patient will watch his diet and exercise.  8.Patient will see non psychiatric providers for non psychiatric disorders.  9. Next appointment in 5 weeks    Any medication(s) require lab monitoring? No    A quantity of 60 tablets is pended for provider review.     Selene Boyle RN on 7/15/2024 at 12:08 PM

## 2024-07-22 ENCOUNTER — VIRTUAL VISIT (OUTPATIENT)
Dept: PSYCHIATRY | Facility: CLINIC | Age: 39
End: 2024-07-22
Payer: COMMERCIAL

## 2024-07-22 DIAGNOSIS — F90.0 ATTENTION DEFICIT HYPERACTIVITY DISORDER (ADHD), PREDOMINANTLY INATTENTIVE TYPE: ICD-10-CM

## 2024-07-22 DIAGNOSIS — G47.00 INSOMNIA, UNSPECIFIED TYPE: ICD-10-CM

## 2024-07-22 DIAGNOSIS — F41.1 GAD (GENERALIZED ANXIETY DISORDER): ICD-10-CM

## 2024-07-22 DIAGNOSIS — F43.10 PTSD (POST-TRAUMATIC STRESS DISORDER): ICD-10-CM

## 2024-07-22 DIAGNOSIS — F33.9 EPISODE OF RECURRENT MAJOR DEPRESSIVE DISORDER, UNSPECIFIED DEPRESSION EPISODE SEVERITY (H): Primary | ICD-10-CM

## 2024-07-22 PROCEDURE — 99214 OFFICE O/P EST MOD 30 MIN: CPT | Mod: 95 | Performed by: NURSE PRACTITIONER

## 2024-07-22 RX ORDER — DEXMETHYLPHENIDATE HYDROCHLORIDE 15 MG/1
15 CAPSULE, EXTENDED RELEASE ORAL DAILY
Qty: 30 CAPSULE | Refills: 0 | Status: SHIPPED | OUTPATIENT
Start: 2024-07-22 | End: 2024-08-22

## 2024-07-22 ASSESSMENT — ANXIETY QUESTIONNAIRES
2. NOT BEING ABLE TO STOP OR CONTROL WORRYING: MORE THAN HALF THE DAYS
7. FEELING AFRAID AS IF SOMETHING AWFUL MIGHT HAPPEN: MORE THAN HALF THE DAYS
4. TROUBLE RELAXING: MORE THAN HALF THE DAYS
GAD7 TOTAL SCORE: 12
7. FEELING AFRAID AS IF SOMETHING AWFUL MIGHT HAPPEN: MORE THAN HALF THE DAYS
6. BECOMING EASILY ANNOYED OR IRRITABLE: MORE THAN HALF THE DAYS
1. FEELING NERVOUS, ANXIOUS, OR ON EDGE: SEVERAL DAYS
GAD7 TOTAL SCORE: 12
3. WORRYING TOO MUCH ABOUT DIFFERENT THINGS: MORE THAN HALF THE DAYS
8. IF YOU CHECKED OFF ANY PROBLEMS, HOW DIFFICULT HAVE THESE MADE IT FOR YOU TO DO YOUR WORK, TAKE CARE OF THINGS AT HOME, OR GET ALONG WITH OTHER PEOPLE?: VERY DIFFICULT
IF YOU CHECKED OFF ANY PROBLEMS ON THIS QUESTIONNAIRE, HOW DIFFICULT HAVE THESE PROBLEMS MADE IT FOR YOU TO DO YOUR WORK, TAKE CARE OF THINGS AT HOME, OR GET ALONG WITH OTHER PEOPLE: VERY DIFFICULT
GAD7 TOTAL SCORE: 12
5. BEING SO RESTLESS THAT IT IS HARD TO SIT STILL: SEVERAL DAYS

## 2024-07-22 ASSESSMENT — PATIENT HEALTH QUESTIONNAIRE - PHQ9
10. IF YOU CHECKED OFF ANY PROBLEMS, HOW DIFFICULT HAVE THESE PROBLEMS MADE IT FOR YOU TO DO YOUR WORK, TAKE CARE OF THINGS AT HOME, OR GET ALONG WITH OTHER PEOPLE: VERY DIFFICULT
SUM OF ALL RESPONSES TO PHQ QUESTIONS 1-9: 13
SUM OF ALL RESPONSES TO PHQ QUESTIONS 1-9: 13

## 2024-07-22 NOTE — PATIENT INSTRUCTIONS
"Patient Education   The [facility]  What to Expect  Here's what to expect in the [facility] Program.   About the program  You'll be meeting with a psychiatric doctor to check your mental health. A psychiatric doctor helps you deal with troubling thoughts and feelings by giving you medicine. They'll make sure you know the plan for your care. You may see them for a long time. When you're feeling better, they may refer you back to seeing your family doctor.   If you have any questions, we'll be glad to talk to you.  About visits  Be open  At your visits, please talk openly about your problems. It may feel hard, but it's the best way for us to help you.  Cancelling visits  If you can't come to your visit, please call us right away at [phone]. If you don't cancel at least 24 hours (1 full day) before your visit, that's \"late cancellation.\"  Not showing up for your visits  Being very late is the same as not showing up. You'll be a \"no show\" if:  You're more than 15 minutes late for a 30-minute (half hour) visit.  You're more than 30 minutes late for a 60-minute (full hour) visit.  If you cancel late or don't show up 2 times within 6 months, we may end your care.  Getting help between visits  If you need help between visits, you can call us Monday to Friday from 8 a.m. to 4:30 p.m. at [phone].  Emergency care  Call 911 or go to the nearest emergency department if your life or someone else's life is in danger.  Call 988 anytime to reach the national Suicide and Crisis hotline.  Medicine refills  To refill your medicine, call your pharmacy. You can also call [facility]'s Behavioral [facility] at [phone], Monday to Friday, 8 a.m. to 4:30 p.m. It can take 1 to 3 business days to get a refill.   Forms, letters, and tests  You may have papers to fill out, like FMLA, short-term disability, and workability. We can help you with these forms at your visits, but you must have an " appointment. You may need more than 1 visit for this, to be in an intensive therapy program, or both.  Before we can give you medicine for ADHD, we may refer you to get tested for it or confirm it another way.  We may not be able to give you an emotional support animal letter.  We don't do mental health checks ordered by the court.   We don't do mental health testing, but we can refer you to get tested.   Thank you for choosing us for your care.  For informational purposes only. Not to replace the advice of your health care provider. Copyright   2022 [facility]. All rights reserved. [employer] [id number] - 12/22.     Plan:  1.Patient will take the medications as prescribed.   Medications: Remeron 30 mg - Take half  tablet (15 mg) at bedtime  Take Focalin XR 15 mg DAILY  Zyprexa 2.5 mg twice daily as needed for mood  Prazosin 1 mg at bedtime  Propranolol 20 mg twice daily as needed for anxiety  Patient will not stop taking medications or adjust them without consulting with the provider.  2.Patient will call with any problems between visits.  3.Patient will go to the emergency room if not feeling safe , unable to function in the community, or if suicidal, homicidal or hearing voices or having paranoia.  4.Patient will abstain from drugs and alcohol./Pt denies use .  5.Patient will not drive if sedated on medications or under influence of any substance.   6.Patient will not mix psychiatric medications with drugs and alcohol.   7.Patient will watch his diet and exercise.  8.Patient will see non psychiatric providers for non psychiatric disorders.  9. Next appointment in 6-7  weeks

## 2024-07-22 NOTE — PROGRESS NOTES
Virtual Visit Details    Type of service:  Video Visit   Video Start Time:  1003  Video End Time: 1030    Originating Location (pt. Location): Home    Distant Location (provider location):  On site  Platform used for Video Visit: Jonny

## 2024-07-22 NOTE — PROGRESS NOTES
"PSYCHIATRIC PROGRESS NOTE     Name:  Andria Daily  : 1985    Andria Daily is a 39 year old female who is being evaluated via a billable Video visit.      Telemedicine Visit: The patient's condition can be safely assessed and treated via synchronous audio and visual telemedicine encounter.      Reason for Telemedicine Visit: COVID 19 pandemic and the social and physical recommendations by the CDC and MD., Patient has requested telehealth visit, and Patient unable to travel      Originating Site (Patient Location): Patient's home    Distant Site (Provider Location): [facility] Outpatient Setting: [facility]    Consent:  The patient/guardian has verbally consented to: the potential risks and benefits of telemedicine (video visit or phone) versus in person care; bill my insurance or make self-payment for services provided; and responsibility for payment of non-covered services.     Mode of Communication:  EnglishUp platform     As the provider I attest to compliance with applicable laws and regulations related to telemedicine.    Date of Last Visit: 24                                          CHIEF COMPLAINT     \"Doing better\"  HISTORY OF PRESENT ILLNESS     Patient who was last seen in the clinic on 24 returned today for follow up visit.  Patient reports she is still about the same as last time  as she has not noticed much improvement in symptoms since taking Remeron 7.5 mg.  Patient stated she is still struggling with increasing neurovegetative symptoms of depression in the form of low motivation, tiredness, feeling down, and poor sleep.  Patient does mention she does realize that she has been taking the old prescription of Remeron as she was supposed to be taking 15 mg for the past 5 weeks.  Patient reports she has only been taking Remeron 7.5 mg based on what was written on the old prescription bottle which was prescribed by different provider several years ago.  Patient requested " she would like to take extended release of Focalin so that the medication can last longer and not dealing with filling two  different prescription.  I encouraged patient to pay  attention to her prescription as taking medication with the wrong dose will not help with symptoms.  Patient verbalized good understanding.  I suggested changing Focalin 10 mg immediate release to Focalin XR 15 mg and discontinuing the 5 mg dose.  I also encouraged patient to start taking Remeron 15 mg at bedtime to further help with his sleep and depressive symptoms.  Patient verbally consented to new treatment plan.  Patient currently denying both suicidal and homicidal ideations.  Patient also denies both auditory and visual hallucination.  Patient report normal no hypomania.  Patient return to clinic in 7 weeks for follow-up.  PSYCHIATRIC HISTORY:   History of Psychiatric Hospitalizations:   - Inpatient: X2 in teenager and early 20s  - IOP/PHP/Day treatment: PHP in 2024  History of Suicidal Ideation: Positive  History of Suicide Attempts:  Positive with medication overdose    History of Self-injurious Behavior: Denies a history of SIB.  Current:  No  History of Violence/Aggression: Negative  History of Commitment? Negative  Electroconvulsive Therapy (ECT):Negative  TMS: 2020  PSYCHIATRIC REVIEW OF SYSTEMS:   Psychiatric Review of Systems:   Depression:   Reportsaf: depressed mood, suicidal ideation, decreased interest, changes in sleep, changes in appetite, guilt, hopelessness, helplessness, impaired concentration, decreased energy, irritability.  Yuni:   Denies: sleeplessness, increased goal-directed activities, abrupt increase in energy pressured speech  Psychosis:   Denies: visual hallucinations, auditory hallucinations, paranoia  Anxiety:   Reports: excessive worries that are difficult to control, panic attacks  PTSD:   Reports: re-experiencing past trauma, nightmares, increased arousal, avoidance of traumatic stimuli, impaired  function.  Denies: re-experiencing past trauma, nightmares, increased arousal, avoidance of traumatic stimuli, impaired function.  OCD:   Denies: obsessions, checking, symmetry, cleaning, skin picking.  Eating Disorder: Hx of Anorexia and Bulimia  Denies: restriction, binging, purging.    Sleep: Struggles with sleep      MEDICATIONS                                                                                                Current Outpatient Medications   Medication Sig Dispense Refill    azelastine (ASTEPRO) 0.15 % nasal spray Spray 2 sprays into both nostrils daily for 124 days 17 mL 1    dexmethylphenidate (FOCALIN) 10 MG tablet Take 1 tablet (10 mg) by mouth daily (with breakfast) 30 tablet 0    dexmethylphenidate (FOCALIN) 5 MG tablet Take 1 tablet (5 mg) by mouth daily at 2 pm 30 tablet 0    hydrOXYzine HCl (ATARAX) 25 MG tablet Take 1 tablet (25 mg) 2 times daily as needed for other (anxiety) 60 tablet 0    levonorgestrel (MIRENA) 20 MCG/DAY IUD 1 each by Intrauterine route once      mirtazapine (REMERON) 15 MG tablet Take half tablet (7.5 mg) daily for 4 nights 2 tablet 0    mirtazapine (REMERON) 30 MG tablet Take Half tablet (15 mg) nightly at bedtime. Start after done taking the 7.5 mg dose for 4 nights. 15 tablet 1    OLANZapine (ZYPREXA) 5 MG tablet Take 0.5 tablets (2.5 mg) by mouth 2 times daily as needed (mood) 30 tablet 1    prazosin (MINIPRESS) 1 MG capsule Take 1 mg by mouth at bedtime      propranolol (INDERAL) 20 MG tablet Take 1 tablet (20 mg) by mouth 2 times daily 180 tablet 1     No current facility-administered medications for this visit.       DRUG MONITORING:  [place] Prescription Monitoring Program evaluating controlled substances in the last year in [place]:  The [place] Prescription Monitoring Program has been reviewed and there are no current concerns with: diversionary activity, early refill requests, and or obtaining the medication from multiple providers       PAST PSYCHOTROPIC  "MEDICATIONS:      V- Lexapro  - Effexor (night sweats)  - Buspar 7.5-15mg  - Wellbutrin (worsened tremor)  - Celexa 20mg  - Cymbalta (trialed with Celexa for pain, noted memory issues)  - trazodone 50mg   - Zoloft 50mg (higher doses associated with night sweats)  - Prazosin 1mg (not well tolerated due to hypotension)ITALS   There were no vitals taken for this visit.     BP Readings from Last 1 Encounters:   07/08/24 102/67     Pulse Readings from Last 1 Encounters:   07/08/24 75     Wt Readings from Last 1 Encounters:   07/08/24 75.3 kg (166 lb)     Ht Readings from Last 1 Encounters:   07/08/24 1.74 m (5' 8.5\")     Estimated body mass index is 24.87 kg/m  as calculated from the following:    Height as of 7/8/24: 1.74 m (5' 8.5\").    Weight as of 7/8/24: 75.3 kg (166 lb).      PERTINENT HISTORY   PAST MEDICAL HISTORY:   Past Medical History:   Diagnosis Date    ADHD (attention deficit hyperactivity disorder)     completed assessment    Anxiety     Chronic fatigue syndrome     Chronic rhinitis     CHHAYA I (cervical intraepithelial neoplasia I) 05/24/2007    Depressive disorder     History of eating disorder     History of substance use     prescription pain killers, alcohol, cocaine    Hypersomnia     PTSD (post-traumatic stress disorder)        PAST SURGICAL HISTORY:   Past Surgical History:   Procedure Laterality Date    KNEE SURGERY Left 2018    for osgood-schlatter and tendon repair/anchor    LAPAROSCOPY DIAGNOSTIC (GYN) N/A 03/22/2023    Procedure: laparoscopy, chromopertubation;  Surgeon: Isabel Vera MD;  Location: [facility] OR    [place] Teeth Removal  2001    WRIST SURGERY Left 2015    snowboarding injury, fractured, had metal plate initially that was then removed       FAMILY HISTORY:   Family History   Problem Relation Age of Onset    Anxiety Disorder Mother     Tremor Mother     Depression Father     Anxiety Disorder Father     Sleep Apnea Father     Diabetes Maternal Grandmother     Hyperlipidemia " "Maternal Grandfather     Hypertension Maternal Grandfather     Coronary Artery Disease Maternal Grandfather 55    Anxiety Disorder Sister         after bad car accident    Breast Cancer No family hx of     Ovarian Cancer No family hx of     Colon Cancer No family hx of     Cerebrovascular Disease No family hx of        SOCIAL HISTORY:   Social History     Tobacco Use    Smoking status: Former     Current packs/day: 0.00     Types: Cigarettes     Quit date:      Years since quittin.5    Smokeless tobacco: Never   Substance Use Topics    Alcohol use: Yes     Comment: Rarely         Seizures or Head Injury: Denies history of head injury. Denies history of seizures.  History of cardiac disease, rheumatic fever, fainting or dizziness, especially with exercise, seizures, chest pain or shortness of breath with exercise, unexplained change in exercise tolerance, palpitations, high blood pressure, or heart murmur?   No    LABS & IMAGING                                                                                                                Personally reviewed  Recent Labs   Lab Test 23  1130 17  0916   WBC 6.2 5.5   HGB 13.6 13.8   HCT 42.1 41.7   MCV 94 91    185   ANEU  --  3.5     Recent Labs   Lab Test 23  1130   GLC 84     Recent Labs   Lab Test 23  1130   CHOL 163   LDL 86   HDL 67   TRIG 51     No lab results found.  No results found for: \"ZNN944\", \"KGJV684\", \"UAAS72YTYPH\", \"VITD3\", \"D2VIT\", \"D3VIT\", \"DTOT\", \"[id number]\", \"[id number]\", \"[id number]\", \"[id number]\", \"[id number]\", \"[id number]\"     ALLERGY & IMMUNIZATIONS       Allergies   Allergen Reactions    Dairy Products [Milk Protein] Other (See Comments)     Nasal problems, stomach pain     No Clinical Screening - See Comments      Apples and bananas cause her throat to itch    Seasonal Allergies     Flonase [Fluticasone] Dizziness and Rash    Penicillins Rash     Rash on face and threw up       FAMILY MEDICAL HISTORY: "     Family History       Problem (# of Occurrences) Relation (Name,Age of Onset)    Anxiety Disorder (3) Mother, Father, Sister: after bad car accident    Depression (1) Father    Diabetes (1) Maternal Grandmother    Hypertension (1) Maternal Grandfather    Hyperlipidemia (1) Maternal Grandfather    Coronary Artery Disease (1) Maternal Grandfather (55)    Sleep Apnea (1) Father    Tremor (1) Mother           Negative family history of: Breast Cancer, Ovarian Cancer, Colon Cancer, Cerebrovascular Disease              Family history of sudden or unexplained death or an event requiring resuscitation in children or young adults, cardiac arrhythmias (eg, Janelle-Parkinson-White syndrome), long QT syndrome, catecholaminergic paroxysmal ventricular tachycardia, Brugada syndrome, arrhythmogenic right ventricular dysplasia, hypertrophic cardiomyopathy, dilated cardiomyopathy, or Marfan syndrome?  No    FAMILY PSYCHIATRIC HISTORY:   Psychiatry:Father, mother sister with anxiety, father with depression  Substance use history in family: Denies  Family suicide history:Negative      SIGNIFICANT SOCIAL/FAMILY HISTORY:                                           Born and raised in: [place]  Relationship status: Single living with boyfriend  Children: None    Highest education level was:Associate degree   Service:Denies  Employment status: Works part time at [employer]  LEGAL:     SUBSTANCE USE HISTORY      Alcohol- fewer urges to drink, mindful with med and risks of alcohol   - treatment in 2015  Nicotine- none  Caffeine- 1-2 cups coffee daily, 1-2 energy drinks a week, can increase shaking if she doesn't eat enough  Opioids- sober since 2015   Cannabis- smoking 1-2x daily  - treated for cannabis use in 2015   Other Illicit Drugs- sober from cocaine and hallucinogens since 2015    MEDICAL REVIEW OF SYSTEMS:   Ten system review was completed with pertinent positives noted above    MENTAL STATUS EXAM:   Mental Status  "Examination (limited due to video virtual visit format):  Vital Signs: There were no vitals taken for this virtual visit.  Appearance: adequately groomed, appears stated age, and in no apparent distress.  Attitude: cooperative   Eye Contact: good to the extent that can be determined in a video visit  Muscle Strength and Tone: no gross abnormalities based on remote observation  Psychomotor Behavior:  no evidence of tardive dyskinesia, dystonia, or tics based on remote observation  Gait and Station: normal, no gross abnormalities based on remote observation  Speech: clear, coherent, normal prosody, regular rate, regular rhythm and fluent  Associations: No loosening of associations  Thought Process: coherent and goal directed  Thought Content: no evidence of suicidal ideation or homicidal ideation, no evidence of psychotic thought, no auditory hallucinations present and no visual hallucinations present  Mood: \"Neutral\"  Affect: appropriate and in normal range  Insight: good  Judgment: intact, adequate for safety  Impulse Control: intact  Oriented to: time, place, person and situation  Attention Span and Concentration: normal  Language: Intact  Recent and Remote Memory: intact to interview. Not formally assessed. No amnesia.  Fund of Knowledge: appropriate        SAFETY   Feels safe in home: Yes   Suicidal ideation: Denies  History of suicide attempts:  No   Hx of impulsivity: No     DSM 5 DIAGNOSIS:   1. Moderate episode of recurrent major depressive disorder (H)    2. PTSD (post-traumatic stress disorder)    3. DIANA (generalized anxiety disorder)    4. Attention deficit hyperactivity disorder (ADHD), predominantly inattentive type    5. Insomnia, unspecified type      ASSESSMENT AND PLAN    Patient is a 39 year old,  White Choose not to answer female with a history notable for moderate episode of recurrent major depressive disorder, PTSD, generalized anxiety disorder, ADHD, predominantly inattentive type, and insomnia, " unspecified type who presents for follow up visit.  She has been taking Remeron 7.5 mg from her old prescription bottle.  I encouraged patient to start taking Remeron 15 mg at bedtime to further help with depressive symptoms and sleep.  Focalin 10 mg instant release changed to Focalin XR 15 mg per patient request.  She denies SI/SIB/HI.  Patient report normal no hypomania.  Patient to return to clinic in 6-week for follow-up.    Plan:  1.Patient will take the medications as prescribed.   Medications: Remeron 30 mg - Take half  tablet (15 mg) at bedtime  Take Focalin XR 15 mg DAILY  Zyprexa 2.5 mg twice daily as needed for mood  Prazosin 1 mg at bedtime  Propranolol 20 mg twice daily as needed for anxiety  Patient will not stop taking medications or adjust them without consulting with the provider.  2.Patient will call with any problems between visits.  3.Patient will go to the emergency room if not feeling safe , unable to function in the community, or if suicidal, homicidal or hearing voices or having paranoia.  4.Patient will abstain from drugs and alcohol./Pt denies use .  5.Patient will not drive if sedated on medications or under influence of any substance.   6.Patient will not mix psychiatric medications with drugs and alcohol.   7.Patient will watch his diet and exercise.  8.Patient will see non psychiatric providers for non psychiatric disorders.  9. Next appointment in 6-7  weeks    Risk Assessment:     Andria has notable risk factors for self-harm, including, single status, anxiety, mood dysregulation, substance use,  and passive SI. However, risk is mitigated by ability to volunteer a safety plan and history of seeking help when needed. Additional steps taken to minimize risk include making medication adjustment, asking patient to call 911 and go to the ER if not able to stay safe at home,  Therefore, based on all available evidence including the factors cited above, Andria does not appear to be an imminent  danger to self or others and does not meet criteria 72 hour hold. However, if patient uses substances or is non-adherent with medication, their risk of decompensation and SI/HI will be elevated. This was discussed with the patient as she verbalized good understanding.   CONSULTS/REFERRALS:   Continue therapy  None at this time  Coordinate care with therapist as needed    MEDICAL:   None at this time  Coordinate care with PCP (Alma Hernandez) as needed  Follow up with primary care provider as planned or for acute medical concerns.    PSYCHOEDUCATION:  Medication side effects and alternatives reviewed. Health promotion activities recommended and reviewed today. All questions addressed. Education and counseling completed regarding risks and benefits of medications and psychotherapy options.  Consent provided by patient/guardian  Call the psychiatric nurse line with medication questions or concerns at [phone].  [employer] may be used to communicate with your provider, but this is not intended to be used for emergencies.  BLACK BOX WARNING: Discussed the Food and Drug Administration (FDA) requires that all antidepressants carry a warning that some children, adolescents and young adults may be at increased risk of suicide when taking antidepressants. Anyone taking an antidepressant should be watched closely for worsening depression or unusual behavior especially in the first few weeks after starting an SSRI. Keep in mind, antidepressants are more likely to reduce suicide risk in the long run by improving mood.   SEROTONIN SYNDROME:  Discussed risks of Serotonin syndrome (ie, serotonin toxicity) which is a potentially life-threatening condition associated with increased serotonergic activity in the central nervous system (CNS). It is seen with therapeutic medication use, inadvertent interactions between drugs, and intentional self-poisoning. Serotonin syndrome may involve a spectrum of clinical findings, which often  include mental status changes, autonomic hyperactivity, and neuromuscular abnormalities.    BENZODIAZEPINE:  discussion on how benzos work and the need to use them short term due to potential of anxiety getting.  This is a controlled substance with risk for abuse, need to keep in a safe keep place and cannot replace lost scripts.    HYPNOTIC USE: Hypnotic use, risk for CNS depression, sleep-walking, not to mix with ETOH or other CNS depressant, need for six hours of sleep, stop if change in mood.  This is a controlled substance with risk for abuse, need to keep in a safe keep place and cannot replace lost scripts.  FIRST GENERATION ANTIPSYCHOTIC/ SECOND GENERATION ANTIPSYCHOTIC USE:  Atypical need for cardiometabolic monitoring with medication- B/P, weight, blood sugar, cholesterol.  Need to monitor for abnormal movements taught  SAFETY:  We all care about your loved one's safety. To reduce the risk of self-harm, remove access to all:  Firearms, Medicines (both prescribed and over-the-counter), Knives and other sharp objects, Ropes and like materials, and Alcohol  SLEEP HYGIENE: establish a sleep routine, limit screen time 1 hour prior to bed, use bed for sleep only, take sleep/medications on time (including sleepy time tea, trazadone or herbal treatments such as melatonin), aroma therapy, limit caffeine/sugar, yoga, guided imagery, stretch, meditation, limit naps to 20 minutes, make a temperature change in the room, white noise, be mindful of slowing down breathing, take a warm bath/shower, frequently wash sheets, and journaling.   Medlineplus.gov is information for patients.  It is run by the National Library of Medicine and it contains information about all disorders, diseases and all medications.      COMMUNITY RESOURCES:    CRISIS NUMBERS: Provided in AVS 6/6/2024  National Suicide Prevention Lifeline: 0-915-584-TALK (393-304-8358)  CitalDoc/resources for a list of additional resources (SOS)             [facility] - [phone]   Urgent Care Adult Mental Lgqbzb-[phone] mobile unit/  crisis line  [facility] -[phone]   COPE  [facility] Mobile Team -[phone] (adults)/ [phone] (child)  Poison Control Center - 1-590.948.4075    OR  go to nearest ER  Crisis Text Line for any crisis  send this-   To: 096861   [facility] ([facility]) [facility]  [phone]  National Suicide Prevention Lifeline: 554.453.8389 (TTY: 425.882.1349). Call anytime for help.  (www.suicidepreventionlifeline.org)  National [place] on Mental Illness (www.iman.org): 836.946.1216 or 128-901-0305.   Mental Health Association (www.mentalhealth.org): 358.756.2623 or 223-525-5171.  [place] Crisis Text Line: Text [place] to 148369  Suicide LifeLine Chat: suicidehappyview.org/chat    ADMINISTRATIVE BILLIN mins spent interviewing patient, reviewing referral documents, obtaining and reviewing outside records, communication with other health specialists, and preparing this report on this day: 24    Video/Phone Start Time:  1003  Video/Phone End Time:   1030    Greater than 50% of time was spent in counseling and coordination of care regarding above diagnoses and treatment plan.    Signed:   Reddy Mello, MSN, APRN, PMHNP-BC  Long Term Outpatient Psychiatry  Chart documentation done in part with Dragon Voice Recognition software.  Although reviewed after completion, some word and grammatical errors may remain.   Answers submitted by the patient for this visit:  Patient Health Questionnaire (Submitted on 2024)  If you checked off any problems, how difficult have these problems made it for you to do your work, take care of things at home, or get along with other people?: Very difficult  PHQ9 TOTAL SCORE: 17  DIANA-7 (Submitted on 2024)  DIANA 7 TOTAL SCORE: 13    Answers submitted by the patient for this visit:  Patient Health Questionnaire (Submitted on  7/22/2024)  If you checked off any problems, how difficult have these problems made it for you to do your work, take care of things at home, or get along with other people?: Very difficult  PHQ9 TOTAL SCORE: 13  DIANA-7 (Submitted on 7/22/2024)  DIANA 7 TOTAL SCORE: 12

## 2024-07-22 NOTE — NURSING NOTE
Is the patient currently in the state of [place]? YES    Current patient location: [address] [place] [id number]    Visit mode:VIDEO    If the visit is dropped, the patient can be reconnected by: VIDEO VISIT: Text to cell phone:   Telephone Information:   Mobile [phone]       Will anyone else be joining the visit? No  (If patient encounters technical issues they should call [phone])    How would you like to obtain your AVS? MyChart    Are changes needed to the allergy or medication list? No    Rooming Documentation: Assigned questionnaire(s) completed .    Reason for visit: SIMONE Corral

## 2024-08-13 DIAGNOSIS — F41.1 GAD (GENERALIZED ANXIETY DISORDER): Chronic | ICD-10-CM

## 2024-08-19 DIAGNOSIS — G47.00 INSOMNIA, UNSPECIFIED TYPE: ICD-10-CM

## 2024-08-19 DIAGNOSIS — F41.1 GAD (GENERALIZED ANXIETY DISORDER): Chronic | ICD-10-CM

## 2024-08-19 DIAGNOSIS — F33.1 MODERATE EPISODE OF RECURRENT MAJOR DEPRESSIVE DISORDER (H): Chronic | ICD-10-CM

## 2024-08-19 RX ORDER — MIRTAZAPINE 30 MG/1
TABLET, FILM COATED ORAL
Qty: 15 TABLET | Refills: 0 | Status: SHIPPED | OUTPATIENT
Start: 2024-08-19 | End: 2024-09-04 | Stop reason: SINTOL

## 2024-08-19 NOTE — TELEPHONE ENCOUNTER
Date of Last Office Visit: 7/23/2024  Date of Next Office Visit:  9/4/2024  No shows since last visit: No  More than one patient-initiated cancellation (with reschedule) since last seen in clinic? No    []Medication refilled per  Medication Refill in Ambulatory Care  policy.  [x]Medication unable to be refilled by RN due to criteria not met as indicated below:    []Eligibility: has not had a provider visit within last 6 months   []Supervision: no future appointment; < 7 days before next appointment   []Compliance: no shows; cancellations; lapse in therapy   []Verification: order discrepancy; may need modification...   [] > 30-day supply request   []Advanced refill request: > 7 days before refill date   []Controlled medication   []Medication not included in policy   []Review: new med; med adjusted ? 30 days; safety alert; requires lab monitoring...   []Scope of Practice: refill request processed by LPN/MA   [x]Other:  see notes       Medication(s) requested:   -  Mirtazapine (Remeron) 30 mg tablets   Date last ordered: 6/11/2024  Qty: 15  Refills: 1  Take Half tablet (15 mg) nightly at bedtime. Start after done taking the 7.5 mg dose for 4 nights.     Appropriate for refill? Provider to review. See [facility] Medical Advice Encounter dated today 8/19/2024 - the patient has been taking a full tablet since last appointment and is having some side effects.      Any Controlled Substance(s)? No      Requested medication(s) verified as identical to current order? Yes    Any lapse in adherence to medication(s) greater than 5 days? No      Additional action taken? routed encounter to provider for review.      Last visit treatment plan:   ASSESSMENT AND PLAN    Patient is a 39 year old,  White Choose not to answer female with a history notable for moderate episode of recurrent major depressive disorder, PTSD, generalized anxiety disorder, ADHD, predominantly inattentive type, and insomnia, unspecified type who presents for  follow up visit.  She has been taking Remeron 7.5 mg from her old prescription bottle.  I encouraged patient to start taking Remeron 15 mg at bedtime to further help with depressive symptoms and sleep.  Focalin 10 mg instant release changed to Focalin XR 15 mg per patient request.  She denies SI/SIB/HI.  Patient report normal no hypomania.  Patient to return to clinic in 6-week for follow-up.     Plan:  1.Patient will take the medications as prescribed.   Medications: Remeron 30 mg - Take half  tablet (15 mg) at bedtime  Take Focalin XR 15 mg DAILY  Zyprexa 2.5 mg twice daily as needed for mood  Prazosin 1 mg at bedtime  Propranolol 20 mg twice daily as needed for anxiety  Patient will not stop taking medications or adjust them without consulting with the provider.  2.Patient will call with any problems between visits.  3.Patient will go to the emergency room if not feeling safe , unable to function in the community, or if suicidal, homicidal or hearing voices or having paranoia.  4.Patient will abstain from drugs and alcohol./Pt denies use .  5.Patient will not drive if sedated on medications or under influence of any substance.   6.Patient will not mix psychiatric medications with drugs and alcohol.   7.Patient will watch his diet and exercise.  8.Patient will see non psychiatric providers for non psychiatric disorders.  9. Next appointment in 6-7  weeks    Any medication(s) require lab monitoring? No

## 2024-08-21 ENCOUNTER — OFFICE VISIT (OUTPATIENT)
Dept: OTOLARYNGOLOGY | Facility: CLINIC | Age: 39
End: 2024-08-21
Attending: STUDENT IN AN ORGANIZED HEALTH CARE EDUCATION/TRAINING PROGRAM
Payer: COMMERCIAL

## 2024-08-21 VITALS
SYSTOLIC BLOOD PRESSURE: 102 MMHG | DIASTOLIC BLOOD PRESSURE: 70 MMHG | HEIGHT: 69 IN | HEART RATE: 76 BPM | OXYGEN SATURATION: 96 % | BODY MASS INDEX: 23.95 KG/M2 | WEIGHT: 161.7 LBS

## 2024-08-21 DIAGNOSIS — J32.9 CHRONIC CONGESTION OF PARANASAL SINUS: ICD-10-CM

## 2024-08-21 DIAGNOSIS — J30.2 SEASONAL ALLERGIC RHINITIS, UNSPECIFIED TRIGGER: Primary | ICD-10-CM

## 2024-08-21 PROCEDURE — 99213 OFFICE O/P EST LOW 20 MIN: CPT | Mod: 25 | Performed by: STUDENT IN AN ORGANIZED HEALTH CARE EDUCATION/TRAINING PROGRAM

## 2024-08-21 PROCEDURE — 31231 NASAL ENDOSCOPY DX: CPT | Performed by: STUDENT IN AN ORGANIZED HEALTH CARE EDUCATION/TRAINING PROGRAM

## 2024-08-21 RX ORDER — AZELASTINE HCL 205.5 UG/1
2 SPRAY NASAL DAILY
Qty: 17 ML | Refills: 1 | Status: SHIPPED | OUTPATIENT
Start: 2024-08-21

## 2024-08-21 ASSESSMENT — PAIN SCALES - GENERAL: PAINLEVEL: SEVERE PAIN (7)

## 2024-08-21 NOTE — LETTER
8/21/2024       RE: Andria Daily  [address]  [place] [address]     Dear Colleague,    Thank you for referring your patient, Andria Daily, to the [facility] [place] [facility]. Please see a copy of my visit note below.      [facility]  Return Patient Visit      Encounter date:   8/21/24    Referring Provider:   Savage Garcia MD  [address] [place] [id number]    Reason for Visit: New Patient      History of Present Illness:      Andria Daily is a 39 year old female who presents for consultation regarding chronic sinusitis. Patient endorses difficulty breathing, nasal drainage, and gets recurrent tonsil stones. Her nasal obstruction has made it difficult for her to breathe at night.  She notes of increased snoring and has been working with Sleep Medicine for possible sleep apnea. Most recent sleep study did not demonstrate obvious JOHANNY but she does endorse significant issues with sleep. Has a history of environmental allergies which she feels contributes to her symptoms. Has had prior testing. Has trialed Flonase in the past but was unable to tolerate the medication due to an allergy in the spray. Her regimen currently consists of Zyrtec.  Worried that she may have a deviated septum based on prior trauma to the nose.       Interval Hx (8/21/24):     Returns for follow-up. States that she thinks she is doing better. Currently just doing saline sprays. Zyrtec dried out her nose too much and wasn't able to get the Astepro.     At her last visit with her allergist did have discussion about allergy shots but didn't proceed at that time.     Sino-Nasal Outcome Test (SNOT - 22)  DNT       Review of Systems:  A comprehensive 14-point review of systems was performed with positives and pertinent negatives listed in the HPI.    Past Medical/Surgical History:  Reviewed today with the patient. No history of  major medical comorbidities. Does not take any blood thinners. No prior history of sinonasal surgery or trauma.    Allergies:       Allergies   Allergen Reactions     Dairy Products [Milk Protein] Other (See Comments)     Nasal problems, stomach pain      No Clinical Screening - See Comments      Apples and bananas cause her throat to itch     Seasonal Allergies      Flonase [Fluticasone] Dizziness and Rash     Penicillins Rash     Rash on face and threw up       Medical History:  Past Medical History:   Diagnosis Date     ADHD (attention deficit hyperactivity disorder)     completed assessment     Anxiety      Chronic fatigue syndrome      Chronic rhinitis      CHHAYA I (cervical intraepithelial neoplasia I) 05/24/2007     Depressive disorder      History of eating disorder      History of substance use     prescription pain killers, alcohol, cocaine     Hypersomnia      PTSD (post-traumatic stress disorder)         Surgical History:   Past Surgical History:   Procedure Laterality Date     KNEE SURGERY Left 2018    for osgood-schlatter and tendon repair/anchor     LAPAROSCOPY DIAGNOSTIC (GYN) N/A 03/22/2023    Procedure: laparoscopy, chromopertubation;  Surgeon: Isabel Vera MD;  Location: [facility] OR     Athena Teeth Removal  2001     WRIST SURGERY Left 2015    snowboarding injury, fractured, had metal plate initially that was then removed        Family History:  Family History   Problem Relation Age of Onset     Anxiety Disorder Mother      Tremor Mother      Depression Father      Anxiety Disorder Father      Sleep Apnea Father      Diabetes Maternal Grandmother      Hyperlipidemia Maternal Grandfather      Hypertension Maternal Grandfather      Coronary Artery Disease Maternal Grandfather 55     Anxiety Disorder Sister         after bad car accident     Breast Cancer No family hx of      Ovarian Cancer No family hx of      Colon Cancer No family hx of      Cerebrovascular Disease No family hx of          Social History:   Social History     Socioeconomic History     Marital status: Single   Tobacco Use     Smoking status: Former     Current packs/day: 0.00     Types: Cigarettes     Quit date: 2016     Years since quittin.6     Smokeless tobacco: Never   Vaping Use     Vaping status: Never Used   Substance and Sexual Activity     Alcohol use: Yes     Comment: Rarely     Drug use: Yes     Types: Marijuana     Comment: Daily (usually smoke)     Sexual activity: Yes     Partners: Male     Birth control/protection: I.U.D.     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    Currently working on physical and mental health until she is able to work again    Did school for theater    Lives with boyfriend, cat (Naomi)     Social Determinants of Health     Financial Resource Strain: Not on File (2024)    Received from [facility]    Financial Resource Strain      Financial Resource Strain: 0   Food Insecurity: Not on File (2024)    Received from [facility]    Food Insecurity      Food: 0   Recent Concern: Food Insecurity - High Risk (2024)    Food Insecurity      Within the past 12 months, did you worry that your food would run out before you got money to buy more?: Yes      Within the past 12 months, did the food you bought just not last and you didn t have money to get more?: Yes   Transportation Needs: Not on File (2024)    Received from [facility]    Transportation Needs      Transportation: 0   Physical Activity: Not on File (2024)    Received from [facility]    Physical Activity      Physical Activity: 0   Stress: Not on File (2024)    Received from [facility]    Stress      Stress: 0   Social Connections: Not on File (2024)    Received from [facility]    Social Connections      Social Connections and Isolation: 0   Interpersonal Safety: Low Risk   "(2023)    Interpersonal Safety      Do you feel physically and emotionally safe where you currently live?: Yes      Within the past 12 months, have you been hit, slapped, kicked or otherwise physically hurt by someone?: No      Within the past 12 months, have you been humiliated or emotionally abused in other ways by your partner or ex-partner?: No   Housing Stability: Not on File (2024)    Received from [facility]    Housing Stability      Housin            Family History:  Reviewed with patient. No pertinent positives.    Physical Examination:  /70 (BP Location: Left arm, Patient Position: Sitting, Cuff Size: Adult Regular)   Pulse 76   Ht 1.74 m (5' 8.5\")   Wt 73.3 kg (161 lb 11.2 oz)   SpO2 96%   BMI 24.23 kg/m      Constitutional/Psychiatric: This is a well-appearing, well-dressed patient in no acute distress. female communicates easily with no obvious speech issues. Oriented to self and environment with appropriate conversation. Does not appear depressed, anxious, or agitated.  Head: Normocephalic. Overall inspection reveals no prior scars, lesions, or masses. Facial motion is symmetric. No sinus tenderness.  Eyes: Extra-ocular motions are intact with symmetric gaze.   Ears: Auricles without masses or lesions bilaterally. External auditory canals clear with minimal non-obstructive cerumen. Tympanic membranes intact without middle ear fluid or retraction. Hearing intact grossly at conversational volume.  Oral Cavity/Oropharynx: Lips, gingiva and teeth appear healthy.   Neck/Lymphatic: Flat  Respiratory: No increased work of breathing or respiratory distress. No audible stridor or stertor.  Peripheral/Cardiovascular: Brisk capillary refill bilaterally.   Neurologic: Cranial nerves II-XII grossly intact as tested.      Imaging Review:  CT FACIAL BONES WITHOUT CONTRAST 2020   Impression: No evidence of sinusitis.       Procedure Note: Diagnostic Nasal Endoscopy, CPT 02244  Date of " Service: June 6, 2024  Provider: Zain Turner MD   Presumptive Diagnosis: No primary diagnosis found.  Anesthesia: Topical lidocaine and oxymetazoline via atomizer    Indication:  Evaluation of nasal/sinus complaints; inadequate visualization with anterior rhinoscopy    Description of procedure:  After obtaining consent for the procedure from the patient, the sinonasal cavity was sprayed with topical anesthetic. A rigid 30-degree nasal endoscope was used to first used to visualize the nasal floor and the nasopharynx on the left. A second pass was then made to visualize the middle meatus and sphenoethmoid recess. Finally, the scope was turned 90-degrees and used to visualize the olfactory cleft and frontal outflow tract. A similar approach was used for the contralateral side. She tolerated the procedure well without difficulty.    Endoscopic Findings:    Oscar-Los Endoscopic Scoring System  Endoscopic observation Right Left   Polyps in middle meatus (0 = absent, 1 = restricted to middle meatus, 2 = Beyond middle meatus) 0 0   Discharge (0 = absent, 1 = thin and clear, 2 = thick, purulent) 0 0   Edema (0 = absent, 1 = mild-moderate, 2 = moderate-severe) 0 0   Crusting (0 = absent, 1 = mild-moderate, 2 = moderate-severe) 0 0   Scarring (0= absent, 1 = mild-moderate, 2 = moderate-severe) 0 0   Total 0 0     Findings:  LT/RT: Bilateral sinonasal passages patent without evidence of infection, polyps, or mass. Edema improved from prior exam       Final Assessment/Plan:  Improved edema on exam today  Discussed trailing Claritin instead of Zyrtec and using the Astepro for two weeks to trail how it does for her  Patient will follow-up as needed            Electronically signed by:  Zain Turner MD    [facility]  Rhinology  Endoscopic Skull Base Surgery  [facility]  Department of Otolaryngology - Head & Neck Surgery        Again, thank you for allowing me to participate in the  care of your patient.      Sincerely,    Zain Turner MD

## 2024-08-21 NOTE — PROGRESS NOTES
[facility]  Return Patient Visit      Encounter date:   8/21/24    Referring Provider:   Savage Garcia MD  [address] [place] [address]    Reason for Visit: New Patient      History of Present Illness:      Andria Daily is a 39 year old female who presents for consultation regarding chronic sinusitis. Patient endorses difficulty breathing, nasal drainage, and gets recurrent tonsil stones. Her nasal obstruction has made it difficult for her to breathe at night.  She notes of increased snoring and has been working with Sleep Medicine for possible sleep apnea. Most recent sleep study did not demonstrate obvious JOHANNY but she does endorse significant issues with sleep. Has a history of environmental allergies which she feels contributes to her symptoms. Has had prior testing. Has trialed Flonase in the past but was unable to tolerate the medication due to an allergy in the spray. Her regimen currently consists of Zyrtec.  Worried that she may have a deviated septum based on prior trauma to the nose.       Interval Hx (8/21/24):     Returns for follow-up. States that she thinks she is doing better. Currently just doing saline sprays. Zyrtec dried out her nose too much and wasn't able to get the Astepro.     At her last visit with her allergist did have discussion about allergy shots but didn't proceed at that time.     Sino-Nasal Outcome Test (SNOT - 22)  DNT       Review of Systems:  A comprehensive 14-point review of systems was performed with positives and pertinent negatives listed in the HPI.    Past Medical/Surgical History:  Reviewed today with the patient. No history of major medical comorbidities. Does not take any blood thinners. No prior history of sinonasal surgery or trauma.    Allergies:       Allergies   Allergen Reactions    Dairy Products [Milk Protein] Other (See Comments)     Nasal problems, stomach pain     No Clinical Screening - See Comments      Apples and bananas cause her  throat to itch    Seasonal Allergies     Flonase [Fluticasone] Dizziness and Rash    Penicillins Rash     Rash on face and threw up       Medical History:  Past Medical History:   Diagnosis Date    ADHD (attention deficit hyperactivity disorder)     completed assessment    Anxiety     Chronic fatigue syndrome     Chronic rhinitis     CHHAYA I (cervical intraepithelial neoplasia I) 2007    Depressive disorder     History of eating disorder     History of substance use     prescription pain killers, alcohol, cocaine    Hypersomnia     PTSD (post-traumatic stress disorder)         Surgical History:   Past Surgical History:   Procedure Laterality Date    KNEE SURGERY Left     for osgood-schlatter and tendon repair/anchor    LAPAROSCOPY DIAGNOSTIC (GYN) N/A 2023    Procedure: laparoscopy, chromopertubation;  Surgeon: Isabel Vera MD;  Location: [facility] OR    Griffin Teeth Removal      WRIST SURGERY Left     snowboarding injury, fractured, had metal plate initially that was then removed        Family History:  Family History   Problem Relation Age of Onset    Anxiety Disorder Mother     Tremor Mother     Depression Father     Anxiety Disorder Father     Sleep Apnea Father     Diabetes Maternal Grandmother     Hyperlipidemia Maternal Grandfather     Hypertension Maternal Grandfather     Coronary Artery Disease Maternal Grandfather 55    Anxiety Disorder Sister         after bad car accident    Breast Cancer No family hx of     Ovarian Cancer No family hx of     Colon Cancer No family hx of     Cerebrovascular Disease No family hx of         Social History:   Social History     Socioeconomic History    Marital status: Single   Tobacco Use    Smoking status: Former     Current packs/day: 0.00     Types: Cigarettes     Quit date:      Years since quittin.6    Smokeless tobacco: Never   Vaping Use    Vaping status: Never Used   Substance and Sexual Activity    Alcohol use: Yes     Comment:  Rarely    Drug use: Yes     Types: Marijuana     Comment: Daily (usually smoke)    Sexual activity: Yes     Partners: Male     Birth control/protection: I.U.D.     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    Currently working on physical and mental health until she is able to work again    Did school for theater    Lives with boyfriend, cat (Naomi)     Social Determinants of Health     Financial Resource Strain: Not on File (2/29/2024)    Received from [facility]    Financial Resource Strain     Financial Resource Strain: 0   Food Insecurity: Not on File (2/29/2024)    Received from [facility]    Food Insecurity     Food: 0   Recent Concern: Food Insecurity - High Risk (1/25/2024)    Food Insecurity     Within the past 12 months, did you worry that your food would run out before you got money to buy more?: Yes     Within the past 12 months, did the food you bought just not last and you didn t have money to get more?: Yes   Transportation Needs: Not on File (2/29/2024)    Received from [facility]    Transportation Needs     Transportation: 0   Physical Activity: Not on File (2/29/2024)    Received from [facility]    Physical Activity     Physical Activity: 0   Stress: Not on File (2/29/2024)    Received from [facility]    Stress     Stress: 0   Social Connections: Not on File (2/29/2024)    Received from [facility]    Social Connections     Social Connections and Isolation: 0   Interpersonal Safety: Low Risk  (9/26/2023)    Interpersonal Safety     Do you feel physically and emotionally safe where you currently live?: Yes     Within the past 12 months, have you been hit, slapped, kicked or otherwise physically hurt by someone?: No     Within the past 12 months, have you been humiliated or emotionally abused in other ways by your partner or ex-partner?: No   Housing Stability: Not on File  "(2024)    Received from [facility]     Housin            Family History:  Reviewed with patient. No pertinent positives.    Physical Examination:  /70 (BP Location: Left arm, Patient Position: Sitting, Cuff Size: Adult Regular)   Pulse 76   Ht 1.74 m (5' 8.5\")   Wt 73.3 kg (161 lb 11.2 oz)   SpO2 96%   BMI 24.23 kg/m      Constitutional/Psychiatric: This is a well-appearing, well-dressed patient in no acute distress. female communicates easily with no obvious speech issues. Oriented to self and environment with appropriate conversation. Does not appear depressed, anxious, or agitated.  Head: Normocephalic. Overall inspection reveals no prior scars, lesions, or masses. Facial motion is symmetric. No sinus tenderness.  Eyes: Extra-ocular motions are intact with symmetric gaze.   Ears: Auricles without masses or lesions bilaterally. External auditory canals clear with minimal non-obstructive cerumen. Tympanic membranes intact without middle ear fluid or retraction. Hearing intact grossly at conversational volume.  Oral Cavity/Oropharynx: Lips, gingiva and teeth appear healthy.   Neck/Lymphatic: Flat  Respiratory: No increased work of breathing or respiratory distress. No audible stridor or stertor.  Peripheral/Cardiovascular: Brisk capillary refill bilaterally.   Neurologic: Cranial nerves II-XII grossly intact as tested.      Imaging Review:  CT FACIAL BONES WITHOUT CONTRAST 2020   Impression: No evidence of sinusitis.       Procedure Note: Diagnostic Nasal Endoscopy, CPT 35212  Date of Service: 2024  Provider: Zain Turner MD   Presumptive Diagnosis: No primary diagnosis found.  Anesthesia: Topical lidocaine and oxymetazoline via atomizer    Indication:  Evaluation of nasal/sinus complaints; inadequate visualization with anterior rhinoscopy    Description of procedure:  After obtaining consent for the procedure from the patient, the sinonasal cavity was sprayed " with topical anesthetic. A rigid 30-degree nasal endoscope was used to first used to visualize the nasal floor and the nasopharynx on the left. A second pass was then made to visualize the middle meatus and sphenoethmoid recess. Finally, the scope was turned 90-degrees and used to visualize the olfactory cleft and frontal outflow tract. A similar approach was used for the contralateral side. She tolerated the procedure well without difficulty.    Endoscopic Findings:    Plover-Los Endoscopic Scoring System  Endoscopic observation Right Left   Polyps in middle meatus (0 = absent, 1 = restricted to middle meatus, 2 = Beyond middle meatus) 0 0   Discharge (0 = absent, 1 = thin and clear, 2 = thick, purulent) 0 0   Edema (0 = absent, 1 = mild-moderate, 2 = moderate-severe) 0 0   Crusting (0 = absent, 1 = mild-moderate, 2 = moderate-severe) 0 0   Scarring (0= absent, 1 = mild-moderate, 2 = moderate-severe) 0 0   Total 0 0     Findings:  LT/RT: Bilateral sinonasal passages patent without evidence of infection, polyps, or mass. Edema improved from prior exam       Final Assessment/Plan:  Improved edema on exam today  Discussed trailing Claritin instead of Zyrtec and using the Astepro for two weeks to trail how it does for her  Patient will follow-up as needed            Electronically signed by:  Zain Turner MD    [facility]  Rhinology  Endoscopic Skull Base Surgery  [facility]  Department of Otolaryngology - Head & Neck Surgery

## 2024-08-21 NOTE — PATIENT INSTRUCTIONS
You were seen in the ENT Clinic today by Dr. Turner. If you have any questions or concerns after your appointment, please contact us (see below)       2.   The following recommendations have been made based upon your appointment today:   -We have resent the prescription for azelastine.   -Claritin: Take one tablet by mouth daily.      3.   Plan to return to the ENT clinic as needed.           How to Contact Us:  Send a [employer] message to your provider. Our team will respond to you via [employer]. Occasionally, we will need to call you to get further information.  For urgent matters (Monday-Friday), call the ENT Clinic: [phone] and speak with a call center team member - they will route your call appropriately.   If you'd like to speak directly with a nurse, please find our contact information below. We do our best to check voicemail frequently throughout the day, and will work to call you back within 1-2 days. For urgent matters, please use the general clinic phone numbers listed above.     Eileen FAITH RN  Direct: [phone]  Maren CLAYTON LPN  Direct: [phone]         [facility]  Department of Otolaryngology

## 2024-08-21 NOTE — NURSING NOTE
"Chief Complaint   Patient presents with    RECHECK   Blood pressure 102/70, pulse 76, height 1.74 m (5' 8.5\"), weight 73.3 kg (161 lb 11.2 oz), SpO2 96%, not currently breastfeeding. Leonard Wilson, EMT    "

## 2024-08-22 ENCOUNTER — MYC REFILL (OUTPATIENT)
Dept: PSYCHIATRY | Facility: CLINIC | Age: 39
End: 2024-08-22
Payer: COMMERCIAL

## 2024-08-22 DIAGNOSIS — F90.0 ATTENTION DEFICIT HYPERACTIVITY DISORDER (ADHD), PREDOMINANTLY INATTENTIVE TYPE: ICD-10-CM

## 2024-08-22 NOTE — TELEPHONE ENCOUNTER
Date of Last Office Visit: 7/22/24  Date of Next Office Visit:  9/4/24  No shows since last visit: No  More than one patient-initiated cancellation (with reschedule) since last seen in clinic? No    []Medication refilled per  Medication Refill in Ambulatory Care  policy.  [x]Medication unable to be refilled by RN due to criteria not met as indicated below:    []Eligibility: has not had a provider visit within last 6 months   []Supervision: no future appointment; < 7 days before next appointment   []Compliance: no shows; cancellations; lapse in therapy   []Verification: order discrepancy; may need modification...   [] > 30-day supply request   []Advanced refill request: > 7 days before refill date   [x]Controlled medication   []Medication not included in policy   []Review: new med; med adjusted ? 30 days; safety alert; requires lab monitoring...   []Scope of Practice: refill request processed by LPN/MA   []Other:      Medication(s) requested:     -  dexmethylphenidate (FOCALIN XR) 15 MG 24 hr capsule   Date last ordered: 7/22/24  Qty: 30  Refills: 0  Appropriate for refill? Yes    Any Controlled Substance(s)? Yes   [place]  checked? Yes    was last sold on 7/24/24 for quantity of 30.  Other controlled substance on [place] ?: No      Requested medication(s) verified as identical to current order? Yes    Any lapse in adherence to medication(s) greater than 5 days? No      Additional action taken? cued up medication/order(s).      Last visit treatment plan:   1.Patient will take the medications as prescribed.   Medications: Remeron 30 mg - Take half  tablet (15 mg) at bedtime  Take Focalin XR 15 mg DAILY  Zyprexa 2.5 mg twice daily as needed for mood  Prazosin 1 mg at bedtime  Propranolol 20 mg twice daily as needed for anxiety   Next appointment in 6-7  weeks     Any medication(s) require lab monitoring? No

## 2024-08-23 RX ORDER — DEXMETHYLPHENIDATE HYDROCHLORIDE 15 MG/1
15 CAPSULE, EXTENDED RELEASE ORAL DAILY
Qty: 30 CAPSULE | Refills: 0 | Status: SHIPPED | OUTPATIENT
Start: 2024-08-23 | End: 2024-09-04

## 2024-08-23 RX ORDER — HYDROXYZINE HYDROCHLORIDE 25 MG/1
TABLET, FILM COATED ORAL
Qty: 60 TABLET | Refills: 0 | Status: SHIPPED | OUTPATIENT
Start: 2024-08-23

## 2024-08-23 NOTE — TELEPHONE ENCOUNTER
Date of Last Office Visit: 7/22/2024  Date of Next Office Visit:  9/4/2024  No shows since last visit: No  More than one patient-initiated cancellation (with reschedule) since last seen in clinic? No    []Medication refilled per  Medication Refill in Ambulatory Care  policy.  [x]Medication unable to be refilled by RN due to criteria not met as indicated below:    []Eligibility: has not had a provider visit within last 6 months   []Supervision: no future appointment; < 7 days before next appointment   []Compliance: no shows; cancellations; lapse in therapy   []Verification: order discrepancy; may need modification...   [] > 30-day supply request   []Advanced refill request: > 7 days before refill date   []Controlled medication   []Medication not included in policy   []Review: new med; med adjusted ? 30 days; safety alert; requires lab monitoring...   []Scope of Practice: refill request processed by LPN/MA   [x]Other:      Medication(s) requested:   -  hydroxyzine HCl (ATARAX) 25 MG tablet   Date last ordered: 71/15/2024  Qty: 60  Refills: 0  Take 1 tablet (25 mg) 2 times daily as needed for other (anxiety)     Appropriate for refill? Provider to review. Not on the treatment plan       Any Controlled Substance(s)? No      Requested medication(s) verified as identical to current order? Yes    Any lapse in adherence to medication(s) greater than 5 days? N/A     Additional action taken? routed encounter to provider for review.      Last visit treatment plan:   ASSESSMENT AND PLAN    Patient is a 39 year old,  White Choose not to answer female with a history notable for moderate episode of recurrent major depressive disorder, PTSD, generalized anxiety disorder, ADHD, predominantly inattentive type, and insomnia, unspecified type who presents for follow up visit.  She has been taking Remeron 7.5 mg from her old prescription bottle.  I encouraged patient to start taking Remeron 15 mg at bedtime to further help with  depressive symptoms and sleep.  Focalin 10 mg instant release changed to Focalin XR 15 mg per patient request.  She denies SI/SIB/HI.  Patient report normal no hypomania.  Patient to return to clinic in 6-week for follow-up.     Plan:  1.Patient will take the medications as prescribed.   Medications: Remeron 30 mg - Take half  tablet (15 mg) at bedtime  Take Focalin XR 15 mg DAILY  Zyprexa 2.5 mg twice daily as needed for mood  Prazosin 1 mg at bedtime  Propranolol 20 mg twice daily as needed for anxiety  Patient will not stop taking medications or adjust them without consulting with the provider.  2.Patient will call with any problems between visits.  3.Patient will go to the emergency room if not feeling safe , unable to function in the community, or if suicidal, homicidal or hearing voices or having paranoia.  4.Patient will abstain from drugs and alcohol./Pt denies use .  5.Patient will not drive if sedated on medications or under influence of any substance.   6.Patient will not mix psychiatric medications with drugs and alcohol.   7.Patient will watch his diet and exercise.  8.Patient will see non psychiatric providers for non psychiatric disorders.  9. Next appointment in 6-7  weeks    Any medication(s) require lab monitoring? No

## 2024-09-04 ENCOUNTER — VIRTUAL VISIT (OUTPATIENT)
Dept: PSYCHIATRY | Facility: CLINIC | Age: 39
End: 2024-09-04
Payer: COMMERCIAL

## 2024-09-04 DIAGNOSIS — F90.0 ATTENTION DEFICIT HYPERACTIVITY DISORDER (ADHD), PREDOMINANTLY INATTENTIVE TYPE: ICD-10-CM

## 2024-09-04 DIAGNOSIS — F43.10 PTSD (POST-TRAUMATIC STRESS DISORDER): ICD-10-CM

## 2024-09-04 DIAGNOSIS — G47.00 INSOMNIA, UNSPECIFIED TYPE: ICD-10-CM

## 2024-09-04 DIAGNOSIS — F41.1 GAD (GENERALIZED ANXIETY DISORDER): ICD-10-CM

## 2024-09-04 DIAGNOSIS — F33.1 MODERATE EPISODE OF RECURRENT MAJOR DEPRESSIVE DISORDER (H): Primary | ICD-10-CM

## 2024-09-04 PROCEDURE — 99214 OFFICE O/P EST MOD 30 MIN: CPT | Mod: 95 | Performed by: NURSE PRACTITIONER

## 2024-09-04 RX ORDER — LISDEXAMFETAMINE DIMESYLATE 30 MG/1
30 CAPSULE ORAL EVERY MORNING
Qty: 30 CAPSULE | Refills: 0 | Status: SHIPPED | OUTPATIENT
Start: 2024-09-04 | End: 2024-10-02 | Stop reason: DRUGHIGH

## 2024-09-04 RX ORDER — LAMOTRIGINE 25 MG/1
25 TABLET ORAL DAILY
Qty: 60 TABLET | Refills: 1 | Status: SHIPPED | OUTPATIENT
Start: 2024-09-04 | End: 2024-10-02

## 2024-09-04 ASSESSMENT — ANXIETY QUESTIONNAIRES
7. FEELING AFRAID AS IF SOMETHING AWFUL MIGHT HAPPEN: SEVERAL DAYS
GAD7 TOTAL SCORE: 13
8. IF YOU CHECKED OFF ANY PROBLEMS, HOW DIFFICULT HAVE THESE MADE IT FOR YOU TO DO YOUR WORK, TAKE CARE OF THINGS AT HOME, OR GET ALONG WITH OTHER PEOPLE?: SOMEWHAT DIFFICULT
GAD7 TOTAL SCORE: 13
GAD7 TOTAL SCORE: 13

## 2024-09-04 ASSESSMENT — PATIENT HEALTH QUESTIONNAIRE - PHQ9
10. IF YOU CHECKED OFF ANY PROBLEMS, HOW DIFFICULT HAVE THESE PROBLEMS MADE IT FOR YOU TO DO YOUR WORK, TAKE CARE OF THINGS AT HOME, OR GET ALONG WITH OTHER PEOPLE: SOMEWHAT DIFFICULT
SUM OF ALL RESPONSES TO PHQ QUESTIONS 1-9: 12
SUM OF ALL RESPONSES TO PHQ QUESTIONS 1-9: 12

## 2024-09-04 NOTE — PROGRESS NOTES
Virtual Visit Details    Type of service:  Video Visit   Video Start Time:  1004  Video End Time: 1033    Originating Location (pt. Location): Home    Distant Location (provider location):  Off-site  Platform used for Video Visit: [employer]

## 2024-09-04 NOTE — PATIENT INSTRUCTIONS
"Patient Education   [facility]  What to Expect  Here's what to expect in the [facility].   About the program  You'll be meeting with a psychiatric doctor to check your mental health. A psychiatric doctor helps you deal with troubling thoughts and feelings by giving you medicine. They'll make sure you know the plan for your care. You may see them for a long time. When you're feeling better, they may refer you back to seeing your family doctor.   If you have any questions, we'll be glad to talk to you.  About visits  Be open  At your visits, please talk openly about your problems. It may feel hard, but it's the best way for us to help you.  Cancelling visits  If you can't come to your visit, please call us right away at [phone]. If you don't cancel at least 24 hours (1 full day) before your visit, that's \"late cancellation.\"  Not showing up for your visits  Being very late is the same as not showing up. You'll be a \"no show\" if:  You're more than 15 minutes late for a 30-minute (half hour) visit.  You're more than 30 minutes late for a 60-minute (full hour) visit.  If you cancel late or don't show up 2 times within 6 months, we may end your care.  Getting help between visits  If you need help between visits, you can call us Monday to Friday from 8 a.m. to 4:30 p.m. at [phone].  Emergency care  Call 911 or go to the nearest emergency department if your life or someone else's life is in danger.  Call 988 anytime to reach the national Suicide and Crisis hotline.  Medicine refills  To refill your medicine, call your pharmacy. You can also call [facility] at [phone], Monday to Friday, 8 a.m. to 4:30 p.m. It can take 1 to 3 business days to get a refill.   Forms, letters, and tests  You may have papers to fill out, like FMLA, short-term disability, and workability. We can help you with these forms at your visits, but you must have an " appointment. You may need more than 1 visit for this, to be in an intensive therapy program, or both.  Before we can give you medicine for ADHD, we may refer you to get tested for it or confirm it another way.  We may not be able to give you an emotional support animal letter.  We don't do mental health checks ordered by the court.   We don't do mental health testing, but we can refer you to get tested.   Thank you for choosing us for your care.  For informational purposes only. Not to replace the advice of your health care provider. Copyright   2022 [facility]. All rights reserved. "[employer]" 598106 - 12/22.     Plan:  1.Patient will take the medications as prescribed.   Medications: Stop Remeron 15 mg  Take Vyvanse 30 mg daily  Lamictal 25 mg - Take 1 tablet (25 mg) daily For 2 weeks, then increase to 2 tablets (50 mg) daily after two weeks  Zyprexa 2.5 mg twice daily as needed for mood  Prazosin 1 mg at bedtime  Propranolol 20 mg twice daily as needed for anxiety  Patient will not stop taking medications or adjust them without consulting with the provider.  2.Patient will call with any problems between visits.  3.Patient will go to the emergency room if not feeling safe , unable to function in the community, or if suicidal, homicidal or hearing voices or having paranoia.  4.Patient will abstain from drugs and alcohol./Pt denies use .  5.Patient will not drive if sedated on medications or under influence of any substance.   6.Patient will not mix psychiatric medications with drugs and alcohol.   7.Patient will watch his diet and exercise.  8.Patient will see non psychiatric providers for non psychiatric disorders.  9. Next appointment in 4  weeks

## 2024-09-04 NOTE — NURSING NOTE
Is the patient currently in the state of [place]? YES    Current patient location: [address] [place] [id number]    Visit mode:VIDEO    If the visit is dropped, the patient can be reconnected by: VIDEO VISIT: Text to cell phone:   Telephone Information:   Mobile [phone]       Will anyone else be joining the visit? No  (If patient encounters technical issues they should call [phone])    How would you like to obtain your AVS? MyChart    Are changes needed to the allergy or medication list? No    Are refills needed on medications prescribed by this physician? NO    Rooming Documentation: Questionnaire(s) completed.    Reason for visit: SIMONE Cor[place]

## 2024-09-04 NOTE — PROGRESS NOTES
"PSYCHIATRIC PROGRESS NOTE     Name:  Andria Daily  : 1985    Andria Daily is a 39 year old female who is being evaluated via a billable Video visit.      Telemedicine Visit: The patient's condition can be safely assessed and treated via synchronous audio and visual telemedicine encounter.      Reason for Telemedicine Visit: COVID 19 pandemic and the social and physical recommendations by the CDC and MD., Patient has requested telehealth visit, and Patient unable to travel      Originating Site (Patient Location): Patient's home    Distant Site (Provider Location): [facility] Outpatient Setting: [facility]    Consent:  The patient/guardian has verbally consented to: the potential risks and benefits of telemedicine (video visit or phone) versus in person care; bill my insurance or make self-payment for services provided; and responsibility for payment of non-covered services.     Mode of Communication:  [employer] platform     As the provider I attest to compliance with applicable laws and regulations related to telemedicine.    Date of Last Visit: 24                                          CHIEF COMPLAINT     \"Doing better\"  HISTORY OF PRESENT ILLNESS     Patient who was last seen in the clinic on 24 returned today for follow up visit.  Patient reports she is still about the same as last time  as she has not noticed much improvement in symptoms since taking Remeron 15 mg.  Patient stated she mistakenly took Remeron 30 mg instead of 15 mg dose and started having side effects related to mood and emotional reactivities, agitation, restlessness, and feeling on edge.  Patient reported she reached out to the clinic and was advised to go down to 15 mg.  Patient stated she has not noticed much improvement in sleep since taking 15 mg as she continues to struggle with feeling on the edge as well as feeling down.  Patient does mention she has not been taking of Focalin XR 15 mg for about 1 " week due to shortage at the pharmacy.  I suggested starting patient on  Lamictal 25 mg for 2 weeks and then increase to 50 mg after 2 weeks to effectively target mood and emotional reactivities.  I also suggested switching Focalin to Vyvanse 30 mg daily to further help with ADHD symptoms.  I discussed both medication side effects, risk, and benefit with the patient.  Patient verbalized understanding and was amenable to taking both Vyvanse and Lamictal.  I suggested patient's stop taking [place] due to poor tolerability and efficacy.  Patient endorsed passive SI but denies plan or intent. Patient also denies both auditory and visual hallucination.  Patient report normal no hypomania.  Patient return to clinic in 7 weeks for follow-up.  PSYCHIATRIC HISTORY:   History of Psychiatric Hospitalizations:   - Inpatient: X2 in teenager and early 20s  - IOP/PHP/Day treatment: PHP in 2024  History of Suicidal Ideation: Positive  History of Suicide Attempts:  Positive with medication overdose    History of Self-injurious Behavior: Denies a history of SIB.  Current:  No  History of Violence/Aggression: Negative  History of Commitment? Negative  Electroconvulsive Therapy (ECT):Negative  TMS: 2020  PSYCHIATRIC REVIEW OF SYSTEMS:   Psychiatric Review of Systems:   Depression:   Reportsaf: depressed mood, suicidal ideation, decreased interest, changes in sleep, changes in appetite, guilt, hopelessness, helplessness, impaired concentration, decreased energy, irritability.  [place]:   Denies: sleeplessness, increased goal-directed activities, abrupt increase in energy pressured speech  Psychosis:   Denies: visual hallucinations, auditory hallucinations, paranoia  Anxiety:   Reports: excessive worries that are difficult to control, panic attacks  PTSD:   Reports: re-experiencing past trauma, nightmares, increased arousal, avoidance of traumatic stimuli, impaired function.  Denies: re-experiencing past trauma, nightmares, increased  arousal, avoidance of traumatic stimuli, impaired function.  OCD:   Denies: obsessions, checking, symmetry, cleaning, skin picking.  Eating Disorder: Hx of Anorexia and Bulimia  Denies: restriction, binging, purging.    Sleep: Struggles with sleep      MEDICATIONS                                                                                                Current Outpatient Medications   Medication Sig Dispense Refill    azelastine (ASTEPRO) 0.15 % nasal spray Spray 2 sprays into both nostrils daily. (Patient not taking: Reported on 9/4/2024) 17 mL 1    dexmethylphenidate (FOCALIN XR) 15 MG 24 hr capsule Take 1 capsule (15 mg) by mouth daily. 30 capsule 0    hydrOXYzine HCl (ATARAX) 25 MG tablet TAKE 1 TABLET(25 MG) BY MOUTH TWICE DAILY AS NEEDED FOR ANXIETY 60 tablet 0    levonorgestrel (MIRENA) 20 MCG/DAY IUD 1 each by Intrauterine route once      mirtazapine (REMERON) 30 MG tablet TAKE 1/2 TABLET(15 MG) BY MOUTH EVERY NIGHT AT BEDTIME. 15 tablet 0    OLANZapine (ZYPREXA) 5 MG tablet Take 0.5 tablets (2.5 mg) by mouth 2 times daily as needed (mood) 30 tablet 1    prazosin (MINIPRESS) 1 MG capsule Take 1 mg by mouth at bedtime      propranolol (INDERAL) 20 MG tablet Take 1 tablet (20 mg) by mouth 2 times daily 180 tablet 1     No current facility-administered medications for this visit.       DRUG MONITORING:  [place] Prescription Monitoring Program evaluating controlled substances in the last year in [place]:  The [place] Prescription Monitoring Program has been reviewed and there are no current concerns with: diversionary activity, early refill requests, and or obtaining the medication from multiple providers       PAST PSYCHOTROPIC MEDICATIONS:    Abilify    V- Lexapro  - Effexor (night sweats)  - Buspar 7.5-15mg  - Wellbutrin (worsened tremor)  - Celexa 20mg  - Cymbalta (trialed with Celexa for pain, noted memory issues)  - trazodone 50mg   - Zoloft 50mg (higher doses associated with night sweats)  - Prazosin  "1mg (not well tolerated due to hypotension)ITALS   There were no vitals taken for this visit.     BP Readings from Last 1 Encounters:   08/21/24 102/70     Pulse Readings from Last 1 Encounters:   08/21/24 76     Wt Readings from Last 1 Encounters:   08/21/24 73.3 kg (161 lb 11.2 oz)     Ht Readings from Last 1 Encounters:   08/21/24 1.74 m (5' 8.5\")     Estimated body mass index is 24.23 kg/m  as calculated from the following:    Height as of 8/21/24: 1.74 m (5' 8.5\").    Weight as of 8/21/24: 73.3 kg (161 lb 11.2 oz).      PERTINENT HISTORY   PAST MEDICAL HISTORY:   Past Medical History:   Diagnosis Date    ADHD (attention deficit hyperactivity disorder)     completed assessment    Anxiety     Chronic fatigue syndrome     Chronic rhinitis     CHHAYA I (cervical intraepithelial neoplasia I) 05/24/2007    Depressive disorder     History of eating disorder     History of substance use     prescription pain killers, alcohol, cocaine    Hypersomnia     PTSD (post-traumatic stress disorder)        PAST SURGICAL HISTORY:   Past Surgical History:   Procedure Laterality Date    KNEE SURGERY Left 2018    for osgood-schlatter and tendon repair/anchor    LAPAROSCOPY DIAGNOSTIC (GYN) N/A 03/22/2023    Procedure: laparoscopy, chromopertubation;  Surgeon: Isabel Vera MD;  Location: [facility] OR    [place] Teeth Removal  2001    WRIST SURGERY Left 2015    snowboarding injury, fractured, had metal plate initially that was then removed       FAMILY HISTORY:   Family History   Problem Relation Age of Onset    Anxiety Disorder Mother     Tremor Mother     Depression Father     Anxiety Disorder Father     Sleep Apnea Father     Diabetes Maternal Grandmother     Hyperlipidemia Maternal Grandfather     Hypertension Maternal Grandfather     Coronary Artery Disease Maternal Grandfather 55    Anxiety Disorder Sister         after bad car accident    Breast Cancer No family hx of     Ovarian Cancer No family hx of     Colon Cancer No " "family hx of     Cerebrovascular Disease No family hx of        SOCIAL HISTORY:   Social History     Tobacco Use    Smoking status: Former     Current packs/day: 0.00     Types: Cigarettes     Quit date: 2016     Years since quittin.6    Smokeless tobacco: Never   Substance Use Topics    Alcohol use: Yes     Comment: Rarely         Seizures or Head Injury: Denies history of head injury. Denies history of seizures.  History of cardiac disease, rheumatic fever, fainting or dizziness, especially with exercise, seizures, chest pain or shortness of breath with exercise, unexplained change in exercise tolerance, palpitations, high blood pressure, or heart murmur?   No    LABS & IMAGING                                                                                                                Personally reviewed  Recent Labs   Lab Test 23  1130 17  0916   WBC 6.2 5.5   HGB 13.6 13.8   HCT 42.1 41.7   MCV 94 91    185   ANEU  --  3.5     Recent Labs   Lab Test 23  1130   GLC 84     Recent Labs   Lab Test 23  1130   CHOL 163   LDL 86   HDL 67   TRIG 51     No lab results found.  No results found for: \"VEK585\", \"JACK239\", \"DJUY65CSRPB\", \"VITD3\", \"D2VIT\", \"D3VIT\", \"DTOT\", \"[id number]\", \"[id number]\", \"[id number]\", \"[id number]\", \"[id number]\", \"[id number]\"     ALLERGY & IMMUNIZATIONS       Allergies   Allergen Reactions    Dairy Products [Milk Protein] Other (See Comments)     Nasal problems, stomach pain     No Clinical Screening - See Comments      Apples and bananas cause her throat to itch    Seasonal Allergies     Flonase [Fluticasone] Dizziness and Rash    Penicillins Rash     Rash on face and threw up       FAMILY MEDICAL HISTORY:     Family History       Problem (# of Occurrences) Relation (Name,Age of Onset)    Anxiety Disorder (3) Mother, Father, Sister: after bad car accident    Depression (1) Father    Diabetes (1) Maternal Grandmother    Hypertension (1) Maternal Grandfather    " Hyperlipidemia (1) Maternal Grandfather    Coronary Artery Disease (1) Maternal Grandfather (55)    Sleep Apnea (1) Father    Tremor (1) Mother           Negative family history of: Breast Cancer, Ovarian Cancer, Colon Cancer, Cerebrovascular Disease              Family history of sudden or unexplained death or an event requiring resuscitation in children or young adults, cardiac arrhythmias (eg, Janelle-Parkinson-White syndrome), long QT syndrome, catecholaminergic paroxysmal ventricular tachycardia, Brugada syndrome, arrhythmogenic right ventricular dysplasia, hypertrophic cardiomyopathy, dilated cardiomyopathy, or Marfan syndrome?  No    FAMILY PSYCHIATRIC HISTORY:   Psychiatry:Father, mother sister with anxiety, father with depression  Substance use history in family: Denies  Family suicide history:Negative      SIGNIFICANT SOCIAL/FAMILY HISTORY:                                           Born and raised in: [place]  Relationship status: Single living with boyfriend  Children: None    Highest education level was:Associate degree   Service:Denies  Employment status: Works part time at the [employer]  LEGAL:     SUBSTANCE USE HISTORY      Alcohol- fewer urges to drink, mindful with med and risks of alcohol   - treatment in 2015  Nicotine- none  Caffeine- 1-2 cups coffee daily, 1-2 energy drinks a week, can increase shaking if she doesn't eat enough  Opioids- sober since 2015   Cannabis- smoking 1-2x daily  - treated for cannabis use in 2015   Other Illicit Drugs- sober from cocaine and hallucinogens since 2015    MEDICAL REVIEW OF SYSTEMS:   Ten system review was completed with pertinent positives noted above    MENTAL STATUS EXAM:   Mental Status Examination (limited due to video virtual visit format):  Vital Signs: There were no vitals taken for this virtual visit.  Appearance: adequately groomed, appears stated age, and in no apparent distress.  Attitude: cooperative   Eye Contact: good to the extent  "that can be determined in a video visit  Muscle Strength and Tone: no gross abnormalities based on remote observation  Psychomotor Behavior:  no evidence of tardive dyskinesia, dystonia, or tics based on remote observation  Gait and Station: normal, no gross abnormalities based on remote observation  Speech: clear, coherent, normal prosody, regular rate, regular rhythm and fluent  Associations: No loosening of associations  Thought Process: coherent and goal directed  Thought Content: no evidence of suicidal ideation or homicidal ideation, no evidence of psychotic thought, no auditory hallucinations present and no visual hallucinations present  Mood: \"Neutral\"  Affect: appropriate and in normal range  Insight: good  Judgment: intact, adequate for safety  Impulse Control: intact  Oriented to: time, place, person and situation  Attention Span and Concentration: normal  Language: Intact  Recent and Remote Memory: intact to interview. Not formally assessed. No amnesia.  Fund of Knowledge: appropriate        SAFETY   Feels safe in home: Yes   Suicidal ideation: Denies  History of suicide attempts:  No   Hx of impulsivity: No     DSM 5 DIAGNOSIS:   1. Moderate episode of recurrent major depressive disorder (H)    2. PTSD (post-traumatic stress disorder)    3. DIANA (generalized anxiety disorder)    4. Attention deficit hyperactivity disorder (ADHD), predominantly inattentive type    5. Insomnia, unspecified type      ASSESSMENT AND PLAN    Patient is a 39 year old,  White Choose not to answer female with a history notable for moderate episode of recurrent major depressive disorder, PTSD, generalized anxiety disorder, ADHD, predominantly inattentive type, and insomnia, unspecified type who presents for follow up visit.  She has noticed improvement in sleep since taking Remeron, instead he has been experiencing mood and emotional reactivities in the form of feeling on edge, agitation, and irritability since taking Remeron.  I " discontinued Remeron and started patient on Lamictal to help with mood and emotional reactivities.  Since he has not been taking Focalin due to shortage at the pharmacy, I started her on Vyvanse 30 mg to help with ADHD symptoms.  She endorsed passive SI but denies plan or intent.  She denies SIB/HI.  Patient report normal no hypomania.  Patient to return to clinic in 6-week for follow-up.    Plan:  1.Patient will take the medications as prescribed.   Medications: Stop Remeron 15 mg  Take Vyvanse 30 mg daily  Lamictal 25 mg - Take 1 tablet (25 mg) daily For 2 weeks, then increase to 2 tablets (50 mg) daily after two weeks  Zyprexa 2.5 mg twice daily as needed for mood  Prazosin 1 mg at bedtime  Propranolol 20 mg twice daily as needed for anxiety  Patient will not stop taking medications or adjust them without consulting with the provider.  2.Patient will call with any problems between visits.  3.Patient will go to the emergency room if not feeling safe , unable to function in the community, or if suicidal, homicidal or hearing voices or having paranoia.  4.Patient will abstain from drugs and alcohol./Pt denies use .  5.Patient will not drive if sedated on medications or under influence of any substance.   6.Patient will not mix psychiatric medications with drugs and alcohol.   7.Patient will watch his diet and exercise.  8.Patient will see non psychiatric providers for non psychiatric disorders.  9. Next appointment in 4  weeks    Risk Assessment:     Andria has notable risk factors for self-harm, including, single status, anxiety, mood dysregulation, substance use,  and passive SI. However, risk is mitigated by ability to volunteer a safety plan and history of seeking help when needed. Additional steps taken to minimize risk include making medication adjustment, asking patient to call 911 and go to the ER if not able to stay safe at home,  Therefore, based on all available evidence including the factors cited above,  Andria does not appear to be an imminent danger to self or others and does not meet criteria 72 hour hold. However, if patient uses substances or is non-adherent with medication, their risk of decompensation and SI/HI will be elevated. This was discussed with the patient as she verbalized good understanding.   CONSULTS/REFERRALS:   Continue therapy  None at this time  Coordinate care with therapist as needed    MEDICAL:   None at this time  Coordinate care with PCP (Alma Hernandez) as needed  Follow up with primary care provider as planned or for acute medical concerns.    PSYCHOEDUCATION:  Medication side effects and alternatives reviewed. Health promotion activities recommended and reviewed today. All questions addressed. Education and counseling completed regarding risks and benefits of medications and psychotherapy options.  Consent provided by patient/guardian  Call the psychiatric nurse line with medication questions or concerns at [phone].  Jike Xueyuanhart may be used to communicate with your provider, but this is not intended to be used for emergencies.  BLACK BOX WARNING: Discussed the Food and Drug Administration (FDA) requires that all antidepressants carry a warning that some children, adolescents and young adults may be at increased risk of suicide when taking antidepressants. Anyone taking an antidepressant should be watched closely for worsening depression or unusual behavior especially in the first few weeks after starting an SSRI. Keep in mind, antidepressants are more likely to reduce suicide risk in the long run by improving mood.   SEROTONIN SYNDROME:  Discussed risks of Serotonin syndrome (ie, serotonin toxicity) which is a potentially life-threatening condition associated with increased serotonergic activity in the central nervous system (CNS). It is seen with therapeutic medication use, inadvertent interactions between drugs, and intentional self-poisoning. Serotonin syndrome may involve a  spectrum of clinical findings, which often include mental status changes, autonomic hyperactivity, and neuromuscular abnormalities.    BENZODIAZEPINE:  discussion on how benzos work and the need to use them short term due to potential of anxiety getting.  This is a controlled substance with risk for abuse, need to keep in a safe keep place and cannot replace lost scripts.    HYPNOTIC USE: Hypnotic use, risk for CNS depression, sleep-walking, not to mix with ETOH or other CNS depressant, need for six hours of sleep, stop if change in mood.  This is a controlled substance with risk for abuse, need to keep in a safe keep place and cannot replace lost scripts.  FIRST GENERATION ANTIPSYCHOTIC/ SECOND GENERATION ANTIPSYCHOTIC USE:  Atypical need for cardiometabolic monitoring with medication- B/P, weight, blood sugar, cholesterol.  Need to monitor for abnormal movements taught  SAFETY:  We all care about your loved one's safety. To reduce the risk of self-harm, remove access to all:  Firearms, Medicines (both prescribed and over-the-counter), Knives and other sharp objects, Ropes and like materials, and Alcohol  SLEEP HYGIENE: establish a sleep routine, limit screen time 1 hour prior to bed, use bed for sleep only, take sleep/medications on time (including sleepy time tea, trazadone or herbal treatments such as melatonin), aroma therapy, limit caffeine/sugar, yoga, guided imagery, stretch, meditation, limit naps to 20 minutes, make a temperature change in the room, white noise, be mindful of slowing down breathing, take a warm bath/shower, frequently wash sheets, and journaling.   Medlineplus.gov is information for patients.  It is run by the National Library of Medicine and it contains information about all disorders, diseases and all medications.      COMMUNITY RESOURCES:    CRISIS NUMBERS: Provided in AVS 6/6/2024  National Suicide Prevention Lifeline: 0-105-067-TALK (331-039-2402)  [employer]/resources for  a list of additional resources (SOS)            [facility] - [phone]   Urgent Care Adult Mental Cqgifz-003-587-7900 mobile unit/  crisis line  [facility] [place] [facility] -[phone]   COPE  [place] Mobile Team -[phone] (adults)/ [phone] (child)  Poison Control Center - 1-260.806.8267    OR  go to nearest ER  Crisis Text Line for any crisis  send this-   To: 599846   [facility] ([facility]) [facility]  [phone]  National Suicide Prevention Lifeline: 517.344.7382 (TTY: 379.514.9165). Call anytime for help.  (www.suicidepreventionlifeline.org)  National [place] on Mental Illness (www.iman.org): 179.710.4467 or 511-399-8418.   Mental Health Association (www.mentalhealth.org): 822.286.9952 or 345-504-2912.  [place] Crisis Text Line: Text [place] to 404957  Suicide LifeLine Chat: suicideLife Metrics.org/chat    ADMINISTRATIVE BILLIN mins spent interviewing patient, reviewing referral documents, obtaining and reviewing outside records, communication with other health specialists, and preparing this report on this day: 24    Video/Phone Start Time:  1004  Video/Phone End Time:   1033    Greater than 50% of time was spent in counseling and coordination of care regarding above diagnoses and treatment plan.    Signed:   Reddy Mello, MSN, APRN, PMHNP-BC  Long Term Outpatient Psychiatry  Chart documentation done in part with Dragon Voice Recognition software.  Although reviewed after completion, some word and grammatical errors may remain.   Answers submitted by the patient for this visit:  Patient Health Questionnaire (Submitted on 2024)  If you checked off any problems, how difficult have these problems made it for you to do your work, take care of things at home, or get along with other people?: Very difficult  PHQ9 TOTAL SCORE: 17  DIANA-7 (Submitted on 2024)  DIANA 7 TOTAL SCORE: 13    Answers submitted by the patient for this visit:  Patient  Health Questionnaire (Submitted on 7/22/2024)  If you checked off any problems, how difficult have these problems made it for you to do your work, take care of things at home, or get along with other people?: Very difficult  PHQ9 TOTAL SCORE: 13  DIANA-7 (Submitted on 7/22/2024)  DIANA 7 TOTAL SCORE: 12

## 2024-10-02 ENCOUNTER — VIRTUAL VISIT (OUTPATIENT)
Dept: PSYCHIATRY | Facility: CLINIC | Age: 39
End: 2024-10-02
Payer: COMMERCIAL

## 2024-10-02 VITALS — HEIGHT: 69 IN | WEIGHT: 160 LBS | BODY MASS INDEX: 23.7 KG/M2

## 2024-10-02 DIAGNOSIS — G47.00 INSOMNIA, UNSPECIFIED TYPE: ICD-10-CM

## 2024-10-02 DIAGNOSIS — F33.1 MODERATE EPISODE OF RECURRENT MAJOR DEPRESSIVE DISORDER (H): Primary | ICD-10-CM

## 2024-10-02 DIAGNOSIS — F90.0 ATTENTION DEFICIT HYPERACTIVITY DISORDER (ADHD), PREDOMINANTLY INATTENTIVE TYPE: ICD-10-CM

## 2024-10-02 DIAGNOSIS — F43.10 PTSD (POST-TRAUMATIC STRESS DISORDER): ICD-10-CM

## 2024-10-02 DIAGNOSIS — F41.1 GAD (GENERALIZED ANXIETY DISORDER): ICD-10-CM

## 2024-10-02 PROCEDURE — 99214 OFFICE O/P EST MOD 30 MIN: CPT | Mod: 95 | Performed by: NURSE PRACTITIONER

## 2024-10-02 RX ORDER — LISDEXAMFETAMINE DIMESYLATE 40 MG/1
40 CAPSULE ORAL EVERY MORNING
Qty: 30 CAPSULE | Refills: 0 | Status: SHIPPED | OUTPATIENT
Start: 2024-10-02 | End: 2024-11-04

## 2024-10-02 RX ORDER — LAMOTRIGINE 25 MG/1
50 TABLET ORAL DAILY
Qty: 60 TABLET | Refills: 1 | Status: SHIPPED | OUTPATIENT
Start: 2024-10-02 | End: 2024-11-04 | Stop reason: DRUGHIGH

## 2024-10-02 ASSESSMENT — PAIN SCALES - GENERAL: PAINLEVEL: MODERATE PAIN (4)

## 2024-10-02 NOTE — PROGRESS NOTES
"PSYCHIATRIC PROGRESS NOTE     Name:  Andria Daily  : 1985    Andria Daily is a 39 year old female who is being evaluated via a billable Video visit.      Telemedicine Visit: The patient's condition can be safely assessed and treated via synchronous audio and visual telemedicine encounter.      Reason for Telemedicine Visit: COVID 19 pandemic and the social and physical recommendations by the CDC and MD., Patient has requested telehealth visit, and Patient unable to travel      Originating Site (Patient Location): Patient's home    Distant Site (Provider Location): [facility] Outpatient Setting: [facility]    Consent:  The patient/guardian has verbally consented to: the potential risks and benefits of telemedicine (video visit or phone) versus in person care; bill my insurance or make self-payment for services provided; and responsibility for payment of non-covered services.     Mode of Communication:  Certus platform     As the provider I attest to compliance with applicable laws and regulations related to telemedicine.    Date of Last Visit: 24                                          CHIEF COMPLAINT     \"Doing better\"  HISTORY OF PRESENT ILLNESS     Patient who was last seen in the clinic on 24 returned today for follow up visit.  Patient reports she has noticed significant malaise in the morning since taking Lamictal 20 mg.  Patient stated her mood and emotional and now well-regulated and no longer struggling with highs and lows like before since taking Lamictal.  Patient stated he has not noted any intolerable side effects since taking this medication.  Patient reports she would like to remain on current dose of 50 mg since she has been well with the mood.  Patient does mention she has not noticed much improvement in depression and hypertension with Vyvanse 30 mg as she believes she the Focalin XR was more effective when she was taking it..  I suggested increasing Vyvanse to " 40 mg to further help with residual ADHD symptoms.  Patient verbally consented with the new treatment plan.  Patient denies SI, SIB, and HI. . Patient also denies both auditory and visual hallucination.  Patient report normal no hypomania.  Patient return to clinic in 7 weeks for follow-up.  PSYCHIATRIC HISTORY:   History of Psychiatric Hospitalizations:   - Inpatient: X2 in teenager and early 20s  - IOP/PHP/Day treatment: PHP in 2024  History of Suicidal Ideation: Positive  History of Suicide Attempts:  Positive with medication overdose    History of Self-injurious Behavior: Denies a history of SIB.  Current:  No  History of Violence/Aggression: Negative  History of Commitment? Negative  Electroconvulsive Therapy (ECT):Negative  TMS: 2020  PSYCHIATRIC REVIEW OF SYSTEMS:   Psychiatric Review of Systems:   Depression:   Reportsaf: depressed mood, suicidal ideation, decreased interest, changes in sleep, changes in appetite, guilt, hopelessness, helplessness, impaired concentration, decreased energy, irritability.  [place]uni:   Denies: sleeplessness, increased goal-directed activities, abrupt increase in energy pressured speech  Psychosis:   Denies: visual hallucinations, auditory hallucinations, paranoia  Anxiety:   Reports: excessive worries that are difficult to control, panic attacks  PTSD:   Reports: re-experiencing past trauma, nightmares, increased arousal, avoidance of traumatic stimuli, impaired function.  Denies: re-experiencing past trauma, nightmares, increased arousal, avoidance of traumatic stimuli, impaired function.  OCD:   Denies: obsessions, checking, symmetry, cleaning, skin picking.  Eating Disorder: Hx of Anorexia and Bulimia  Denies: restriction, binging, purging.    Sleep: Struggles with sleep      MEDICATIONS                                                                                                Current Outpatient Medications   Medication Sig Dispense Refill    azelastine (ASTEPRO) 0.15 %  "nasal spray Spray 2 sprays into both nostrils daily. (Patient not taking: Reported on 9/4/2024) 17 mL 1    hydrOXYzine HCl (ATARAX) 25 MG tablet TAKE 1 TABLET(25 MG) BY MOUTH TWICE DAILY AS NEEDED FOR ANXIETY 60 tablet 0    lamoTRIgine (LAMICTAL) 25 MG tablet Take 1 tablet (25 mg) by mouth daily. For 2 weeks, then increase to 2 tablets (50 mg) daily after two weeks 60 tablet 1    levonorgestrel (MIRENA) 20 MCG/DAY IUD 1 each by Intrauterine route once      lisdexamfetamine (VYVANSE) 30 MG capsule Take 1 capsule (30 mg) by mouth every morning. 30 capsule 0    OLANZapine (ZYPREXA) 5 MG tablet Take 0.5 tablets (2.5 mg) by mouth 2 times daily as needed (mood) 30 tablet 1    prazosin (MINIPRESS) 1 MG capsule Take 1 mg by mouth at bedtime      propranolol (INDERAL) 20 MG tablet Take 1 tablet (20 mg) by mouth 2 times daily 180 tablet 1     No current facility-administered medications for this visit.       DRUG MONITORING:  [place] Prescription Monitoring Program evaluating controlled substances in the last year in [place]:  The [place] Prescription Monitoring Program has been reviewed and there are no current concerns with: diversionary activity, early refill requests, and or obtaining the medication from multiple providers       PAST PSYCHOTROPIC MEDICATIONS:    Abilify    V- Lexapro  - Effexor (night sweats)  - Buspar 7.5-15mg  - Wellbutrin (worsened tremor)  - Celexa 20mg  - Cymbalta (trialed with Celexa for pain, noted memory issues)  - trazodone 50mg   - Zoloft 50mg (higher doses associated with night sweats)  - Prazosin 1mg (not well tolerated due to hypotension)ITALS   Ht 1.74 m (5' 8.5\")   Wt 72.6 kg (160 lb)   BMI 23.97 kg/m       BP Readings from Last 1 Encounters:   08/21/24 102/70     Pulse Readings from Last 1 Encounters:   08/21/24 76     Wt Readings from Last 1 Encounters:   10/02/24 72.6 kg (160 lb)     Ht Readings from Last 1 Encounters:   10/02/24 1.74 m (5' 8.5\")     Estimated body mass index is 23.97 " "kg/m  as calculated from the following:    Height as of this encounter: 1.74 m (5' 8.5\").    Weight as of this encounter: 72.6 kg (160 lb).      PERTINENT HISTORY   PAST MEDICAL HISTORY:   Past Medical History:   Diagnosis Date    ADHD (attention deficit hyperactivity disorder)     completed assessment    Anxiety     Chronic fatigue syndrome     Chronic rhinitis     CHHAYA I (cervical intraepithelial neoplasia I) 2007    Depressive disorder     History of eating disorder     History of substance use     prescription pain killers, alcohol, cocaine    Hypersomnia     PTSD (post-traumatic stress disorder)        PAST SURGICAL HISTORY:   Past Surgical History:   Procedure Laterality Date    KNEE SURGERY Left     for osgood-schlatter and tendon repair/anchor    LAPAROSCOPY DIAGNOSTIC (GYN) N/A 2023    Procedure: laparoscopy, chromopertubation;  Surgeon: Isabel Vera MD;  Location: [facility] OR    [place] Teeth Removal      WRIST SURGERY Left     snowboarding injury, fractured, had metal plate initially that was then removed       FAMILY HISTORY:   Family History   Problem Relation Age of Onset    Anxiety Disorder Mother     Tremor Mother     Depression Father     Anxiety Disorder Father     Sleep Apnea Father     Diabetes Maternal Grandmother     Hyperlipidemia Maternal Grandfather     Hypertension Maternal Grandfather     Coronary Artery Disease Maternal Grandfather 55    Anxiety Disorder Sister         after bad car accident    Breast Cancer No family hx of     Ovarian Cancer No family hx of     Colon Cancer No family hx of     Cerebrovascular Disease No family hx of        SOCIAL HISTORY:   Social History     Tobacco Use    Smoking status: Former     Current packs/day: 0.00     Types: Cigarettes     Quit date: 2016     Years since quittin.7    Smokeless tobacco: Never   Substance Use Topics    Alcohol use: Yes     Comment: Rarely         Seizures or Head Injury: Denies history of head " "injury. Denies history of seizures.  History of cardiac disease, rheumatic fever, fainting or dizziness, especially with exercise, seizures, chest pain or shortness of breath with exercise, unexplained change in exercise tolerance, palpitations, high blood pressure, or heart murmur?   No    LABS & IMAGING                                                                                                                Personally reviewed  Recent Labs   Lab Test 09/26/23  1130 02/02/17  0916   WBC 6.2 5.5   HGB 13.6 13.8   HCT 42.1 41.7   MCV 94 91    185   ANEU  --  3.5     Recent Labs   Lab Test 09/26/23  1130   GLC 84     Recent Labs   Lab Test 09/26/23  1130   CHOL 163   LDL 86   HDL 67   TRIG 51     No lab results found.  No results found for: \"CLE813\", \"HLEP995\", \"[id number]\", \"VITD3\", \"D2VIT\", \"D3VIT\", \"DTOT\", \"[id number]\", \"[id number]\", \"[id number]\", \"[id number]\", \"[id number]\", \"[id number]\"     ALLERGY & IMMUNIZATIONS       Allergies   Allergen Reactions    Dairy Products [Milk Protein] Other (See Comments)     Nasal problems, stomach pain     No Clinical Screening - See Comments      Apples and bananas cause her throat to itch    Seasonal Allergies     Flonase [Fluticasone] Dizziness and Rash    Penicillins Rash     Rash on face and threw up       FAMILY MEDICAL HISTORY:     Family History       Problem (# of Occurrences) Relation (Name,Age of Onset)    Anxiety Disorder (3) Mother, Father, Sister: after bad car accident    Depression (1) Father    Diabetes (1) Maternal Grandmother    Hypertension (1) Maternal Grandfather    Hyperlipidemia (1) Maternal Grandfather    Coronary Artery Disease (1) Maternal Grandfather (55)    Sleep Apnea (1) Father    Tremor (1) Mother           Negative family history of: Breast Cancer, Ovarian Cancer, Colon Cancer, Cerebrovascular Disease              Family history of sudden or unexplained death or an event requiring resuscitation in children or young adults, cardiac " arrhythmias (eg, Janelle-Parkinson-White syndrome), long QT syndrome, catecholaminergic paroxysmal ventricular tachycardia, Brugada syndrome, arrhythmogenic right ventricular dysplasia, hypertrophic cardiomyopathy, dilated cardiomyopathy, or Marfan syndrome?  No    FAMILY PSYCHIATRIC HISTORY:   Psychiatry:Father, mother sister with anxiety, father with depression  Substance use history in family: Denies  Family suicide history:Negative      SIGNIFICANT SOCIAL/FAMILY HISTORY:                                           Born and raised in: [place]  Relationship status: Single living with boyfriend  Children: None    Highest education level was:Associate degree   Service:Denies  Employment status: Works part time at the [employer]  LEGAL:     SUBSTANCE USE HISTORY      Alcohol- fewer urges to drink, mindful with med and risks of alcohol   - treatment in 2015  Nicotine- none  Caffeine- 1-2 cups coffee daily, 1-2 energy drinks a week, can increase shaking if she doesn't eat enough  Opioids- sober since 2015   Cannabis- smoking 1-2x daily  - treated for cannabis use in 2015   Other Illicit Drugs- sober from cocaine and hallucinogens since 2015    MEDICAL REVIEW OF SYSTEMS:   Ten system review was completed with pertinent positives noted above    MENTAL STATUS EXAM:   Mental Status Examination (limited due to video virtual visit format):  Vital Signs: There were no vitals taken for this virtual visit.  Appearance: adequately groomed, appears stated age, and in no apparent distress.  Attitude: cooperative   Eye Contact: good to the extent that can be determined in a video visit  Muscle Strength and Tone: no gross abnormalities based on remote observation  Psychomotor Behavior:  no evidence of tardive dyskinesia, dystonia, or tics based on remote observation  Gait and Station: normal, no gross abnormalities based on remote observation  Speech: clear, coherent, normal prosody, regular rate, regular rhythm and  "fluent  Associations: No loosening of associations  Thought Process: coherent and goal directed  Thought Content: no evidence of suicidal ideation or homicidal ideation, no evidence of psychotic thought, no auditory hallucinations present and no visual hallucinations present  Mood: \"Neutral\"  Affect: appropriate and in normal range  Insight: good  Judgment: intact, adequate for safety  Impulse Control: intact  Oriented to: time, place, person and situation  Attention Span and Concentration: normal  Language: Intact  Recent and Remote Memory: intact to interview. Not formally assessed. No amnesia.  Fund of Knowledge: appropriate        SAFETY   Feels safe in home: Yes   Suicidal ideation: Denies  History of suicide attempts:  No   Hx of impulsivity: No     DSM 5 DIAGNOSIS:   1. Moderate episode of recurrent major depressive disorder (H)    2. PTSD (post-traumatic stress disorder)    3. DIANA (generalized anxiety disorder)    4. Attention deficit hyperactivity disorder (ADHD), predominantly inattentive type    5. Insomnia, unspecified type      ASSESSMENT AND PLAN    Patient is a 39 year old,  White Choose not to answer female with a history notable for moderate episode of recurrent major depressive disorder, PTSD, generalized anxiety disorder, ADHD, predominantly inattentive type, and insomnia, unspecified type who presents for follow up visit.  I started patient on Lamictal during last visit to help with mood and emotional activities.  Today she reports noticing significant improvement in mood and emotion as she is no longer feeling on edge why irritability has decreased significantly since taking Lamictal 50 mg.  She would like to remain on current dose.  Not much improvement on Vyvanse with ADHD symptoms.  I titrated Vyvanse to 40 mg to effectively target ADHD symptoms.  She plans to switch back to Focalin XR if she continues to struggle with ADHD symptoms despite titrating Vyvanse.  She denies SI/ SIB/HI.  Patient " report n bartolo or hypomania.  Patient to return to clinic in 4 weeks for follow-up.    Plan:  1.Patient will take the medications as prescribed.   Medications: Take Vyvanse 40 mg daily  Lamictal 25 mg - Continue 2 tablets (50 mg) daily  Zyprexa 2.5 mg twice daily as needed for mood  Prazosin 1 mg at bedtime  Propranolol 20 mg twice daily as needed for anxiety  Patient will not stop taking medications or adjust them without consulting with the provider.  2.Patient will call with any problems between visits.  3.Patient will go to the emergency room if not feeling safe , unable to function in the community, or if suicidal, homicidal or hearing voices or having paranoia.  4.Patient will abstain from drugs and alcohol./Pt denies use .  5.Patient will not drive if sedated on medications or under influence of any substance.   6.Patient will not mix psychiatric medications with drugs and alcohol.   7.Patient will watch his diet and exercise.  8.Patient will see non psychiatric providers for non psychiatric disorders.  9. Next appointment in 4  weeks    Risk Assessment:     Andria has notable risk factors for self-harm, including, single status, anxiety, mood dysregulation, substance use,  and passive SI. However, risk is mitigated by ability to volunteer a safety plan and history of seeking help when needed. Additional steps taken to minimize risk include making medication adjustment, asking patient to call 911 and go to the ER if not able to stay safe at home,  Therefore, based on all available evidence including the factors cited above, Andria does not appear to be an imminent danger to self or others and does not meet criteria 72 hour hold. However, if patient uses substances or is non-adherent with medication, their risk of decompensation and SI/HI will be elevated. This was discussed with the patient as she verbalized good understanding.   CONSULTS/REFERRALS:   Continue therapy  None at this time  Coordinate care with  therapist as needed    MEDICAL:   None at this time  Coordinate care with PCP (Alma Hernandez) as needed  Follow up with primary care provider as planned or for acute medical concerns.    PSYCHOEDUCATION:  Medication side effects and alternatives reviewed. Health promotion activities recommended and reviewed today. All questions addressed. Education and counseling completed regarding risks and benefits of medications and psychotherapy options.  Consent provided by patient/guardian  Call the psychiatric nurse line with medication questions or concerns at [phone].  [employer] may be used to communicate with your provider, but this is not intended to be used for emergencies.  BLACK BOX WARNING: Discussed the Food and Drug Administration (FDA) requires that all antidepressants carry a warning that some children, adolescents and young adults may be at increased risk of suicide when taking antidepressants. Anyone taking an antidepressant should be watched closely for worsening depression or unusual behavior especially in the first few weeks after starting an SSRI. Keep in mind, antidepressants are more likely to reduce suicide risk in the long run by improving mood.   SEROTONIN SYNDROME:  Discussed risks of Serotonin syndrome (ie, serotonin toxicity) which is a potentially life-threatening condition associated with increased serotonergic activity in the central nervous system (CNS). It is seen with therapeutic medication use, inadvertent interactions between drugs, and intentional self-poisoning. Serotonin syndrome may involve a spectrum of clinical findings, which often include mental status changes, autonomic hyperactivity, and neuromuscular abnormalities.    BENZODIAZEPINE:  discussion on how benzos work and the need to use them short term due to potential of anxiety getting.  This is a controlled substance with risk for abuse, need to keep in a safe keep place and cannot replace lost scripts.    HYPNOTIC USE:  Hypnotic use, risk for CNS depression, sleep-walking, not to mix with ETOH or other CNS depressant, need for six hours of sleep, stop if change in mood.  This is a controlled substance with risk for abuse, need to keep in a safe keep place and cannot replace lost scripts.  FIRST GENERATION ANTIPSYCHOTIC/ SECOND GENERATION ANTIPSYCHOTIC USE:  Atypical need for cardiometabolic monitoring with medication- B/P, weight, blood sugar, cholesterol.  Need to monitor for abnormal movements taught  SAFETY:  We all care about your loved one's safety. To reduce the risk of self-harm, remove access to all:  Firearms, Medicines (both prescribed and over-the-counter), Knives and other sharp objects, Ropes and like materials, and Alcohol  SLEEP HYGIENE: establish a sleep routine, limit screen time 1 hour prior to bed, use bed for sleep only, take sleep/medications on time (including sleepy time tea, trazadone or herbal treatments such as melatonin), aroma therapy, limit caffeine/sugar, yoga, guided imagery, stretch, meditation, limit naps to 20 minutes, make a temperature change in the room, white noise, be mindful of slowing down breathing, take a warm bath/shower, frequently wash sheets, and journaling.   Medlineplus.gov is information for patients.  It is run by the National Library of Medicine and it contains information about all disorders, diseases and all medications.      COMMUNITY RESOURCES:    CRISIS NUMBERS: Provided in AVS 6/6/2024  National Suicide Prevention Lifeline: 9-855-188-TALK (498-090-1475)  Screenburn/resources for a list of additional resources (SOS)            [facility] - [phone]   Urgent Care Adult Mental [facility]-[phone] mobile unit/ 24/7 crisis line  [facility] -[phone]   COPE 24/7 [place] Mobile Team -[phone] (adults)/ [phone] (child)  Poison Control Center - 1-395.575.1403    OR  go to nearest ER  Crisis Text Line for any crisis 24/7  send this-   To: 239997   [facility] ([facility]  [phone]  National Suicide Prevention Lifeline: 586.465.4908 (TTY: 259.605.1589). Call anytime for help.  (www.suicidepreventionlifeline.org)  National [place] on Mental Illness (www.iman.org): 421.503.7103 or 576-377-4831.   Mental Health Association (www.mentalhealth.org): 166.750.4855 or 107-198-3342.  [place] Crisis Text Line: Text [place] to 127158  Suicide LifeLine Chat: suicideGuangdong [employer].org/chat    ADMINISTRATIVE BILLIN mins spent interviewing patient, reviewing referral documents, obtaining and reviewing outside records, communication with other health specialists, and preparing this report on this day: 10/2/24    Video/Phone Start Time:  1004  Video/Phone End Time:   1010    Greater than 50% of time was spent in counseling and coordination of care regarding above diagnoses and treatment plan.    Signed:   Reddy Mello, MSN, APRN, PMHNP-BC  Long Term Outpatient Psychiatry  Chart documentation done in part with Dragon Voice Recognition software.  Although reviewed after completion, some word and grammatical errors may remain.   Answers submitted by the patient for this visit:  Patient Health Questionnaire (Submitted on 2024)  If you checked off any problems, how difficult have these problems made it for you to do your work, take care of things at home, or get along with other people?: Very difficult  PHQ9 TOTAL SCORE: 17  DIANA-7 (Submitted on 2024)  DIANA 7 TOTAL SCORE: 13    Answers submitted by the patient for this visit:  Patient Health Questionnaire (Submitted on 2024)  If you checked off any problems, how difficult have these problems made it for you to do your work, take care of things at home, or get along with other people?: Very difficult  PHQ9 TOTAL SCORE: 13  DIANA-7 (Submitted on 2024)  DIANA 7 TOTAL SCORE: 12     What Type Of Note Output Would You Prefer (Optional)?: Standard Output How Severe Is Your Acne?: mild Is This A New Presentation, Or A Follow-Up?: Acne

## 2024-10-02 NOTE — PROGRESS NOTES
Virtual Visit Details    Type of service:  Video Visit   Video/Phone Start Time:  1004  Video/Phone End Time:   1010    Originating Location (pt. Location): Home    Distant Location (provider location):  Off-site  Platform used for Video Visit: Jonny

## 2024-10-02 NOTE — PATIENT INSTRUCTIONS
"Patient Education   The [facility]  What to Expect  Here's what to expect in the [facility] Program.   About the program  You'll be meeting with a psychiatric doctor to check your mental health. A psychiatric doctor helps you deal with troubling thoughts and feelings by giving you medicine. They'll make sure you know the plan for your care. You may see them for a long time. When you're feeling better, they may refer you back to seeing your family doctor.   If you have any questions, we'll be glad to talk to you.  About visits  Be open  At your visits, please talk openly about your problems. It may feel hard, but it's the best way for us to help you.  Cancelling visits  If you can't come to your visit, please call us right away at [phone]. If you don't cancel at least 24 hours (1 full day) before your visit, that's \"late cancellation.\"  Not showing up for your visits  Being very late is the same as not showing up. You'll be a \"no show\" if:  You're more than 15 minutes late for a 30-minute (half hour) visit.  You're more than 30 minutes late for a 60-minute (full hour) visit.  If you cancel late or don't show up 2 times within 6 months, we may end your care.  Getting help between visits  If you need help between visits, you can call us Monday to Friday from 8 a.m. to 4:30 p.m. at [phone].  Emergency care  Call 911 or go to the nearest emergency department if your life or someone else's life is in danger.  Call 988 anytime to reach the national Suicide and Crisis hotline.  Medicine refills  To refill your medicine, call your pharmacy. You can also call [facility]'s [facility] at [phone], Monday to Friday, 8 a.m. to 4:30 p.m. It can take 1 to 3 business days to get a refill.   Forms, letters, and tests  You may have papers to fill out, like FMLA, short-term disability, and workability. We can help you with these forms at your visits, but you must have an " appointment. You may need more than 1 visit for this, to be in an intensive therapy program, or both.  Before we can give you medicine for ADHD, we may refer you to get tested for it or confirm it another way.  We may not be able to give you an emotional support animal letter.  We don't do mental health checks ordered by the court.   We don't do mental health testing, but we can refer you to get tested.   Thank you for choosing us for your care.  For informational purposes only. Not to replace the advice of your health care provider. Copyright   2022 [facility]. All rights reserved. dINK 134948 - 12/22.     Plan:  1.Patient will take the medications as prescribed.   Medications: Take Vyvanse 40 mg daily  Lamictal 25 mg - Continue 2 tablets (50 mg) daily  Zyprexa 2.5 mg twice daily as needed for mood  Prazosin 1 mg at bedtime  Propranolol 20 mg twice daily as needed for anxiety  Patient will not stop taking medications or adjust them without consulting with the provider.  2.Patient will call with any problems between visits.  3.Patient will go to the emergency room if not feeling safe , unable to function in the community, or if suicidal, homicidal or hearing voices or having paranoia.  4.Patient will abstain from drugs and alcohol./Pt denies use .  5.Patient will not drive if sedated on medications or under influence of any substance.   6.Patient will not mix psychiatric medications with drugs and alcohol.   7.Patient will watch his diet and exercise.  8.Patient will see non psychiatric providers for non psychiatric disorders.  9. Next appointment in 4  weeks

## 2024-10-02 NOTE — NURSING NOTE
Current patient location: [address] [place] [id number]    Is the patient currently in the state of [place]? YES    Visit mode:VIDEO    If the visit is dropped, the patient can be reconnected by: VIDEO VISIT: Text to cell phone:   Telephone Information:   Mobile [phone]       Will anyone else be joining the visit? NO  (If patient encounters technical issues they should call [phone]    Are changes needed to the allergy or medication list? No    Are refills needed on medications prescribed by this physician? Discuss with provider    Rooming Documentation:  Questionnaire(s) completed    Reason for visit: RECHECK    Odalis NICHOLS

## 2024-10-06 DIAGNOSIS — F41.1 GAD (GENERALIZED ANXIETY DISORDER): Chronic | ICD-10-CM

## 2024-10-14 RX ORDER — HYDROXYZINE HYDROCHLORIDE 25 MG/1
TABLET, FILM COATED ORAL
Qty: 60 TABLET | Refills: 0 | Status: SHIPPED | OUTPATIENT
Start: 2024-10-14 | End: 2024-11-04

## 2024-10-14 NOTE — TELEPHONE ENCOUNTER
Date of Last Office Visit: 10/2/24  Date of Next Office Visit:  10/4/24  No shows since last visit: No  More than one patient-initiated cancellation (with reschedule) since last seen in clinic? No    []Medication refilled per  Medication Refill in Ambulatory Care  policy.  [x]Medication unable to be refilled by RN due to criteria not met as indicated below:    []Eligibility: has not had a provider visit within last 6 months   []Supervision: no future appointment; < 7 days before next appointment   []Compliance: no shows; cancellations; lapse in therapy   [x]Verification: order discrepancy; may need modification...   [] > 30-day supply request   []Advanced refill request: > 7 days before refill date   []Controlled medication   []Medication not included in policy   []Review: new med; med adjusted ? 30 days; safety alert; requires lab monitoring...   []Scope of Practice: refill request processed by LPN/MA   [x]Other:      Medication(s) requested:   Pending Prescriptions:                       Disp   Refills    hydrOXYzine HCl (ATARAX) 25 MG tablet [Ph*60 tab*0            Sig: TAKE 1 TABLET(25 MG) BY MOUTH TWICE DAILY AS           NEEDED FOR ANXIETY      Any Controlled Substance(s)? No      Requested medication(s) verified as identical to current order? Yes    Any lapse in adherence to medication(s) greater than 5 days? N/A     Additional action taken? none.      Last visit treatment plan:   ASSESSMENT AND PLAN    Patient is a 39 year old,  White Choose not to answer female with a history notable for moderate episode of recurrent major depressive disorder, PTSD, generalized anxiety disorder, ADHD, predominantly inattentive type, and insomnia, unspecified type who presents for follow up visit.  I started patient on Lamictal during last visit to help with mood and emotional activities.  Today she reports noticing significant improvement in mood and emotion as she is no longer feeling on edge why irritability has decreased  significantly since taking Lamictal 50 mg.  She would like to remain on current dose.  Not much improvement on Vyvanse with ADHD symptoms.  I titrated Vyvanse to 40 mg to effectively target ADHD symptoms.  She plans to switch back to Focalin XR if she continues to struggle with ADHD symptoms despite titrating Vyvanse.  She denies SI/ SIB/HI.  Patient report n bartolo or hypomania.  Patient to return to clinic in 4 weeks for follow-up.     Plan:  1.Patient will take the medications as prescribed.   Medications: Take Vyvanse 40 mg daily  Lamictal 25 mg - Continue 2 tablets (50 mg) daily  Zyprexa 2.5 mg twice daily as needed for mood  Prazosin 1 mg at bedtime  Propranolol 20 mg twice daily as needed for anxiety  Patient will not stop taking medications or adjust them without consulting with the provider.  2.Patient will call with any problems between visits.  3.Patient will go to the emergency room if not feeling safe , unable to function in the community, or if suicidal, homicidal or hearing voices or having paranoia.  4.Patient will abstain from drugs and alcohol./Pt denies use .  5.Patient will not drive if sedated on medications or under influence of any substance.   6.Patient will not mix psychiatric medications with drugs and alcohol.   7.Patient will watch his diet and exercise.  8.Patient will see non psychiatric providers for non psychiatric disorders.  9. Next appointment in 4  weeks  --------------------             Current Outpatient Medications   Medication Sig Dispense Refill           hydrOXYzine HCl (ATARAX) 25 MG tablet TAKE 1 TABLET(25 MG) BY MOUTH TWICE DAILY AS NEEDED FOR ANXIETY 60 tablet 0       Any medication(s) require lab monitoring? No

## 2024-11-04 ENCOUNTER — VIRTUAL VISIT (OUTPATIENT)
Dept: PSYCHIATRY | Facility: CLINIC | Age: 39
End: 2024-11-04
Payer: COMMERCIAL

## 2024-11-04 DIAGNOSIS — R25.1 TREMOR: ICD-10-CM

## 2024-11-04 DIAGNOSIS — F90.0 ATTENTION DEFICIT HYPERACTIVITY DISORDER (ADHD), PREDOMINANTLY INATTENTIVE TYPE: ICD-10-CM

## 2024-11-04 DIAGNOSIS — F33.1 MODERATE EPISODE OF RECURRENT MAJOR DEPRESSIVE DISORDER (H): Primary | ICD-10-CM

## 2024-11-04 DIAGNOSIS — F43.10 PTSD (POST-TRAUMATIC STRESS DISORDER): ICD-10-CM

## 2024-11-04 DIAGNOSIS — F41.1 GAD (GENERALIZED ANXIETY DISORDER): ICD-10-CM

## 2024-11-04 PROCEDURE — 99214 OFFICE O/P EST MOD 30 MIN: CPT | Mod: 95 | Performed by: NURSE PRACTITIONER

## 2024-11-04 RX ORDER — DEXMETHYLPHENIDATE HYDROCHLORIDE 10 MG/1
10 CAPSULE, EXTENDED RELEASE ORAL DAILY
Qty: 30 CAPSULE | Refills: 0 | Status: SHIPPED | OUTPATIENT
Start: 2024-11-04

## 2024-11-04 RX ORDER — PROPRANOLOL HCL 20 MG
20 TABLET ORAL 2 TIMES DAILY
Qty: 180 TABLET | Refills: 1 | Status: SHIPPED | OUTPATIENT
Start: 2024-11-04

## 2024-11-04 RX ORDER — LAMOTRIGINE 100 MG/1
100 TABLET ORAL DAILY
Qty: 30 TABLET | Refills: 1 | Status: SHIPPED | OUTPATIENT
Start: 2024-11-04

## 2024-11-04 RX ORDER — HYDROXYZINE HYDROCHLORIDE 25 MG/1
TABLET, FILM COATED ORAL
Qty: 60 TABLET | Refills: 0 | Status: SHIPPED | OUTPATIENT
Start: 2024-11-04

## 2024-11-04 RX ORDER — PRAZOSIN HYDROCHLORIDE 1 MG/1
1 CAPSULE ORAL AT BEDTIME
Qty: 90 CAPSULE | Refills: 0 | Status: SHIPPED | OUTPATIENT
Start: 2024-11-04

## 2024-11-04 RX ORDER — DEXMETHYLPHENIDATE HYDROCHLORIDE 5 MG/1
5 TABLET ORAL DAILY PRN
Qty: 30 TABLET | Refills: 0 | Status: SHIPPED | OUTPATIENT
Start: 2024-11-04

## 2024-11-04 ASSESSMENT — ANXIETY QUESTIONNAIRES
6. BECOMING EASILY ANNOYED OR IRRITABLE: MORE THAN HALF THE DAYS
2. NOT BEING ABLE TO STOP OR CONTROL WORRYING: NEARLY EVERY DAY
4. TROUBLE RELAXING: MORE THAN HALF THE DAYS
GAD7 TOTAL SCORE: 13
8. IF YOU CHECKED OFF ANY PROBLEMS, HOW DIFFICULT HAVE THESE MADE IT FOR YOU TO DO YOUR WORK, TAKE CARE OF THINGS AT HOME, OR GET ALONG WITH OTHER PEOPLE?: SOMEWHAT DIFFICULT
5. BEING SO RESTLESS THAT IT IS HARD TO SIT STILL: SEVERAL DAYS
7. FEELING AFRAID AS IF SOMETHING AWFUL MIGHT HAPPEN: SEVERAL DAYS
IF YOU CHECKED OFF ANY PROBLEMS ON THIS QUESTIONNAIRE, HOW DIFFICULT HAVE THESE PROBLEMS MADE IT FOR YOU TO DO YOUR WORK, TAKE CARE OF THINGS AT HOME, OR GET ALONG WITH OTHER PEOPLE: SOMEWHAT DIFFICULT
GAD7 TOTAL SCORE: 13
GAD7 TOTAL SCORE: 13
7. FEELING AFRAID AS IF SOMETHING AWFUL MIGHT HAPPEN: SEVERAL DAYS
1. FEELING NERVOUS, ANXIOUS, OR ON EDGE: MORE THAN HALF THE DAYS
3. WORRYING TOO MUCH ABOUT DIFFERENT THINGS: MORE THAN HALF THE DAYS

## 2024-11-04 ASSESSMENT — PAIN SCALES - GENERAL: PAINLEVEL_OUTOF10: NO PAIN (0)

## 2024-11-04 NOTE — PROGRESS NOTES
Virtual Visit Details    Type of service:  Video Visit   Video/Phone Start Time:  0903  Video/Phone End Time:   0917    Originating Location (pt. Location): Home    Distant Location (provider location):  On-site  Platform used for Video Visit: [employer]  Answers submitted by the patient for this visit:  Patient Health Questionnaire (Submitted on 11/4/2024)  If you checked off any problems, how difficult have these problems made it for you to do your work, take care of things at home, or get along with other people?: Somewhat difficult  PHQ9 TOTAL SCORE: 12  Patient Health Questionnaire (G7) (Submitted on 11/4/2024)  DIANA 7 TOTAL SCORE: 13

## 2024-11-04 NOTE — NURSING NOTE
Current patient location: [address] [place] [id number]    Is the patient currently in the state of [place]? YES    Visit mode:VIDEO    If the visit is dropped, the patient can be reconnected by: VIDEO VISIT: Text to cell phone:   Telephone Information:   Mobile [phone]    and VIDEO VISIT: Send to e-mail at: [email or web address]    Will anyone else be joining the visit? NO  (If patient encounters technical issues they should call [phone]    Are changes needed to the allergy or medication list? Pt stated no changes to allergies and Pt stated no med changes    Are refills needed on medications prescribed by this physician? Discuss with provider    Rooming Documentation:  Questionnaire(s) completed    Reason for visit: LIVIER NICHOLS

## 2024-11-04 NOTE — PROGRESS NOTES
"PSYCHIATRIC PROGRESS NOTE     Name:  Andria Daily  : 1985    Andria Daily is a 39 year old female who is being evaluated via a billable Video visit.      Telemedicine Visit: The patient's condition can be safely assessed and treated via synchronous audio and visual telemedicine encounter.      Reason for Telemedicine Visit: COVID 19 pandemic and the social and physical recommendations by the CDC and MD., Patient has requested telehealth visit, and Patient unable to travel      Originating Site (Patient Location): Patient's home    Distant Site (Provider Location): [facility] Outpatient Setting: [facility]    Consent:  The patient/guardian has verbally consented to: the potential risks and benefits of telemedicine (video visit or phone) versus in person care; bill my insurance or make self-payment for services provided; and responsibility for payment of non-covered services.     Mode of Communication:  [employer] App platform     As the provider I attest to compliance with applicable laws and regulations related to telemedicine.    Date of Last Visit: 10/02/24                                          CHIEF COMPLAINT     \"Doing better\"  HISTORY OF PRESENT ILLNESS     Patient who was last seen in the clinic on 10/2//24 returned today for follow up visit.  Patient reports she is doing fairly okay with current medication regimen except for still struggling with concentration and attention despite increasing Vyvanse to 40 mg.  Patient stated she will prefer to go back to taking methylphenidate as ADHD symptoms were better managed with methylphenidate.  I suggested stopping Vyvanse and started methylphenidate extended release 10 mg daily and methylphenidate 5 mg instant release as needed for ADHD.  I also suggested increasing lamotrigine to 100 mg to effectively manage feeling of overwhelming as well as mood and emotional reactivities.  Patient was amenable to taking Lamictal 100 mg as well as " methylphenidate.  Patient denies SI, SIB, and HI. . Patient also denies both auditory and visual hallucination.  Patient report normal no hypomania.  Patient return to clinic in 4 weeks for follow-up.  PSYCHIATRIC HISTORY:   History of Psychiatric Hospitalizations:   - Inpatient: X2 in teenager and early 20s  - IOP/PHP/Day treatment: PHP in 2024  History of Suicidal Ideation: Positive  History of Suicide Attempts:  Positive with medication overdose    History of Self-injurious Behavior: Denies a history of SIB.  Current:  No  History of Violence/Aggression: Negative  History of Commitment? Negative  Electroconvulsive Therapy (ECT):Negative  TMS: 2020  PSYCHIATRIC REVIEW OF SYSTEMS:   Psychiatric Review of Systems:   Depression:   Reportsaf: depressed mood, suicidal ideation, decreased interest, changes in sleep, changes in appetite, guilt, hopelessness, helplessness, impaired concentration, decreased energy, irritability.  [place]uni:   Denies: sleeplessness, increased goal-directed activities, abrupt increase in energy pressured speech  Psychosis:   Denies: visual hallucinations, auditory hallucinations, paranoia  Anxiety:   Reports: excessive worries that are difficult to control, panic attacks  PTSD:   Reports: re-experiencing past trauma, nightmares, increased arousal, avoidance of traumatic stimuli, impaired function.  Denies: re-experiencing past trauma, nightmares, increased arousal, avoidance of traumatic stimuli, impaired function.  OCD:   Denies: obsessions, checking, symmetry, cleaning, skin picking.  Eating Disorder: Hx of Anorexia and Bulimia  Denies: restriction, binging, purging.    Sleep: Struggles with sleep      MEDICATIONS                                                                                                Current Outpatient Medications   Medication Sig Dispense Refill    hydrOXYzine HCl (ATARAX) 25 MG tablet TAKE 1 TABLET(25 MG) BY MOUTH TWICE DAILY AS NEEDED FOR ANXIETY 60 tablet 0     "lamoTRIgine (LAMICTAL) 25 MG tablet Take 2 tablets (50 mg) by mouth daily. 60 tablet 1    levonorgestrel (MIRENA) 20 MCG/DAY IUD 1 each by Intrauterine route once      lisdexamfetamine (VYVANSE) 40 MG capsule Take 1 capsule (40 mg) by mouth every morning. 30 capsule 0    prazosin (MINIPRESS) 1 MG capsule Take 1 mg by mouth at bedtime      propranolol (INDERAL) 20 MG tablet Take 1 tablet (20 mg) by mouth 2 times daily 180 tablet 1    azelastine (ASTEPRO) 0.15 % nasal spray Spray 2 sprays into both nostrils daily. (Patient not taking: Reported on 9/4/2024) 17 mL 1    OLANZapine (ZYPREXA) 5 MG tablet Take 0.5 tablets (2.5 mg) by mouth 2 times daily as needed (mood) (Patient not taking: Reported on 11/4/2024) 30 tablet 1     No current facility-administered medications for this visit.       DRUG MONITORING:  [place] Prescription Monitoring Program evaluating controlled substances in the last year in [place]:  The [place] Prescription Monitoring Program has been reviewed and there are no current concerns with: diversionary activity, early refill requests, and or obtaining the medication from multiple providers       PAST PSYCHOTROPIC MEDICATIONS:    Abilify    V- Lexapro  - Effexor (night sweats)  - Buspar 7.5-15mg  - Wellbutrin (worsened tremor)  - Celexa 20mg  - Cymbalta (trialed with Celexa for pain, noted memory issues)  - trazodone 50mg   - Zoloft 50mg (higher doses associated with night sweats)  - Prazosin 1mg (not well tolerated due to hypotension)ITALS   There were no vitals taken for this visit.     BP Readings from Last 1 Encounters:   08/21/24 102/70     Pulse Readings from Last 1 Encounters:   08/21/24 76     Wt Readings from Last 1 Encounters:   10/02/24 72.6 kg (160 lb)     Ht Readings from Last 1 Encounters:   10/02/24 1.74 m (5' 8.5\")     Estimated body mass index is 23.97 kg/m  as calculated from the following:    Height as of 10/2/24: 1.74 m (5' 8.5\").    Weight as of 10/2/24: 72.6 kg (160 " lb).      PERTINENT HISTORY   PAST MEDICAL HISTORY:   Past Medical History:   Diagnosis Date    ADHD (attention deficit hyperactivity disorder)     completed assessment    Anxiety     Chronic fatigue syndrome     Chronic rhinitis     CHHAYA I (cervical intraepithelial neoplasia I) 2007    Depressive disorder     History of eating disorder     History of substance use     prescription pain killers, alcohol, cocaine    Hypersomnia     PTSD (post-traumatic stress disorder)        PAST SURGICAL HISTORY:   Past Surgical History:   Procedure Laterality Date    KNEE SURGERY Left 2018    for osgood-schlatter and tendon repair/anchor    LAPAROSCOPY DIAGNOSTIC (GYN) N/A 2023    Procedure: laparoscopy, chromopertubation;  Surgeon: Isabel Vera MD;  Location: [facility] OR    [place] Teeth Removal      WRIST SURGERY Left     snowboarding injury, fractured, had metal plate initially that was then removed       FAMILY HISTORY:   Family History   Problem Relation Age of Onset    Anxiety Disorder Mother     Tremor Mother     Depression Father     Anxiety Disorder Father     Sleep Apnea Father     Diabetes Maternal Grandmother     Hyperlipidemia Maternal Grandfather     Hypertension Maternal Grandfather     Coronary Artery Disease Maternal Grandfather 55    Anxiety Disorder Sister         after bad car accident    Breast Cancer No family hx of     Ovarian Cancer No family hx of     Colon Cancer No family hx of     Cerebrovascular Disease No family hx of        SOCIAL HISTORY:   Social History     Tobacco Use    Smoking status: Former     Current packs/day: 0.00     Types: Cigarettes     Quit date: 2016     Years since quittin.8    Smokeless tobacco: Never   Substance Use Topics    Alcohol use: Yes     Comment: Rarely         Seizures or Head Injury: Denies history of head injury. Denies history of seizures.  History of cardiac disease, rheumatic fever, fainting or dizziness, especially with exercise,  "seizures, chest pain or shortness of breath with exercise, unexplained change in exercise tolerance, palpitations, high blood pressure, or heart murmur?   No    LABS & IMAGING                                                                                                                Personally reviewed  Recent Labs   Lab Test 09/26/23  1130 02/02/17  0916   WBC 6.2 5.5   HGB 13.6 13.8   HCT 42.1 41.7   MCV 94 91    185   ANEU  --  3.5     Recent Labs   Lab Test 09/26/23  1130   GLC 84     Recent Labs   Lab Test 09/26/23  1130   CHOL 163   LDL 86   HDL 67   TRIG 51     No lab results found.  No results found for: \"LFQ856\", \"OLMU459\", \"BLZK43CIBAI\", \"VITD3\", \"D2VIT\", \"D3VIT\", \"DTOT\", \"[id number]\", \"[id number]\", \"[id number]\", \"[id number]\", \"[id number]\", \"[id number]\"     ALLERGY & IMMUNIZATIONS       Allergies   Allergen Reactions    Dairy Products [Milk Protein] Other (See Comments)     Nasal problems, stomach pain     No Clinical Screening - See Comments      Apples and bananas cause her throat to itch    Seasonal Allergies     Flonase [Fluticasone] Dizziness and Rash    Penicillins Rash     Rash on face and threw up       FAMILY MEDICAL HISTORY:     Family History       Problem (# of Occurrences) Relation (Name,Age of Onset)    Anxiety Disorder (3) Mother, Father, Sister: after bad car accident    Depression (1) Father    Diabetes (1) Maternal Grandmother    Hypertension (1) Maternal Grandfather    Hyperlipidemia (1) Maternal Grandfather    Coronary Artery Disease (1) Maternal Grandfather (55)    Sleep Apnea (1) Father    Tremor (1) Mother           Negative family history of: Breast Cancer, Ovarian Cancer, Colon Cancer, Cerebrovascular Disease              Family history of sudden or unexplained death or an event requiring resuscitation in children or young adults, cardiac arrhythmias (eg, Janelle-Parkinson-White syndrome), long QT syndrome, catecholaminergic paroxysmal ventricular tachycardia, Brugada " syndrome, arrhythmogenic right ventricular dysplasia, hypertrophic cardiomyopathy, dilated cardiomyopathy, or Marfan syndrome?  No    FAMILY PSYCHIATRIC HISTORY:   Psychiatry:Father, mother sister with anxiety, father with depression  Substance use history in family: Denies  Family suicide history:Negative      SIGNIFICANT SOCIAL/FAMILY HISTORY:                                           Born and raised in: [place]  Relationship status: Single living with boyfriend  Children: None    Highest education level was:Associate degree   Service:Denies  Employment status: Works part time at the [employer]  LEGAL:     SUBSTANCE USE HISTORY      Alcohol- fewer urges to drink, mindful with med and risks of alcohol   - treatment in 2015  Nicotine- none  Caffeine- 1-2 cups coffee daily, 1-2 energy drinks a week, can increase shaking if she doesn't eat enough  Opioids- sober since 2015   Cannabis- smoking 1-2x daily  - treated for cannabis use in 2015   Other Illicit Drugs- sober from cocaine and hallucinogens since 2015    MEDICAL REVIEW OF SYSTEMS:   Ten system review was completed with pertinent positives noted above    MENTAL STATUS EXAM:   Mental Status Examination (limited due to video virtual visit format):  Vital Signs: There were no vitals taken for this virtual visit.  Appearance: adequately groomed, appears stated age, and in no apparent distress.  Attitude: cooperative   Eye Contact: good to the extent that can be determined in a video visit  Muscle Strength and Tone: no gross abnormalities based on remote observation  Psychomotor Behavior:  no evidence of tardive dyskinesia, dystonia, or tics based on remote observation  Gait and Station: normal, no gross abnormalities based on remote observation  Speech: clear, coherent, normal prosody, regular rate, regular rhythm and fluent  Associations: No loosening of associations  Thought Process: coherent and goal directed  Thought Content: no evidence of  "suicidal ideation or homicidal ideation, no evidence of psychotic thought, no auditory hallucinations present and no visual hallucinations present  Mood: \"Neutral\"  Affect: appropriate and in normal range  Insight: good  Judgment: intact, adequate for safety  Impulse Control: intact  Oriented to: time, place, person and situation  Attention Span and Concentration: normal  Language: Intact  Recent and Remote Memory: intact to interview. Not formally assessed. No amnesia.  Fund of Knowledge: appropriate        SAFETY   Feels safe in home: Yes   Suicidal ideation: Denies  History of suicide attempts:  No   Hx of impulsivity: No     DSM 5 DIAGNOSIS:   1. Moderate episode of recurrent major depressive disorder (H)    2. PTSD (post-traumatic stress disorder)    3. DIANA (generalized anxiety disorder)    4. Attention deficit hyperactivity disorder (ADHD), predominantly inattentive type    5. Insomnia, unspecified type      ASSESSMENT AND PLAN    Patient is a 39 year old,  White Choose not to answer female with a history notable for moderate episode of recurrent major depressive disorder, PTSD, generalized anxiety disorder, ADHD, predominantly inattentive type, and insomnia, unspecified type who presents for follow up visit.  She has not noticed any improvement in ADHD symptoms despite increasing Vyvanse to 40 mg.  Since she is open to switching back to methylphenidate, I started her on Focalin XR 10 mg daily and 5 mg instant release as needed for ADHD.  I titrated Lamictal to 100 mg to effectively target residual mood, anxiety, and depression.  She denies SI/ SIB/HI.  Patient report no bartolo or hypomania.  Patient to return to clinic in 4 weeks for follow-up.    Plan:  1.Patient will take the medications as prescribed.   Medications: Take Focalin XR 10 mg daily  Take Focalin IR 5 mg daily as needed for ADHD  Take Lamictal 100 mg daily  Prazosin 1 mg at bedtime  Propranolol 20 mg twice daily as needed for anxiety  Patient " will not stop taking medications or adjust them without consulting with the provider.  2.Patient will call with any problems between visits.  3.Patient will go to the emergency room if not feeling safe , unable to function in the community, or if suicidal, homicidal or hearing voices or having paranoia.  4.Patient will abstain from drugs and alcohol./Pt denies use .  5.Patient will not drive if sedated on medications or under influence of any substance.   6.Patient will not mix psychiatric medications with drugs and alcohol.   7.Patient will watch his diet and exercise.  8.Patient will see non psychiatric providers for non psychiatric disorders.  9. Next appointment in 4  weeks    Risk Assessment:     Andria has notable risk factors for self-harm, including, single status, anxiety, mood dysregulation, substance use,  and passive SI. However, risk is mitigated by ability to volunteer a safety plan and history of seeking help when needed. Additional steps taken to minimize risk include making medication adjustment, asking patient to call 911 and go to the ER if not able to stay safe at home,  Therefore, based on all available evidence including the factors cited above, Andria does not appear to be an imminent danger to self or others and does not meet criteria 72 hour hold. However, if patient uses substances or is non-adherent with medication, their risk of decompensation and SI/HI will be elevated. This was discussed with the patient as she verbalized good understanding.   CONSULTS/REFERRALS:   Continue therapy  None at this time  Coordinate care with therapist as needed    MEDICAL:   None at this time  Coordinate care with PCP (Alma Hernandez) as needed  Follow up with primary care provider as planned or for acute medical concerns.    PSYCHOEDUCATION:  Medication side effects and alternatives reviewed. Health promotion activities recommended and reviewed today. All questions addressed. Education and counseling  completed regarding risks and benefits of medications and psychotherapy options.  Consent provided by patient/guardian  Call the psychiatric nurse line with medication questions or concerns at [phone].  BlueStackshart may be used to communicate with your provider, but this is not intended to be used for emergencies.  BLACK BOX WARNING: Discussed the Food and Drug Administration (FDA) requires that all antidepressants carry a warning that some children, adolescents and young adults may be at increased risk of suicide when taking antidepressants. Anyone taking an antidepressant should be watched closely for worsening depression or unusual behavior especially in the first few weeks after starting an SSRI. Keep in mind, antidepressants are more likely to reduce suicide risk in the long run by improving mood.   SEROTONIN SYNDROME:  Discussed risks of Serotonin syndrome (ie, serotonin toxicity) which is a potentially life-threatening condition associated with increased serotonergic activity in the central nervous system (CNS). It is seen with therapeutic medication use, inadvertent interactions between drugs, and intentional self-poisoning. Serotonin syndrome may involve a spectrum of clinical findings, which often include mental status changes, autonomic hyperactivity, and neuromuscular abnormalities.    BENZODIAZEPINE:  discussion on how benzos work and the need to use them short term due to potential of anxiety getting.  This is a controlled substance with risk for abuse, need to keep in a safe keep place and cannot replace lost scripts.    HYPNOTIC USE: Hypnotic use, risk for CNS depression, sleep-walking, not to mix with ETOH or other CNS depressant, need for six hours of sleep, stop if change in mood.  This is a controlled substance with risk for abuse, need to keep in a safe keep place and cannot replace lost scripts.  FIRST GENERATION ANTIPSYCHOTIC/ SECOND GENERATION ANTIPSYCHOTIC USE:  Atypical need for  cardiometabolic monitoring with medication- B/P, weight, blood sugar, cholesterol.  Need to monitor for abnormal movements taught  SAFETY:  We all care about your loved one's safety. To reduce the risk of self-harm, remove access to all:  Firearms, Medicines (both prescribed and over-the-counter), Knives and other sharp objects, Ropes and like materials, and Alcohol  SLEEP HYGIENE: establish a sleep routine, limit screen time 1 hour prior to bed, use bed for sleep only, take sleep/medications on time (including sleepy time tea, trazadone or herbal treatments such as melatonin), aroma therapy, limit caffeine/sugar, yoga, guided imagery, stretch, meditation, limit naps to 20 minutes, make a temperature change in the room, white noise, be mindful of slowing down breathing, take a warm bath/shower, frequently wash sheets, and journaling.   Medlineplus.gov is information for patients.  It is run by the [employer] and it contains information about all disorders, diseases and all medications.      COMMUNITY RESOURCES:    CRISIS NUMBERS: Provided in AVS 6/6/2024  National Suicide Prevention Lifeline: 0-318-384-TALK (172-873-7769)  SafetyWeb/resources for a list of additional resources (SOS)            [facility] - [phone]   Urgent Care Adult Mental Zxjpdt-[phone] mobile unit/ 24/7 crisis line  [facility] -[phone]   COPE 24/7 [place] Mobile Team -[phone] (adults)/ [phone] (child)  Poison Control Center - 1-338.831.4976    OR  go to nearest ER  Crisis Text Line for any crisis 24/7 send this-   To: 786820   [facility] ([facility]) [facility]  [phone]  National Suicide Prevention Lifeline: 780.160.8597 (TTY: 139.242.9381). Call anytime for help.  (www.suicidepreventionlifeline.org)  National [place] [employer] Mental Illness (www.iman.org): 252.833.7039 or 060-396-7091.   Mental Health Association (www.mentalhealth.org): 216.664.9009 or  217-549-8852.  [place] Crisis Text Line: Text [place] to 487713  Suicide LifeLine Chat: suicidepreventionlifeline.org/chat    ADMINISTRATIVE BILLIN mins spent interviewing patient, reviewing referral documents, obtaining and reviewing outside records, communication with other health specialists, and preparing this report on this day: 24    Video/Phone Start Time:  903  Video/Phone End Time:   917    Greater than 50% of time was spent in counseling and coordination of care regarding above diagnoses and treatment plan.    Signed:   Reddy Mello, MSN, APRN, PMHNP-BC  Long Term Outpatient Psychiatry  Chart documentation done in part with Dragon Voice Recognition software.  Although reviewed after completion, some word and grammatical errors may remain.   Answers submitted by the patient for this visit:  Patient Health Questionnaire (Submitted on 2024)  If you checked off any problems, how difficult have these problems made it for you to do your work, take care of things at home, or get along with other people?: Very difficult  PHQ9 TOTAL SCORE: 17  DIANA-7 (Submitted on 2024)  DIANA 7 TOTAL SCORE: 13    Answers submitted by the patient for this visit:  Patient Health Questionnaire (Submitted on 2024)  If you checked off any problems, how difficult have these problems made it for you to do your work, take care of things at home, or get along with other people?: Very difficult  PHQ9 TOTAL SCORE: 13  DIANA-7 (Submitted on 2024)  DIANA 7 TOTAL SCORE: 12    Answers submitted by the patient for this visit:  Patient Health Questionnaire (Submitted on 2024)  If you checked off any problems, how difficult have these problems made it for you to do your work, take care of things at home, or get along with other people?: Somewhat difficult  PHQ9 TOTAL SCORE: 12  Patient Health Questionnaire (G7) (Submitted on 2024)  DIANA 7 TOTAL SCORE: 13

## 2024-11-04 NOTE — PATIENT INSTRUCTIONS
"Patient Education   [facility]  What to Expect  Here's what to expect in the [facility].   About the program  You'll be meeting with a psychiatric doctor to check your mental health. A psychiatric doctor helps you deal with troubling thoughts and feelings by giving you medicine. They'll make sure you know the plan for your care. You may see them for a long time. When you're feeling better, they may refer you back to seeing your family doctor.   If you have any questions, we'll be glad to talk to you.  About visits  Be open  At your visits, please talk openly about your problems. It may feel hard, but it's the best way for us to help you.  Cancelling visits  If you can't come to your visit, please call us right away at [phone]. If you don't cancel at least 24 hours (1 full day) before your visit, that's \"late cancellation.\"  Not showing up for your visits  Being very late is the same as not showing up. You'll be a \"no show\" if:  You're more than 15 minutes late for a 30-minute (half hour) visit.  You're more than 30 minutes late for a 60-minute (full hour) visit.  If you cancel late or don't show up 2 times within 6 months, we may end your care.  Getting help between visits  If you need help between visits, you can call us Monday to Friday from 8 a.m. to 4:30 p.m. at [phone].  Emergency care  Call 911 or go to the nearest emergency department if your life or someone else's life is in danger.  Call 988 anytime to reach the national Suicide and Crisis hotline.  Medicine refills  To refill your medicine, call your pharmacy. You can also call [facility] at [phone], Monday to Friday, 8 a.m. to 4:30 p.m. It can take 1 to 3 business days to get a refill.   Forms, letters, and tests  You may have papers to fill out, like FMLA, short-term disability, and workability. We can help you with these forms at your visits, but you must have an " appointment. You may need more than 1 visit for this, to be in an intensive therapy program, or both.  Before we can give you medicine for ADHD, we may refer you to get tested for it or confirm it another way.  We may not be able to give you an emotional support animal letter.  We don't do mental health checks ordered by the court.   We don't do mental health testing, but we can refer you to get tested.   Thank you for choosing us for your care.  For informational purposes only. Not to replace the advice of your health care provider. Copyright   2022 [facility]. All rights reserved. [employer] [id number] - 12/22.     Plan:  1.Patient will take the medications as prescribed.   Medications: Take Focalin XR 10 mg daily  Take Focalin IR 5 mg daily as needed for ADHD  Take Lamictal 100 mg daily  Prazosin 1 mg at bedtime  Propranolol 20 mg twice daily as needed for anxiety  Patient will not stop taking medications or adjust them without consulting with the provider.  2.Patient will call with any problems between visits.  3.Patient will go to the emergency room if not feeling safe , unable to function in the community, or if suicidal, homicidal or hearing voices or having paranoia.  4.Patient will abstain from drugs and alcohol./Pt denies use .  5.Patient will not drive if sedated on medications or under influence of any substance.   6.Patient will not mix psychiatric medications with drugs and alcohol.   7.Patient will watch his diet and exercise.  8.Patient will see non psychiatric providers for non psychiatric disorders.  9. Next appointment in 4  weeks

## 2024-12-04 ENCOUNTER — VIRTUAL VISIT (OUTPATIENT)
Dept: PSYCHIATRY | Facility: CLINIC | Age: 39
End: 2024-12-04
Payer: COMMERCIAL

## 2024-12-04 DIAGNOSIS — F90.0 ATTENTION DEFICIT HYPERACTIVITY DISORDER (ADHD), PREDOMINANTLY INATTENTIVE TYPE: ICD-10-CM

## 2024-12-04 DIAGNOSIS — F43.10 PTSD (POST-TRAUMATIC STRESS DISORDER): ICD-10-CM

## 2024-12-04 DIAGNOSIS — F33.1 MODERATE EPISODE OF RECURRENT MAJOR DEPRESSIVE DISORDER (H): ICD-10-CM

## 2024-12-04 DIAGNOSIS — F41.1 GAD (GENERALIZED ANXIETY DISORDER): ICD-10-CM

## 2024-12-04 RX ORDER — HYDROXYZINE HYDROCHLORIDE 25 MG/1
TABLET, FILM COATED ORAL
Qty: 60 TABLET | Refills: 0 | Status: SHIPPED | OUTPATIENT
Start: 2024-12-04

## 2024-12-04 RX ORDER — DEXMETHYLPHENIDATE HYDROCHLORIDE 10 MG/1
10 CAPSULE, EXTENDED RELEASE ORAL DAILY
Qty: 30 CAPSULE | Refills: 0 | Status: SHIPPED | OUTPATIENT
Start: 2024-12-04

## 2024-12-04 RX ORDER — DEXMETHYLPHENIDATE HYDROCHLORIDE 5 MG/1
5 TABLET ORAL DAILY PRN
Qty: 30 TABLET | Refills: 0 | Status: SHIPPED | OUTPATIENT
Start: 2024-12-04

## 2024-12-04 RX ORDER — PRAZOSIN HYDROCHLORIDE 1 MG/1
1 CAPSULE ORAL AT BEDTIME
Qty: 90 CAPSULE | Refills: 0 | Status: SHIPPED | OUTPATIENT
Start: 2024-12-04

## 2024-12-04 RX ORDER — LAMOTRIGINE 100 MG/1
100 TABLET ORAL DAILY
Qty: 30 TABLET | Refills: 1 | Status: SHIPPED | OUTPATIENT
Start: 2024-12-04

## 2024-12-04 ASSESSMENT — PATIENT HEALTH QUESTIONNAIRE - PHQ9: SUM OF ALL RESPONSES TO PHQ QUESTIONS 1-9: 10

## 2024-12-04 ASSESSMENT — PAIN SCALES - GENERAL: PAINLEVEL_OUTOF10: MILD PAIN (3)

## 2024-12-04 NOTE — NURSING NOTE
Is the patient currently in the state of [place]? YES    Current patient location: [address] [place] [id number]    Visit mode:VIDEO    If the visit is dropped, the patient can be reconnected by: VIDEO VISIT: Text to cell phone:   Telephone Information:   Mobile [phone]       Will anyone else be joining the visit? No  (If patient encounters technical issues they should call [phone])    Are changes needed to the allergy or medication list? Yes Medications flagged for removal; and Pt stated no changes to allergies    Are refills needed on medications prescribed by this physician? Yes    Rooming Documentation: Questionnaire(s) completed.    Reason for visit: RECHBRIDGETTE Aguirre, VVF

## 2024-12-04 NOTE — PROGRESS NOTES
"PSYCHIATRIC PROGRESS NOTE     Name:  Andria Daily  : 1985    Andria Daily is a 39 year old female who is being evaluated via a billable Video visit.      Telemedicine Visit: The patient's condition can be safely assessed and treated via synchronous audio and visual telemedicine encounter.      Reason for Telemedicine Visit: COVID 19 pandemic and the social and physical recommendations by the CDC and MD., Patient has requested telehealth visit, and Patient unable to travel      Originating Site (Patient Location): Patient's home    Distant Site (Provider Location): [facility] Outpatient Setting: [facility]    Consent:  The patient/guardian has verbally consented to: the potential risks and benefits of telemedicine (video visit or phone) versus in person care; bill my insurance or make self-payment for services provided; and responsibility for payment of non-covered services.     Mode of Communication:  [employer] platform     As the provider I attest to compliance with applicable laws and regulations related to telemedicine.    Date of Last Visit: 24                                          CHIEF COMPLAINT     \"Doing better\"  HISTORY OF PRESENT ILLNESS     Patient who was last seen in the clinic on 24 returned today for follow up visit.  Patient reports she is doing great, stating mood, depression, and anxiety as well as ADHD are well under control with current medication regimen.  Patient reports that she has noticed significant improvement in mood since increasing Lamictal to 100 mg.  Patient also stated ADHD are more controlled with methylphenidate compared to Vyvanse.  Patient stated she would like to remain on current medication regimen as she believes they are effectively managing symptoms..  Patient denies SI, SIB, and HI. . Patient also denies both auditory and visual hallucination.  Patient report normal no hypomania.  Patient return to clinic in 6 weeks for " follow-up.  PSYCHIATRIC HISTORY:   History of Psychiatric Hospitalizations:   - Inpatient: X2 in teenager and early 20s  - IOP/PHP/Day treatment: PHP in 2024  History of Suicidal Ideation: Positive  History of Suicide Attempts:  Positive with medication overdose    History of Self-injurious Behavior: Denies a history of SIB.  Current:  No  History of Violence/Aggression: Negative  History of Commitment? Negative  Electroconvulsive Therapy (ECT):Negative  TMS: 2020  PSYCHIATRIC REVIEW OF SYSTEMS:   Psychiatric Review of Systems:   Depression:   Reportsaf: depressed mood, suicidal ideation, decreased interest, changes in sleep, changes in appetite, guilt, hopelessness, helplessness, impaired concentration, decreased energy, irritability.  [place]uni:   Denies: sleeplessness, increased goal-directed activities, abrupt increase in energy pressured speech  Psychosis:   Denies: visual hallucinations, auditory hallucinations, paranoia  Anxiety:   Reports: excessive worries that are difficult to control, panic attacks  PTSD:   Reports: re-experiencing past trauma, nightmares, increased arousal, avoidance of traumatic stimuli, impaired function.  Denies: re-experiencing past trauma, nightmares, increased arousal, avoidance of traumatic stimuli, impaired function.  OCD:   Denies: obsessions, checking, symmetry, cleaning, skin picking.  Eating Disorder: Hx of Anorexia and Bulimia  Denies: restriction, binging, purging.    Sleep: Struggles with sleep      MEDICATIONS                                                                                                Current Outpatient Medications   Medication Sig Dispense Refill    dexmethylphenidate (FOCALIN XR) 10 MG 24 hr capsule Take 1 capsule (10 mg) by mouth daily. 30 capsule 0    dexmethylphenidate (FOCALIN) 5 MG tablet Take 1 tablet (5 mg) by mouth daily as needed (ADHD). 30 tablet 0    hydrOXYzine HCl (ATARAX) 25 MG tablet TAKE 1 TABLET(25 MG) BY MOUTH TWICE DAILY AS NEEDED FOR  "ANXIETY 60 tablet 0    prazosin (MINIPRESS) 1 MG capsule Take 1 capsule (1 mg) by mouth at bedtime. 90 capsule 0    azelastine (ASTEPRO) 0.15 % nasal spray Spray 2 sprays into both nostrils daily. (Patient not taking: Reported on 9/4/2024) 17 mL 1    lamoTRIgine (LAMICTAL) 100 MG tablet Take 1 tablet (100 mg) by mouth daily. 30 tablet 1    levonorgestrel (MIRENA) 20 MCG/DAY IUD 1 each by Intrauterine route once      propranolol (INDERAL) 20 MG tablet Take 1 tablet (20 mg) by mouth 2 times daily. 180 tablet 1     No current facility-administered medications for this visit.       DRUG MONITORING:  [place] Prescription Monitoring Program evaluating controlled substances in the last year in [place]:  The [place] Prescription Monitoring Program has been reviewed and there are no current concerns with: diversionary activity, early refill requests, and or obtaining the medication from multiple providers       PAST PSYCHOTROPIC MEDICATIONS:    Abilify    V- Lexapro  - Effexor (night sweats)  - Buspar 7.5-15mg  - Wellbutrin (worsened tremor)  - Celexa 20mg  - Cymbalta (trialed with Celexa for pain, noted memory issues)  - trazodone 50mg   - Zoloft 50mg (higher doses associated with night sweats)  - Prazosin 1mg (not well tolerated due to hypotension)ITALS   There were no vitals taken for this visit.     BP Readings from Last 1 Encounters:   08/21/24 102/70     Pulse Readings from Last 1 Encounters:   08/21/24 76     Wt Readings from Last 1 Encounters:   10/02/24 72.6 kg (160 lb)     Ht Readings from Last 1 Encounters:   10/02/24 1.74 m (5' 8.5\")     Estimated body mass index is 23.97 kg/m  as calculated from the following:    Height as of 10/2/24: 1.74 m (5' 8.5\").    Weight as of 10/2/24: 72.6 kg (160 lb).      PERTINENT HISTORY   PAST MEDICAL HISTORY:   Past Medical History:   Diagnosis Date    ADHD (attention deficit hyperactivity disorder)     completed assessment    Anxiety     Chronic fatigue syndrome     Chronic " rhinitis     CHHAYA I (cervical intraepithelial neoplasia I) 2007    Depressive disorder     History of eating disorder     History of substance use     prescription pain killers, alcohol, cocaine    Hypersomnia     PTSD (post-traumatic stress disorder)        PAST SURGICAL HISTORY:   Past Surgical History:   Procedure Laterality Date    KNEE SURGERY Left 2018    for osgood-schlatter and tendon repair/anchor    LAPAROSCOPY DIAGNOSTIC (GYN) N/A 2023    Procedure: laparoscopy, chromopertubation;  Surgeon: Isabel Vera MD;  Location: [facility] OR    [place] Teeth Removal      WRIST SURGERY Left     snowboarding injury, fractured, had metal plate initially that was then removed       FAMILY HISTORY:   Family History   Problem Relation Age of Onset    Anxiety Disorder Mother     Tremor Mother     Depression Father     Anxiety Disorder Father     Sleep Apnea Father     Diabetes Maternal Grandmother     Hyperlipidemia Maternal Grandfather     Hypertension Maternal Grandfather     Coronary Artery Disease Maternal Grandfather 55    Anxiety Disorder Sister         after bad car accident    Breast Cancer No family hx of     Ovarian Cancer No family hx of     Colon Cancer No family hx of     Cerebrovascular Disease No family hx of        SOCIAL HISTORY:   Social History     Tobacco Use    Smoking status: Former     Current packs/day: 0.00     Types: Cigarettes     Quit date:      Years since quittin.9    Smokeless tobacco: Never   Substance Use Topics    Alcohol use: Yes     Comment: Rarely         Seizures or Head Injury: Denies history of head injury. Denies history of seizures.  History of cardiac disease, rheumatic fever, fainting or dizziness, especially with exercise, seizures, chest pain or shortness of breath with exercise, unexplained change in exercise tolerance, palpitations, high blood pressure, or heart murmur?   No    LABS & IMAGING                                                       "                                                          Personally reviewed  Recent Labs   Lab Test 09/26/23  1130 02/02/17  0916   WBC 6.2 5.5   HGB 13.6 13.8   HCT 42.1 41.7   MCV 94 91    185   ANEU  --  3.5     Recent Labs   Lab Test 09/26/23  1130   GLC 84     Recent Labs   Lab Test 09/26/23  1130   CHOL 163   LDL 86   HDL 67   TRIG 51     No lab results found.  No results found for: \"OWJ164\", \"ZUGN657\", \"TTDP20JGMUB\", \"VITD3\", \"D2VIT\", \"D3VIT\", \"DTOT\", \"[id number]\", \"[id number]\", \"[id number]\", \"[id number]\", \"[id number]\", \"[id number]\"     ALLERGY & IMMUNIZATIONS       Allergies   Allergen Reactions    Dairy Products [Milk Protein] Other (See Comments)     Nasal problems, stomach pain     No Clinical Screening - See Comments      Apples and bananas cause her throat to itch    Seasonal Allergies     Flonase [Fluticasone] Dizziness and Rash    Penicillins Rash     Rash on face and threw up       FAMILY MEDICAL HISTORY:     Family History       Problem (# of Occurrences) Relation (Name,Age of Onset)    Anxiety Disorder (3) Mother, Father, Sister: after bad car accident    Depression (1) Father    Diabetes (1) Maternal Grandmother    Hypertension (1) Maternal Grandfather    Hyperlipidemia (1) Maternal Grandfather    Coronary Artery Disease (1) Maternal Grandfather (55)    Sleep Apnea (1) Father    Tremor (1) Mother           Negative family history of: Breast Cancer, Ovarian Cancer, Colon Cancer, Cerebrovascular Disease              Family history of sudden or unexplained death or an event requiring resuscitation in children or young adults, cardiac arrhythmias (eg, Janelle-Parkinson-White syndrome), long QT syndrome, catecholaminergic paroxysmal ventricular tachycardia, Brugada syndrome, arrhythmogenic right ventricular dysplasia, hypertrophic cardiomyopathy, dilated cardiomyopathy, or Marfan syndrome?  No    FAMILY PSYCHIATRIC HISTORY:   Psychiatry:Father, mother sister with anxiety, father with " "depression  Substance use history in family: Denies  Family suicide history:Negative      SIGNIFICANT SOCIAL/FAMILY HISTORY:                                           Born and raised in: [place]  Relationship status: Single living with boyfriend  Children: None    Highest education level was:Associate degree   Service:Denies  Employment status: Works part time at the [employer]  LEGAL:     SUBSTANCE USE HISTORY      Alcohol- fewer urges to drink, mindful with med and risks of alcohol   - treatment in 2015  Nicotine- none  Caffeine- 1-2 cups coffee daily, 1-2 energy drinks a week, can increase shaking if she doesn't eat enough  Opioids- sober since 2015   Cannabis- smoking 1-2x daily  - treated for cannabis use in 2015   Other Illicit Drugs- sober from cocaine and hallucinogens since 2015    MEDICAL REVIEW OF SYSTEMS:   Ten system review was completed with pertinent positives noted above    MENTAL STATUS EXAM:   Mental Status Examination (limited due to video virtual visit format):  Vital Signs: There were no vitals taken for this virtual visit.  Appearance: adequately groomed, appears stated age, and in no apparent distress.  Attitude: cooperative   Eye Contact: good to the extent that can be determined in a video visit  Muscle Strength and Tone: no gross abnormalities based on remote observation  Psychomotor Behavior:  no evidence of tardive dyskinesia, dystonia, or tics based on remote observation  Gait and Station: normal, no gross abnormalities based on remote observation  Speech: clear, coherent, normal prosody, regular rate, regular rhythm and fluent  Associations: No loosening of associations  Thought Process: coherent and goal directed  Thought Content: no evidence of suicidal ideation or homicidal ideation, no evidence of psychotic thought, no auditory hallucinations present and no visual hallucinations present  Mood: \"Neutral\"  Affect: appropriate and in normal range  Insight: " good  Judgment: intact, adequate for safety  Impulse Control: intact  Oriented to: time, place, person and situation  Attention Span and Concentration: normal  Language: Intact  Recent and Remote Memory: intact to interview. Not formally assessed. No amnesia.  Fund of Knowledge: appropriate        SAFETY   Feels safe in home: Yes   Suicidal ideation: Denies  History of suicide attempts:  No   Hx of impulsivity: No     DSM 5 DIAGNOSIS:   1. Moderate episode of recurrent major depressive disorder (H)    2. PTSD (post-traumatic stress disorder)    3. DIANA (generalized anxiety disorder)    4. Attention deficit hyperactivity disorder (ADHD), predominantly inattentive type    5. Insomnia, unspecified type      ASSESSMENT AND PLAN    Patient is a 39 year old,  White Choose not to answer female with a history notable for moderate episode of recurrent major depressive disorder, PTSD, generalized anxiety disorder, ADHD, predominantly inattentive type, and insomnia, unspecified type who presents for follow up visit.  She has noticed significant improvement in mood, depression, and anxiety as well as ADHD symptoms since switching to methylphenidate as well as increasing Lamictal to 100 mg.  She would like to remain on current medication regimen as she believes the helping with symptoms.  She denies SI/ SIB/HI.  Patient report no bartolo or hypomania.  Patient to return to clinic in 6 weeks for follow-up.    Plan:  1.Patient will take the medications as prescribed.   Medications: Continue Focalin XR 10 mg daily  Continue Focalin IR 5 mg daily as needed for ADHD  Continue Lamictal 100 mg daily  Prazosin 1 mg at bedtime  Propranolol 20 mg twice daily as needed for anxiety  Patient will not stop taking medications or adjust them without consulting with the provider.  2.Patient will call with any problems between visits.  3.Patient will go to the emergency room if not feeling safe , unable to function in the community, or if suicidal,  homicidal or hearing voices or having paranoia.  4.Patient will abstain from drugs and alcohol./Pt denies use .  5.Patient will not drive if sedated on medications or under influence of any substance.   6.Patient will not mix psychiatric medications with drugs and alcohol.   7.Patient will watch his diet and exercise.  8.Patient will see non psychiatric providers for non psychiatric disorders.  9. Next appointment in 6  weeks    Risk Assessment:     Andria has notable risk factors for self-harm, including, single status, anxiety, mood dysregulation, substance use,  and passive SI. However, risk is mitigated by ability to volunteer a safety plan and history of seeking help when needed. Additional steps taken to minimize risk include making medication adjustment, asking patient to call 911 and go to the ER if not able to stay safe at home,  Therefore, based on all available evidence including the factors cited above, Andria does not appear to be an imminent danger to self or others and does not meet criteria 72 hour hold. However, if patient uses substances or is non-adherent with medication, their risk of decompensation and SI/HI will be elevated. This was discussed with the patient as she verbalized good understanding.   CONSULTS/REFERRALS:   Continue therapy  None at this time  Coordinate care with therapist as needed    MEDICAL:   None at this time  Coordinate care with PCP (Alma Hernandez) as needed  Follow up with primary care provider as planned or for acute medical concerns.    PSYCHOEDUCATION:  Medication side effects and alternatives reviewed. Health promotion activities recommended and reviewed today. All questions addressed. Education and counseling completed regarding risks and benefits of medications and psychotherapy options.  Consent provided by patient/guardian  Call the psychiatric nurse line with medication questions or concerns at [phone].  GiftLauncherhart may be used to communicate with your provider,  but this is not intended to be used for emergencies.  BLACK BOX WARNING: Discussed the Food and Drug Administration (FDA) requires that all antidepressants carry a warning that some children, adolescents and young adults may be at increased risk of suicide when taking antidepressants. Anyone taking an antidepressant should be watched closely for worsening depression or unusual behavior especially in the first few weeks after starting an SSRI. Keep in mind, antidepressants are more likely to reduce suicide risk in the long run by improving mood.   SEROTONIN SYNDROME:  Discussed risks of Serotonin syndrome (ie, serotonin toxicity) which is a potentially life-threatening condition associated with increased serotonergic activity in the central nervous system (CNS). It is seen with therapeutic medication use, inadvertent interactions between drugs, and intentional self-poisoning. Serotonin syndrome may involve a spectrum of clinical findings, which often include mental status changes, autonomic hyperactivity, and neuromuscular abnormalities.    BENZODIAZEPINE:  discussion on how benzos work and the need to use them short term due to potential of anxiety getting.  This is a controlled substance with risk for abuse, need to keep in a safe keep place and cannot replace lost scripts.    HYPNOTIC USE: Hypnotic use, risk for CNS depression, sleep-walking, not to mix with ETOH or other CNS depressant, need for six hours of sleep, stop if change in mood.  This is a controlled substance with risk for abuse, need to keep in a safe keep place and cannot replace lost scripts.  FIRST GENERATION ANTIPSYCHOTIC/ SECOND GENERATION ANTIPSYCHOTIC USE:  Atypical need for cardiometabolic monitoring with medication- B/P, weight, blood sugar, cholesterol.  Need to monitor for abnormal movements taught  SAFETY:  We all care about your loved one's safety. To reduce the risk of self-harm, remove access to all:  Firearms, Medicines (both  prescribed and over-the-counter), Knives and other sharp objects, Ropes and like materials, and Alcohol  SLEEP HYGIENE: establish a sleep routine, limit screen time 1 hour prior to bed, use bed for sleep only, take sleep/medications on time (including sleepy time tea, trazadone or herbal treatments such as melatonin), aroma therapy, limit caffeine/sugar, yoga, guided imagery, stretch, meditation, limit naps to 20 minutes, make a temperature change in the room, white noise, be mindful of slowing down breathing, take a warm bath/shower, frequently wash sheets, and journaling.   Medlineplus.gov is information for patients.  It is run by the [employer] and it contains information about all disorders, diseases and all medications.      COMMUNITY RESOURCES:    CRISIS NUMBERS: Provided in AVS 2024  National Suicide Prevention Lifeline: 6-873-544-TALK (063-801-5277)  Droid system master/resources for a list of additional resources (SOS)            [facility] - [phone]   [facility] Adult Mental Mthcqn-[phone] mobile unit/  crisis line  [facility] -[phone]   COPE  [place] Mobile Team -[phone] (adults)/ [phone] (child)  Poison Control Center - 1-414.424.8966    OR  go to nearest ER  Crisis Text Line for any crisis  send this-   To: 747422   [facility] ([facility]  [phone]  National Suicide Prevention Lifeline: 536.174.9344 (TTY: 124.118.7608). Call anytime for help.  (www.suicidepreventionlifeline.org)  National [place] on Mental Illness (www.iman.org): 365.491.2784 or 018-770-1869.   Mental Health Association (www.mentalhealth.org): 209.360.2581 or 501-684-5623.  [place] Crisis Text Line: Text [place] to 519316  Suicide LifeLine Chat: suicidePhosphagenics.org/chat    ADMINISTRATIVE BILLIN mins spent interviewing patient, reviewing referral documents, obtaining and reviewing outside records,  communication with other health specialists, and preparing this report on this day: 12/4/24    Video/Phone Start Time:  1102  Video/Phone End Time:   1109    Greater than 50% of time was spent in counseling and coordination of care regarding above diagnoses and treatment plan.    The longitudinal plan of care for the diagnosis(es)/condition(s) as documented were addressed during this visit. Due to the added complexity in care, I will continue to support Andria in the subsequent management and with ongoing continuity of care.     Signed:   Reddy Mello, MSN, APRN, PMHNP-BC  Long Term Outpatient Psychiatry  Chart documentation done in part with Dragon Voice Recognition software.  Although reviewed after completion, some word and grammatical errors may remain.   Answers submitted by the patient for this visit:  Patient Health Questionnaire (Submitted on 6/6/2024)  If you checked off any problems, how difficult have these problems made it for you to do your work, take care of things at home, or get along with other people?: Very difficult  PHQ9 TOTAL SCORE: 17  DIANA-7 (Submitted on 6/6/2024)  DIANA 7 TOTAL SCORE: 13    Answers submitted by the patient for this visit:  Patient Health Questionnaire (Submitted on 7/22/2024)  If you checked off any problems, how difficult have these problems made it for you to do your work, take care of things at home, or get along with other people?: Very difficult  PHQ9 TOTAL SCORE: 13  DIANA-7 (Submitted on 7/22/2024)  DIANA 7 TOTAL SCORE: 12    Answers submitted by the patient for this visit:  Patient Health Questionnaire (Submitted on 11/4/2024)  If you checked off any problems, how difficult have these problems made it for you to do your work, take care of things at home, or get along with other people?: Somewhat difficult  PHQ9 TOTAL SCORE: 12  Patient Health Questionnaire (G7) (Submitted on 11/4/2024)  DIANA 7 TOTAL SCORE: 13

## 2024-12-04 NOTE — PATIENT INSTRUCTIONS
Plan:  1.Patient will take the medications as prescribed.   Medications: Continue Focalin XR 10 mg daily  Continue Focalin IR 5 mg daily as needed for ADHD  Continue Lamictal 100 mg daily  Prazosin 1 mg at bedtime  Propranolol 20 mg twice daily as needed for anxiety  Patient will not stop taking medications or adjust them without consulting with the provider.  2.Patient will call with any problems between visits.  3.Patient will go to the emergency room if not feeling safe , unable to function in the community, or if suicidal, homicidal or hearing voices or having paranoia.  4.Patient will abstain from drugs and alcohol./Pt denies use .  5.Patient will not drive if sedated on medications or under influence of any substance.   6.Patient will not mix psychiatric medications with drugs and alcohol.   7.Patient will watch his diet and exercise.  8.Patient will see non psychiatric providers for non psychiatric disorders.  9. Next appointment in 6  weeks

## 2024-12-04 NOTE — PROGRESS NOTES
Virtual Visit Details    Type of service:  Video Visit   Video/Phone Start Time:  1102  Video/Phone End Time:   1109    Originating Location (pt. Location): Home    Distant Location (provider location):  Off-site  Platform used for Video Visit: Jonny

## 2025-01-12 ENCOUNTER — MYC REFILL (OUTPATIENT)
Dept: PSYCHIATRY | Facility: CLINIC | Age: 40
End: 2025-01-12
Payer: COMMERCIAL

## 2025-01-12 DIAGNOSIS — F90.0 ATTENTION DEFICIT HYPERACTIVITY DISORDER (ADHD), PREDOMINANTLY INATTENTIVE TYPE: ICD-10-CM

## 2025-01-14 RX ORDER — DEXMETHYLPHENIDATE HYDROCHLORIDE 5 MG/1
5 TABLET ORAL DAILY PRN
Qty: 30 TABLET | Refills: 0 | Status: SHIPPED | OUTPATIENT
Start: 2025-01-14

## 2025-01-14 RX ORDER — DEXMETHYLPHENIDATE HYDROCHLORIDE 10 MG/1
10 CAPSULE, EXTENDED RELEASE ORAL DAILY
Qty: 30 CAPSULE | Refills: 0 | Status: SHIPPED | OUTPATIENT
Start: 2025-01-14

## 2025-01-14 NOTE — TELEPHONE ENCOUNTER
Date of Last Office Visit: 12/4/24  Date of Next Office Visit:  1/16/25  No shows since last visit: No  More than one patient-initiated cancellation (with reschedule) since last seen in clinic? No    []Medication refilled per  Medication Refill in Ambulatory Care  policy.  []Medication unable to be refilled by RN due to criteria not met as indicated below:    []Eligibility: has not had a provider visit within last 6 months   []Supervision: no future appointment; < 7 days before next appointment   []Compliance: no shows; cancellations; lapse in therapy   []Verification: order discrepancy; may need modification...   [] > 30-day supply request   []Advanced refill request: > 7 days before refill date   [x]Controlled medication   []Medication not included in policy   []Review: new med; med adjusted <= 30 days; safety alert; requires lab monitoring...   []Scope of Practice: refill request processed by LPN/MA   []Other:      Medication(s) requested:     -  dexmethylphenidate (FOCALIN XR) 10 MG 24 hr capsule   Date last ordered: 12/4/24  Qty: 30  Refills: 0  Appropriate for refill? Provider to review.      -  dexmethylphenidate (FOCALIN) 5 MG tablet   Date last ordered: 12/4/24  Qty: 30  Refills: 0  Appropriate for refill? Provider to review.        Patient comment: Hi, I didn t realize my prescription for these would run out before my next appointment. I wasn t able to plan very well and ran out of this med yesterday. I am wondering if I could get it refilled, or do I need to wait until my appointment? thank you! Andria     Any Controlled Substance(s)? Yes   [place]  checked? No      Requested medication(s) verified as identical to current order? Yes    Any lapse in adherence to medication(s) greater than 5 days? No  See patient comment above    Additional action taken? routed encounter to provider for review.      Last visit treatment plan:   Plan:  1.Patient will take the medications as prescribed.   Medications: Continue  Focalin XR 10 mg daily  Continue Focalin IR 5 mg daily as needed for ADHD  Continue Lamictal 100 mg daily  Prazosin 1 mg at bedtime  Propranolol 20 mg twice daily as needed for anxiety  Patient will not stop taking medications or adjust them without consulting with the provider.  2.Patient will call with any problems between visits.  3.Patient will go to the emergency room if not feeling safe , unable to function in the community, or if suicidal, homicidal or hearing voices or having paranoia.  4.Patient will abstain from drugs and alcohol./Pt denies use .  5.Patient will not drive if sedated on medications or under influence of any substance.   6.Patient will not mix psychiatric medications with drugs and alcohol.   7.Patient will watch his diet and exercise.  8.Patient will see non psychiatric providers for non psychiatric disorders.  9. Next appointment in 6  weeks    Any medication(s) require lab monitoring? No

## 2025-01-16 ENCOUNTER — VIRTUAL VISIT (OUTPATIENT)
Dept: PSYCHIATRY | Facility: CLINIC | Age: 40
End: 2025-01-16
Payer: COMMERCIAL

## 2025-01-16 VITALS — WEIGHT: 165 LBS | BODY MASS INDEX: 24.44 KG/M2 | HEIGHT: 69 IN

## 2025-01-16 DIAGNOSIS — F41.1 GAD (GENERALIZED ANXIETY DISORDER): Chronic | ICD-10-CM

## 2025-01-16 DIAGNOSIS — F33.0 MILD EPISODE OF RECURRENT MAJOR DEPRESSIVE DISORDER: Primary | Chronic | ICD-10-CM

## 2025-01-16 DIAGNOSIS — F43.10 PTSD (POST-TRAUMATIC STRESS DISORDER): Chronic | ICD-10-CM

## 2025-01-16 DIAGNOSIS — F90.0 ATTENTION DEFICIT HYPERACTIVITY DISORDER (ADHD), PREDOMINANTLY INATTENTIVE TYPE: ICD-10-CM

## 2025-01-16 ASSESSMENT — ANXIETY QUESTIONNAIRES
IF YOU CHECKED OFF ANY PROBLEMS ON THIS QUESTIONNAIRE, HOW DIFFICULT HAVE THESE PROBLEMS MADE IT FOR YOU TO DO YOUR WORK, TAKE CARE OF THINGS AT HOME, OR GET ALONG WITH OTHER PEOPLE: VERY DIFFICULT
2. NOT BEING ABLE TO STOP OR CONTROL WORRYING: MORE THAN HALF THE DAYS
7. FEELING AFRAID AS IF SOMETHING AWFUL MIGHT HAPPEN: MORE THAN HALF THE DAYS
4. TROUBLE RELAXING: MORE THAN HALF THE DAYS
7. FEELING AFRAID AS IF SOMETHING AWFUL MIGHT HAPPEN: MORE THAN HALF THE DAYS
GAD7 TOTAL SCORE: 13
1. FEELING NERVOUS, ANXIOUS, OR ON EDGE: MORE THAN HALF THE DAYS
GAD7 TOTAL SCORE: 13
3. WORRYING TOO MUCH ABOUT DIFFERENT THINGS: MORE THAN HALF THE DAYS
GAD7 TOTAL SCORE: 13
6. BECOMING EASILY ANNOYED OR IRRITABLE: MORE THAN HALF THE DAYS
8. IF YOU CHECKED OFF ANY PROBLEMS, HOW DIFFICULT HAVE THESE MADE IT FOR YOU TO DO YOUR WORK, TAKE CARE OF THINGS AT HOME, OR GET ALONG WITH OTHER PEOPLE?: VERY DIFFICULT
5. BEING SO RESTLESS THAT IT IS HARD TO SIT STILL: SEVERAL DAYS

## 2025-01-16 ASSESSMENT — PATIENT HEALTH QUESTIONNAIRE - PHQ9
SUM OF ALL RESPONSES TO PHQ QUESTIONS 1-9: 10
SUM OF ALL RESPONSES TO PHQ QUESTIONS 1-9: 10
10. IF YOU CHECKED OFF ANY PROBLEMS, HOW DIFFICULT HAVE THESE PROBLEMS MADE IT FOR YOU TO DO YOUR WORK, TAKE CARE OF THINGS AT HOME, OR GET ALONG WITH OTHER PEOPLE: SOMEWHAT DIFFICULT

## 2025-01-16 ASSESSMENT — PAIN SCALES - GENERAL: PAINLEVEL_OUTOF10: MODERATE PAIN (5)

## 2025-01-16 NOTE — PROGRESS NOTES
"PSYCHIATRIC PROGRESS NOTE     Name:  Andria Daily  : 1985    Andria Daily is a 39 year old female who is being evaluated via a billable Video visit.      Telemedicine Visit: The patient's condition can be safely assessed and treated via synchronous audio and visual telemedicine encounter.      Reason for Telemedicine Visit: COVID 19 pandemic and the social and physical recommendations by the CDC and MD., Patient has requested telehealth visit, and Patient unable to travel      Originating Site (Patient Location): Patient's home    Distant Site (Provider Location): [facility] Outpatient Setting: [facility]    Consent:  The patient/guardian has verbally consented to: the potential risks and benefits of telemedicine (video visit or phone) versus in person care; bill my insurance or make self-payment for services provided; and responsibility for payment of non-covered services.     Mode of Communication:  theAudience platform     As the provider I attest to compliance with applicable laws and regulations related to telemedicine.    Date of Last Visit: 24                                          CHIEF COMPLAINT     \"Doing better\"  HISTORY OF PRESENT ILLNESS     Patient who was last seen in the clinic on 24 returned today for follow up visit.  Patient reports she is doing great, stating mood, depression, and anxiety as well as ADHD are well under control with current medication regimen.  Patient reports she believes she is on the right combination of medication compared to few months ago as she does not want to make any changes at this time.  Patient reports propranolol continues to help with anxiety and PTSD symptoms as she takes the medication twice every day.  Patient stated our goal for this year is to find a permanent job that is constant.  Patient denies SI, SIB, and HI. . Patient also denies both auditory and visual hallucination.  Patient report normal no hypomania.  Patient " return to clinic in 6 weeks for follow-up.  PSYCHIATRIC HISTORY:   History of Psychiatric Hospitalizations:   - Inpatient: X2 in teenager and early 20s  - IOP/PHP/Day treatment: PHP in 2024  History of Suicidal Ideation: Positive  History of Suicide Attempts:  Positive with medication overdose    History of Self-injurious Behavior: Denies a history of SIB.  Current:  No  History of Violence/Aggression: Negative  History of Commitment? Negative  Electroconvulsive Therapy (ECT):Negative  TMS: 2020  PSYCHIATRIC REVIEW OF SYSTEMS:   Psychiatric Review of Systems:   Depression:   Reportsaf: depressed mood, suicidal ideation, decreased interest, changes in sleep, changes in appetite, guilt, hopelessness, helplessness, impaired concentration, decreased energy, irritability.  [place]uni:   Denies: sleeplessness, increased goal-directed activities, abrupt increase in energy pressured speech  Psychosis:   Denies: visual hallucinations, auditory hallucinations, paranoia  Anxiety:   Reports: excessive worries that are difficult to control, panic attacks  PTSD:   Reports: re-experiencing past trauma, nightmares, increased arousal, avoidance of traumatic stimuli, impaired function.  Denies: re-experiencing past trauma, nightmares, increased arousal, avoidance of traumatic stimuli, impaired function.  OCD:   Denies: obsessions, checking, symmetry, cleaning, skin picking.  Eating Disorder: Hx of Anorexia and Bulimia  Denies: restriction, binging, purging.    Sleep: Struggles with sleep      MEDICATIONS                                                                                                Current Outpatient Medications   Medication Sig Dispense Refill    azelastine (ASTEPRO) 0.15 % nasal spray Spray 2 sprays into both nostrils daily. (Patient not taking: Reported on 9/4/2024) 17 mL 1    dexmethylphenidate (FOCALIN XR) 10 MG 24 hr capsule Take 1 capsule (10 mg) by mouth daily. 30 capsule 0    dexmethylphenidate (FOCALIN) 5 MG  "tablet Take 1 tablet (5 mg) by mouth daily as needed (ADHD). 30 tablet 0    hydrOXYzine HCl (ATARAX) 25 MG tablet TAKE 1 TABLET(25 MG) BY MOUTH TWICE DAILY AS NEEDED FOR ANXIETY 60 tablet 0    lamoTRIgine (LAMICTAL) 100 MG tablet Take 1 tablet (100 mg) by mouth daily. 30 tablet 1    levonorgestrel (MIRENA) 20 MCG/DAY IUD 1 each by Intrauterine route once      prazosin (MINIPRESS) 1 MG capsule Take 1 capsule (1 mg) by mouth at bedtime. 90 capsule 0    propranolol (INDERAL) 20 MG tablet Take 1 tablet (20 mg) by mouth 2 times daily. 180 tablet 1     No current facility-administered medications for this visit.       DRUG MONITORING:  [place] Prescription Monitoring Program evaluating controlled substances in the last year in [place]:  The [place] Prescription Monitoring Program has been reviewed and there are no current concerns with: diversionary activity, early refill requests, and or obtaining the medication from multiple providers       PAST PSYCHOTROPIC MEDICATIONS:    Abilify    V- Lexapro  - Effexor (night sweats)  - Buspar 7.5-15mg  - Wellbutrin (worsened tremor)  - Celexa 20mg  - Cymbalta (trialed with Celexa for pain, noted memory issues)  - trazodone 50mg   - Zoloft 50mg (higher doses associated with night sweats)  - Prazosin 1mg (not well tolerated due to hypotension)ITALS   Ht 1.74 m (5' 8.5\")   Wt 74.8 kg (165 lb)   BMI 24.72 kg/m       BP Readings from Last 1 Encounters:   08/21/24 102/70     Pulse Readings from Last 1 Encounters:   08/21/24 76     Wt Readings from Last 1 Encounters:   01/16/25 74.8 kg (165 lb)     Ht Readings from Last 1 Encounters:   01/16/25 1.74 m (5' 8.5\")     Estimated body mass index is 24.72 kg/m  as calculated from the following:    Height as of this encounter: 1.74 m (5' 8.5\").    Weight as of this encounter: 74.8 kg (165 lb).      PERTINENT HISTORY   PAST MEDICAL HISTORY:   Past Medical History:   Diagnosis Date    ADHD (attention deficit hyperactivity disorder)     completed " assessment    Anxiety     Chronic fatigue syndrome     Chronic rhinitis     CHHAYA I (cervical intraepithelial neoplasia I) 2007    Depressive disorder     History of eating disorder     History of substance use     prescription pain killers, alcohol, cocaine    Hypersomnia     PTSD (post-traumatic stress disorder)        PAST SURGICAL HISTORY:   Past Surgical History:   Procedure Laterality Date    KNEE SURGERY Left 2018    for osgood-schlatter and tendon repair/anchor    LAPAROSCOPY DIAGNOSTIC (GYN) N/A 2023    Procedure: laparoscopy, chromopertubation;  Surgeon: Isabel Vera MD;  Location: [facility] OR    [place] Teeth Removal      WRIST SURGERY Left     snowboarding injury, fractured, had metal plate initially that was then removed       FAMILY HISTORY:   Family History   Problem Relation Age of Onset    Anxiety Disorder Mother     Tremor Mother     Depression Father     Anxiety Disorder Father     Sleep Apnea Father     Diabetes Maternal Grandmother     Hyperlipidemia Maternal Grandfather     Hypertension Maternal Grandfather     Coronary Artery Disease Maternal Grandfather 55    Anxiety Disorder Sister         after bad car accident    Breast Cancer No family hx of     Ovarian Cancer No family hx of     Colon Cancer No family hx of     Cerebrovascular Disease No family hx of        SOCIAL HISTORY:   Social History     Tobacco Use    Smoking status: Former     Current packs/day: 0.00     Types: Cigarettes     Quit date:      Years since quittin.0    Smokeless tobacco: Never   Substance Use Topics    Alcohol use: Yes     Comment: Rarely         Seizures or Head Injury: Denies history of head injury. Denies history of seizures.  History of cardiac disease, rheumatic fever, fainting or dizziness, especially with exercise, seizures, chest pain or shortness of breath with exercise, unexplained change in exercise tolerance, palpitations, high blood pressure, or heart murmur?   No    LABS  "& IMAGING                                                                                                                Personally reviewed  Recent Labs   Lab Test 09/26/23  1130 02/02/17  0916   WBC 6.2 5.5   HGB 13.6 13.8   HCT 42.1 41.7   MCV 94 91    185   ANEU  --  3.5     Recent Labs   Lab Test 09/26/23  1130   GLC 84     Recent Labs   Lab Test 09/26/23  1130   CHOL 163   LDL 86   HDL 67   TRIG 51     No lab results found.  No results found for: \"IHU406\", \"GQGT542\", \"LPCH12XYVYX\", \"VITD3\", \"D2VIT\", \"D3VIT\", \"DTOT\", \"[id number]\", \"[id number]\", \"[id number]\", \"[id number]\", \"[id number]\", \"[id number]\"     ALLERGY & IMMUNIZATIONS       Allergies   Allergen Reactions    Dairy Products [Milk Protein] Other (See Comments)     Nasal problems, stomach pain     No Clinical Screening - See Comments      Apples and bananas cause her throat to itch    Seasonal Allergies     Flonase [Fluticasone] Dizziness and Rash    Penicillins Rash     Rash on face and threw up       FAMILY MEDICAL HISTORY:     Family History       Problem (# of Occurrences) Relation (Name,Age of Onset)    Anxiety Disorder (3) Mother, Father, Sister: after bad car accident    Depression (1) Father    Diabetes (1) Maternal Grandmother    Hypertension (1) Maternal Grandfather    Hyperlipidemia (1) Maternal Grandfather    Coronary Artery Disease (1) Maternal Grandfather (55)    Sleep Apnea (1) Father    Tremor (1) Mother           Negative family history of: Breast Cancer, Ovarian Cancer, Colon Cancer, Cerebrovascular Disease              Family history of sudden or unexplained death or an event requiring resuscitation in children or young adults, cardiac arrhythmias (eg, Janelle-Parkinson-White syndrome), long QT syndrome, catecholaminergic paroxysmal ventricular tachycardia, Brugada syndrome, arrhythmogenic right ventricular dysplasia, hypertrophic cardiomyopathy, dilated cardiomyopathy, or Marfan syndrome?  No    FAMILY PSYCHIATRIC HISTORY: "   Psychiatry:Father, mother sister with anxiety, father with depression  Substance use history in family: Denies  Family suicide history:Negative      SIGNIFICANT SOCIAL/FAMILY HISTORY:                                           Born and raised in: [place]  Relationship status: Single living with boyfriend  Children: None    Highest education level was:Associate degree   Service:Denies  Employment status: Works part time at the [employer]  LEGAL:     SUBSTANCE USE HISTORY      Alcohol- fewer urges to drink, mindful with med and risks of alcohol   - treatment in 2015  Nicotine- none  Caffeine- 1-2 cups coffee daily, 1-2 energy drinks a week, can increase shaking if she doesn't eat enough  Opioids- sober since 2015   Cannabis- smoking 1-2x daily  - treated for cannabis use in 2015   Other Illicit Drugs- sober from cocaine and hallucinogens since 2015    MEDICAL REVIEW OF SYSTEMS:   Ten system review was completed with pertinent positives noted above    MENTAL STATUS EXAM:   Mental Status Examination (limited due to video virtual visit format):  Vital Signs: There were no vitals taken for this virtual visit.  Appearance: adequately groomed, appears stated age, and in no apparent distress.  Attitude: cooperative   Eye Contact: good to the extent that can be determined in a video visit  Muscle Strength and Tone: no gross abnormalities based on remote observation  Psychomotor Behavior:  no evidence of tardive dyskinesia, dystonia, or tics based on remote observation  Gait and Station: normal, no gross abnormalities based on remote observation  Speech: clear, coherent, normal prosody, regular rate, regular rhythm and fluent  Associations: No loosening of associations  Thought Process: coherent and goal directed  Thought Content: no evidence of suicidal ideation or homicidal ideation, no evidence of psychotic thought, no auditory hallucinations present and no visual hallucinations present  Mood:  "\"Neutral\"  Affect: appropriate and in normal range  Insight: good  Judgment: intact, adequate for safety  Impulse Control: intact  Oriented to: time, place, person and situation  Attention Span and Concentration: normal  Language: Intact  Recent and Remote Memory: intact to interview. Not formally assessed. No amnesia.  Fund of Knowledge: appropriate        SAFETY   Feels safe in home: Yes   Suicidal ideation: Denies  History of suicide attempts:  No   Hx of impulsivity: No     DSM 5 DIAGNOSIS:   1. Moderate episode of recurrent major depressive disorder (H)    2. PTSD (post-traumatic stress disorder)    3. DIANA (generalized anxiety disorder)    4. Attention deficit hyperactivity disorder (ADHD), predominantly inattentive type    5. Insomnia, unspecified type      ASSESSMENT AND PLAN    Patient is a 39 year old,  White Choose not to answer female with a history notable for moderate episode of recurrent major depressive disorder, PTSD, generalized anxiety disorder, ADHD, predominantly inattentive type, and insomnia, unspecified type who presents for follow up visit.  Patient reports symptom stabilization on current medication regimen as mood, depression, anxiety as well as ADHD and PTSD symptoms are well under control at this time.  She would like to continue taking the current medication as she believes they are effectively helping with psychiatric symptoms.  She denies SI/ SIB/HI.  Patient report no bartolo or hypomania.  Patient to return to clinic in 6 weeks for follow-up.    Plan:  1.Patient will take the medications as prescribed.   Medications: Continue Focalin XR 10 mg daily  Continue Focalin IR 5 mg daily as needed for ADHD  Continue Lamictal 100 mg daily  Prazosin 1 mg at bedtime  Propranolol 20 mg twice daily as needed for anxiety  Patient will not stop taking medications or adjust them without consulting with the provider.  2.Patient will call with any problems between visits.  3.Patient will go to the " emergency room if not feeling safe , unable to function in the community, or if suicidal, homicidal or hearing voices or having paranoia.  4.Patient will abstain from drugs and alcohol./Pt denies use .  5.Patient will not drive if sedated on medications or under influence of any substance.   6.Patient will not mix psychiatric medications with drugs and alcohol.   7.Patient will watch his diet and exercise.  8.Patient will see non psychiatric providers for non psychiatric disorders.  9. Next appointment in 6  weeks    Risk Assessment:     Andria has notable risk factors for self-harm, including, single status, anxiety, mood dysregulation, substance use,  and passive SI. However, risk is mitigated by ability to volunteer a safety plan and history of seeking help when needed. Additional steps taken to minimize risk include making medication adjustment, asking patient to call 911 and go to the ER if not able to stay safe at home,  Therefore, based on all available evidence including the factors cited above, Andria does not appear to be an imminent danger to self or others and does not meet criteria 72 hour hold. However, if patient uses substances or is non-adherent with medication, their risk of decompensation and SI/HI will be elevated. This was discussed with the patient as she verbalized good understanding.   CONSULTS/REFERRALS:   Continue therapy  None at this time  Coordinate care with therapist as needed    MEDICAL:   None at this time  Coordinate care with PCP (Alma Hernandez) as needed  Follow up with primary care provider as planned or for acute medical concerns.    PSYCHOEDUCATION:  Medication side effects and alternatives reviewed. Health promotion activities recommended and reviewed today. All questions addressed. Education and counseling completed regarding risks and benefits of medications and psychotherapy options.  Consent provided by patient/guardian  Call the psychiatric nurse line with medication  questions or concerns at [phone].  MyChart may be used to communicate with your provider, but this is not intended to be used for emergencies.  BLACK BOX WARNING: Discussed the Food and Drug Administration (FDA) requires that all antidepressants carry a warning that some children, adolescents and young adults may be at increased risk of suicide when taking antidepressants. Anyone taking an antidepressant should be watched closely for worsening depression or unusual behavior especially in the first few weeks after starting an SSRI. Keep in mind, antidepressants are more likely to reduce suicide risk in the long run by improving mood.   SEROTONIN SYNDROME:  Discussed risks of Serotonin syndrome (ie, serotonin toxicity) which is a potentially life-threatening condition associated with increased serotonergic activity in the central nervous system (CNS). It is seen with therapeutic medication use, inadvertent interactions between drugs, and intentional self-poisoning. Serotonin syndrome may involve a spectrum of clinical findings, which often include mental status changes, autonomic hyperactivity, and neuromuscular abnormalities.    BENZODIAZEPINE:  discussion on how benzos work and the need to use them short term due to potential of anxiety getting.  This is a controlled substance with risk for abuse, need to keep in a safe keep place and cannot replace lost scripts.    HYPNOTIC USE: Hypnotic use, risk for CNS depression, sleep-walking, not to mix with ETOH or other CNS depressant, need for six hours of sleep, stop if change in mood.  This is a controlled substance with risk for abuse, need to keep in a safe keep place and cannot replace lost scripts.  FIRST GENERATION ANTIPSYCHOTIC/ SECOND GENERATION ANTIPSYCHOTIC USE:  Atypical need for cardiometabolic monitoring with medication- B/P, weight, blood sugar, cholesterol.  Need to monitor for abnormal movements taught  SAFETY:  We all care about your loved one's  safety. To reduce the risk of self-harm, remove access to all:  Firearms, Medicines (both prescribed and over-the-counter), Knives and other sharp objects, Ropes and like materials, and Alcohol  SLEEP HYGIENE: establish a sleep routine, limit screen time 1 hour prior to bed, use bed for sleep only, take sleep/medications on time (including sleepy time tea, trazadone or herbal treatments such as melatonin), aroma therapy, limit caffeine/sugar, yoga, guided imagery, stretch, meditation, limit naps to 20 minutes, make a temperature change in the room, white noise, be mindful of slowing down breathing, take a warm bath/shower, frequently wash sheets, and journaling.   Medlineplus.gov is information for patients.  It is run by the [employer] and it contains information about all disorders, diseases and all medications.      COMMUNITY RESOURCES:    CRISIS NUMBERS: Provided in AVS 2024  National Suicide Prevention Lifeline: 4-411-312-TALK (199-311-3499)  [employer]/resources for a list of additional resources (SOS)            [facility] - [phone]   Urgent Care Adult Mental Ijxpwv-[phone] mobile unit/  crisis line  [facility] -[phone]   COPE  [place] Mobile Team -[phone] (adults)/ [phone] (child)  Poison Control Center - 1-586.417.7693    OR  go to nearest ER  Crisis Text Line for any crisis  send this-   To: 001243   [facility] ([facility]  [phone]  National Suicide Prevention Lifeline: 674.258.6908 (TTY: 361.712.6601). Call anytime for help.  (www.suicidepreventionlifeline.org)  [place] on Mental Illness (www.iman.org): 366.370.3294 or 794-703-6595.   Mental Health Association (www.mentalhealth.org): 789.393.4682 or 472-871-1418.  [place] Crisis Text Line: Text [place] to 720590  Suicide LifeLine Chat: suicideClaimSync.org/chat    ADMINISTRATIVE BILLIN mins spent  interviewing patient, reviewing referral documents, obtaining and reviewing outside records, communication with other health specialists, and preparing this report on this day: 1/16/25    Video/Phone Start Time:  1005  Video/Phone End Time:   1014    Greater than 50% of time was spent in counseling and coordination of care regarding above diagnoses and treatment plan.    The longitudinal plan of care for the diagnosis(es)/condition(s) as documented were addressed during this visit. Due to the added complexity in care, I will continue to support Andria in the subsequent management and with ongoing continuity of care.     Signed:   Reddy Mello, MSN, APRN, PMHNP-BC  Long Term Outpatient Psychiatry  Chart documentation done in part with Dragon Voice Recognition software.  Although reviewed after completion, some word and grammatical errors may remain.   Answers submitted by the patient for this visit:  Patient Health Questionnaire (Submitted on 6/6/2024)  If you checked off any problems, how difficult have these problems made it for you to do your work, take care of things at home, or get along with other people?: Very difficult  PHQ9 TOTAL SCORE: 17  DIANA-7 (Submitted on 6/6/2024)  DIANA 7 TOTAL SCORE: 13    Answers submitted by the patient for this visit:  Patient Health Questionnaire (Submitted on 7/22/2024)  If you checked off any problems, how difficult have these problems made it for you to do your work, take care of things at home, or get along with other people?: Very difficult  PHQ9 TOTAL SCORE: 13  DIANA-7 (Submitted on 7/22/2024)  DIANA 7 TOTAL SCORE: 12    Answers submitted by the patient for this visit:  Patient Health Questionnaire (Submitted on 11/4/2024)  If you checked off any problems, how difficult have these problems made it for you to do your work, take care of things at home, or get along with other people?: Somewhat difficult  PHQ9 TOTAL SCORE: 12  Patient Health Questionnaire (G7) (Submitted on  11/4/2024)  DIANA 7 TOTAL SCORE: 13    Answers submitted by the patient for this visit:  Patient Health Questionnaire (Submitted on 1/16/2025)  If you checked off any problems, how difficult have these problems made it for you to do your work, take care of things at home, or get along with other people?: Somewhat difficult  PHQ9 TOTAL SCORE: 10  Patient Health Questionnaire (G7) (Submitted on 1/16/2025)  DIANA 7 TOTAL SCORE: 13

## 2025-01-16 NOTE — NURSING NOTE
Current patient location: [address] [place] [id number]    Is the patient currently in the state of [place]? YES    Visit mode: VIDEO    If the visit is dropped, the patient can be reconnected by:VIDEO VISIT: Text to cell phone:   Telephone Information:   Mobile [phone]       Will anyone else be joining the visit? NO  (If patient encounters technical issues they should call [phone]    Are changes needed to the allergy or medication list? No    Are refills needed on medications prescribed by this physician? Discuss with provider    Rooming Documentation:  Patient will complete questionnaire(s) in [facility]    Reason for visit: RECHECK    Odalis NICHOLS

## 2025-01-16 NOTE — PROGRESS NOTES
Virtual Visit Details    Type of service:  Video Visit   Video/Phone Start Time:  1005  Video/Phone End Time:   1014    Originating Location (pt. Location): Home    Distant Location (provider location):  Off-site  Platform used for Video Visit: Jonny

## 2025-01-30 DIAGNOSIS — F41.1 GAD (GENERALIZED ANXIETY DISORDER): ICD-10-CM

## 2025-01-30 DIAGNOSIS — F33.1 MODERATE EPISODE OF RECURRENT MAJOR DEPRESSIVE DISORDER (H): ICD-10-CM

## 2025-01-30 RX ORDER — LAMOTRIGINE 100 MG/1
100 TABLET ORAL DAILY
Qty: 30 TABLET | Refills: 2 | Status: SHIPPED | OUTPATIENT
Start: 2025-01-30

## 2025-01-30 NOTE — TELEPHONE ENCOUNTER
Date of Last Office Visit: 1/16/25  Date of Next Office Visit:  2/27/2025  No shows since last visit: No  More than one patient-initiated cancellation (with reschedule) since last seen in clinic? No    []Medication refilled per  Medication Refill in Ambulatory Care  policy.  [x]Medication unable to be refilled by RN due to criteria not met as indicated below:    []Eligibility: has not had a provider visit within last 6 months   []Supervision: no future appointment; < 7 days before next appointment   []Compliance: no shows; cancellations; lapse in therapy   []Verification: order discrepancy; may need modification...   [] > 30-day supply request   []Advanced refill request: > 7 days before refill date   []Controlled medication   [x]Medication not included in policy   []Review: new med; med adjusted <= 30 days; safety alert; requires lab monitoring...   [x]Scope of Practice: refill request processed by LPN/MA   []Other:      Medication(s) requested:     -  lamoTRIgine (LAMICTAL) 100 MG tablet   Date last ordered: 12/4/2024  Qty: 30  Refills: 1       Disp Refills Start End CLAUDIA   lamoTRIgine (LAMICTAL) 100 MG tablet 30 tablet 1 12/4/2024 -- No   Sig - Route: Take 1 tablet (100 mg) by mouth daily. - Oral   Sent to pharmacy as: lamoTRIgine 100 MG Oral Tablet (LaMICtal)   Class: E-Prescribe     Any Controlled Substance(s)? No      Requested medication(s) verified as identical to current order? Yes    Any lapse in adherence to medication(s) greater than 5 days? No      Additional action taken? routed encounter to provider for review.      Last visit treatment plan:   Plan:  1.Patient will take the medications as prescribed.   Medications: Continue Focalin XR 10 mg daily  Continue Focalin IR 5 mg daily as needed for ADHD  Continue Lamictal 100 mg daily  Prazosin 1 mg at bedtime  Propranolol 20 mg twice daily as needed for anxiety  Patient will not stop taking medications or adjust them without consulting with the  "provider.  2.Patient will call with any problems between visits.  3.Patient will go to the emergency room if not feeling safe , unable to function in the community, or if suicidal, homicidal or hearing voices or having paranoia.  4.Patient will abstain from drugs and alcohol./Pt denies use .  5.Patient will not drive if sedated on medications or under influence of any substance.   6.Patient will not mix psychiatric medications with drugs and alcohol.   7.Patient will watch his diet and exercise.  8.Patient will see non psychiatric providers for non psychiatric disorders.  9. Next appointment in 6  weeks    Any medication(s) require lab monitoring? Yes   LAMOTRIGINE   Last Lamotrigine Level: No results found for: \"AVILA\"  Last CMP/LFT Lab Values:   Glucose   Date Value Ref Range Status   09/26/2023 84 70 - 99 mg/dL Final                 "

## 2025-02-16 ENCOUNTER — MYC REFILL (OUTPATIENT)
Dept: PSYCHIATRY | Facility: CLINIC | Age: 40
End: 2025-02-16
Payer: COMMERCIAL

## 2025-02-16 DIAGNOSIS — F90.0 ATTENTION DEFICIT HYPERACTIVITY DISORDER (ADHD), PREDOMINANTLY INATTENTIVE TYPE: ICD-10-CM

## 2025-02-17 RX ORDER — DEXMETHYLPHENIDATE HYDROCHLORIDE 5 MG/1
5 TABLET ORAL DAILY PRN
Qty: 30 TABLET | Refills: 0 | Status: SHIPPED | OUTPATIENT
Start: 2025-02-17

## 2025-02-17 RX ORDER — DEXMETHYLPHENIDATE HYDROCHLORIDE 10 MG/1
10 CAPSULE, EXTENDED RELEASE ORAL DAILY
Qty: 30 CAPSULE | Refills: 0 | Status: SHIPPED | OUTPATIENT
Start: 2025-02-17

## 2025-02-17 SDOH — HEALTH STABILITY: PHYSICAL HEALTH: ON AVERAGE, HOW MANY MINUTES DO YOU ENGAGE IN EXERCISE AT THIS LEVEL?: 0 MIN

## 2025-02-17 SDOH — HEALTH STABILITY: PHYSICAL HEALTH: ON AVERAGE, HOW MANY DAYS PER WEEK DO YOU ENGAGE IN MODERATE TO STRENUOUS EXERCISE (LIKE A BRISK WALK)?: 0 DAYS

## 2025-02-17 ASSESSMENT — SOCIAL DETERMINANTS OF HEALTH (SDOH): HOW OFTEN DO YOU GET TOGETHER WITH FRIENDS OR RELATIVES?: ONCE A WEEK

## 2025-02-17 NOTE — TELEPHONE ENCOUNTER
Date of Last Office Visit: 1/16/25  Date of Next Office Visit:  2/27/25  No shows since last visit: No  More than one patient-initiated cancellation (with reschedule) since last seen in clinic? No    []Medication refilled per  Medication Refill in Ambulatory Care  policy.  [x]Medication unable to be refilled by RN due to criteria not met as indicated below:    []Eligibility: has not had a provider visit within last 6 months   []Supervision: no future appointment; < 7 days before next appointment   []Compliance: no shows; cancellations; lapse in therapy   []Verification: order discrepancy; may need modification...   [] > 30-day supply request   []Advanced refill request: > 7 days before refill date   [x]Controlled medication   []Medication not included in policy   []Review: new med; med adjusted <= 30 days; safety alert; requires lab monitoring...   []Scope of Practice: refill request processed by LPN/MA   []Other:      Medication(s) requested:     -  dexmethylphenidate (FOCALIN XR) 10 MG 24 hr capsule   Date last ordered: 1/14/25  Qty: 30  Refills: 0  Appropriate for refill? Provider to review.      -  dexmethylphenidate (FOCALIN) 5 MG tablet   Date last ordered: 1/14/25  Qty: 30  Refills: 0  Appropriate for refill? Provider to review.        Any Controlled Substance(s)? Yes   [place]  checked? N/A      Requested medication(s) verified as identical to current order? Yes    Any lapse in adherence to medication(s) greater than 5 days? Unknown     Additional action taken? contacted patient via phone at [phone] . No answer.      Last visit treatment plan:   Plan:  1.Patient will take the medications as prescribed.   Medications: Continue Focalin XR 10 mg daily  Continue Focalin IR 5 mg daily as needed for ADHD  Continue Lamictal 100 mg daily  Prazosin 1 mg at bedtime  Propranolol 20 mg twice daily as needed for anxiety  Patient will not stop taking medications or adjust them without consulting with the  provider.  2.Patient will call with any problems between visits.  3.Patient will go to the emergency room if not feeling safe , unable to function in the community, or if suicidal, homicidal or hearing voices or having paranoia.  4.Patient will abstain from drugs and alcohol./Pt denies use .  5.Patient will not drive if sedated on medications or under influence of any substance.   6.Patient will not mix psychiatric medications with drugs and alcohol.   7.Patient will watch his diet and exercise.  8.Patient will see non psychiatric providers for non psychiatric disorders.  9. Next appointment in 6  weeks    Any medication(s) require lab monitoring? No    Layla Finn RN on 2/17/2025 at 4:03 PM

## 2025-02-18 ENCOUNTER — OFFICE VISIT (OUTPATIENT)
Dept: FAMILY MEDICINE | Facility: CLINIC | Age: 40
End: 2025-02-18
Payer: COMMERCIAL

## 2025-02-18 VITALS
TEMPERATURE: 96.3 F | SYSTOLIC BLOOD PRESSURE: 105 MMHG | DIASTOLIC BLOOD PRESSURE: 71 MMHG | OXYGEN SATURATION: 98 % | BODY MASS INDEX: 26.07 KG/M2 | WEIGHT: 172 LBS | HEART RATE: 65 BPM | RESPIRATION RATE: 16 BRPM | HEIGHT: 68 IN

## 2025-02-18 DIAGNOSIS — Z23 NEED FOR VACCINATION: ICD-10-CM

## 2025-02-18 DIAGNOSIS — Z00.00 ROUTINE GENERAL MEDICAL EXAMINATION AT A HEALTH CARE FACILITY: Primary | ICD-10-CM

## 2025-02-18 DIAGNOSIS — G89.29 CHRONIC BILATERAL LOW BACK PAIN WITHOUT SCIATICA: ICD-10-CM

## 2025-02-18 DIAGNOSIS — F90.0 ATTENTION DEFICIT HYPERACTIVITY DISORDER (ADHD), PREDOMINANTLY INATTENTIVE TYPE: ICD-10-CM

## 2025-02-18 DIAGNOSIS — F41.1 GAD (GENERALIZED ANXIETY DISORDER): ICD-10-CM

## 2025-02-18 DIAGNOSIS — F51.04 PSYCHOPHYSIOLOGICAL INSOMNIA: ICD-10-CM

## 2025-02-18 DIAGNOSIS — M54.50 CHRONIC BILATERAL LOW BACK PAIN WITHOUT SCIATICA: ICD-10-CM

## 2025-02-18 DIAGNOSIS — Z13.1 SCREENING FOR DIABETES MELLITUS (DM): ICD-10-CM

## 2025-02-18 DIAGNOSIS — Z13.220 SCREENING FOR HYPERLIPIDEMIA: ICD-10-CM

## 2025-02-18 DIAGNOSIS — Z12.31 ENCOUNTER FOR SCREENING MAMMOGRAM FOR MALIGNANT NEOPLASM OF BREAST: ICD-10-CM

## 2025-02-18 DIAGNOSIS — G93.32 CHRONIC FATIGUE SYNDROME: ICD-10-CM

## 2025-02-18 DIAGNOSIS — M54.2 NECK PAIN: ICD-10-CM

## 2025-02-18 DIAGNOSIS — Z13.0 SCREENING, IRON DEFICIENCY ANEMIA: ICD-10-CM

## 2025-02-18 DIAGNOSIS — L85.3 DRY SKIN DERMATITIS: ICD-10-CM

## 2025-02-18 DIAGNOSIS — F43.10 PTSD (POST-TRAUMATIC STRESS DISORDER): ICD-10-CM

## 2025-02-18 LAB
CHOLEST SERPL-MCNC: 195 MG/DL
ERYTHROCYTE [DISTWIDTH] IN BLOOD BY AUTOMATED COUNT: 12.1 % (ref 10–15)
EST. AVERAGE GLUCOSE BLD GHB EST-MCNC: 103 MG/DL
FASTING STATUS PATIENT QL REPORTED: NO
FERRITIN SERPL-MCNC: 82 NG/ML (ref 6–175)
HBA1C MFR BLD: 5.2 % (ref 0–5.6)
HCT VFR BLD AUTO: 42.3 % (ref 35–47)
HDLC SERPL-MCNC: 75 MG/DL
HGB BLD-MCNC: 14 G/DL (ref 11.7–15.7)
LDLC SERPL CALC-MCNC: 98 MG/DL
MCH RBC QN AUTO: 30.4 PG (ref 26.5–33)
MCHC RBC AUTO-ENTMCNC: 33.1 G/DL (ref 31.5–36.5)
MCV RBC AUTO: 92 FL (ref 78–100)
NONHDLC SERPL-MCNC: 120 MG/DL
PLATELET # BLD AUTO: 227 10E3/UL (ref 150–450)
RBC # BLD AUTO: 4.6 10E6/UL (ref 3.8–5.2)
TRIGL SERPL-MCNC: 111 MG/DL
WBC # BLD AUTO: 5.3 10E3/UL (ref 4–11)

## 2025-02-18 PROCEDURE — 82728 ASSAY OF FERRITIN: CPT | Performed by: FAMILY MEDICINE

## 2025-02-18 PROCEDURE — 80061 LIPID PANEL: CPT | Performed by: FAMILY MEDICINE

## 2025-02-18 RX ORDER — TRIAMCINOLONE ACETONIDE 1 MG/G
CREAM TOPICAL 2 TIMES DAILY
Qty: 30 G | Refills: 0 | Status: SHIPPED | OUTPATIENT
Start: 2025-02-18

## 2025-02-18 ASSESSMENT — ANXIETY QUESTIONNAIRES
2. NOT BEING ABLE TO STOP OR CONTROL WORRYING: NEARLY EVERY DAY
7. FEELING AFRAID AS IF SOMETHING AWFUL MIGHT HAPPEN: MORE THAN HALF THE DAYS
1. FEELING NERVOUS, ANXIOUS, OR ON EDGE: NEARLY EVERY DAY
GAD7 TOTAL SCORE: 17
6. BECOMING EASILY ANNOYED OR IRRITABLE: NEARLY EVERY DAY
IF YOU CHECKED OFF ANY PROBLEMS ON THIS QUESTIONNAIRE, HOW DIFFICULT HAVE THESE PROBLEMS MADE IT FOR YOU TO DO YOUR WORK, TAKE CARE OF THINGS AT HOME, OR GET ALONG WITH OTHER PEOPLE: VERY DIFFICULT
8. IF YOU CHECKED OFF ANY PROBLEMS, HOW DIFFICULT HAVE THESE MADE IT FOR YOU TO DO YOUR WORK, TAKE CARE OF THINGS AT HOME, OR GET ALONG WITH OTHER PEOPLE?: VERY DIFFICULT
3. WORRYING TOO MUCH ABOUT DIFFERENT THINGS: NEARLY EVERY DAY
4. TROUBLE RELAXING: MORE THAN HALF THE DAYS
5. BEING SO RESTLESS THAT IT IS HARD TO SIT STILL: SEVERAL DAYS
7. FEELING AFRAID AS IF SOMETHING AWFUL MIGHT HAPPEN: MORE THAN HALF THE DAYS

## 2025-02-18 ASSESSMENT — PATIENT HEALTH QUESTIONNAIRE - PHQ9
10. IF YOU CHECKED OFF ANY PROBLEMS, HOW DIFFICULT HAVE THESE PROBLEMS MADE IT FOR YOU TO DO YOUR WORK, TAKE CARE OF THINGS AT HOME, OR GET ALONG WITH OTHER PEOPLE: SOMEWHAT DIFFICULT
SUM OF ALL RESPONSES TO PHQ QUESTIONS 1-9: 13
SUM OF ALL RESPONSES TO PHQ QUESTIONS 1-9: 13

## 2025-02-18 NOTE — NURSING NOTE
"Andria  39 year old    Chief Complaint   Patient presents with    Physical            Blood pressure 105/71, pulse 65, temperature (!) 96.3  F (35.7  C), temperature source Skin, resp. rate 16, height 1.725 m (5' 7.91\"), weight 78 kg (172 lb), SpO2 98%, not currently breastfeeding. Body mass index is 26.22 kg/m .    Patient Active Problem List   Diagnosis    Chronic fatigue syndrome    Hypersomnia    CHHAYA I (cervical intraepithelial neoplasia I)    Chronic rhinitis    Recurrent major depressive disorder    PTSD (post-traumatic stress disorder)    DIANA (generalized anxiety disorder)    Back pain    Intrauterine device surveillance    Pelvic pain in female    Seasonal allergic rhinitis, unspecified trigger    Chronic congestion of paranasal sinus    Attention deficit hyperactivity disorder (ADHD), predominantly inattentive type              Wt Readings from Last 2 Encounters:   02/18/25 78 kg (172 lb)   08/21/24 73.3 kg (161 lb 11.2 oz)       BP Readings from Last 3 Encounters:   02/18/25 105/71   08/21/24 102/70   07/08/24 102/67                Current Outpatient Medications   Medication Sig Dispense Refill    azelastine (ASTEPRO) 0.15 % nasal spray Spray 2 sprays into both nostrils daily. 17 mL 1    dexmethylphenidate (FOCALIN XR) 10 MG 24 hr capsule Take 1 capsule (10 mg) by mouth daily. 30 capsule 0    dexmethylphenidate (FOCALIN) 5 MG tablet Take 1 tablet (5 mg) by mouth daily as needed (ADHD). 30 tablet 0    hydrOXYzine HCl (ATARAX) 25 MG tablet TAKE 1 TABLET(25 MG) BY MOUTH TWICE DAILY AS NEEDED FOR ANXIETY 60 tablet 0    lamoTRIgine (LAMICTAL) 100 MG tablet TAKE 1 TABLET(100 MG) BY MOUTH DAILY 30 tablet 2    levonorgestrel (MIRENA) 20 MCG/DAY IUD 1 each by Intrauterine route once      prazosin (MINIPRESS) 1 MG capsule Take 1 capsule (1 mg) by mouth at bedtime. 90 capsule 0    propranolol (INDERAL) 20 MG tablet Take 1 tablet (20 mg) by mouth 2 times daily. 180 tablet 1    triamcinolone (KENALOG) 0.1 % external " cream Apply topically 2 times daily. Apply twice daily to hands until clear. Do not use more than seven consecutive days 30 g 0     No current facility-administered medications for this visit.              Social History     Tobacco Use    Smoking status: Former     Current packs/day: 0.00     Types: Cigarettes     Quit date:      Years since quittin.1    Smokeless tobacco: Never   Vaping Use    Vaping status: Never Used   Substance Use Topics    Alcohol use: Not Currently     Comment: Rarely    Drug use: Yes     Frequency: 3.0 times per week     Types: Marijuana     Comment: helps with insomnia/sleep              Health Maintenance Due   Topic Date Due    HEPATITIS B IMMUNIZATION (2 of 3 - 19+ 3-dose series) 2023    DTAP/TDAP/TD IMMUNIZATION (4 - Td or Tdap) 2024    INFLUENZA VACCINE (1) 2024    COVID-19 Vaccine (5 - - season) 2024              Lab Results   Component Value Date    PAP NIL 2021        Prior to immunization administration, verified patients identity using patient s name and date of birth. Please see Immunization Activity for additional information.     Screening Questionnaire for Adult Immunization    Are you sick today?   No   Do you have allergies to medications, food, a vaccine component or latex?   No   Have you ever had a serious reaction after receiving a vaccination?   No   Do you have a long-term health problem with heart, lung, kidney, or metabolic disease (e.g., diabetes), asthma, a blood disorder, no spleen, complement component deficiency, a cochlear implant, or a spinal fluid leak?  Are you on long-term aspirin therapy?   No   Do you have cancer, leukemia, HIV/AIDS, or any other immune system problem?   No   Do you have a parent, brother, or sister with an immune system problem?   No   In the past 3 months, have you taken medications that affect  your immune system, such as prednisone, other steroids, or anticancer drugs; drugs for the  treatment of rheumatoid arthritis, Crohn s disease, or psoriasis; or have you had radiation treatments?   No   Have you had a seizure, or a brain or other nervous system problem?   No   During the past year, have you received a transfusion of blood or blood    products, or been given immune (gamma) globulin or antiviral drug?   No   For women: Are you pregnant or is there a chance you could become       pregnant during the next month?   No   Have you received any vaccinations in the past 4 weeks?   No     Immunization questionnaire answers were all negative.      Patient instructed to remain in clinic for 15 minutes afterwards, and to report any adverse reactions.     Screening performed by Shalini Villanueva LPN on 2/18/2025 at 2:05 PM.            February 18, 2025 2:04 PM

## 2025-02-18 NOTE — PATIENT INSTRUCTIONS
Nice to meet you today!    We will draw lab tests today. I will contact you in the next 2-3 business days with the results of these labs.    To schedule your imaging study (mammogram) , please call [phone].    They are [facility] at [facility]  [address] [place] [id number]     Referrals today:  - Spine clinic  - Sleep medicine for CBT[facility]    Lifestyle Renewal Program - [facility] Maxime Behm Eucerin, [place], [place] - tub unscented twice daily to skin      Patient Education   Preventive Care Advice   This is general advice given by our system to help you stay healthy. However, your care team may have specific advice just for you. Please talk to your care team about your preventive care needs.  Nutrition  Eat 5 or more servings of fruits and vegetables each day.  Try wheat bread, brown rice and whole grain pasta (instead of white bread, rice, and pasta).  Get enough calcium and vitamin D. Check the label on foods and aim for 100% of the RDA (recommended daily allowance).  Lifestyle  Exercise at least 150 minutes each week  (30 minutes a day, 5 days a week).  Do muscle strengthening activities 2 days a week. These help control your weight and prevent disease.  No smoking.  Wear sunscreen to prevent skin cancer.  Have a dental exam and cleaning every 6 months.  Yearly exams  See your health care team every year to talk about:  Any changes in your health.  Any medicines your care team has prescribed.  Preventive care, family planning, and ways to prevent chronic diseases.  Shots (vaccines)   HPV shots (up to age 26), if you've never had them before.  Hepatitis B shots (up to age 59), if you've never had them before.  COVID-19 shot: Get this shot when it's due.  Flu shot: Get a flu shot every year.  Tetanus shot: Get a tetanus shot every 10 years.  Pneumococcal, hepatitis A, and RSV shots: Ask your care team if you need these based on your risk.  Shingles shot (for age 50 and  up)  General health tests  Diabetes screening:  Starting at age 35, Get screened for diabetes at least every 3 years.  If you are younger than age 35, ask your care team if you should be screened for diabetes.  Cholesterol test: At age 39, start having a cholesterol test every 5 years, or more often if advised.  Bone density scan (DEXA): At age 50, ask your care team if you should have this scan for osteoporosis (brittle bones).  Hepatitis C: Get tested at least once in your life.  STIs (sexually transmitted infections)  Before age 24: Ask your care team if you should be screened for STIs.  After age 24: Get screened for STIs if you're at risk. You are at risk for STIs (including HIV) if:  You are sexually active with more than one person.  You don't use condoms every time.  You or a partner was diagnosed with a sexually transmitted infection.  If you are at risk for HIV, ask about PrEP medicine to prevent HIV.  Get tested for HIV at least once in your life, whether you are at risk for HIV or not.  Cancer screening tests  Cervical cancer screening: If you have a cervix, begin getting regular cervical cancer screening tests starting at age 21.  Breast cancer scan (mammogram): If you've ever had breasts, begin having regular mammograms starting at age 40. This is a scan to check for breast cancer.  Colon cancer screening: It is important to start screening for colon cancer at age 45.  Have a colonoscopy test every 10 years (or more often if you're at risk) Or, ask your provider about stool tests like a FIT test every year or Cologuard test every 3 years.  To learn more about your testing options, visit:   .  For help making a decision, visit:   https://bit.ly/ks57312.  Prostate cancer screening test: If you have a prostate, ask your care team if a prostate cancer screening test (PSA) at age 55 is right for you.  Lung cancer screening: If you are a current or former smoker ages 50 to 80, ask your care team if ongoing  lung cancer screenings are right for you.  For informational purposes only. Not to replace the advice of your health care provider. Copyright   2023 [facility]. All rights reserved. Clinically reviewed by the [facility]. [employer] 323373 - REV 01/24.  Learning About Stress  What is stress?     Stress is your body's response to a hard situation. Your body can have a physical, emotional, or mental response. Stress is a fact of life for most people, and it affects everyone differently. What causes stress for you may not be stressful for someone else.  A lot of things can cause stress. You may feel stress when you go on a job interview, take a test, or run a race. This kind of short-term stress is normal and even useful. It can help you if you need to work hard or react quickly. For example, stress can help you finish an important job on time.  Long-term stress is caused by ongoing stressful situations or events. Examples of long-term stress include long-term health problems, ongoing problems at work, or conflicts in your family. Long-term stress can harm your health.  How does stress affect your health?  When you are stressed, your body responds as though you are in danger. It makes hormones that speed up your heart, make you breathe faster, and give you a burst of energy. This is called the fight-or-flight stress response. If the stress is over quickly, your body goes back to normal and no harm is done.  But if stress happens too often or lasts too long, it can have bad effects. Long-term stress can make you more likely to get sick, and it can make symptoms of some diseases worse. If you tense up when you are stressed, you may develop neck, shoulder, or low back pain. Stress is linked to high blood pressure and heart disease.  Stress also harms your emotional health. It can make you magallanes, tense, or depressed. Your relationships may suffer, and you may not do well at work or  school.  What can you do to manage stress?  You can try these things to help manage stress:   Do something active. Exercise or activity can help reduce stress. Walking is a great way to get started. Even everyday activities such as housecleaning or yard work can help.  Try yoga or keny chi. These techniques combine exercise and meditation. You may need some training at first to learn them.  Do something you enjoy. For example, listen to music or go to a movie. Practice your hobby or do volunteer work.  Meditate. This can help you relax, because you are not worrying about what happened before or what may happen in the future.  Do guided imagery. Imagine yourself in any setting that helps you feel calm. You can use online videos, books, or a teacher to guide you.  Do breathing exercises. For example:  From a standing position, bend forward from the waist with your knees slightly bent. Let your arms dangle close to the floor.  Breathe in slowly and deeply as you return to a standing position. Roll up slowly and lift your head last.  Hold your breath for just a few seconds in the standing position.  Breathe out slowly and bend forward from the waist.  Let your feelings out. Talk, laugh, cry, and express anger when you need to. Talking with supportive friends or family, a counselor, or a lilian leader about your feelings is a healthy way to relieve stress. Avoid discussing your feelings with people who make you feel worse.  Write. It may help to write about things that are bothering you. This helps you find out how much stress you feel and what is causing it. When you know this, you can find better ways to cope.  What can you do to prevent stress?  You might try some of these things to help prevent stress:  Manage your time. This helps you find time to do the things you want and need to do.  Get enough sleep. Your body recovers from the stresses of the day while you are sleeping.  Get support. Your family, friends, and  "community can make a difference in how you experience stress.  Limit your news feed. Avoid or limit time on social media or news that may make you feel stressed.  Do something active. Exercise or activity can help reduce stress. Walking is a great way to get started.  Where can you learn more?  Go to https://www.[employer].net/patiented  Enter N032 in the search box to learn more about \"Learning About Stress.\"  Current as of: October 24, 2023  Content Version: 14.3    2024 [employer].   Care instructions adapted under license by your healthcare professional. If you have questions about a medical condition or this instruction, always ask your healthcare professional. [employer] disclaims any warranty or liability for your use of this information.    Learning About Depression Screening  What is depression screening?  Depression screening is a way to see if you have depression symptoms. It may be done by a doctor or counselor. It's often part of a routine checkup. That's because your mental health is just as important as your physical health.  Depression is a mental health condition that affects how you feel, think, and act. You may:  Have less energy.  Lose interest in your daily activities.  Feel sad and grouchy for a long time.  Depression is very common. It affects people of all ages.  Many things can lead to depression. Some people become depressed after they have a stroke or find out they have a major illness like cancer or heart disease. The death of a loved one or a breakup may lead to depression. It can run in families. Most experts believe that a combination of inherited genes and stressful life events can cause it.  What happens during screening?  You may be asked to fill out a form about your depression symptoms. You and the doctor will discuss your answers. The doctor may ask you more questions to learn more about how you think, act, and feel.  What happens after screening?  If you " "have symptoms of depression, your doctor will talk to you about your options.  Doctors usually treat depression with medicines or counseling. Often, combining the two works best. Many people don't get help because they think that they'll get over the depression on their own. But people with depression may not get better unless they get treatment.  The cause of depression is not well understood. There may be many factors involved. But if you have depression, it's not your fault.  A serious symptom of depression is thinking about death or suicide. If you or someone you care about talks about this or about feeling hopeless, get help right away.  It's important to know that depression can be treated. Medicine, counseling, and self-care may help.  Where can you learn more?  Go to https://www.[employer].net/patiented  Enter T185 in the search box to learn more about \"Learning About Depression Screening.\"  Current as of: July 31, 2024  Content Version: 14.3    2024 [employer].   Care instructions adapted under license by your healthcare professional. If you have questions about a medical condition or this instruction, always ask your healthcare professional. [employer] disclaims any warranty or liability for your use of this information.       "

## 2025-02-18 NOTE — PROGRESS NOTES
Preventive Care Visit  [facility]  Alma Hernandez MD, Family Medicine  Feb 18, 2025      Assessment & Plan     Andria was seen today for physical.    Diagnoses and all orders for this visit:    Routine general medical examination at a health care facility    Attention deficit hyperactivity disorder (ADHD), predominantly inattentive type    DIANA (generalized anxiety disorder)    PTSD (post-traumatic stress disorder)    Chronic fatigue syndrome    Neck pain  -     Spine  Referral; Future    Chronic bilateral low back pain without sciatica  -     Spine  Referral; Future    Need for vaccination  -     HEPATITIS B, ADULT 20+ (ENGERIX-B/RECOMBIVAX HB)  -     TDAP 10-64Y (ADACEL,BOOSTRIX)  -     INFLUENZA VACCINE,SPLIT VIRUS,TRIVALENT,PF(FLUZONE)  -     COVID-19 12+ (MODERNA)    Screening, iron deficiency anemia  -     CBC with platelets; Future  -     Ferritin; Future    Screening for hyperlipidemia  -     Lipid Panel ([place]); Future    Screening for diabetes mellitus (DM)  -     Hemoglobin A1c; Future    Psychophysiological insomnia  -     Adult Sleep Eval & Management Referral; Future    Encounter for screening mammogram for malignant neoplasm of breast  -     MA Screen Bilateral w/James; Future    Dry skin dermatitis  -     triamcinolone (KENALOG) 0.1 % external cream; Apply topically 2 times daily. Apply twice daily to hands until clear. Do not use more than seven consecutive days      Update screening labs today    Insomnia - check ferritin levels for possible RLS (limb movements on sleep study). Recommended to do sleep CBTi - is interested in pursuing this.    Neck/back pain - prior XR with some degenerative changes to cervical spine. Recommend eval by spine clinic. Consider repeat PT, pool therapy and/or lifestyle renewal program in the future.    Steroid cream for dry skin dermatitis of hands - discussed moisturizers and limiting use.    Follows with psychiatry and psychology for  mood/mental health      Counseling  Appropriate preventive services were addressed with this patient via screening, questionnaire, or discussion as appropriate for fall prevention, nutrition, physical activity, Tobacco-use cessation, social engagement, weight loss and cognition.  Checklist reviewing preventive services available has been given to the patient.  Reviewed patient's diet, addressing concerns and/or questions.   She is at risk for psychosocial distress and has been provided with information to reduce risk.   The patient's PHQ-9 score is consistent with moderate depression. She was provided with information regarding depression.     The longitudinal plan of care for the diagnosis(es)/condition(s) as documented were addressed during this visit. Due to the added complexity in care, I will continue to support Andria in the subsequent management and with ongoing continuity of care.     Return in about 53 weeks (around 2026) for Annual Wellness Visit.    Cynthia Ayers is a 39 year old, presenting for the following:  Physical         HPI    Gynecological history:  Menstrual/PMS/menopausal symptoms: no periods, still gets symptoms  Last Pap:  , result Normal  History of abnormal Paps: Yes, remote history of CIN1  Would you like to become pregnant in the next year? no   Contraceptive method: Mirena IUD  Pregnancy: , 2007, SAB    Other health-related habits:  Physical activity:  minimal, due to weather. Walking. Enjoys dancing, yoga, stretching  Mood: current medications feel they are still working well. Still having some depressive symptoms. Fatigue,     Health Maintenance Due   Topic Date Due    HEPATITIS B IMMUNIZATION (2 of 3 - 19+ 3-dose series) 2023    DTAP/TDAP/TD IMMUNIZATION (4 - Td or Tdap) 2024    INFLUENZA VACCINE (1) 2024    COVID-19 Vaccine (5 - - season) 2024     Chronic pain - went to pain/fibromyalgia clinic. Neck always hurts, lower back is pretty  persistent pain. Has done some PT in the past, chiropractic care is too painful. Tried functional medicine provider - not really addressed.  Insomnia     Rhinitis - improved, but does still have rhinorrhea    Noticing more vaginal itching - no discharge present          2/17/2025   General Health   How would you rate your overall physical health? (!) FAIR   Feel stress (tense, anxious, or unable to sleep) Very much   (!) STRESS CONCERN      2/17/2025   Nutrition   Three or more servings of calcium each day? (!) NO   Diet: Other   If other, please elaborate: No   How many servings of fruit and vegetables per day? (!) 0-1   How many sweetened beverages each day? (!) 2         2/17/2025   Exercise   Days per week of moderate/strenous exercise 0 days   Average minutes spent exercising at this level 0 min   (!) EXERCISE CONCERN      2/17/2025   Social Factors   Frequency of gathering with friends or relatives Once a week   Worry food won't last until get money to buy more Yes   Food not last or not have enough money for food? Yes   Do you have housing? (Housing is defined as stable permanent housing and does not include staying ouside in a car, in a tent, in an abandoned building, in an overnight shelter, or couch-surfing.) Yes   Are you worried about losing your housing? Yes   Lack of transportation? No   Unable to get utilities (heat,electricity)? No   Want help with housing or utility concern? No   (!) FOOD SECURITY CONCERN PRESENT(!) HOUSING CONCERN PRESENT      2/17/2025   Dental   Dentist two times every year? Yes          Today's PHQ-9 Score:       2/18/2025    12:59 PM   PHQ-9 SCORE   PHQ-9 Total Score MyChart 13 (Moderate depression)   PHQ-9 Total Score 13        Patient-reported         2/17/2025   Substance Use   Alcohol more than 3/day or more than 7/wk No   Do you use any other substances recreationally? (!) CANNABIS PRODUCTS     Social History     Tobacco Use    Smoking status: Former     Current packs/day:  "0.00     Types: Cigarettes     Quit date:      Years since quittin.1    Smokeless tobacco: Never   Vaping Use    Vaping status: Never Used   Substance Use Topics    Alcohol use: Not Currently     Comment: Rarely    Drug use: Yes     Frequency: 3.0 times per week     Types: Marijuana     Comment: helps with insomnia/sleep     Mammogram Screening - Mammogram every 1-2 years updated in Health Maintenance based on mutual decision making        2025   STI Screening   New sexual partner(s) since last STI/HIV test? No     History of abnormal Pap smear: YES - reflected in Problem List and Health Maintenance accordingly        Latest Ref Rng & Units 2021    10:18 AM 2021     9:15 AM 2017    12:00 AM   PAP / HPV   PAP (Historical)  NIL      HPV 16 DNA NEG^Negative  Negative     HPV 18 DNA NEG^Negative  Negative     Other HR HPV NEG^Negative  Negative     PAP-ABSTRACT    See Scanned Document           This result is from an external source.           2025   Contraception/Family Planning   Questions about contraception or family planning No        Reviewed and updated as needed this visit by Provider   Tobacco  Allergies  Meds  Problems  Med Hx  Surg Hx  Fam Hx  Soc   Hx Sexual Activity               Objective    Exam  /71 (BP Location: Left arm, Patient Position: Sitting, Cuff Size: Adult Regular)   Pulse 65   Temp (!) 96.3  F (35.7  C) (Skin)   Resp 16   Ht 1.725 m (5' 7.91\")   Wt 78 kg (172 lb)   SpO2 98%   BMI 26.22 kg/m     Estimated body mass index is 26.22 kg/m  as calculated from the following:    Height as of this encounter: 1.725 m (5' 7.91\").    Weight as of this encounter: 78 kg (172 lb).    Physical Exam  GENERAL: alert and no distress  EYES: Eyes grossly normal to inspection, PERRL and conjunctivae and sclerae normal  HENT: ear canals and TM's normal, nose and mouth without ulcers or lesions  NECK: no adenopathy, no asymmetry, masses, or scars  RESP: lungs " clear to auscultation - no rales, rhonchi or wheezes  CV: regular rate and rhythm, normal S1 S2, no S3 or S4, no murmur, click or rub, no peripheral edema  ABDOMEN: soft, nontender, no hepatosplenomegaly, no masses and bowel sounds normal  MS: no gross musculoskeletal defects noted, no edema  SKIN: no suspicious lesions or rashes  NEURO: Normal strength and tone, mentation intact and speech normal  PSYCH: mentation appears normal, affect normal/bright        Signed Electronically by: Alma Hernandez MD

## 2025-02-19 ENCOUNTER — PATIENT OUTREACH (OUTPATIENT)
Dept: CARE COORDINATION | Facility: CLINIC | Age: 40
End: 2025-02-19
Payer: COMMERCIAL

## 2025-02-27 ENCOUNTER — VIRTUAL VISIT (OUTPATIENT)
Dept: PSYCHIATRY | Facility: CLINIC | Age: 40
End: 2025-02-27
Payer: COMMERCIAL

## 2025-02-27 DIAGNOSIS — F90.0 ATTENTION DEFICIT HYPERACTIVITY DISORDER (ADHD), PREDOMINANTLY INATTENTIVE TYPE: ICD-10-CM

## 2025-02-27 DIAGNOSIS — F43.10 PTSD (POST-TRAUMATIC STRESS DISORDER): Chronic | ICD-10-CM

## 2025-02-27 DIAGNOSIS — F41.1 GAD (GENERALIZED ANXIETY DISORDER): Primary | Chronic | ICD-10-CM

## 2025-02-27 DIAGNOSIS — F33.0 MILD EPISODE OF RECURRENT MAJOR DEPRESSIVE DISORDER: Chronic | ICD-10-CM

## 2025-02-27 ASSESSMENT — PATIENT HEALTH QUESTIONNAIRE - PHQ9
10. IF YOU CHECKED OFF ANY PROBLEMS, HOW DIFFICULT HAVE THESE PROBLEMS MADE IT FOR YOU TO DO YOUR WORK, TAKE CARE OF THINGS AT HOME, OR GET ALONG WITH OTHER PEOPLE: SOMEWHAT DIFFICULT
SUM OF ALL RESPONSES TO PHQ QUESTIONS 1-9: 11
SUM OF ALL RESPONSES TO PHQ QUESTIONS 1-9: 11

## 2025-02-27 NOTE — PROGRESS NOTES
Virtual Visit Details    Type of service:  Video Visit   Video/Phone Start Time:  0934  Video/Phone End Time:   0945    Originating Location (pt. Location): Home    Distant Location (provider location):  Off-site  Platform used for Video Visit: Jonny

## 2025-02-27 NOTE — NURSING NOTE
Is the patient currently in the state of [place]? YES    Current patient location: [address] [place] [id number]    Visit mode:Video    If the visit is dropped, the patient can be reconnected by: VIDEO VISIT: Text to cell phone:   Telephone Information:   Mobile [phone]       Will anyone else be joining the visit? No  (If patient encounters technical issues they should call [phone])    Are changes needed to the allergy or medication list? No    Are refills needed on medications prescribed by this physician? Discuss with Provider    Rooming Documentation: Questionnaire(s) completed.    Reason for visit: SIMONE [place]

## 2025-02-27 NOTE — PATIENT INSTRUCTIONS
"Patient Education   The [facility]  What to Expect  Here's what to expect in the [facility] Program.   About the program  You'll be meeting with a psychiatric doctor to check your mental health. A psychiatric doctor helps you deal with troubling thoughts and feelings by giving you medicine. They'll make sure you know the plan for your care. You may see them for a long time. When you're feeling better, they may refer you back to seeing your family doctor.   If you have any questions, we'll be glad to talk to you.  About visits  Be open  At your visits, please talk openly about your problems. It may feel hard, but it's the best way for us to help you.  Cancelling visits  If you can't come to your visit, please call us right away at [phone]. If you don't cancel at least 24 hours (1 full day) before your visit, that's \"late cancellation.\"  Not showing up for your visits  Being very late is the same as not showing up. You'll be a \"no show\" if:  You're more than 15 minutes late for a 30-minute (half hour) visit.  You're more than 30 minutes late for a 60-minute (full hour) visit.  If you cancel late or don't show up 2 times within 6 months, we may end your care.  Getting help between visits  If you need help between visits, you can call us Monday to Friday from 8 a.m. to 4:30 p.m. at [phone].  Emergency care  Call 911 or go to the nearest emergency department if your life or someone else's life is in danger.  Call 988 anytime to reach the national Suicide and Crisis hotline.  Medicine refills  To refill your medicine, call your pharmacy. You can also call [facility]'s [facility] at [phone], Monday to Friday, 8 a.m. to 4:30 p.m. It can take 1 to 3 business days to get a refill.   Forms, letters, and tests  You may have papers to fill out, like FMLA, short-term disability, and workability. We can help you with these forms at your visits, but you must have an " appointment. You may need more than 1 visit for this, to be in an intensive therapy program, or both.  Before we can give you medicine for ADHD, we may refer you to get tested for it or confirm it another way.  We may not be able to give you an emotional support animal letter.  We don't do mental health checks ordered by the court.   We don't do mental health testing, but we can refer you to get tested.   Thank you for choosing us for your care.  For informational purposes only. Not to replace the advice of your health care provider. Copyright   2022 [facility]. All rights reserved. [employer] 398284 - Rev 11/24.   Plan:  1.Patient will take the medications as prescribed.   Medications: Continue Focalin XR 10 mg daily  Continue Focalin IR 5 mg daily as needed for ADHD  Continue Lamictal 100 mg daily  Prazosin 1 mg at bedtime  Propranolol 20 mg twice daily as needed for anxiety  Patient will not stop taking medications or adjust them without consulting with the provider.  2.Patient will call with any problems between visits.  3.Patient will go to the emergency room if not feeling safe , unable to function in the community, or if suicidal, homicidal or hearing voices or having paranoia.  4.Patient will abstain from drugs and alcohol./Pt denies use .  5.Patient will not drive if sedated on medications or under influence of any substance.   6.Patient will not mix psychiatric medications with drugs and alcohol.   7.Patient will watch his diet and exercise.  8.Patient will see non psychiatric providers for non psychiatric disorders.  9. Next appointment in 6  weeks

## 2025-02-27 NOTE — PROGRESS NOTES
"PSYCHIATRIC PROGRESS NOTE     Name:  Andria Daily  : 1985    Andria Daily is a 39 year old female who is being evaluated via a billable Video visit.      Telemedicine Visit: The patient's condition can be safely assessed and treated via synchronous audio and visual telemedicine encounter.      Reason for Telemedicine Visit: COVID 19 pandemic and the social and physical recommendations by the CDC and MD., Patient has requested telehealth visit, and Patient unable to travel      Originating Site (Patient Location): Patient's home    Distant Site (Provider Location): [facility] Outpatient Setting: [facility]    Consent:  The patient/guardian has verbally consented to: the potential risks and benefits of telemedicine (video visit or phone) versus in person care; bill my insurance or make self-payment for services provided; and responsibility for payment of non-covered services.     Mode of Communication:  [employer] platform     As the provider I attest to compliance with applicable laws and regulations related to telemedicine.    Date of Last Visit: 25                                       CHIEF COMPLAINT     \"Doing better\"  HISTORY OF PRESENT ILLNESS     Patient who was last seen in the clinic on 25 returned today for follow up visit.  Patient reports she is doing fairly okay, stating mood, depression, and anxiety are manageable at this time.  Patient also reports she has been a little down lately due to breaking up with her longtime boyfriend.  Patient stated this was a very difficult decision.  Patient reports she has also been dealing with family members getting sick and family friends  passing from terminal disease.  Patient stated she has been seeing her therapist more frequently which has been helping with the recent loss.  Patient also reports current medication continue to effectively help with psychiatric symptoms despite dealing with losses.  Patient denies SI, SIB, and HI. " . Patient also denies both auditory and visual hallucination.  Patient report normal no hypomania.  Patient return to clinic in 6 weeks for follow-up.  PSYCHIATRIC HISTORY:   History of Psychiatric Hospitalizations:   - Inpatient: X2 in teenager and early 20s  - IOP/PHP/Day treatment: PHP in 2024  History of Suicidal Ideation: Positive  History of Suicide Attempts:  Positive with medication overdose    History of Self-injurious Behavior: Denies a history of SIB.  Current:  No  History of Violence/Aggression: Negative  History of Commitment? Negative  Electroconvulsive Therapy (ECT):Negative  TMS: 2020  PSYCHIATRIC REVIEW OF SYSTEMS:   Psychiatric Review of Systems:   Depression:   Reportsaf: depressed mood, suicidal ideation, decreased interest, changes in sleep, changes in appetite, guilt, hopelessness, helplessness, impaired concentration, decreased energy, irritability.  [place]:   Denies: sleeplessness, increased goal-directed activities, abrupt increase in energy pressured speech  Psychosis:   Denies: visual hallucinations, auditory hallucinations, paranoia  Anxiety:   Reports: excessive worries that are difficult to control, panic attacks  PTSD:   Reports: re-experiencing past trauma, nightmares, increased arousal, avoidance of traumatic stimuli, impaired function.  Denies: re-experiencing past trauma, nightmares, increased arousal, avoidance of traumatic stimuli, impaired function.  OCD:   Denies: obsessions, checking, symmetry, cleaning, skin picking.  Eating Disorder: Hx of Anorexia and Bulimia  Denies: restriction, binging, purging.    Sleep: Struggles with sleep      MEDICATIONS                                                                                                Current Outpatient Medications   Medication Sig Dispense Refill    azelastine (ASTEPRO) 0.15 % nasal spray Spray 2 sprays into both nostrils daily. 17 mL 1    dexmethylphenidate (FOCALIN XR) 10 MG 24 hr capsule Take 1 capsule (10 mg) by  "mouth daily. 30 capsule 0    dexmethylphenidate (FOCALIN) 5 MG tablet Take 1 tablet (5 mg) by mouth daily as needed (ADHD). 30 tablet 0    hydrOXYzine HCl (ATARAX) 25 MG tablet TAKE 1 TABLET(25 MG) BY MOUTH TWICE DAILY AS NEEDED FOR ANXIETY 60 tablet 0    lamoTRIgine (LAMICTAL) 100 MG tablet TAKE 1 TABLET(100 MG) BY MOUTH DAILY 30 tablet 2    levonorgestrel (MIRENA) 20 MCG/DAY IUD 1 each by Intrauterine route once      prazosin (MINIPRESS) 1 MG capsule Take 1 capsule (1 mg) by mouth at bedtime. 90 capsule 0    propranolol (INDERAL) 20 MG tablet Take 1 tablet (20 mg) by mouth 2 times daily. 180 tablet 1    triamcinolone (KENALOG) 0.1 % external cream Apply topically 2 times daily. Apply twice daily to hands until clear. Do not use more than seven consecutive days 30 g 0     No current facility-administered medications for this visit.       DRUG MONITORING:  [place] Prescription Monitoring Program evaluating controlled substances in the last year in [place]:  The [place] Prescription Monitoring Program has been reviewed and there are no current concerns with: diversionary activity, early refill requests, and or obtaining the medication from multiple providers       PAST PSYCHOTROPIC MEDICATIONS:    Abilify    V- Lexapro  - Effexor (night sweats)  - Buspar 7.5-15mg  - Wellbutrin (worsened tremor)  - Celexa 20mg  - Cymbalta (trialed with Celexa for pain, noted memory issues)  - trazodone 50mg   - Zoloft 50mg (higher doses associated with night sweats)  - Prazosin 1mg (not well tolerated due to hypotension)ITALS   There were no vitals taken for this visit.     BP Readings from Last 1 Encounters:   02/18/25 105/71     Pulse Readings from Last 1 Encounters:   02/18/25 65     Wt Readings from Last 1 Encounters:   02/18/25 78 kg (172 lb)     Ht Readings from Last 1 Encounters:   02/18/25 1.725 m (5' 7.91\")     Estimated body mass index is 26.22 kg/m  as calculated from the following:    Height as of 2/18/25: 1.725 m (5' " "7.91\").    Weight as of 2/18/25: 78 kg (172 lb).      PERTINENT HISTORY   PAST MEDICAL HISTORY:   Past Medical History:   Diagnosis Date    ADHD (attention deficit hyperactivity disorder)     completed assessment    Anxiety     Chronic fatigue syndrome     Chronic rhinitis     CHHAYA I (cervical intraepithelial neoplasia I) 05/24/2007    Depressive disorder     History of eating disorder     bulimia - in remission since 2015, sporadic since then    History of substance use     prescription pain killers, alcohol, cocaine, sober since 2015    History of substance use     - history of prescription pain killers, alcohol, cocaine, and marijuana abuse  - history of outpatient treatment at [facility] 2015, [facility] residential treatment 2 months, IOP  - sober from cocaine since about 01/2018  - uses MJ daily, smokes      Hypersomnia     PTSD (post-traumatic stress disorder)        PAST SURGICAL HISTORY:   Past Surgical History:   Procedure Laterality Date    KNEE SURGERY Left 2018    for osgood-schlatter and tendon repair/anchor    LAPAROSCOPY DIAGNOSTIC (GYN) N/A 03/22/2023    Procedure: laparoscopy, chromopertubation;  Surgeon: Isabel Vera MD;  Location: [facility] OR    [place] Teeth Removal  2001    WRIST SURGERY Left 2015    snowboarding injury, fractured, had metal plate initially that was then removed       FAMILY HISTORY:   Family History   Problem Relation Age of Onset    Anxiety Disorder Mother     Tremor Mother     Depression Father     Anxiety Disorder Father     Sleep Apnea Father     Diabetes Maternal Grandmother     Hyperlipidemia Maternal Grandfather     Hypertension Maternal Grandfather     Coronary Artery Disease Maternal Grandfather 55    Anxiety Disorder Sister         after bad car accident    Breast Cancer No family hx of     Ovarian Cancer No family hx of     Colon Cancer No family hx of     Cerebrovascular Disease No family hx of        SOCIAL HISTORY:   Social History " "    Tobacco Use    Smoking status: Former     Current packs/day: 0.00     Types: Cigarettes     Quit date: 2016     Years since quittin.1    Smokeless tobacco: Never   Substance Use Topics    Alcohol use: Not Currently     Comment: Rarely         Seizures or Head Injury: Denies history of head injury. Denies history of seizures.  History of cardiac disease, rheumatic fever, fainting or dizziness, especially with exercise, seizures, chest pain or shortness of breath with exercise, unexplained change in exercise tolerance, palpitations, high blood pressure, or heart murmur?   No    LABS & IMAGING                                                                                                                Personally reviewed  Recent Labs   Lab Test 25  1354 23  1130 17  0916   WBC 5.3   < > 5.5   HGB 14.0   < > 13.8   HCT 42.3   < > 41.7   MCV 92   < > 91      < > 185   ANEU  --   --  3.5    < > = values in this interval not displayed.     Recent Labs   Lab Test 23  1130   GLC 84     Recent Labs   Lab Test 25  1354   CHOL 195   LDL 98   HDL 75   TRIG 111   A1C 5.2     No lab results found.  No results found for: \"WSP316\", \"JIIL515\", \"VNKS92NEMRD\", \"VITD3\", \"D2VIT\", \"D3VIT\", \"DTOT\", \"[id number]\", \"[id number]\", \"[id number]\", \"[id number]\", \"[id number]\", \"[id number]\"     ALLERGY & IMMUNIZATIONS       Allergies   Allergen Reactions    Dairy Products [Milk Protein] Other (See Comments)     Nasal problems, stomach pain     No Clinical Screening - See Comments      Apples and bananas cause her throat to itch    Seasonal Allergies     Flonase [Fluticasone] Dizziness and Rash    Penicillins Rash     Rash on face and threw up       FAMILY MEDICAL HISTORY:     Family History       Problem (# of Occurrences) Relation (Name,Age of Onset)    Anxiety Disorder (3) Mother, Father, Sister: after bad car accident    Depression (1) Father    Diabetes (1) Maternal Grandmother    Hypertension (1) " Maternal Grandfather    Hyperlipidemia (1) Maternal Grandfather    Coronary Artery Disease (1) Maternal Grandfather (55)    Sleep Apnea (1) Father    Tremor (1) Mother           Negative family history of: Breast Cancer, Ovarian Cancer, Colon Cancer, Cerebrovascular Disease              Family history of sudden or unexplained death or an event requiring resuscitation in children or young adults, cardiac arrhythmias (eg, Janelle-Parkinson-White syndrome), long QT syndrome, catecholaminergic paroxysmal ventricular tachycardia, Brugada syndrome, arrhythmogenic right ventricular dysplasia, hypertrophic cardiomyopathy, dilated cardiomyopathy, or Marfan syndrome?  No    FAMILY PSYCHIATRIC HISTORY:   Psychiatry:Father, mother sister with anxiety, father with depression  Substance use history in family: Denies  Family suicide history:Negative      SIGNIFICANT SOCIAL/FAMILY HISTORY:                                           Born and raised in: [place]  Relationship status: Single living with boyfriend  Children: None    Highest education level was:Associate degree   Service:Denies  Employment status: Works part time at the [employer]  LEGAL:     SUBSTANCE USE HISTORY      Alcohol- fewer urges to drink, mindful with med and risks of alcohol   - treatment in 2015  Nicotine- none  Caffeine- 1-2 cups coffee daily, 1-2 energy drinks a week, can increase shaking if she doesn't eat enough  Opioids- sober since 2015   Cannabis- smoking 1-2x daily  - treated for cannabis use in 2015   Other Illicit Drugs- sober from cocaine and hallucinogens since 2015    MEDICAL REVIEW OF SYSTEMS:   Ten system review was completed with pertinent positives noted above    MENTAL STATUS EXAM:   Mental Status Examination (limited due to video virtual visit format):  Vital Signs: There were no vitals taken for this virtual visit.  Appearance: adequately groomed, appears stated age, and in no apparent distress.  Attitude: cooperative   Eye  "Contact: good to the extent that can be determined in a video visit  Muscle Strength and Tone: no gross abnormalities based on remote observation  Psychomotor Behavior:  no evidence of tardive dyskinesia, dystonia, or tics based on remote observation  Gait and Station: normal, no gross abnormalities based on remote observation  Speech: clear, coherent, normal prosody, regular rate, regular rhythm and fluent  Associations: No loosening of associations  Thought Process: coherent and goal directed  Thought Content: no evidence of suicidal ideation or homicidal ideation, no evidence of psychotic thought, no auditory hallucinations present and no visual hallucinations present  Mood: \"Neutral\"  Affect: appropriate and in normal range  Insight: good  Judgment: intact, adequate for safety  Impulse Control: intact  Oriented to: time, place, person and situation  Attention Span and Concentration: normal  Language: Intact  Recent and Remote Memory: intact to interview. Not formally assessed. No amnesia.  Fund of Knowledge: appropriate        SAFETY   Feels safe in home: Yes   Suicidal ideation: Denies  History of suicide attempts:  No   Hx of impulsivity: No     DSM 5 DIAGNOSIS:   1. Moderate episode of recurrent major depressive disorder (H)    2. PTSD (post-traumatic stress disorder)    3. DIANA (generalized anxiety disorder)    4. Attention deficit hyperactivity disorder (ADHD), predominantly inattentive type    5. Insomnia, unspecified type      ASSESSMENT AND PLAN    Patient is a 39 year old,  White Choose not to answer female with a history notable for moderate episode of recurrent major depressive disorder, PTSD, generalized anxiety disorder, ADHD, predominantly inattentive type, and insomnia, unspecified type who presents for follow up visit.  Patient has been dealing with some losses recently as she recently broke up with her boyfriend of several years while dealing with family friends passing from terminal disease.  " She believes current medication has been helping with symptoms despite recent losses.  She has been seen her therapist more frequently which has also been helping with the current condition.  Patient would like to continue with current medication regimen.  She denies SI/ SIB/HI.  Patient report no batrolo or hypomania.  Patient to return to clinic in 6 weeks for follow-up.    Plan:  1.Patient will take the medications as prescribed.   Medications: Continue Focalin XR 10 mg daily  Continue Focalin IR 5 mg daily as needed for ADHD  Continue Lamictal 100 mg daily  Prazosin 1 mg at bedtime  Propranolol 20 mg twice daily as needed for anxiety  Patient will not stop taking medications or adjust them without consulting with the provider.  2.Patient will call with any problems between visits.  3.Patient will go to the emergency room if not feeling safe , unable to function in the community, or if suicidal, homicidal or hearing voices or having paranoia.  4.Patient will abstain from drugs and alcohol./Pt denies use .  5.Patient will not drive if sedated on medications or under influence of any substance.   6.Patient will not mix psychiatric medications with drugs and alcohol.   7.Patient will watch his diet and exercise.  8.Patient will see non psychiatric providers for non psychiatric disorders.  9. Next appointment in 6  weeks    Risk Assessment:     Andria has notable risk factors for self-harm, including, single status, anxiety, mood dysregulation, substance use,  and passive SI. However, risk is mitigated by ability to volunteer a safety plan and history of seeking help when needed. Additional steps taken to minimize risk include making medication adjustment, asking patient to call 911 and go to the ER if not able to stay safe at home,  Therefore, based on all available evidence including the factors cited above, Andria does not appear to be an imminent danger to self or others and does not meet criteria 72 hour hold.  However, if patient uses substances or is non-adherent with medication, their risk of decompensation and SI/HI will be elevated. This was discussed with the patient as she verbalized good understanding.   CONSULTS/REFERRALS:   Continue therapy  None at this time  Coordinate care with therapist as needed    MEDICAL:   None at this time  Coordinate care with PCP (Alma Hernandez) as needed  Follow up with primary care provider as planned or for acute medical concerns.    PSYCHOEDUCATION:  Medication side effects and alternatives reviewed. Health promotion activities recommended and reviewed today. All questions addressed. Education and counseling completed regarding risks and benefits of medications and psychotherapy options.  Consent provided by patient/guardian  Call the psychiatric nurse line with medication questions or concerns at [phone].  Agrivit may be used to communicate with your provider, but this is not intended to be used for emergencies.  BLACK BOX WARNING: Discussed the Food and Drug Administration (FDA) requires that all antidepressants carry a warning that some children, adolescents and young adults may be at increased risk of suicide when taking antidepressants. Anyone taking an antidepressant should be watched closely for worsening depression or unusual behavior especially in the first few weeks after starting an SSRI. Keep in mind, antidepressants are more likely to reduce suicide risk in the long run by improving mood.   SEROTONIN SYNDROME:  Discussed risks of Serotonin syndrome (ie, serotonin toxicity) which is a potentially life-threatening condition associated with increased serotonergic activity in the central nervous system (CNS). It is seen with therapeutic medication use, inadvertent interactions between drugs, and intentional self-poisoning. Serotonin syndrome may involve a spectrum of clinical findings, which often include mental status changes, autonomic hyperactivity, and  neuromuscular abnormalities.    BENZODIAZEPINE:  discussion on how benzos work and the need to use them short term due to potential of anxiety getting.  This is a controlled substance with risk for abuse, need to keep in a safe keep place and cannot replace lost scripts.    HYPNOTIC USE: Hypnotic use, risk for CNS depression, sleep-walking, not to mix with ETOH or other CNS depressant, need for six hours of sleep, stop if change in mood.  This is a controlled substance with risk for abuse, need to keep in a safe keep place and cannot replace lost scripts.  FIRST GENERATION ANTIPSYCHOTIC/ SECOND GENERATION ANTIPSYCHOTIC USE:  Atypical need for cardiometabolic monitoring with medication- B/P, weight, blood sugar, cholesterol.  Need to monitor for abnormal movements taught  SAFETY:  We all care about your loved one's safety. To reduce the risk of self-harm, remove access to all:  Firearms, Medicines (both prescribed and over-the-counter), Knives and other sharp objects, Ropes and like materials, and Alcohol  SLEEP HYGIENE: establish a sleep routine, limit screen time 1 hour prior to bed, use bed for sleep only, take sleep/medications on time (including sleepy time tea, trazadone or herbal treatments such as melatonin), aroma therapy, limit caffeine/sugar, yoga, guided imagery, stretch, meditation, limit naps to 20 minutes, make a temperature change in the room, white noise, be mindful of slowing down breathing, take a warm bath/shower, frequently wash sheets, and journaling.   Medlineplus.gov is information for patients.  It is run by the National Library of Medicine and it contains information about all disorders, diseases and all medications.      COMMUNITY RESOURCES:    CRISIS NUMBERS: Provided in AVS 6/6/2024  National Suicide Prevention Lifeline: 3-422-252-TALK (842-192-9403)  YEOXIN VMall/resources for a list of additional resources (SOS)            [facility] - [phone]   Urgent Care  Adult Mental Qhbofg-[phone] mobile unit/  crisis line  [facility] -[phone]   COPE  [place] Mobile Team -[phone] (adults)/ [phone] (child)  Poison Control Center - 1-372.399.2064    OR  go to nearest ER  Crisis Text Line for any crisis  send this-   To: 427587   [facility] ([facility]) [facility]  [phone]  National Suicide Prevention Lifeline: 894.830.3592 (TTY: 205.778.1644). Call anytime for help.  (www.suicidepreventionlifeline.org)  National [place] on Mental Illness (www.iman.org): 529.804.9042 or 856-620-6649.   Mental Health Association (www.mentalhealth.org): 507.821.6333 or 162-659-7770.  [place] Crisis Text Line: Text [place] to 742374  Suicide LifeLine Chat: suicideSpredfast.org/chat    ADMINISTRATIVE BILLIN mins spent interviewing patient, reviewing referral documents, obtaining and reviewing outside records, communication with other health specialists, and preparing this report on this day: 25    Video/Phone Start Time:  0934  Video/Phone End Time:   0945    Greater than 50% of time was spent in counseling and coordination of care regarding above diagnoses and treatment plan.    The longitudinal plan of care for the diagnosis(es)/condition(s) as documented were addressed during this visit. Due to the added complexity in care, I will continue to support Andria in the subsequent management and with ongoing continuity of care.     Signed:   Reddy Mello, MSN, APRN, PMHNP-BC  Long Term Outpatient Psychiatry  Chart documentation done in part with Dragon Voice Recognition software.  Although reviewed after completion, some word and grammatical errors may remain.   Answers submitted by the patient for this visit:  Patient Health Questionnaire (Submitted on 2024)  If you checked off any problems, how difficult have these problems made it for you to do your work, take care of things at home, or get along with other people?:  Very difficult  PHQ9 TOTAL SCORE: 17  DIANA-7 (Submitted on 6/6/2024)  DIANA 7 TOTAL SCORE: 13    Answers submitted by the patient for this visit:  Patient Health Questionnaire (Submitted on 7/22/2024)  If you checked off any problems, how difficult have these problems made it for you to do your work, take care of things at home, or get along with other people?: Very difficult  PHQ9 TOTAL SCORE: 13  DIANA-7 (Submitted on 7/22/2024)  DIANA 7 TOTAL SCORE: 12    Answers submitted by the patient for this visit:  Patient Health Questionnaire (Submitted on 11/4/2024)  If you checked off any problems, how difficult have these problems made it for you to do your work, take care of things at home, or get along with other people?: Somewhat difficult  PHQ9 TOTAL SCORE: 12  Patient Health Questionnaire (G7) (Submitted on 11/4/2024)  DIANA 7 TOTAL SCORE: 13    Answers submitted by the patient for this visit:  Patient Health Questionnaire (Submitted on 1/16/2025)  If you checked off any problems, how difficult have these problems made it for you to do your work, take care of things at home, or get along with other people?: Somewhat difficult  PHQ9 TOTAL SCORE: 10  Patient Health Questionnaire (G7) (Submitted on 1/16/2025)  DIANA 7 TOTAL SCORE: 13    Answers submitted by the patient for this visit:  Patient Health Questionnaire (Submitted on 2/27/2025)  If you checked off any problems, how difficult have these problems made it for you to do your work, take care of things at home, or get along with other people?: Somewhat difficult  PHQ9 TOTAL SCORE: 11

## 2025-03-07 DIAGNOSIS — F41.1 GAD (GENERALIZED ANXIETY DISORDER): ICD-10-CM

## 2025-03-07 DIAGNOSIS — F33.1 MODERATE EPISODE OF RECURRENT MAJOR DEPRESSIVE DISORDER (H): ICD-10-CM

## 2025-03-07 DIAGNOSIS — R25.1 TREMOR: ICD-10-CM

## 2025-03-07 RX ORDER — PROPRANOLOL HCL 20 MG
20 TABLET ORAL 2 TIMES DAILY
Refills: 0 | Status: CANCELLED | OUTPATIENT
Start: 2025-03-07

## 2025-03-07 RX ORDER — LAMOTRIGINE 100 MG/1
100 TABLET ORAL DAILY
Refills: 0 | Status: CANCELLED | OUTPATIENT
Start: 2025-03-07

## 2025-03-07 NOTE — TELEPHONE ENCOUNTER
Date of Last Office Visit: 01/16/2025  Date of Next Office Visit:  04/17/2025  No shows since last visit: No  More than one patient-initiated cancellation (with reschedule) since last seen in clinic? No    []Medication refilled per  Medication Refill in Ambulatory Care  policy.  [x]Scope of Practice: refill request processed by LPN/MA  []Medication unable to be refilled by RN due to criteria not met as indicated below:    []Eligibility: has not had a provider visit within last 6 months   []Supervision: no future appointment; < 7 days before next appointment   []Compliance: no shows; cancellations; lapse in therapy   []Verification: order discrepancy; may need modification...   [] > 30-day supply request   []Advanced refill request: > 7 days before refill date   []Controlled medication   []Medication not included in policy   []Review: new med; med adjusted <= 30 days; safety alert; requires lab monitoring...   []Other:      Medication(s) requested:     -  propranolol (INDERAL) 20 MG tablet   Date last ordered: 11/04/2024  Qty: 180  Refills: 1  Appropriate for refill? Yes     -  lamoTRIgine (LAMICTAL) 100 MG tablet   Date last ordered: 01/30/2025  Qty: 30  Refills: 2  Appropriate for refill? Yes     Any Controlled Substance(s)? No        Requested medication(s) verified as identical to current order? Yes     Any lapse in adherence to medication(s) greater than 5 days? No       Additional action taken? cued up medication/order(s) and routed encounter to provider for review.        Last visit treatment plan:   Return for Follow up, with me in 6  weeks , [facility]    Continue Focalin XR 10 mg daily  Continue Focalin IR 5 mg daily as needed for ADHD  Continue Lamictal 100 mg daily  Prazosin 1 mg at bedtime  Propranolol 20 mg twice daily as needed for anxiety    Any medication(s) require lab monitoring? No    Johana Archibald MA on 3/7/2025 at 4:09 PM       Reviewed by Layla Finn RN, pended to  provider

## 2025-03-11 NOTE — TELEPHONE ENCOUNTER
Lamotrigine and propranolol refills sent by Vandana Mejia 3/7/25. No further action needed.    Mary Epstein RN on 3/11/2025 at 11:06 AM

## 2025-03-18 ENCOUNTER — MYC REFILL (OUTPATIENT)
Dept: PSYCHIATRY | Facility: CLINIC | Age: 40
End: 2025-03-18
Payer: COMMERCIAL

## 2025-03-18 DIAGNOSIS — F90.0 ATTENTION DEFICIT HYPERACTIVITY DISORDER (ADHD), PREDOMINANTLY INATTENTIVE TYPE: ICD-10-CM

## 2025-03-19 RX ORDER — DEXMETHYLPHENIDATE HYDROCHLORIDE 10 MG/1
10 CAPSULE, EXTENDED RELEASE ORAL DAILY
Qty: 30 CAPSULE | Refills: 0 | Status: SHIPPED | OUTPATIENT
Start: 2025-03-19

## 2025-03-19 RX ORDER — DEXMETHYLPHENIDATE HYDROCHLORIDE 5 MG/1
5 TABLET ORAL DAILY PRN
Qty: 30 TABLET | Refills: 0 | Status: SHIPPED | OUTPATIENT
Start: 2025-03-19

## 2025-03-19 NOTE — TELEPHONE ENCOUNTER
Date of Last Office Visit: 02/27/2025  Date of Next Office Visit:  04/17/2025  No shows since last visit: No  More than one patient-initiated cancellation (with reschedule) since last seen in clinic? No    []Medication refilled per  Medication Refill in Ambulatory Care  policy.  [x]Scope of Practice: refill request processed by LPN/MA  []Medication unable to be refilled by RN due to criteria not met as indicated below:    []Eligibility: has not had a provider visit within last 6 months   []Supervision: no future appointment; < 7 days before next appointment   []Compliance: no shows; cancellations; lapse in therapy   []Verification: order discrepancy; may need modification...   [] > 30-day supply request   []Advanced refill request: > 7 days before refill date   [x]Controlled medication   []Medication not included in policy   []Review: new med; med adjusted <= 30 days; safety alert; requires lab monitoring...   []Other:      Medication(s) requested:     -  dexmethylphenidate (FOCALIN XR) 10 MG 24 hr capsule   Date last ordered: 02/17/2025  Qty: 30  Refills: 0  Appropriate for refill? Provider to review.      -  dexmethylphenidate (FOCALIN) 5 MG tablet   Date last ordered: 02/17/2025  Qty: 30  Refills: 0  Appropriate for refill? Provider to review.            Any Controlled Substance(s)? Yes   [place]  checked? N/A      Requested medication(s) verified as identical to current order? Yes    Any lapse in adherence to medication(s) greater than 5 days? No      Additional action taken? cued up medication/order(s) and routed encounter to provider for review.      Last visit treatment plan:   Next appointment in 6  weeks   Medications:   Continue Focalin XR 10 mg daily  Continue Focalin IR 5 mg daily as needed for ADHD  Continue Lamictal 100 mg daily  Prazosin 1 mg at bedtime  Propranolol 20 mg twice daily as needed for anxiety    Any medication(s) require lab monitoring? No    Johana Archibald MA on 3/19/2025 at 12:54 PM

## 2025-04-05 ENCOUNTER — HEALTH MAINTENANCE LETTER (OUTPATIENT)
Age: 40
End: 2025-04-05

## 2025-04-07 DIAGNOSIS — R25.1 TREMOR: ICD-10-CM

## 2025-04-07 RX ORDER — PROPRANOLOL HCL 20 MG
20 TABLET ORAL 2 TIMES DAILY
Qty: 60 TABLET | Refills: 1 | OUTPATIENT
Start: 2025-04-07

## 2025-04-17 ENCOUNTER — VIRTUAL VISIT (OUTPATIENT)
Dept: PSYCHIATRY | Facility: CLINIC | Age: 40
End: 2025-04-17
Payer: COMMERCIAL

## 2025-04-17 DIAGNOSIS — F90.0 ATTENTION DEFICIT HYPERACTIVITY DISORDER (ADHD), PREDOMINANTLY INATTENTIVE TYPE: ICD-10-CM

## 2025-04-17 DIAGNOSIS — G47.00 INSOMNIA, UNSPECIFIED TYPE: ICD-10-CM

## 2025-04-17 DIAGNOSIS — F41.1 GAD (GENERALIZED ANXIETY DISORDER): ICD-10-CM

## 2025-04-17 DIAGNOSIS — F33.1 MODERATE EPISODE OF RECURRENT MAJOR DEPRESSIVE DISORDER (H): Primary | ICD-10-CM

## 2025-04-17 DIAGNOSIS — F43.10 PTSD (POST-TRAUMATIC STRESS DISORDER): ICD-10-CM

## 2025-04-17 RX ORDER — DEXMETHYLPHENIDATE HYDROCHLORIDE 15 MG/1
15 CAPSULE, EXTENDED RELEASE ORAL DAILY
Qty: 30 CAPSULE | Refills: 0 | Status: SHIPPED | OUTPATIENT
Start: 2025-04-18

## 2025-04-17 RX ORDER — TRAZODONE HYDROCHLORIDE 50 MG/1
50 TABLET ORAL
Qty: 60 TABLET | Refills: 1 | Status: SHIPPED | OUTPATIENT
Start: 2025-04-17

## 2025-04-17 RX ORDER — DEXMETHYLPHENIDATE HYDROCHLORIDE 5 MG/1
5 TABLET ORAL DAILY PRN
Qty: 30 TABLET | Refills: 0 | Status: SHIPPED | OUTPATIENT
Start: 2025-04-18

## 2025-04-17 RX ORDER — CLONIDINE HYDROCHLORIDE 0.1 MG/1
0.1 TABLET ORAL 2 TIMES DAILY PRN
Qty: 60 TABLET | Refills: 1 | Status: SHIPPED | OUTPATIENT
Start: 2025-04-17

## 2025-04-17 ASSESSMENT — PATIENT HEALTH QUESTIONNAIRE - PHQ9
SUM OF ALL RESPONSES TO PHQ QUESTIONS 1-9: 11
SUM OF ALL RESPONSES TO PHQ QUESTIONS 1-9: 11
10. IF YOU CHECKED OFF ANY PROBLEMS, HOW DIFFICULT HAVE THESE PROBLEMS MADE IT FOR YOU TO DO YOUR WORK, TAKE CARE OF THINGS AT HOME, OR GET ALONG WITH OTHER PEOPLE: SOMEWHAT DIFFICULT

## 2025-04-17 NOTE — PROGRESS NOTES
Virtual Visit Details    Type of service:  Video Visit   Video/Phone Start Time:  1033  Video/Phone End Time:   1038    Originating Location (pt. Location): Home    Distant Location (provider location):  Off-site  Platform used for Video Visit: AV[employer] Cloud

## 2025-04-17 NOTE — PATIENT INSTRUCTIONS
"Patient Education   [facility]  What to Expect  Here's what to expect in the [facility].   About the program  You'll be meeting with a psychiatric doctor to check your mental health. A psychiatric doctor helps you deal with troubling thoughts and feelings by giving you medicine. They'll make sure you know the plan for your care. You may see them for a long time. When you're feeling better, they may refer you back to seeing your family doctor.   If you have any questions, we'll be glad to talk to you.  About visits  Be open  At your visits, please talk openly about your problems. It may feel hard, but it's the best way for us to help you.  Cancelling visits  If you can't come to your visit, please call us right away at [phone]. If you don't cancel at least 24 hours (1 full day) before your visit, that's \"late cancellation.\"  Not showing up for your visits  Being very late is the same as not showing up. You'll be a \"no show\" if:  You're more than 15 minutes late for a 30-minute (half hour) visit.  You're more than 30 minutes late for a 60-minute (full hour) visit.  If you cancel late or don't show up 2 times within 6 months, we may end your care.  Getting help between visits  If you need help between visits, you can call us Monday to Friday from 8 a.m. to 4:30 p.m. at [phone].  Emergency care  Call 911 or go to the nearest emergency department if your life or someone else's life is in danger.  Call 988 anytime to reach the national Suicide and Crisis hotline.  Medicine refills  To refill your medicine, call your pharmacy. You can also call [facility]'s [facility] at [phone], Monday to Friday, 8 a.m. to 4:30 p.m. It can take 1 to 3 business days to get a refill.   Forms, letters, and tests  You may have papers to fill out, like FMLA, short-term disability, and workability. We can help you with these forms at your visits, but you must have an " appointment. You may need more than 1 visit for this, to be in an intensive therapy program, or both.  Before we can give you medicine for ADHD, we may refer you to get tested for it or confirm it another way.  We may not be able to give you an emotional support animal letter.  We don't do mental health checks ordered by the court.   We don't do mental health testing, but we can refer you to get tested.   Thank you for choosing us for your care.  For informational purposes only. Not to replace the advice of your health care provider. Copyright   2022 [facility]. All rights reserved. Aframe 702554 - Rev 11/24.     Plan:  1.Patient will take the medications as prescribed.   Medications: Take Focalin XR 15 mg daily  Continue Focalin IR 5 mg daily as needed for ADHD  Take Clonidine 0.1 mg twice daily as needed for anxiety  Continue Lamictal 100 mg daily  Prazosin 1 mg at bedtime  Take Trazodone 50 mg at bedtime as needed for sleep - may repeat x1   Stop Propranolol 20 mg twice daily as needed for anxiety  Patient will not stop taking medications or adjust them without consulting with the provider.  2.Patient will call with any problems between visits.  3.Patient will go to the emergency room if not feeling safe , unable to function in the community, or if suicidal, homicidal or hearing voices or having paranoia.  4.Patient will abstain from drugs and alcohol./Pt denies use .  5.Patient will not drive if sedated on medications or under influence of any substance.   6.Patient will not mix psychiatric medications with drugs and alcohol.   7.Patient will watch his diet and exercise.  8.Patient will see non psychiatric providers for non psychiatric disorders.  9. Next appointment in 6  weeks

## 2025-04-17 NOTE — NURSING NOTE
Current patient location: [address] [place] [id number]    Is the patient currently in the state of [place]? YES    Visit mode: VIDEO    If the visit is dropped, the patient can be reconnected by:VIDEO VISIT: Text to cell phone:   Telephone Information:   Mobile [phone]       Will anyone else be joining the visit? NO  (If patient encounters technical issues they should call [phone]    Are changes needed to the allergy or medication list? Pt stated no changes to allergies and Pt stated no med changes    Are refills needed on medications prescribed by this physician? Discuss with provider    Rooming Documentation:  Questionnaire(s) completed    Reason for visit: LIVIER NICHOLS

## 2025-04-17 NOTE — PROGRESS NOTES
"PSYCHIATRIC PROGRESS NOTE     Name:  Andria Daily  : 1985    Andria Daily is a 39 year old female who is being evaluated via a billable Video visit.      Telemedicine Visit: The patient's condition can be safely assessed and treated via synchronous audio and visual telemedicine encounter.      Reason for Telemedicine Visit: COVID 19 pandemic and the social and physical recommendations by the CDC and MD., Patient has requested telehealth visit, and Patient unable to travel      Originating Site (Patient Location): Patient's home    Distant Site (Provider Location): [facility] Outpatient Setting: [facility]    Consent:  The patient/guardian has verbally consented to: the potential risks and benefits of telemedicine (video visit or phone) versus in person care; bill my insurance or make self-payment for services provided; and responsibility for payment of non-covered services.     Mode of Communication:  [employer] platform     As the provider I attest to compliance with applicable laws and regulations related to telemedicine.    Date of Last Visit: 25                                       CHIEF COMPLAINT     \"Doing okay  HISTORY OF PRESENT ILLNESS     Patient who was last seen in the clinic on 25 returned today for follow up visit.  Patient reports she is doing fairly okay, except for feeling tired during the day due to not sleeping well at night despite using over-the-counter sleeping aids.  Patient also reports she is still struggling with ADHD symptoms in the evening despite taking Focalin 5 mg instant release.  Patient also reports she has been dealing with physical symptoms of anxiety lately despite taking propranolol 20 mg twice daily in addition to hydroxyzine.  Patient states she continues to uptrend individual psychotherapy once monthly but thinking about changing the frequency to once every other week.  I suggested increasing Focalin XR to 15 mg in the morning while " starting him on clonidine 0.1 mg twice daily as needed for breakthrough anxiety.  I also suggested starting patient on trazodone 50 mg at bedtime to further help with sleep.  I discussed medication side effect risk, benefit with the patient.  Patient verbalized understanding and verbally consented to the new treatment plan.  Patient denies SI, SIB, and HI. . Patient also denies both auditory and visual hallucination.  Patient report normal no hypomania.  Patient return to clinic in 6 weeks for follow-up.  PSYCHIATRIC HISTORY:   History of Psychiatric Hospitalizations:   - Inpatient: X2 in teenager and early 20s  - IOP/PHP/Day treatment: PHP in 2024  History of Suicidal Ideation: Positive  History of Suicide Attempts:  Positive with medication overdose    History of Self-injurious Behavior: Denies a history of SIB.  Current:  No  History of Violence/Aggression: Negative  History of Commitment? Negative  Electroconvulsive Therapy (ECT):Negative  TMS: 2020  PSYCHIATRIC REVIEW OF SYSTEMS:   Psychiatric Review of Systems:   Depression:   Reportsaf: depressed mood, suicidal ideation, decreased interest, changes in sleep, changes in appetite, guilt, hopelessness, helplessness, impaired concentration, decreased energy, irritability.  [place]:   Denies: sleeplessness, increased goal-directed activities, abrupt increase in energy pressured speech  Psychosis:   Denies: visual hallucinations, auditory hallucinations, paranoia  Anxiety:   Reports: excessive worries that are difficult to control, panic attacks  PTSD:   Reports: re-experiencing past trauma, nightmares, increased arousal, avoidance of traumatic stimuli, impaired function.  Denies: re-experiencing past trauma, nightmares, increased arousal, avoidance of traumatic stimuli, impaired function.  OCD:   Denies: obsessions, checking, symmetry, cleaning, skin picking.  Eating Disorder: Hx of Anorexia and Bulimia  Denies: restriction, binging, purging.    Sleep: Struggles  with sleep      MEDICATIONS                                                                                                Current Outpatient Medications   Medication Sig Dispense Refill    azelastine (ASTEPRO) 0.15 % nasal spray Spray 2 sprays into both nostrils daily. 17 mL 1    dexmethylphenidate (FOCALIN XR) 10 MG 24 hr capsule Take 1 capsule (10 mg) by mouth daily. 30 capsule 0    dexmethylphenidate (FOCALIN) 5 MG tablet Take 1 tablet (5 mg) by mouth daily as needed (ADHD). 30 tablet 0    hydrOXYzine HCl (ATARAX) 25 MG tablet TAKE 1 TABLET(25 MG) BY MOUTH TWICE DAILY AS NEEDED FOR ANXIETY 60 tablet 0    lamoTRIgine (LAMICTAL) 100 MG tablet Take 1 tablet (100 mg) by mouth daily. 30 tablet 1    levonorgestrel (MIRENA) 20 MCG/DAY IUD 1 each by Intrauterine route once      prazosin (MINIPRESS) 1 MG capsule Take 1 capsule (1 mg) by mouth at bedtime. 90 capsule 0    propranolol (INDERAL) 20 MG tablet Take 1 tablet (20 mg) by mouth 2 times daily. 60 tablet 1    triamcinolone (KENALOG) 0.1 % external cream Apply topically 2 times daily. Apply twice daily to hands until clear. Do not use more than seven consecutive days 30 g 0     No current facility-administered medications for this visit.       DRUG MONITORING:  [place] Prescription Monitoring Program evaluating controlled substances in the last year in [place]:  The [place] Prescription Monitoring Program has been reviewed and there are no current concerns with: diversionary activity, early refill requests, and or obtaining the medication from multiple providers       PAST PSYCHOTROPIC MEDICATIONS:    Abilify    V- Lexapro  - Effexor (night sweats)  - Buspar 7.5-15mg  - Wellbutrin (worsened tremor)  - Celexa 20mg  - Cymbalta (trialed with Celexa for pain, noted memory issues)  - trazodone 50mg   - Zoloft 50mg (higher doses associated with night sweats)  - Prazosin 1mg (not well tolerated due to hypotension)ITALS   There were no vitals taken for this visit.     BP  "Readings from Last 1 Encounters:   02/18/25 105/71     Pulse Readings from Last 1 Encounters:   02/18/25 65     Wt Readings from Last 1 Encounters:   02/18/25 78 kg (172 lb)     Ht Readings from Last 1 Encounters:   02/18/25 1.725 m (5' 7.91\")     Estimated body mass index is 26.22 kg/m  as calculated from the following:    Height as of 2/18/25: 1.725 m (5' 7.91\").    Weight as of 2/18/25: 78 kg (172 lb).      PERTINENT HISTORY   PAST MEDICAL HISTORY:   Past Medical History:   Diagnosis Date    ADHD (attention deficit hyperactivity disorder)     completed assessment    Anxiety     Chronic fatigue syndrome     Chronic rhinitis     CHHAYA I (cervical intraepithelial neoplasia I) 05/24/2007    Depressive disorder     History of eating disorder     bulimia - in remission since 2015, sporadic since then    History of substance use     prescription pain killers, alcohol, cocaine, sober since 2015    History of substance use     - history of prescription pain killers, alcohol, cocaine, and marijuana abuse  - history of outpatient treatment at [place] [address], [facility] treatment 2 months, IOP  - sober from cocaine since about 01/2018  - uses MJ daily, smokes      Hypersomnia     PTSD (post-traumatic stress disorder)        PAST SURGICAL HISTORY:   Past Surgical History:   Procedure Laterality Date    KNEE SURGERY Left 2018    for osgood-schlatter and tendon repair/anchor    LAPAROSCOPY DIAGNOSTIC (GYN) N/A 03/22/2023    Procedure: laparoscopy, chromopertubation;  Surgeon: Isabel Vera MD;  Location: [facility] OR    [place] Teeth Removal  2001    WRIST SURGERY Left 2015    snowboarding injury, fractured, had metal plate initially that was then removed       FAMILY HISTORY:   Family History   Problem Relation Age of Onset    Anxiety Disorder Mother     Tremor Mother     Depression Father     Anxiety Disorder Father     Sleep Apnea Father     Diabetes Maternal Grandmother     Hyperlipidemia " "Maternal Grandfather     Hypertension Maternal Grandfather     Coronary Artery Disease Maternal Grandfather 55    Anxiety Disorder Sister         after bad car accident    Breast Cancer No family hx of     Ovarian Cancer No family hx of     Colon Cancer No family hx of     Cerebrovascular Disease No family hx of        SOCIAL HISTORY:   Social History     Tobacco Use    Smoking status: Former     Current packs/day: 0.00     Types: Cigarettes     Quit date:      Years since quittin.2    Smokeless tobacco: Never   Substance Use Topics    Alcohol use: Not Currently     Comment: Rarely         Seizures or Head Injury: Denies history of head injury. Denies history of seizures.  History of cardiac disease, rheumatic fever, fainting or dizziness, especially with exercise, seizures, chest pain or shortness of breath with exercise, unexplained change in exercise tolerance, palpitations, high blood pressure, or heart murmur?   No    LABS & IMAGING                                                                                                                Personally reviewed  Recent Labs   Lab Test 25  1354 23  1130 17  0916   WBC 5.3   < > 5.5   HGB 14.0   < > 13.8   HCT 42.3   < > 41.7   MCV 92   < > 91      < > 185   ANEU  --   --  3.5    < > = values in this interval not displayed.     Recent Labs   Lab Test 23  1130   GLC 84     Recent Labs   Lab Test 25  1354   CHOL 195   LDL 98   HDL 75   TRIG 111   A1C 5.2     No lab results found.  No results found for: \"VCM549\", \"JAXJ889\", \"CGWY35APCEZ\", \"VITD3\", \"D2VIT\", \"D3VIT\", \"DTOT\", \"[id number]\", \"[id number]\", \"[id number]\", \"[id number]\", \"[id number]\", \"[id number]\"     ALLERGY & IMMUNIZATIONS       Allergies   Allergen Reactions    Dairy Products [Milk Protein] Other (See Comments)     Nasal problems, stomach pain     No Clinical Screening - See Comments      Apples and bananas cause her throat to itch    Seasonal Allergies     " Flonase [Fluticasone] Dizziness and Rash    Penicillins Rash     Rash on face and threw up       FAMILY MEDICAL HISTORY:     Family History       Problem (# of Occurrences) Relation (Name,Age of Onset)    Anxiety Disorder (3) Mother, Father, Sister: after bad car accident    Depression (1) Father    Diabetes (1) Maternal Grandmother    Hypertension (1) Maternal Grandfather    Hyperlipidemia (1) Maternal Grandfather    Coronary Artery Disease (1) Maternal Grandfather (55)    Sleep Apnea (1) Father    Tremor (1) Mother           Negative family history of: Breast Cancer, Ovarian Cancer, Colon Cancer, Cerebrovascular Disease              Family history of sudden or unexplained death or an event requiring resuscitation in children or young adults, cardiac arrhythmias (eg, Janelle-Parkinson-White syndrome), long QT syndrome, catecholaminergic paroxysmal ventricular tachycardia, Brugada syndrome, arrhythmogenic right ventricular dysplasia, hypertrophic cardiomyopathy, dilated cardiomyopathy, or Marfan syndrome?  No    FAMILY PSYCHIATRIC HISTORY:   Psychiatry:Father, mother sister with anxiety, father with depression  Substance use history in family: Denies  Family suicide history:Negative      SIGNIFICANT SOCIAL/FAMILY HISTORY:                                           Born and raised in: [place]  Relationship status: Single living with boyfriend  Children: None    Highest education level was:Associate degree   Service:Denies  Employment status: Works part time at the [employer]  LEGAL:     SUBSTANCE USE HISTORY      Alcohol- fewer urges to drink, mindful with med and risks of alcohol   - treatment in 2015  Nicotine- none  Caffeine- 1-2 cups coffee daily, 1-2 energy drinks a week, can increase shaking if she doesn't eat enough  Opioids- sober since 2015   Cannabis- smoking 1-2x daily  - treated for cannabis use in 2015   Other Illicit Drugs- sober from cocaine and hallucinogens since 2015    MEDICAL REVIEW OF  "SYSTEMS:   Ten system review was completed with pertinent positives noted above    MENTAL STATUS EXAM:   Mental Status Examination (limited due to video virtual visit format):  Vital Signs: There were no vitals taken for this virtual visit.  Appearance: adequately groomed, appears stated age, and in no apparent distress.  Attitude: cooperative   Eye Contact: good to the extent that can be determined in a video visit  Muscle Strength and Tone: no gross abnormalities based on remote observation  Psychomotor Behavior:  no evidence of tardive dyskinesia, dystonia, or tics based on remote observation  Gait and Station: normal, no gross abnormalities based on remote observation  Speech: clear, coherent, normal prosody, regular rate, regular rhythm and fluent  Associations: No loosening of associations  Thought Process: coherent and goal directed  Thought Content: no evidence of suicidal ideation or homicidal ideation, no evidence of psychotic thought, no auditory hallucinations present and no visual hallucinations present  Mood: \"Neutral\"  Affect: appropriate and in normal range  Insight: good  Judgment: intact, adequate for safety  Impulse Control: intact  Oriented to: time, place, person and situation  Attention Span and Concentration: normal  Language: Intact  Recent and Remote Memory: intact to interview. Not formally assessed. No amnesia.  Fund of Knowledge: appropriate        SAFETY   Feels safe in home: Yes   Suicidal ideation: Denies  History of suicide attempts:  No   Hx of impulsivity: No     DSM 5 DIAGNOSIS:   1. Moderate episode of recurrent major depressive disorder (H)    2. PTSD (post-traumatic stress disorder)    3. DIANA (generalized anxiety disorder)    4. Attention deficit hyperactivity disorder (ADHD), predominantly inattentive type    5. Insomnia, unspecified type      ASSESSMENT AND PLAN    Patient is a 39 year old,  White Choose not to answer female with a history notable for moderate episode of " recurrent major depressive disorder, PTSD, generalized anxiety disorder, ADHD, predominantly inattentive type, and insomnia, unspecified type who presents for follow up visit.  She has been struggling with fatigue and tiredness during the day due difficulty falling and staying asleep.  She still struggles with ADHD symptoms in the evening.  Propranolol does not seem to be helping with physical symptoms of anxiety.  I discontinue propranolol and start the patient on clonidine 0.1 mg twice daily for anxiety.  I increased Focalin XR to 15 mg in the morning to further help with ADD symptoms.  I also started patient on trazodone 50 mg at bedtime with additional 50 mg after few hours if she continues to struggle with difficulty falling asleep.  Patient would like to continue with current medication regimen.  She denies SI/ SIB/HI.  Patient report no bartolo or hypomania.  Patient to return to clinic in 6 weeks for follow-up.    Plan:  1.Patient will take the medications as prescribed.   Medications: Take Focalin XR 15 mg daily  Continue Focalin IR 5 mg daily as needed for ADHD  Take Clonidine 0.1 mg twice daily as needed for anxiety  Continue Lamictal 100 mg daily  Prazosin 1 mg at bedtime  Take Trazodone 50 mg at bedtime as needed for sleep - may repeat x1   Stop Propranolol 20 mg twice daily as needed for anxiety  Patient will not stop taking medications or adjust them without consulting with the provider.  2.Patient will call with any problems between visits.  3.Patient will go to the emergency room if not feeling safe , unable to function in the community, or if suicidal, homicidal or hearing voices or having paranoia.  4.Patient will abstain from drugs and alcohol./Pt denies use .  5.Patient will not drive if sedated on medications or under influence of any substance.   6.Patient will not mix psychiatric medications with drugs and alcohol.   7.Patient will watch his diet and exercise.  8.Patient will see non  psychiatric providers for non psychiatric disorders.  9. Next appointment in 6  weeks    Risk Assessment:     Andria has notable risk factors for self-harm, including, single status, anxiety, mood dysregulation, substance use,  and passive SI. However, risk is mitigated by ability to volunteer a safety plan and history of seeking help when needed. Additional steps taken to minimize risk include making medication adjustment, asking patient to call 911 and go to the ER if not able to stay safe at home,  Therefore, based on all available evidence including the factors cited above, Andria does not appear to be an imminent danger to self or others and does not meet criteria 72 hour hold. However, if patient uses substances or is non-adherent with medication, their risk of decompensation and SI/HI will be elevated. This was discussed with the patient as she verbalized good understanding.   CONSULTS/REFERRALS:   Continue therapy  None at this time  Coordinate care with therapist as needed    MEDICAL:   None at this time  Coordinate care with PCP (Alma Hernandez) as needed  Follow up with primary care provider as planned or for acute medical concerns.    PSYCHOEDUCATION:  Medication side effects and alternatives reviewed. Health promotion activities recommended and reviewed today. All questions addressed. Education and counseling completed regarding risks and benefits of medications and psychotherapy options.  Consent provided by patient/guardian  Call the psychiatric nurse line with medication questions or concerns at [phone].  Nanoledgehart may be used to communicate with your provider, but this is not intended to be used for emergencies.  BLACK BOX WARNING: Discussed the Food and Drug Administration (FDA) requires that all antidepressants carry a warning that some children, adolescents and young adults may be at increased risk of suicide when taking antidepressants. Anyone taking an antidepressant should be watched closely  for worsening depression or unusual behavior especially in the first few weeks after starting an SSRI. Keep in mind, antidepressants are more likely to reduce suicide risk in the long run by improving mood.   SEROTONIN SYNDROME:  Discussed risks of Serotonin syndrome (ie, serotonin toxicity) which is a potentially life-threatening condition associated with increased serotonergic activity in the central nervous system (CNS). It is seen with therapeutic medication use, inadvertent interactions between drugs, and intentional self-poisoning. Serotonin syndrome may involve a spectrum of clinical findings, which often include mental status changes, autonomic hyperactivity, and neuromuscular abnormalities.    BENZODIAZEPINE:  discussion on how benzos work and the need to use them short term due to potential of anxiety getting.  This is a controlled substance with risk for abuse, need to keep in a safe keep place and cannot replace lost scripts.    HYPNOTIC USE: Hypnotic use, risk for CNS depression, sleep-walking, not to mix with ETOH or other CNS depressant, need for six hours of sleep, stop if change in mood.  This is a controlled substance with risk for abuse, need to keep in a safe keep place and cannot replace lost scripts.  FIRST GENERATION ANTIPSYCHOTIC/ SECOND GENERATION ANTIPSYCHOTIC USE:  Atypical need for cardiometabolic monitoring with medication- B/P, weight, blood sugar, cholesterol.  Need to monitor for abnormal movements taught  SAFETY:  We all care about your loved one's safety. To reduce the risk of self-harm, remove access to all:  Firearms, Medicines (both prescribed and over-the-counter), Knives and other sharp objects, Ropes and like materials, and Alcohol  SLEEP HYGIENE: establish a sleep routine, limit screen time 1 hour prior to bed, use bed for sleep only, take sleep/medications on time (including sleepy time tea, trazadone or herbal treatments such as melatonin), aroma therapy, limit  caffeine/sugar, yoga, guided imagery, stretch, meditation, limit naps to 20 minutes, make a temperature change in the room, white noise, be mindful of slowing down breathing, take a warm bath/shower, frequently wash sheets, and journaling.   Medlineplus.gov is information for patients.  It is run by the [employer] and it contains information about all disorders, diseases and all medications.      COMMUNITY RESOURCES:    CRISIS NUMBERS: Provided in AVS 2024  National Suicide Prevention Lifeline: 8-970-557-TALK (883-779-7366)  "[employer]"/resources for a list of additional resources (SOS)            [place] - [phone]   Urgent Care Adult Mental Fotitn-394-399-7900 mobile unit/  crisis line  [facility] -[phone]   COPE  [place] Mobile Team -[phone] (adults)/ [phone] (child)  Poison Control Center - 1-585.761.5968    OR  go to [facility] ER  Crisis Text Line for any crisis  send this-   To: 731697   [facility] ([facility]) [facility]  [phone]  National Suicide Prevention Lifeline: 958.923.2184 (TTY: 906.178.4324). Call anytime for help.  (www.suicidepreventionlifeline.org)  National [place] on Mental Illness (www.iman.org): 362.428.6040 or 494-434-5507.   Mental Health Association (www.mentalhealth.org): 851.324.2844 or 718-023-3352.  [place] Crisis Text Line: Text [place] to 929920  Suicide LifeLine Chat: suicidePure [place].org/chat    ADMINISTRATIVE BILLIN mins spent interviewing patient, reviewing referral documents, obtaining and reviewing outside records, communication with other health specialists, and preparing this report on this day: 25    Video/Phone Start Time:  1033  Video/Phone End Time:   1038    Greater than 50% of time was spent in counseling and coordination of care regarding above diagnoses and treatment plan.    The longitudinal plan of care for the diagnosis(es)/condition(s) as  documented were addressed during this visit. Due to the added complexity in care, I will continue to support Andria in the subsequent management and with ongoing continuity of care.     Signed:   Reddy Mello, MSN, APRN, PMHNP-BC  Long Term Outpatient Psychiatry  Chart documentation done in part with Dragon Voice Recognition software.  Although reviewed after completion, some word and grammatical errors may remain.   Answers submitted by the patient for this visit:  Patient Health Questionnaire (Submitted on 6/6/2024)  If you checked off any problems, how difficult have these problems made it for you to do your work, take care of things at home, or get along with other people?: Very difficult  PHQ9 TOTAL SCORE: 17  DIANA-7 (Submitted on 6/6/2024)  DIANA 7 TOTAL SCORE: 13    Answers submitted by the patient for this visit:  Patient Health Questionnaire (Submitted on 7/22/2024)  If you checked off any problems, how difficult have these problems made it for you to do your work, take care of things at home, or get along with other people?: Very difficult  PHQ9 TOTAL SCORE: 13  DIANA-7 (Submitted on 7/22/2024)  DIANA 7 TOTAL SCORE: 12    Answers submitted by the patient for this visit:  Patient Health Questionnaire (Submitted on 11/4/2024)  If you checked off any problems, how difficult have these problems made it for you to do your work, take care of things at home, or get along with other people?: Somewhat difficult  PHQ9 TOTAL SCORE: 12  Patient Health Questionnaire (G7) (Submitted on 11/4/2024)  DIANA 7 TOTAL SCORE: 13    Answers submitted by the patient for this visit:  Patient Health Questionnaire (Submitted on 1/16/2025)  If you checked off any problems, how difficult have these problems made it for you to do your work, take care of things at home, or get along with other people?: Somewhat difficult  PHQ9 TOTAL SCORE: 10  Patient Health Questionnaire (G7) (Submitted on 1/16/2025)  DIANA 7 TOTAL SCORE: 13    Answers  submitted by the patient for this visit:  Patient Health Questionnaire (Submitted on 2/27/2025)  If you checked off any problems, how difficult have these problems made it for you to do your work, take care of things at home, or get along with other people?: Somewhat difficult  PHQ9 TOTAL SCORE: 11    Answers submitted by the patient for this visit:  Patient Health Questionnaire (Submitted on 4/17/2025)  If you checked off any problems, how difficult have these problems made it for you to do your work, take care of things at home, or get along with other people?: Somewhat difficult  PHQ9 TOTAL SCORE: 11

## 2025-04-23 ENCOUNTER — MYC MEDICAL ADVICE (OUTPATIENT)
Dept: PSYCHIATRY | Facility: CLINIC | Age: 40
End: 2025-04-23
Payer: COMMERCIAL

## 2025-04-23 DIAGNOSIS — R25.1 TREMOR: ICD-10-CM

## 2025-04-23 DIAGNOSIS — G47.00 INSOMNIA, UNSPECIFIED TYPE: ICD-10-CM

## 2025-04-23 DIAGNOSIS — F41.1 GAD (GENERALIZED ANXIETY DISORDER): ICD-10-CM

## 2025-04-23 DIAGNOSIS — F43.10 PTSD (POST-TRAUMATIC STRESS DISORDER): Primary | ICD-10-CM

## 2025-04-23 DIAGNOSIS — F33.1 MODERATE EPISODE OF RECURRENT MAJOR DEPRESSIVE DISORDER (H): ICD-10-CM

## 2025-04-24 RX ORDER — TRAZODONE HYDROCHLORIDE 50 MG/1
25 TABLET ORAL
Qty: 60 TABLET | Refills: 1 | Status: SHIPPED | OUTPATIENT
Start: 2025-04-24

## 2025-04-24 RX ORDER — PROPRANOLOL HYDROCHLORIDE 10 MG/1
10 TABLET ORAL 3 TIMES DAILY
Qty: 90 TABLET | Refills: 1 | Status: SHIPPED | OUTPATIENT
Start: 2025-04-24

## 2025-05-18 ENCOUNTER — MYC REFILL (OUTPATIENT)
Dept: PSYCHIATRY | Facility: CLINIC | Age: 40
End: 2025-05-18
Payer: COMMERCIAL

## 2025-05-18 DIAGNOSIS — F90.0 ATTENTION DEFICIT HYPERACTIVITY DISORDER (ADHD), PREDOMINANTLY INATTENTIVE TYPE: ICD-10-CM

## 2025-05-20 ENCOUNTER — MYC REFILL (OUTPATIENT)
Dept: PSYCHIATRY | Facility: CLINIC | Age: 40
End: 2025-05-20
Payer: COMMERCIAL

## 2025-05-20 DIAGNOSIS — F90.0 ATTENTION DEFICIT HYPERACTIVITY DISORDER (ADHD), PREDOMINANTLY INATTENTIVE TYPE: ICD-10-CM

## 2025-05-20 RX ORDER — DEXMETHYLPHENIDATE HYDROCHLORIDE 5 MG/1
5 TABLET ORAL DAILY PRN
Qty: 30 TABLET | Refills: 0 | Status: SHIPPED | OUTPATIENT
Start: 2025-05-20

## 2025-05-20 NOTE — TELEPHONE ENCOUNTER
Date of Last Office Visit: 4/17/25  Date of Next Office Visit: 5/30/25  No shows since last visit: No  More than one patient-initiated cancellation (with reschedule) since last seen in clinic? No    []Medication refilled per  Medication Refill in Ambulatory Care  policy.  []Scope of Practice: refill request processed by LPN/MA  [x]Medication unable to be refilled by RN due to criteria not met as indicated below:    []Eligibility: has not had a provider visit within last 6 months   []Supervision: no future appointment; < 7 days before next appointment   []Compliance: no shows; cancellations; lapse in therapy   []Verification: order discrepancy; may need modification...   [] > 30-day supply request   []Advanced refill request: > 7 days before refill date   [x]Controlled medication   []Medication not included in policy   []Review: new med; med adjusted <= 30 days; safety alert; requires lab monitoring...   []Other:      Medication(s) requested:           Any Controlled Substance(s)? Yes   [place]      Requested medication(s) verified as identical to current order? Yes    Any lapse in adherence to medication(s) greater than 5 days? No      Additional action taken? routed encounter to provider for review.      Last visit treatment plan:     Plan:  1.Patient will take the medications as prescribed.   Medications: Take Focalin XR 15 mg daily  Continue Focalin IR 5 mg daily as needed for ADHD  Take Clonidine 0.1 mg twice daily as needed for anxiety  Continue Lamictal 100 mg daily  Prazosin 1 mg at bedtime  Take Trazodone 50 mg at bedtime as needed for sleep - may repeat x1   Stop Propranolol 20 mg twice daily as needed for anxiety  Patient will not stop taking medications or adjust them without consulting with the provider.  2.Patient will call with any problems between visits.  3.Patient will go to the emergency room if not feeling safe , unable to function in the community, or if suicidal, homicidal or hearing  voices or having paranoia.  4.Patient will abstain from drugs and alcohol./Pt denies use .  5.Patient will not drive if sedated on medications or under influence of any substance.   6.Patient will not mix psychiatric medications with drugs and alcohol.   7.Patient will watch his diet and exercise.  8.Patient will see non psychiatric providers for non psychiatric disorders.  9. Next appointment in 6  weeks    Any medication(s) require lab monitoring? No    BENEDICT DAY RN on 5/20/2025 at 1:28 PM

## 2025-05-21 RX ORDER — DEXMETHYLPHENIDATE HYDROCHLORIDE 15 MG/1
15 CAPSULE, EXTENDED RELEASE ORAL DAILY
Qty: 30 CAPSULE | Refills: 0 | OUTPATIENT
Start: 2025-05-21

## 2025-05-21 NOTE — TELEPHONE ENCOUNTER
1) Reviewed refill request(s) from [id number] - [place], [place] - [address] AT [place] [address] & [facility].     2) Any Controlled Substance(s)? Yes   [place]  checked? N/A    3) Refill(s) requested for:     - dexmethylphenidate (FOCALIN) 5 MG tablet Date last ordered: 5/20/25 Qty: 30 Refills: 0   Prescription was sent on 5/20/25.       4) Action taken: contacted patient via [employer]  .    Mandie Ferro RN on 5/21/2025 at 1:23 PM

## 2025-05-23 DIAGNOSIS — F41.1 GAD (GENERALIZED ANXIETY DISORDER): ICD-10-CM

## 2025-05-27 RX ORDER — HYDROXYZINE HYDROCHLORIDE 25 MG/1
TABLET, FILM COATED ORAL
Qty: 60 TABLET | Refills: 0 | Status: SHIPPED | OUTPATIENT
Start: 2025-05-27

## 2025-05-27 NOTE — TELEPHONE ENCOUNTER
Date of Last Office Visit: 4/17/25  Date of Next Office Visit: 5/30/25  No shows since last visit: No  More than one patient-initiated cancellation (with reschedule) since last seen in clinic? No    []Medication refilled per  Medication Refill in Ambulatory Care  policy.  []Scope of Practice: refill request processed by LPN/MA  [x]Medication unable to be refilled by RN due to criteria not met as indicated below:    []Eligibility: has not had a provider visit within last 6 months   []Supervision: no future appointment; < 7 days before next appointment   []Compliance: no shows; cancellations; lapse in therapy   [x]Verification: order discrepancy; may need modification... Hydroxyzine on on provider's plan. IS on active medication list in EPIC. Clarification needed.    [] > 30-day supply request   []Advanced refill request: > 7 days before refill date   []Controlled medication   []Medication not included in policy   [x]Review: new med; med adjusted <= 30 days; safety alert; requires lab monitoring...   []Other:      Medication(s) requested:           Any Controlled Substance(s)? No      Requested medication(s) verified as identical to current order? Yes    Any lapse in adherence to medication(s) greater than 5 days? N/A     Additional action taken? Routed encounter to provider for review      Last visit treatment plan:     Plan:  1.Patient will take the medications as prescribed.   Medications: Take Focalin XR 15 mg daily  Continue Focalin IR 5 mg daily as needed for ADHD  Take Clonidine 0.1 mg twice daily as needed for anxiety  Continue Lamictal 100 mg daily  Prazosin 1 mg at bedtime  Take Trazodone 50 mg at bedtime as needed for sleep - may repeat x1   Stop Propranolol 20 mg twice daily as needed for anxiety  Patient will not stop taking medications or adjust them without consulting with the provider.  2.Patient will call with any problems between visits.  3.Patient will go to the emergency room if not feeling safe ,  unable to function in the community, or if suicidal, homicidal or hearing voices or having paranoia.  4.Patient will abstain from drugs and alcohol./Pt denies use .  5.Patient will not drive if sedated on medications or under influence of any substance.   6.Patient will not mix psychiatric medications with drugs and alcohol.   7.Patient will watch his diet and exercise.  8.Patient will see non psychiatric providers for non psychiatric disorders.  9. Next appointment in 6  weeks       Any medication(s) require lab monitoring? No    BENEDICT DAY RN on 5/27/2025 at 8:55 AM

## 2025-05-30 ENCOUNTER — MYC REFILL (OUTPATIENT)
Dept: PSYCHIATRY | Facility: CLINIC | Age: 40
End: 2025-05-30
Payer: COMMERCIAL

## 2025-05-30 DIAGNOSIS — F90.0 ATTENTION DEFICIT HYPERACTIVITY DISORDER (ADHD), PREDOMINANTLY INATTENTIVE TYPE: ICD-10-CM

## 2025-06-03 RX ORDER — DEXMETHYLPHENIDATE HYDROCHLORIDE 15 MG/1
15 CAPSULE, EXTENDED RELEASE ORAL DAILY
Qty: 30 CAPSULE | Refills: 0 | Status: SHIPPED | OUTPATIENT
Start: 2025-06-03

## 2025-06-03 NOTE — TELEPHONE ENCOUNTER
Date of Last Office Visit: 4/17/25  Date of Next Office Visit: None; routing for A to assist pt with scheduling.    No shows since last visit: No  More than one patient-initiated cancellation (with reschedule) since last seen in clinic? No    []Medication refilled per  Medication Refill in Ambulatory Care  policy.  []Scope of Practice: refill request processed by LPN/MA  [x]Medication unable to be refilled by RN due to criteria not met as indicated below:    []Eligibility: has not had a provider visit within last 6 months   []Supervision: no future appointment; < 7 days before next appointment   [x]Compliance: no shows; cancellations; lapse in therapy   []Verification: order discrepancy; may need modification...   [] > 30-day supply request   []Advanced refill request: > 7 days before refill date   [x]Controlled medication   []Medication not included in policy   []Review: new med; med adjusted <= 30 days; safety alert; requires lab monitoring...   []Other:      Medication(s) requested:     -      dexmethylphenidate (FOCALIN XR) 15 MG 24 hr capsule  Date last ordered: 4/18/25  Qty: 30  Refills: 0  Appropriate for refill? Yes    Any Controlled Substance(s)? Yes   [place]    Requested medication(s) verified as identical to current order? Yes    Any lapse in adherence to medication(s) greater than 5 days? Yes     Additional action taken? Sent [employer] message to patient with update, cued up medication/order(s), and routed encounter to provider for review.      Last visit treatment plan:     4/17/25      Plan:  1.Patient will take the medications as prescribed.   Medications: Take Focalin XR 15 mg daily  Continue Focalin IR 5 mg daily as needed for ADHD  Take Clonidine 0.1 mg twice daily as needed for anxiety  Continue Lamictal 100 mg daily  Prazosin 1 mg at bedtime  Take Trazodone 50 mg at bedtime as needed for sleep - may repeat x1   Stop Propranolol 20 mg twice daily as needed for anxiety  Patient will  not stop taking medications or adjust them without consulting with the provider.  2.Patient will call with any problems between visits.  3.Patient will go to the emergency room if not feeling safe , unable to function in the community, or if suicidal, homicidal or hearing voices or having paranoia.  4.Patient will abstain from drugs and alcohol./Pt denies use .  5.Patient will not drive if sedated on medications or under influence of any substance.   6.Patient will not mix psychiatric medications with drugs and alcohol.   7.Patient will watch his diet and exercise.  8.Patient will see non psychiatric providers for non psychiatric disorders.  9. Next appointment in 6  weeks    Any medication(s) require lab monitoring? No    Erwin Hastings RN on 6/3/2025 at 10:45 AM

## 2025-06-19 ENCOUNTER — MYC REFILL (OUTPATIENT)
Dept: PSYCHIATRY | Facility: CLINIC | Age: 40
End: 2025-06-19
Payer: COMMERCIAL

## 2025-06-19 DIAGNOSIS — F90.0 ATTENTION DEFICIT HYPERACTIVITY DISORDER (ADHD), PREDOMINANTLY INATTENTIVE TYPE: ICD-10-CM

## 2025-06-19 RX ORDER — DEXMETHYLPHENIDATE HYDROCHLORIDE 5 MG/1
5 TABLET ORAL DAILY PRN
Qty: 30 TABLET | Refills: 0 | Status: SHIPPED | OUTPATIENT
Start: 2025-06-19

## 2025-06-19 NOTE — TELEPHONE ENCOUNTER
Date of Last Office Visit: 4/17/2025  Date of Next Office Visit: None; routing for A to assist pt with scheduling.    No shows since last visit: No  More than one patient-initiated cancellation (with reschedule) since last seen in clinic? No    []Medication refilled per  Medication Refill in Ambulatory Care  policy.  [x]Scope of Practice: refill request processed by LPN/MA  []Medication unable to be refilled by RN due to criteria not met as indicated below:    []Eligibility: has not had a provider visit within last 6 months   []Supervision: no future appointment; < 7 days before next appointment   []Compliance: no shows; cancellations; lapse in therapy   []Verification: order discrepancy; may need modification...   [] > 30-day supply request   []Advanced refill request: > 7 days before refill date   []Controlled medication   []Medication not included in policy   []Review: new med; med adjusted <= 30 days; safety alert; requires lab monitoring...   []Other:      Medication(s) requested:      Disp Refills Start End CLAUDIA   dexmethylphenidate (FOCALIN) 5 MG tablet 30 tablet 0 5/20/2025     Appropriate for refill? Yes    Any Controlled Substance(s)? No    Requested medication(s) verified as identical to current order? Yes    Any lapse in adherence to medication(s) greater than 5 days? No      Additional action taken? routed encounter to provider for review.      Last visit treatment plan:   Plan:  1.Patient will take the medications as prescribed.   Medications: Take Focalin XR 15 mg daily  Continue Focalin IR 5 mg daily as needed for ADHD  Take Clonidine 0.1 mg twice daily as needed for anxiety  Continue Lamictal 100 mg daily  Prazosin 1 mg at bedtime  Take Trazodone 50 mg at bedtime as needed for sleep - may repeat x1   Stop Propranolol 20 mg twice daily as needed for anxiety  Patient will not stop taking medications or adjust them without consulting with the provider.  2.Patient will call with any problems between  visits.  3.Patient will go to the emergency room if not feeling safe , unable to function in the community, or if suicidal, homicidal or hearing voices or having paranoia.  4.Patient will abstain from drugs and alcohol./Pt denies use .  5.Patient will not drive if sedated on medications or under influence of any substance.   6.Patient will not mix psychiatric medications with drugs and alcohol.   7.Patient will watch his diet and exercise.  8.Patient will see non psychiatric providers for non psychiatric disorders.  9. Next appointment in 6  weeks       Any medication(s) require lab monitoring? No    Mika Barajas MA on 6/19/2025 at 8:25 AM

## 2025-06-24 DIAGNOSIS — F41.1 GAD (GENERALIZED ANXIETY DISORDER): ICD-10-CM

## 2025-06-24 RX ORDER — HYDROXYZINE HYDROCHLORIDE 25 MG/1
TABLET, FILM COATED ORAL
Qty: 60 TABLET | Refills: 0 | Status: SHIPPED | OUTPATIENT
Start: 2025-06-24

## 2025-06-24 NOTE — TELEPHONE ENCOUNTER
Date of Last Office Visit: 4/17/25  Date of Next Office Visit: None; routing for A to assist pt with scheduling.    No shows since last visit: No  More than one patient-initiated cancellation (with reschedule) since last seen in clinic? No    []Medication refilled per  Medication Refill in Ambulatory Care  policy.  [x]Scope of Practice: refill request processed by LPN/MA  []Medication unable to be refilled by RN due to criteria not met as indicated below:    []Eligibility: has not had a provider visit within last 6 months   []Supervision: no future appointment; < 7 days before next appointment   []Compliance: no shows; cancellations; lapse in therapy   []Verification: order discrepancy; may need modification...   [] > 30-day supply request   []Advanced refill request: > 7 days before refill date   []Controlled medication   []Medication not included in policy   []Review: new med; med adjusted <= 30 days; safety alert; requires lab monitoring...   []Other:      Medication(s) requested:      Disp Refills Start End CLAUDIA   hydrOXYzine HCl (ATARAX) 25 MG tablet 60 tablet 0 5/27/2025      Appropriate for refill? Yes    Any Controlled Substance(s)? No    Requested medication(s) verified as identical to current order? Yes    Any lapse in adherence to medication(s) greater than 5 days? No      Additional action taken? routed encounter to provider for review.      Last visit treatment plan:   Plan:  1.Patient will take the medications as prescribed.   Medications: Take Focalin XR 15 mg daily  Continue Focalin IR 5 mg daily as needed for ADHD  Take Clonidine 0.1 mg twice daily as needed for anxiety  Continue Lamictal 100 mg daily  Prazosin 1 mg at bedtime  Take Trazodone 50 mg at bedtime as needed for sleep - may repeat x1   Stop Propranolol 20 mg twice daily as needed for anxiety  Patient will not stop taking medications or adjust them without consulting with the provider.  2.Patient will call with any problems between  visits.  3.Patient will go to the emergency room if not feeling safe , unable to function in the community, or if suicidal, homicidal or hearing voices or having paranoia.  4.Patient will abstain from drugs and alcohol./Pt denies use .  5.Patient will not drive if sedated on medications or under influence of any substance.   6.Patient will not mix psychiatric medications with drugs and alcohol.   7.Patient will watch his diet and exercise.  8.Patient will see non psychiatric providers for non psychiatric disorders.  9. Next appointment in 6  weeks    Any medication(s) require lab monitoring? No    Mika Barajas MA on 6/24/2025 at 4:19 PM

## 2025-07-01 ENCOUNTER — MYC REFILL (OUTPATIENT)
Dept: PSYCHIATRY | Facility: CLINIC | Age: 40
End: 2025-07-01
Payer: COMMERCIAL

## 2025-07-01 DIAGNOSIS — F90.0 ATTENTION DEFICIT HYPERACTIVITY DISORDER (ADHD), PREDOMINANTLY INATTENTIVE TYPE: ICD-10-CM

## 2025-07-03 RX ORDER — DEXMETHYLPHENIDATE HYDROCHLORIDE 15 MG/1
15 CAPSULE, EXTENDED RELEASE ORAL DAILY
Qty: 30 CAPSULE | Refills: 0 | Status: SHIPPED | OUTPATIENT
Start: 2025-07-03

## 2025-07-03 NOTE — TELEPHONE ENCOUNTER
Date of Last Office Visit: 4/17/2025  Date of Next Office Visit: None; routing for A to assist pt with scheduling.    No shows since last visit: No  More than one patient-initiated cancellation (with reschedule) since last seen in clinic? No    []Medication refilled per  Medication Refill in Ambulatory Care  policy.  [x]Scope of Practice: refill request processed by LPN/MA  []Medication unable to be refilled by RN due to criteria not met as indicated below:    []Eligibility: has not had a provider visit within last 6 months   [x]Supervision: no future appointment; < 7 days before next appointment   []Compliance: no shows; cancellations; lapse in therapy   []Verification: order discrepancy; may need modification...   [] > 30-day supply request   []Advanced refill request: > 7 days before refill date   [x]Controlled medication   []Medication not included in policy   []Review: new med; med adjusted <= 30 days; safety alert; requires lab monitoring...   []Other:      Medication(s) requested:     -  dexmethylphenidate (FOCALIN) 15 MG tablet   Date last ordered: 6/19/2025  Qty: 30  Refills: 0    dexmethylphenidate (FOCALIN XR) 15 MG 24 hr capsule 30 capsule 0 6/3/2025 -- No   Sig - Route: Take 1 capsule (15 mg) by mouth daily. - Oral   Sent to pharmacy as: Dexmethylphenidate HCl ER 15 MG Oral Capsule Extended Release 24 Hour (FOCALIN XR)   Class: E-Prescribe       Any Cont[place] Sub[place](s)? Yes   [place]   Dexmethylphenidate 5 Mg Tab  was last sold on 6/03/2025 for quantity of 30.  Other controlled substance on [place] ?: No      Requested medication(s) verified as identical to current order? Yes    Any lapse in adherence to medication(s) greater than 5 days? No      Additional action taken? routed encounter to provider for review.      Last visit treatment plan:   Plan:  1.Patient will take the medications as prescribed.   Medications: Take Focalin XR 15 mg daily  Continue Focalin IR 5 mg daily as needed  for ADHD  Take Clonidine 0.1 mg twice daily as needed for anxiety  Continue Lamictal 100 mg daily  Prazosin 1 mg at bedtime  Take Trazodone 50 mg at bedtime as needed for sleep - may repeat x1   Stop Propranolol 20 mg twice daily as needed for anxiety  Patient will not stop taking medications or adjust them without consulting with the provider.  2.Patient will call with any problems between visits.  3.Patient will go to the emergency room if not feeling safe , unable to function in the community, or if suicidal, homicidal or hearing voices or having paranoia.  4.Patient will abstain from drugs and alcohol./Pt denies use .  5.Patient will not drive if sedated on medications or under influence of any substance.   6.Patient will not mix psychiatric medications with drugs and alcohol.   7.Patient will watch his diet and exercise.  8.Patient will see non psychiatric providers for non psychiatric disorders.  9. Next appointment in 6  weeks      Radha Monson LPN on 7/3/2025 at 11:54 AM

## 2025-07-03 NOTE — TELEPHONE ENCOUNTER
[facility], please schedule this patient for a future visit with  JENNIFER Mello . Patient is requesting a refill.

## 2025-07-18 ENCOUNTER — MYC REFILL (OUTPATIENT)
Dept: PSYCHIATRY | Facility: CLINIC | Age: 40
End: 2025-07-18
Payer: COMMERCIAL

## 2025-07-18 DIAGNOSIS — F90.0 ATTENTION DEFICIT HYPERACTIVITY DISORDER (ADHD), PREDOMINANTLY INATTENTIVE TYPE: ICD-10-CM

## 2025-07-18 NOTE — TELEPHONE ENCOUNTER
Received a refill request for dexmethylphenidate (FOCALIN XR) 15 MG 24 hr capsules.     Per Epic review, this was last filled 6/19/25.    Last script sent 7/3/25:     dexmethylphenidate (FOCALIN XR) 15 MG 24 hr capsule 30 capsule 0 7/3/2025 -- No   Sig - Route: Take 1 capsule (15 mg) by mouth daily. - Oral     RN called [facility] and they state the patient picked up the medication on 7/7/25. Patient should have enough medication through around 8/7/25.     RN sent [employer] message to clarify needs.     Will route to provider once response received.     Eden Barnes RN on 7/18/2025 at 12:59 PM

## 2025-07-21 ENCOUNTER — VIRTUAL VISIT (OUTPATIENT)
Dept: PSYCHIATRY | Facility: CLINIC | Age: 40
End: 2025-07-21
Payer: COMMERCIAL

## 2025-07-21 DIAGNOSIS — R25.1 TREMOR: ICD-10-CM

## 2025-07-21 DIAGNOSIS — F90.0 ATTENTION DEFICIT HYPERACTIVITY DISORDER (ADHD), PREDOMINANTLY INATTENTIVE TYPE: ICD-10-CM

## 2025-07-21 DIAGNOSIS — F43.10 PTSD (POST-TRAUMATIC STRESS DISORDER): ICD-10-CM

## 2025-07-21 DIAGNOSIS — F33.1 MODERATE EPISODE OF RECURRENT MAJOR DEPRESSIVE DISORDER (H): Primary | ICD-10-CM

## 2025-07-21 DIAGNOSIS — F41.1 GAD (GENERALIZED ANXIETY DISORDER): ICD-10-CM

## 2025-07-21 DIAGNOSIS — G47.00 INSOMNIA, UNSPECIFIED TYPE: ICD-10-CM

## 2025-07-21 PROCEDURE — G2211 COMPLEX E/M VISIT ADD ON: HCPCS | Mod: 95 | Performed by: NURSE PRACTITIONER

## 2025-07-21 PROCEDURE — 98006 SYNCH AUDIO-VIDEO EST MOD 30: CPT | Performed by: NURSE PRACTITIONER

## 2025-07-21 RX ORDER — PROPRANOLOL HYDROCHLORIDE 10 MG/1
TABLET ORAL
Qty: 90 TABLET | Refills: 1 | Status: SHIPPED | OUTPATIENT
Start: 2025-07-21

## 2025-07-21 RX ORDER — LISDEXAMFETAMINE DIMESYLATE 40 MG/1
40 CAPSULE ORAL EVERY MORNING
Qty: 30 CAPSULE | Refills: 0 | Status: SHIPPED | OUTPATIENT
Start: 2025-07-21

## 2025-07-21 RX ORDER — DEXMETHYLPHENIDATE HYDROCHLORIDE 5 MG/1
5 TABLET ORAL DAILY PRN
Qty: 30 TABLET | Refills: 0 | OUTPATIENT
Start: 2025-07-21

## 2025-07-21 ASSESSMENT — PATIENT HEALTH QUESTIONNAIRE - PHQ9
10. IF YOU CHECKED OFF ANY PROBLEMS, HOW DIFFICULT HAVE THESE PROBLEMS MADE IT FOR YOU TO DO YOUR WORK, TAKE CARE OF THINGS AT HOME, OR GET ALONG WITH OTHER PEOPLE: SOMEWHAT DIFFICULT
SUM OF ALL RESPONSES TO PHQ QUESTIONS 1-9: 7
SUM OF ALL RESPONSES TO PHQ QUESTIONS 1-9: 7

## 2025-07-21 ASSESSMENT — PAIN SCALES - GENERAL: PAINLEVEL_OUTOF10: MILD PAIN (3)

## 2025-07-21 NOTE — NURSING NOTE
Current patient location: [address] [place] [id number]    Is the patient currently in the state of [place]? YES    Visit mode: VIDEO    If the visit is dropped, the patient can be reconnected by:VIDEO VISIT: Text to cell phone:   Telephone Information:   Mobile [phone]       Will anyone else be joining the visit? NO  (If patient encounters technical issues they should call [phone]    Are changes needed to the allergy or medication list? Pt stated no changes to allergies and Pt stated no med changes    Are refills needed on medications prescribed by this physician? Discuss with provider    Rooming Documentation:  Questionnaire(s) completed    Reason for visit: LIVIER NICHOLS

## 2025-07-21 NOTE — PROGRESS NOTES
"PSYCHIATRIC PROGRESS NOTE     Name:  Andria Daily  : 1985    Andria Daily is a 39 year old female who is being evaluated via a billable Video visit.      Telemedicine Visit: The patient's condition can be safely assessed and treated via synchronous audio and visual telemedicine encounter.      Reason for Telemedicine Visit: COVID 19 pandemic and the social and physical recommendations by the CDC and MD., Patient has requested telehealth visit, and Patient unable to travel      Originating Site (Patient Location): Patient's home    Distant Site (Provider Location): [facility] Outpatient Setting: [facility]    Consent:  The patient/guardian has verbally consented to: the potential risks and benefits of telemedicine (video visit or phone) versus in person care; bill my insurance or make self-payment for services provided; and responsibility for payment of non-covered services.     Mode of Communication:  [email or web address] platform     As the provider I attest to compliance with applicable laws and regulations related to telemedicine.    Date of Last Visit: 25                                       CHIEF COMPLAINT     \"Doing okay  HISTORY OF PRESENT ILLNESS     Patient who was last seen in the clinic on 25 returned today for follow up visit.  .  Patient reports she is doing fairly okay apart from still struggling with feeling overwhelmed related to relationship break-up as well as frontal selling their house and moving to his new apartment.  Patient states that she still struggles with distraction and increased anxiety.  Patient reports she does not believe the Focalin is helping with ADHD symptoms anymore and would like to switch back to Vyvanse which was more if when she was taking it.  Patient reports he continues to attending individual psychotherapy once every week.  Patient denies SI, SIB, and HI. . Patient also denies both auditory and visual hallucination.  Patient report normal no " hypomania.  Patient return to clinic in 6 weeks for follow-up.  PSYCHIATRIC HISTORY:   History of Psychiatric Hospitalizations:   - Inpatient: X2 in teenager and early 20s  - IOP/PHP/Day treatment: PHP in 2024  History of Suicidal Ideation: Positive  History of Suicide Attempts:  Positive with medication overdose    History of Self-injurious Behavior: Denies a history of SIB.  Current:  No  History of Violence/Aggression: Negative  History of Commitment? Negative  Electroconvulsive Therapy (ECT):Negative  TMS: 2020  PSYCHIATRIC REVIEW OF SYSTEMS:   Psychiatric Review of Systems:   Depression:   Reportsaf: depressed mood, suicidal ideation, decreased interest, changes in sleep, changes in appetite, guilt, hopelessness, helplessness, impaired concentration, decreased energy, irritability.  Yuni:   Denies: sleeplessness, increased goal-directed activities, abrupt increase in energy pressured speech  Psychosis:   Denies: visual hallucinations, auditory hallucinations, paranoia  Anxiety:   Reports: excessive worries that are difficult to control, panic attacks  PTSD:   Reports: re-experiencing past trauma, nightmares, increased arousal, avoidance of traumatic stimuli, impaired function.  Denies: re-experiencing past trauma, nightmares, increased arousal, avoidance of traumatic stimuli, impaired function.  OCD:   Denies: obsessions, checking, symmetry, cleaning, skin picking.  Eating Disorder: Hx of Anorexia and Bulimia  Denies: restriction, binging, purging.    Sleep: Struggles with sleep      MEDICATIONS                                                                                                Current Outpatient Medications   Medication Sig Dispense Refill    azelastine (ASTEPRO) 0.15 % nasal spray Spray 2 sprays into both nostrils daily. 17 mL 1    dexmethylphenidate (FOCALIN XR) 15 MG 24 hr capsule Take 1 capsule (15 mg) by mouth daily. 30 capsule 0    dexmethylphenidate (FOCALIN) 5 MG tablet Take 1 tablet (5 mg)  "by mouth daily as needed (ADHD). 30 tablet 0    hydrOXYzine HCl (ATARAX) 25 MG tablet TAKE 1 TABLET(25 MG) BY MOUTH TWICE DAILY AS NEEDED FOR ANXIETY 60 tablet 0    lamoTRIgine (LAMICTAL) 100 MG tablet Take 1 tablet (100 mg) by mouth daily. 30 tablet 2    levonorgestrel (MIRENA) 20 MCG/DAY IUD 1 each by Intrauterine route once      propranolol (INDERAL) 10 MG tablet Take 1 tablet (10 mg) by mouth 3 times daily. 90 tablet 1    traZODone (DESYREL) 50 MG tablet Take 0.5 tablets (25 mg) by mouth at bedtime as needed, may repeat once for sleep. 60 tablet 1    triamcinolone (KENALOG) 0.1 % external cream Apply topically 2 times daily. Apply twice daily to hands until clear. Do not use more than seven consecutive days 30 g 0     No current facility-administered medications for this visit.       DRUG MONITORING:  [place] Prescription Monitoring Program evaluating controlled substances in the last year in [place]:  The [place] Prescription Monitoring Program has been reviewed and there are no current concerns with: diversionary activity, early refill requests, and or obtaining the medication from multiple providers       PAST PSYCHOTROPIC MEDICATIONS:    Abilify    V- Lexapro  - Effexor (night sweats)  - Buspar 7.5-15mg  - Wellbutrin (worsened tremor)  - Celexa 20mg  - Cymbalta (trialed with Celexa for pain, noted memory issues)  - trazodone 50mg   - Zoloft 50mg (higher doses associated with night sweats)  - Prazosin 1mg (not well tolerated due to hypotension)ITALS   There were no vitals taken for this visit.     BP Readings from Last 1 Encounters:   02/18/25 105/71     Pulse Readings from Last 1 Encounters:   02/18/25 65     Wt Readings from Last 1 Encounters:   02/18/25 78 kg (172 lb)     Ht Readings from Last 1 Encounters:   02/18/25 1.725 m (5' 7.91\")     Estimated body mass index is 26.22 kg/m  as calculated from the following:    Height as of 2/18/25: 1.725 m (5' 7.91\").    Weight as of 2/18/25: 78 kg (172 " lb).      PERTINENT HISTORY   PAST MEDICAL HISTORY:   Past Medical History:   Diagnosis Date    ADHD (attention deficit hyperactivity disorder)     completed assessment    Anxiety     Chronic fatigue syndrome     Chronic rhinitis     CHHAYA I (cervical intraepithelial neoplasia I) 05/24/2007    Depressive disorder     History of eating disorder     bulimia - in remission since 2015, sporadic since then    History of substance use     prescription pain killers, alcohol, cocaine, sober since 2015    History of substance use     - history of prescription pain killers, alcohol, cocaine, and marijuana abuse  - history of outpatient treatment at [place] [address], [facility] residential treatment 2 months, IOP  - sober from cocaine since about 01/2018  - uses MJ daily, smokes      Hypersomnia     PTSD (post-traumatic stress disorder)        PAST SURGICAL HISTORY:   Past Surgical History:   Procedure Laterality Date    KNEE SURGERY Left 2018    for osgood-schlatter and tendon repair/anchor    LAPAROSCOPY DIAGNOSTIC (GYN) N/A 03/22/2023    Procedure: laparoscopy, chromopertubation;  Surgeon: Isabel Vera MD;  Location: [facility] OR    [place] Teeth Removal  2001    WRIST SURGERY Left 2015    snowboarding injury, fractured, had metal plate initially that was then removed       FAMILY HISTORY:   Family History   Problem Relation Age of Onset    Anxiety Disorder Mother     Tremor Mother     Depression Father     Anxiety Disorder Father     Sleep Apnea Father     Diabetes Maternal Grandmother     Hyperlipidemia Maternal Grandfather     Hypertension Maternal Grandfather     Coronary Artery Disease Maternal Grandfather 55    Anxiety Disorder Sister         after bad car accident    Breast Cancer No family hx of     Ovarian Cancer No family hx of     Colon Cancer No family hx of     Cerebrovascular Disease No family hx of        SOCIAL HISTORY:   Social History     Tobacco Use    Smoking status: Former     Current  "packs/day: 0.00     Types: Cigarettes     Quit date:      Years since quittin.5    Smokeless tobacco: Never   Substance Use Topics    Alcohol use: Not Currently     Comment: Rarely         Seizures or Head Injury: Denies history of head injury. Denies history of seizures.  History of cardiac disease, rheumatic fever, fainting or dizziness, especially with exercise, seizures, chest pain or shortness of breath with exercise, unexplained change in exercise tolerance, palpitations, high blood pressure, or heart murmur?   No    LABS & IMAGING                                                                                                                Personally reviewed  Recent Labs   Lab Test 25  1354   WBC 5.3   HGB 14.0   HCT 42.3   MCV 92        Recent Labs   Lab Test 23  1130   GLC 84     Recent Labs   Lab Test 25  1354   CHOL 195   LDL 98   HDL 75   TRIG 111   A1C 5.2     No lab results found.  No results found for: \"FNZ585\", \"NRGE700\", \"PEGC38TRYLG\", \"VITD3\", \"D2VIT\", \"D3VIT\", \"DTOT\", \"[id number]\", \"[id number]\", \"[id number]\", \"[id number]\", \"[id number]\", \"[id number]\"     ALLERGY & IMMUNIZATIONS       Allergies   Allergen Reactions    Dairy Products [Milk Protein] Other (See Comments)     Nasal problems, stomach pain     No Clinical Screening - See Comments      Apples and bananas cause her throat to itch    Seasonal Allergies     Flonase [Fluticasone] Dizziness and Rash    Penicillins Rash     Rash on face and threw up       FAMILY MEDICAL HISTORY:     Family History       Problem (# of Occurrences) Relation (Name,Age of Onset)    Anxiety Disorder (3) Mother, Father, Sister: after bad car accident    Depression (1) Father    Diabetes (1) Maternal Grandmother    Hypertension (1) Maternal Grandfather    Hyperlipidemia (1) Maternal Grandfather    Coronary Artery Disease (1) Maternal Grandfather (55)    Sleep Apnea (1) Father    Tremor (1) Mother           Negative family history of: " Breast Cancer, Ovarian Cancer, Colon Cancer, Cerebrovascular Disease              Family history of sudden or unexplained death or an event requiring resuscitation in children or young adults, cardiac arrhythmias (eg, Janelle-Parkinson-White syndrome), long QT syndrome, catecholaminergic paroxysmal ventricular tachycardia, Brugada syndrome, arrhythmogenic right ventricular dysplasia, hypertrophic cardiomyopathy, dilated cardiomyopathy, or Marfan syndrome?  No    FAMILY PSYCHIATRIC HISTORY:   Psychiatry:Father, mother sister with anxiety, father with depression  Substance use history in family: Denies  Family suicide history:Negative      SIGNIFICANT SOCIAL/FAMILY HISTORY:                                           Born and raised in: [place]  Relationship status: Single living with boyfriend  Children: None    Highest education level was:Associate degree   Service:Denies  Employment status: Works part time at the [employer]  LEGAL:     SUBSTANCE USE HISTORY      Alcohol- fewer urges to drink, mindful with med and risks of alcohol   - treatment in 2015  Nicotine- none  Caffeine- 1-2 cups coffee daily, 1-2 energy drinks a week, can increase shaking if she doesn't eat enough  Opioids- sober since 2015   Cannabis- smoking 1-2x daily  - treated for cannabis use in 2015   Other Illicit Drugs- sober from cocaine and hallucinogens since 2015    MEDICAL REVIEW OF SYSTEMS:   Ten system review was completed with pertinent positives noted above    MENTAL STATUS EXAM:   Mental Status Examination (limited due to video virtual visit format):  Vital Signs: There were no vitals taken for this virtual visit.  Appearance: adequately groomed, appears stated age, and in no apparent distress.  Attitude: cooperative   Eye Contact: good to the extent that can be determined in a video visit  Muscle Strength and Tone: no gross abnormalities based on remote observation  Psychomotor Behavior:  no evidence of tardive dyskinesia,  "dystonia, or tics based on remote observation  Gait and Station: normal, no gross abnormalities based on remote observation  Speech: clear, coherent, normal prosody, regular rate, regular rhythm and fluent  Associations: No loosening of associations  Thought Process: coherent and goal directed  Thought Content: no evidence of suicidal ideation or homicidal ideation, no evidence of psychotic thought, no auditory hallucinations present and no visual hallucinations present  Mood: \"Neutral\"  Affect: appropriate and in normal range  Insight: good  Judgment: intact, adequate for safety  Impulse Control: intact  Oriented to: time, place, person and situation  Attention Span and Concentration: normal  Language: Intact  Recent and Remote Memory: intact to interview. Not formally assessed. No amnesia.  Fund of Knowledge: appropriate        SAFETY   Feels safe in home: Yes   Suicidal ideation: Denies  History of suicide attempts:  No   Hx of impulsivity: No     DSM 5 DIAGNOSIS:   1. Moderate episode of recurrent major depressive disorder (H)    2. PTSD (post-traumatic stress disorder)    3. DIANA (generalized anxiety disorder)    4. Attention deficit hyperactivity disorder (ADHD), predominantly inattentive type    5. Insomnia, unspecified type      ASSESSMENT AND PLAN    Patient is a 39 year old,  White Choose not to answer female with a history notable for moderate episode of recurrent major depressive disorder, PTSD, generalized anxiety disorder, ADHD, predominantly inattentive type, and insomnia, unspecified type who presents for follow up visit.  Patient is currently in the process of moving to a new apartment as they are planning to sell their house following the break-up with the significant other.  Apart from feeling overwhelming regarding the break-up and moving as well as selling the house, she is doing fairly okay.  She requested to switch back to Vyvanse as Focalin is no longer helping with ADHD.  She also preferred " to take propranolol twice daily.  I started patient back on Vyvanse 40 mg to effectively target residual ADHD symptoms.  Patient is to take propranolol 10 mg in the morning and 20 mg at bedtime to help with anxiety.  Patient would like to continue with current medication regimen.  She denies SI/ SIB/HI.  Patient report no bartolo or hypomania.  Patient to return to clinic in 6 weeks for follow-up.    Plan:  1.Patient will take the medications as prescribed.   Medications: Take Vyvanse 40 mg daily  Continue Lamictal 100 mg daily  Hydroxyzine 25 mg twice daily as needed for anxiety  Take  Propranolol 10 mg daily in the mornig and 20 mg at bedtime   Patient will not stop taking medications or adjust them without consulting with the provider.  2.Patient will call with any problems between visits.  3.Patient will go to the emergency room if not feeling safe , unable to function in the community, or if suicidal, homicidal or hearing voices or having paranoia.  4.Patient will abstain from drugs and alcohol./Pt denies use .  5.Patient will not drive if sedated on medications or under influence of any substance.   6.Patient will not mix psychiatric medications with drugs and alcohol.   7.Patient will watch his diet and exercise.  8.Patient will see non psychiatric providers for non psychiatric disorders.  9. Next appointment in 6  weeks    Risk Assessment:     Andria has notable risk factors for self-harm, including, single status, anxiety, mood dysregulation, substance use,  and passive SI. However, risk is mitigated by ability to volunteer a safety plan and history of seeking help when needed. Additional steps taken to minimize risk include making medication adjustment, asking patient to call 911 and go to the ER if not able to stay safe at home,  Therefore, based on all available evidence including the factors cited above, Andria does not appear to be an imminent danger to self or others and does not meet criteria 72 hour hold.  However, if patient uses substances or is non-adherent with medication, their risk of decompensation and SI/HI will be elevated. This was discussed with the patient as she verbalized good understanding.   CONSULTS/REFERRALS:   Continue therapy  None at this time  Coordinate care with therapist as needed    MEDICAL:   None at this time  Coordinate care with PCP (Alma Hernandez) as needed  Follow up with primary care provider as planned or for acute medical concerns.    PSYCHOEDUCATION:  Medication side effects and alternatives reviewed. Health promotion activities recommended and reviewed today. All questions addressed. Education and counseling completed regarding risks and benefits of medications and psychotherapy options.  Consent provided by patient/guardian  Call the psychiatric nurse line with medication questions or concerns at [phone].  ComVibet may be used to communicate with your provider, but this is not intended to be used for emergencies.  BLACK BOX WARNING: Discussed the Food and Drug Administration (FDA) requires that all antidepressants carry a warning that some children, adolescents and young adults may be at increased risk of suicide when taking antidepressants. Anyone taking an antidepressant should be watched closely for worsening depression or unusual behavior especially in the first few weeks after starting an SSRI. Keep in mind, antidepressants are more likely to reduce suicide risk in the long run by improving mood.   SEROTONIN SYNDROME:  Discussed risks of Serotonin syndrome (ie, serotonin toxicity) which is a potentially life-threatening condition associated with increased serotonergic activity in the central nervous system (CNS). It is seen with therapeutic medication use, inadvertent interactions between drugs, and intentional self-poisoning. Serotonin syndrome may involve a spectrum of clinical findings, which often include mental status changes, autonomic hyperactivity, and  neuromuscular abnormalities.    BENZODIAZEPINE:  discussion on how benzos work and the need to use them short term due to potential of anxiety getting.  This is a controlled substance with risk for abuse, need to keep in a safe keep place and cannot replace lost scripts.    HYPNOTIC USE: Hypnotic use, risk for CNS depression, sleep-walking, not to mix with ETOH or other CNS depressant, need for six hours of sleep, stop if change in mood.  This is a controlled substance with risk for abuse, need to keep in a safe keep place and cannot replace lost scripts.  FIRST GENERATION ANTIPSYCHOTIC/ SECOND GENERATION ANTIPSYCHOTIC USE:  Atypical need for cardiometabolic monitoring with medication- B/P, weight, blood sugar, cholesterol.  Need to monitor for abnormal movements taught  SAFETY:  We all care about your loved one's safety. To reduce the risk of self-harm, remove access to all:  Firearms, Medicines (both prescribed and over-the-counter), Knives and other sharp objects, Ropes and like materials, and Alcohol  SLEEP HYGIENE: establish a sleep routine, limit screen time 1 hour prior to bed, use bed for sleep only, take sleep/medications on time (including sleepy time tea, trazadone or herbal treatments such as melatonin), aroma therapy, limit caffeine/sugar, yoga, guided imagery, stretch, meditation, limit naps to 20 minutes, make a temperature change in the room, white noise, be mindful of slowing down breathing, take a warm bath/shower, frequently wash sheets, and journaling.   Medlineplus.gov is information for patients.  It is run by the National Library of Medicine and it contains information about all disorders, diseases and all medications.      COMMUNITY RESOURCES:    CRISIS NUMBERS: Provided in AVS 6/6/2024  National Suicide Prevention Lifeline: 7-665-933-TALK (168-530-7578)  Prezi/resources for a list of additional resources (SOS)            [facility] - [phone]   Urgent Care  Adult Mental [facility]-[phone] mobile unit/  crisis line  [facility] -[phone]   COPE  [facility] Mobile Team -[phone] (adults)/ [phone] (child)  Poison Control Center - 1-665.592.2887    OR  go to nearest ER  Crisis Text Line for any crisis  send this-   To: 111377   [facility] ([facility]) [facility]  [phone]  National Suicide Prevention Lifeline: 998.680.3542 (TTY: 163.228.3206). Call anytime for help.  (www.suicidepreventionlifeline.org)  National [place] on Mental Illness (www.iman.org): 645.987.3906 or 428-811-5245.   Mental Health Association (www.mentalhealth.org): 974.344.6146 or 072-255-6082.  [place] Crisis Text Line: Text [place] to 202193  Suicide LifeLine Chat: [employer].org/chat    ADMINISTRATIVE BILLIN mins spent interviewing patient, reviewing referral documents, obtaining and reviewing outside records, communication with other health specialists, and preparing this report on this day: 25    Video/Phone Start Time:  0833  Video/Phone End Time:   0851    Greater than 50% of time was spent in counseling and coordination of care regarding above diagnoses and treatment plan.    The longitudinal plan of care for the diagnosis(es)/condition(s) as documented were addressed during this visit. Due to the added complexity in care, I will continue to support Andria in the subsequent management and with ongoing continuity of care.     Signed:   Reddy Mello, MSN, APRN, PMHNP-BC  Long Term Outpatient Psychiatry  Chart documentation done in part with Dragon Voice Recognition software.  Although reviewed after completion, some word and grammatical errors may remain.   Answers submitted by the patient for this visit:  Patient Health Questionnaire (Submitted on 2024)  If you checked off any problems, how difficult have these problems made it for you to do your work, take care of things at home, or get along with other people?:  Very difficult  PHQ9 TOTAL SCORE: 17  DIANA-7 (Submitted on 6/6/2024)  DIANA 7 TOTAL SCORE: 13    Answers submitted by the patient for this visit:  Patient Health Questionnaire (Submitted on 7/22/2024)  If you checked off any problems, how difficult have these problems made it for you to do your work, take care of things at home, or get along with other people?: Very difficult  PHQ9 TOTAL SCORE: 13  DIANA-7 (Submitted on 7/22/2024)  DIANA 7 TOTAL SCORE: 12    Answers submitted by the patient for this visit:  Patient Health Questionnaire (Submitted on 11/4/2024)  If you checked off any problems, how difficult have these problems made it for you to do your work, take care of things at home, or get along with other people?: Somewhat difficult  PHQ9 TOTAL SCORE: 12  Patient Health Questionnaire (G7) (Submitted on 11/4/2024)  DIANA 7 TOTAL SCORE: 13    Answers submitted by the patient for this visit:  Patient Health Questionnaire (Submitted on 1/16/2025)  If you checked off any problems, how difficult have these problems made it for you to do your work, take care of things at home, or get along with other people?: Somewhat difficult  PHQ9 TOTAL SCORE: 10  Patient Health Questionnaire (G7) (Submitted on 1/16/2025)  DIANA 7 TOTAL SCORE: 13    Answers submitted by the patient for this visit:  Patient Health Questionnaire (Submitted on 2/27/2025)  If you checked off any problems, how difficult have these problems made it for you to do your work, take care of things at home, or get along with other people?: Somewhat difficult  PHQ9 TOTAL SCORE: 11    Answers submitted by the patient for this visit:  Patient Health Questionnaire (Submitted on 4/17/2025)  If you checked off any problems, how difficult have these problems made it for you to do your work, take care of things at home, or get along with other people?: Somewhat difficult  PHQ9 TOTAL SCORE: 11    Answers submitted by the patient for this visit:  Patient Health Questionnaire  (Submitted on 7/21/2025)  If you checked off any problems, how difficult have these problems made it for you to do your work, take care of things at home, or get along with other people?: Somewhat difficult  PHQ9 TOTAL SCORE: 7

## 2025-07-21 NOTE — PATIENT INSTRUCTIONS
"Patient Education   [facility]  What to Expect  Here's what to expect in the [facility].   About the program  You'll be meeting with a psychiatric doctor to check your mental health. A psychiatric doctor helps you deal with troubling thoughts and feelings by giving you medicine. They'll make sure you know the plan for your care. You may see them for a long time. When you're feeling better, they may refer you back to seeing your family doctor.   If you have any questions, we'll be glad to talk to you.  About visits  Be open  At your visits, please talk openly about your problems. It may feel hard, but it's the best way for us to help you.  Cancelling visits  If you can't come to your visit, please call us right away at [phone]. If you don't cancel at least 24 hours (1 full day) before your visit, that's \"late cancellation.\"  Not showing up for your visits  Being very late is the same as not showing up. You'll be a \"no show\" if:  You're more than 15 minutes late for a 30-minute (half hour) visit.  You're more than 30 minutes late for a 60-minute (full hour) visit.  If you cancel late or don't show up 2 times within 6 months, we may end your care.  Getting help between visits  If you need help between visits, you can call us Monday to Friday from 8 a.m. to 4:30 p.m. at [phone].  Emergency care  Call 911 or go to the nearest emergency department if your life or someone else's life is in danger.  Call 988 anytime to reach the national Suicide and Crisis hotline.  Medicine refills  To refill your medicine, call your pharmacy. You can also call [facility] at [phone], Monday to Friday, 8 a.m. to 4:30 p.m. It can take 1 to 3 business days to get a refill.   Forms, letters, and tests  You may have papers to fill out, like FMLA, short-term disability, and workability. We can help you with these forms at your visits, but you must have an " appointment. You may need more than 1 visit for this, to be in an intensive therapy program, or both.  Before we can give you medicine for ADHD, we may refer you to get tested for it or confirm it another way.  We may not be able to give you an emotional support animal letter.  We don't do mental health checks ordered by the court.   We don't do mental health testing, but we can refer you to get tested.   Thank you for choosing us for your care.  For informational purposes only. Not to replace the advice of your health care provider. Copyright   2022 [facility]. All rights reserved. Endorse[employer] [id number] - Rev 11/24.   Plan:  1.Patient will take the medications as prescribed.   Medications: Take Vyvanse 40 mg daily  Continue Lamictal 100 mg daily  Hydroxyzine 25 mg twice daily as needed for anxiety  Take  Propranolol 10 mg daily in the mornig and 20 mg at bedtime   Patient will not stop taking medications or adjust them without consulting with the provider.  2.Patient will call with any problems between visits.  3.Patient will go to the emergency room if not feeling safe , unable to function in the community, or if suicidal, homicidal or hearing voices or having paranoia.  4.Patient will abstain from drugs and alcohol./Pt denies use .  5.Patient will not drive if sedated on medications or under influence of any substance.   6.Patient will not mix psychiatric medications with drugs and alcohol.   7.Patient will watch his diet and exercise.  8.Patient will see non psychiatric providers for non psychiatric disorders.  9. Next appointment in 6  weeks

## 2025-07-21 NOTE — PROGRESS NOTES
Virtual Visit Details    Type of service:  Video Visit     Video/Phone Start Time:  0833  Video/Phone End Time:   0851    Originating Location (pt. Location): Home    Distant Location (provider location):  On-site  Platform used for Video Visit: Jonny

## 2025-08-09 DIAGNOSIS — F33.1 MODERATE EPISODE OF RECURRENT MAJOR DEPRESSIVE DISORDER (H): ICD-10-CM

## 2025-08-09 DIAGNOSIS — F41.1 GAD (GENERALIZED ANXIETY DISORDER): ICD-10-CM

## 2025-08-11 RX ORDER — LAMOTRIGINE 100 MG/1
100 TABLET ORAL DAILY
Qty: 30 TABLET | Refills: 2 | Status: SHIPPED | OUTPATIENT
Start: 2025-08-11

## 2025-09-02 ENCOUNTER — VIRTUAL VISIT (OUTPATIENT)
Dept: PSYCHIATRY | Facility: CLINIC | Age: 40
End: 2025-09-02
Payer: COMMERCIAL

## 2025-09-02 DIAGNOSIS — F33.1 MODERATE EPISODE OF RECURRENT MAJOR DEPRESSIVE DISORDER (H): Primary | ICD-10-CM

## 2025-09-02 DIAGNOSIS — G47.00 INSOMNIA, UNSPECIFIED TYPE: ICD-10-CM

## 2025-09-02 DIAGNOSIS — F41.1 GAD (GENERALIZED ANXIETY DISORDER): ICD-10-CM

## 2025-09-02 DIAGNOSIS — F43.10 PTSD (POST-TRAUMATIC STRESS DISORDER): ICD-10-CM

## 2025-09-02 DIAGNOSIS — F90.0 ATTENTION DEFICIT HYPERACTIVITY DISORDER (ADHD), PREDOMINANTLY INATTENTIVE TYPE: ICD-10-CM

## 2025-09-02 PROCEDURE — G2211 COMPLEX E/M VISIT ADD ON: HCPCS | Mod: 95 | Performed by: NURSE PRACTITIONER

## 2025-09-02 PROCEDURE — 98006 SYNCH AUDIO-VIDEO EST MOD 30: CPT | Performed by: NURSE PRACTITIONER

## 2025-09-02 RX ORDER — LAMOTRIGINE 150 MG/1
150 TABLET ORAL DAILY
Qty: 30 TABLET | Refills: 1 | Status: SHIPPED | OUTPATIENT
Start: 2025-09-02

## 2025-09-02 RX ORDER — QUETIAPINE FUMARATE 50 MG/1
50 TABLET, FILM COATED ORAL AT BEDTIME
Qty: 30 TABLET | Refills: 1 | Status: SHIPPED | OUTPATIENT
Start: 2025-09-02

## 2025-09-02 ASSESSMENT — ANXIETY QUESTIONNAIRES
IF YOU CHECKED OFF ANY PROBLEMS ON THIS QUESTIONNAIRE, HOW DIFFICULT HAVE THESE PROBLEMS MADE IT FOR YOU TO DO YOUR WORK, TAKE CARE OF THINGS AT HOME, OR GET ALONG WITH OTHER PEOPLE: VERY DIFFICULT
GAD7 TOTAL SCORE: 18
7. FEELING AFRAID AS IF SOMETHING AWFUL MIGHT HAPPEN: MORE THAN HALF THE DAYS
3. WORRYING TOO MUCH ABOUT DIFFERENT THINGS: NEARLY EVERY DAY
2. NOT BEING ABLE TO STOP OR CONTROL WORRYING: NEARLY EVERY DAY
1. FEELING NERVOUS, ANXIOUS, OR ON EDGE: MORE THAN HALF THE DAYS
GAD7 TOTAL SCORE: 18
8. IF YOU CHECKED OFF ANY PROBLEMS, HOW DIFFICULT HAVE THESE MADE IT FOR YOU TO DO YOUR WORK, TAKE CARE OF THINGS AT HOME, OR GET ALONG WITH OTHER PEOPLE?: VERY DIFFICULT
6. BECOMING EASILY ANNOYED OR IRRITABLE: NEARLY EVERY DAY
5. BEING SO RESTLESS THAT IT IS HARD TO SIT STILL: MORE THAN HALF THE DAYS
7. FEELING AFRAID AS IF SOMETHING AWFUL MIGHT HAPPEN: MORE THAN HALF THE DAYS
4. TROUBLE RELAXING: NEARLY EVERY DAY
GAD7 TOTAL SCORE: 18

## 2025-09-02 ASSESSMENT — PATIENT HEALTH QUESTIONNAIRE - PHQ9
10. IF YOU CHECKED OFF ANY PROBLEMS, HOW DIFFICULT HAVE THESE PROBLEMS MADE IT FOR YOU TO DO YOUR WORK, TAKE CARE OF THINGS AT HOME, OR GET ALONG WITH OTHER PEOPLE: SOMEWHAT DIFFICULT
SUM OF ALL RESPONSES TO PHQ QUESTIONS 1-9: 14
SUM OF ALL RESPONSES TO PHQ QUESTIONS 1-9: 14

## (undated) DEVICE — SOL WATER IRRIG 500ML BOTTLE 2F7113

## (undated) DEVICE — LINEN TOWEL PACK X5 5464

## (undated) DEVICE — ANTIFOG SOLUTION W/FOAM PAD 31142527

## (undated) DEVICE — SU VICRYL 0 TIE 54" J608H

## (undated) DEVICE — DEVICE SUTURE PASSER 14GA WECK EFX EFXSP2

## (undated) DEVICE — PREP SKIN SCRUB TRAY 4461A

## (undated) DEVICE — SOL NACL 0.9% IRRIG 500ML BOTTLE 2F7123

## (undated) DEVICE — PAD PERI W/WINGS 1580A

## (undated) DEVICE — PAD CHUX UNDERPAD 30X36" P3036C

## (undated) DEVICE — JELLY LUBRICATING SURGILUBE 2OZ TUBE

## (undated) DEVICE — ADH SKIN CLOSURE PREMIERPRO EXOFIN 1.0ML 3470

## (undated) DEVICE — ENDO TROCAR FIRST ENTRY KII FIOS ADV FIX 05X100MM CFF03

## (undated) DEVICE — ENDO TROCAR SLEEVE KII ADV FIXATION 05X100MM CFS02

## (undated) DEVICE — BLADE CLIPPER SGL USE 9680

## (undated) DEVICE — Device

## (undated) DEVICE — SUCTION MANIFOLD NEPTUNE 2 SYS 1 PORT 702-025-000

## (undated) DEVICE — PREP CHLORHEXIDINE 4% 4OZ (HIBICLENS) 57504

## (undated) DEVICE — PANTIES MESH LG/XLG 2PK 706M2

## (undated) DEVICE — SU VICRYL 4-0 PS-2 18" UND J496H

## (undated) DEVICE — ESU GROUND PAD ADULT W/CORD E7507

## (undated) DEVICE — TUBING INSUFFLATION W/FILTER CPC TO LUER 620-030-301

## (undated) DEVICE — GLOVE PROTEXIS BLUE W/NEU-THERA 6.5  2D73EB65

## (undated) DEVICE — LINEN POUCH DBL 5427

## (undated) DEVICE — KIT PATIENT POSITIONING PIGAZZI LATEX FREE 40580

## (undated) DEVICE — NDL INSUFFLATION 13GA 120MM C2201

## (undated) DEVICE — POSITIONER ARMBOARD FOAM 1PAIR LF FP-ARMB1

## (undated) DEVICE — SU VICRYL 0 UR-6 27" J603H

## (undated) DEVICE — STRAP KNEE/BODY 31143004

## (undated) DEVICE — GLOVE PROTEXIS MICRO 6.5  2D73PM65

## (undated) RX ORDER — DEXMEDETOMIDINE HYDROCHLORIDE 4 UG/ML
INJECTION, SOLUTION INTRAVENOUS
Status: DISPENSED
Start: 2023-03-22

## (undated) RX ORDER — ONDANSETRON 2 MG/ML
INJECTION INTRAMUSCULAR; INTRAVENOUS
Status: DISPENSED
Start: 2023-03-22

## (undated) RX ORDER — GABAPENTIN 300 MG/1
CAPSULE ORAL
Status: DISPENSED
Start: 2023-03-22

## (undated) RX ORDER — HYDROMORPHONE HYDROCHLORIDE 1 MG/ML
INJECTION, SOLUTION INTRAMUSCULAR; INTRAVENOUS; SUBCUTANEOUS
Status: DISPENSED
Start: 2023-03-22

## (undated) RX ORDER — ACETAMINOPHEN 325 MG/1
TABLET ORAL
Status: DISPENSED
Start: 2023-03-22

## (undated) RX ORDER — OXYCODONE HYDROCHLORIDE 5 MG/1
TABLET ORAL
Status: DISPENSED
Start: 2023-03-22

## (undated) RX ORDER — DEXAMETHASONE SODIUM PHOSPHATE 4 MG/ML
INJECTION, SOLUTION INTRA-ARTICULAR; INTRALESIONAL; INTRAMUSCULAR; INTRAVENOUS; SOFT TISSUE
Status: DISPENSED
Start: 2023-03-22

## (undated) RX ORDER — PROPOFOL 10 MG/ML
INJECTION, EMULSION INTRAVENOUS
Status: DISPENSED
Start: 2023-03-22

## (undated) RX ORDER — KETOROLAC TROMETHAMINE 30 MG/ML
INJECTION, SOLUTION INTRAMUSCULAR; INTRAVENOUS
Status: DISPENSED
Start: 2023-03-22

## (undated) RX ORDER — FENTANYL CITRATE 50 UG/ML
INJECTION, SOLUTION INTRAMUSCULAR; INTRAVENOUS
Status: DISPENSED
Start: 2023-03-22

## (undated) RX ORDER — BUPIVACAINE HYDROCHLORIDE 2.5 MG/ML
INJECTION, SOLUTION EPIDURAL; INFILTRATION; INTRACAUDAL
Status: DISPENSED
Start: 2023-03-22